# Patient Record
Sex: FEMALE | Race: WHITE | NOT HISPANIC OR LATINO | Employment: OTHER | URBAN - METROPOLITAN AREA
[De-identification: names, ages, dates, MRNs, and addresses within clinical notes are randomized per-mention and may not be internally consistent; named-entity substitution may affect disease eponyms.]

---

## 2023-03-11 PROBLEM — E03.9 HYPOTHYROIDISM: Status: ACTIVE | Noted: 2023-03-11

## 2023-03-11 PROBLEM — I10 HTN (HYPERTENSION), BENIGN: Status: ACTIVE | Noted: 2023-03-11

## 2023-03-11 PROBLEM — E87.1 HYPONATREMIA: Status: ACTIVE | Noted: 2023-03-11

## 2023-03-11 PROBLEM — F41.9 ANXIETY: Status: ACTIVE | Noted: 2023-03-11

## 2023-03-11 PROBLEM — M19.90 OSTEOARTHRITIS: Status: ACTIVE | Noted: 2023-03-11

## 2023-03-11 PROBLEM — M79.7 FIBROMYALGIA: Status: ACTIVE | Noted: 2023-03-11

## 2023-03-11 PROBLEM — H91.90 HOH (HARD OF HEARING): Status: ACTIVE | Noted: 2023-03-11

## 2023-03-11 PROBLEM — F32.A DEPRESSION: Status: ACTIVE | Noted: 2023-03-11

## 2023-04-13 ENCOUNTER — NURSING HOME VISIT (OUTPATIENT)
Dept: POST ACUTE CARE | Facility: EXTERNAL LOCATION | Age: 88
End: 2023-04-13
Payer: MEDICARE

## 2023-04-13 VITALS
TEMPERATURE: 97 F | WEIGHT: 138.2 LBS | SYSTOLIC BLOOD PRESSURE: 154 MMHG | BODY MASS INDEX: 24.49 KG/M2 | DIASTOLIC BLOOD PRESSURE: 64 MMHG | HEIGHT: 63 IN | HEART RATE: 50 BPM | RESPIRATION RATE: 16 BRPM

## 2023-04-13 DIAGNOSIS — F41.9 ANXIETY: ICD-10-CM

## 2023-04-13 DIAGNOSIS — M79.7 FIBROMYALGIA: ICD-10-CM

## 2023-04-13 DIAGNOSIS — F32.A DEPRESSION, UNSPECIFIED DEPRESSION TYPE: ICD-10-CM

## 2023-04-13 DIAGNOSIS — I10 HTN (HYPERTENSION), BENIGN: Primary | ICD-10-CM

## 2023-04-13 DIAGNOSIS — E87.1 HYPONATREMIA: ICD-10-CM

## 2023-04-13 DIAGNOSIS — H91.93 BILATERAL HEARING LOSS, UNSPECIFIED HEARING LOSS TYPE: ICD-10-CM

## 2023-04-13 DIAGNOSIS — M19.90 OSTEOARTHRITIS, UNSPECIFIED OSTEOARTHRITIS TYPE, UNSPECIFIED SITE: ICD-10-CM

## 2023-04-13 DIAGNOSIS — E03.9 HYPOTHYROIDISM, UNSPECIFIED TYPE: ICD-10-CM

## 2023-04-13 PROBLEM — R42 VERTIGO: Status: ACTIVE | Noted: 2023-04-13

## 2023-04-13 PROCEDURE — 99309 SBSQ NF CARE MODERATE MDM 30: CPT | Performed by: NURSE PRACTITIONER

## 2023-04-13 NOTE — LETTER
Patient: Jacqueline Sandoval  : 1935    Encounter Date: 2023    Name: Jacqueline Sandoval  YOB: 1935    Monthly visit: Long term care resident    Subjective:  The patient is an 87-year-old female who is a long-term care resident at [facility].  She transitioned after a hospitalization back in 2022 for a UTI.  And Prior, the patient had a fractured left humerus and repair in 2022.  The patient has a past medical history of hypertension, per patient and son CHF, hard of hearing, fibromyalgia, hyponatremia, anxiety/depression, and hypothyroidism patient is not a smoker.  She is allergic to penicillin and sulfa drugs.  Family history is not pertinent.  Patient was seen by Ortho in January of this year for follow-up of humerus fracture from 2022 giving full activities with no restrictions and to follow up as needed.  Patient is sitting up in her wheelchair and appears comfortable.  She states that she is doing okay.  She is pleasant. She denies shortness of breath or chest pain.  She does report feeling vertigo at times. Patient denies any difficulty with urination or constipation/diarrhea.  Patient does follow with urology for history of urinary retention.  The patient has had history of hyponatremia while here at the facility and sodium tablets were substituted with fluid restriction however this has resolved.  Sodium is 140 and we will continue to monitor closely.    Labs reviewed at facility:  TSH REPORTED 2023 10:58 TSH H 10.670 mIU/L 0.270-4.200  Urine Culture REPORTED 2023 08:37 Culture Urine See Below PLDEF CULTURE, URINE No growth (<100 CFU/ml) SOURCE: Urine, Straight catheterization   Urinalysis With Micro REPORTED 2023 10:11 Color Light Yellow Yellow Clarity Clear Clear Glucose, Urine Negative Trace, Negative Bilirubin, Urine Negative Negative Ketones, Urine Negative Negative, Trace Specific Gravity, Ur 1.012 1.005-1.030  "Hemoglobin/Blood,Ur Negative Negative, Trace pH, Urine 6.0 5.0-8.0 Protein, Urine Negative Trace, Negative Urobilinogen Negative Negative Nitrites Negative Negative Leuk Esterase Negative Negative, 25 Edward/uL WBC, Urine 0-5 /HPF 0-5 /HPF RBC, Urine 0-3 /HPF 0-3 /HPF Bacteria A Rare /HPF None Seen  CBC REPORTED 03/15/2023 09:08 White Blood Cell Count 5.72 k/uL 3.70-11.00 RBC L 3.43 m/uL 3.90-5.20 Hemoglobin L 10.6 g/dL 11.5-15.5 Hematocrit L 33.0 % 36.0-46.0 MCV 96.2 fL 80.0-100.0 MCH 30.9 pg 26.0-34.0 MCHC 32.1 g/dL 30.5-36.0 RDW-CV H 16.1 % 11.5-15.0 Platelet Count 196 k/uL 150-400 MPV 10.5 fL 9.0-12.7 Absolute nRBC <0.01 k/uL <0.01   Basic Metabolic Panl REPORTED 03/15/2023 09:13 Glucose 95 mg/dL 74-99 BUN H 24 mg/dL 7-21 Creatinine H 1.27 mg/dL 0.58-0.96 Sodium 140 mmol/L 136-144 Potassium 4.0 mmol/L 3.7-5.1 Chloride 105 mmol/L  CO2 25 mmol/L 22-30 Anion Gap 10 mmol/L 9-18 Calcium, Total 9.3 mg/dL 8.5-10.2 Estimated Glomerular Filtration Rate L 41 mL/min/1.73 meters squared >=60     Medications reviewed at facility:  Meclizine 12.5 mg Q12hr PRN added today  Aripiprazole 5 mg daily  Aspirin 81 mg daily  Buspirone 15 mg 3 times a day  Colace 100 mg twice daily  Diazepam 5 mg daily  Gabapentin 100 mg at bedtime  Levothyroxine 100 mics daily  Melatonin 3 mg 2 tabs at bedtime  Metoprolol tartrate 12.5 mg twice daily  Multivitamin daily  Oxybutynin 5 mg daily  MiraLAX 17 g daily  Systane 2 times a day  Tylenol arthritis 2 tabs twice daily  Zoloft 50 mg 1 daily  Bowel protocol-Milk of mag, Dulcolax suppository, fleets enema as needed for constipation    Objective:  /64   Pulse 50   Temp 36.1 °C (97 °F)   Resp 16   Ht 1.6 m (5' 3\")   Wt 62.7 kg (138 lb 3.2 oz)   BMI 24.48 kg/m²   Physical Exam  HENT:      Head: Normocephalic.      Mouth/Throat:      Mouth: Mucous membranes are moist.   Cardiovascular:      Rate and Rhythm: Normal rate and regular rhythm.   Pulmonary:      Effort: Pulmonary effort is " normal.      Breath sounds: Normal breath sounds.   Abdominal:      General: Abdomen is flat. Bowel sounds are normal.      Palpations: Abdomen is soft.   Genitourinary:     Comments: Genitalia not examined  Musculoskeletal:      Cervical back: Neck supple.      Comments: Generalized weakness   Skin:     General: Skin is warm and dry.      Capillary Refill: Capillary refill takes less than 2 seconds.   Neurological:      General: No focal deficit present.      Mental Status: She is alert and oriented to person, place, and time.   Psychiatric:         Mood and Affect: Mood normal.         Assessment/Plan  Problem List Items Addressed This Visit          Circulatory    HTN (hypertension), benign - Primary       Musculoskeletal    Fibromyalgia    Osteoarthritis       Endocrine/Metabolic    Hypothyroidism       Other    Anxiety    Depression    [place] (hard of hearing)    Hyponatremia     Skin integrity:  Nursing to monitor skin integrity as patient is at risk for pressure injuries.    Plan:  The patient was seen for a regular monthly visit.  The patient is a long-term care resident confined here at [facility].  Recent nursing evaluation and notes were reviewed.  Living will related issues reviewed-patient is a DNR CCA.  Blood pressure readings were reviewed readings are variable and today 150s over 60s.  Patient has a f/up appt w/Urology - Dr. Padilla on June 28.  She was recently seen by Ortho and now has full weightbearing status to the left upper extremity.  Patient's mood is stable.  She is followed by psych 360.  TSH is due May 18.  Pt c/o Vertigo and will give Meclizine 12.5 mg q 12 hr PRN. No changes with medications at this time.  Overall the patient is stable and is getting along here at the facility well.  Any decline or change in condition needs to be communicated with the physician or myself.  Discussion with nursing staff.  If needed would communicate with family who are not present at this  time.  There are no major concerns at this time.  Medications will continue as noted above.    Discharge planning: no plan for discharge    *Please note that nursing facility, outside laboratory agency, and  [facility] do not interface.     Completion of the note was done through Dragon voice recognition technology and may include   unintended or grammatical errors which may not have been recognized when finalizing the note.     Time:    GLORIA Valverde      Electronically Signed By: GLORIA Valverde   4/13/23  8:29 PM

## 2023-04-13 NOTE — PROGRESS NOTES
Name: Jacqueline Sandoval  YOB: 1935    Monthly visit: Long term care resident    Subjective:  The patient is an 87-year-old female who is a long-term care resident at [facility].  She transitioned after a hospitalization back in October 2022 for a UTI.  And Prior, the patient had a fractured left humerus and repair in September 2022.  The patient has a past medical history of hypertension, per patient and son CHF, hard of hearing, fibromyalgia, hyponatremia, anxiety/depression, and hypothyroidism patient is not a smoker.  She is allergic to penicillin and sulfa drugs.  Family history is not pertinent.  Patient was seen by Ortho in January of this year for follow-up of humerus fracture from September 2022 giving full activities with no restrictions and to follow up as needed.  Patient is sitting up in her wheelchair and appears comfortable.  She states that she is doing okay.  She is pleasant. She denies shortness of breath or chest pain.  She does report feeling vertigo at times. Patient denies any difficulty with urination or constipation/diarrhea.  Patient does follow with urology for history of urinary retention.  The patient has had history of hyponatremia while here at the facility and sodium tablets were substituted with fluid restriction however this has resolved.  Sodium is 140 and we will continue to monitor closely.    Labs reviewed at facility:  TSH REPORTED 04/05/2023 10:58 TSH H 10.670 mIU/L 0.270-4.200  Urine Culture REPORTED 03/23/2023 08:37 Culture Urine See Below PLDEF CULTURE, URINE No growth (<100 CFU/ml) SOURCE: Urine, Straight catheterization   Urinalysis With Micro REPORTED 03/21/2023 10:11 Color Light Yellow Yellow Clarity Clear Clear Glucose, Urine Negative Trace, Negative Bilirubin, Urine Negative Negative Ketones, Urine Negative Negative, Trace Specific Gravity, Ur 1.012 1.005-1.030 Hemoglobin/Blood,Ur Negative Negative, Trace pH, Urine 6.0 5.0-8.0 Protein, Urine  "Negative Trace, Negative Urobilinogen Negative Negative Nitrites Negative Negative Leuk Esterase Negative Negative, 25 Edward/uL WBC, Urine 0-5 /HPF 0-5 /HPF RBC, Urine 0-3 /HPF 0-3 /HPF Bacteria A Rare /HPF None Seen  CBC REPORTED 03/15/2023 09:08 White Blood Cell Count 5.72 k/uL 3.70-11.00 RBC L 3.43 m/uL 3.90-5.20 Hemoglobin L 10.6 g/dL 11.5-15.5 Hematocrit L 33.0 % 36.0-46.0 MCV 96.2 fL 80.0-100.0 MCH 30.9 pg 26.0-34.0 MCHC 32.1 g/dL 30.5-36.0 RDW-CV H 16.1 % 11.5-15.0 Platelet Count 196 k/uL 150-400 MPV 10.5 fL 9.0-12.7 Absolute nRBC <0.01 k/uL <0.01   Basic Metabolic Panl REPORTED 03/15/2023 09:13 Glucose 95 mg/dL 74-99 BUN H 24 mg/dL 7-21 Creatinine H 1.27 mg/dL 0.58-0.96 Sodium 140 mmol/L 136-144 Potassium 4.0 mmol/L 3.7-5.1 Chloride 105 mmol/L  CO2 25 mmol/L 22-30 Anion Gap 10 mmol/L 9-18 Calcium, Total 9.3 mg/dL 8.5-10.2 Estimated Glomerular Filtration Rate L 41 mL/min/1.73 meters squared >=60     Medications reviewed at facility:  Meclizine 12.5 mg Q12hr PRN added today  Aripiprazole 5 mg daily  Aspirin 81 mg daily  Buspirone 15 mg 3 times a day  Colace 100 mg twice daily  Diazepam 5 mg daily  Gabapentin 100 mg at bedtime  Levothyroxine 100 mics daily  Melatonin 3 mg 2 tabs at bedtime  Metoprolol tartrate 12.5 mg twice daily  Multivitamin daily  Oxybutynin 5 mg daily  MiraLAX 17 g daily  Systane 2 times a day  Tylenol arthritis 2 tabs twice daily  Zoloft 50 mg 1 daily  Bowel protocol-Milk of mag, Dulcolax suppository, fleets enema as needed for constipation    Objective:  /64   Pulse 50   Temp 36.1 °C (97 °F)   Resp 16   Ht 1.6 m (5' 3\")   Wt 62.7 kg (138 lb 3.2 oz)   BMI 24.48 kg/m²   Physical Exam  HENT:      Head: Normocephalic.      Mouth/Throat:      Mouth: Mucous membranes are moist.   Cardiovascular:      Rate and Rhythm: Normal rate and regular rhythm.   Pulmonary:      Effort: Pulmonary effort is normal.      Breath sounds: Normal breath sounds.   Abdominal:      General: Abdomen " is flat. Bowel sounds are normal.      Palpations: Abdomen is soft.   Genitourinary:     Comments: Genitalia not examined  Musculoskeletal:      Cervical back: Neck supple.      Comments: Generalized weakness   Skin:     General: Skin is warm and dry.      Capillary Refill: Capillary refill takes less than 2 seconds.   Neurological:      General: No focal deficit present.      Mental Status: She is alert and oriented to person, place, and time.   Psychiatric:         Mood and Affect: Mood normal.         Assessment/Plan   Problem List Items Addressed This Visit          Circulatory    HTN (hypertension), benign - Primary       Musculoskeletal    Fibromyalgia    Osteoarthritis       Endocrine/Metabolic    Hypothyroidism       Other    Anxiety    Depression    [place] (hard of hearing)    Hyponatremia     Skin integrity:  Nursing to monitor skin integrity as patient is at risk for pressure injuries.    Plan:  The patient was seen for a regular monthly visit.  The patient is a long-term care resident confined here at [facility].  Recent nursing evaluation and notes were reviewed.  Living will related issues reviewed-patient is a DNR CCA.  Blood pressure readings were reviewed readings are variable and today 150s over 60s.  Patient has a f/up appt w/Urology - Dr. Padilla on June 28.  She was recently seen by Ortho and now has full weightbearing status to the left upper extremity.  Patient's mood is stable.  She is followed by psych 360.  TSH is due May 18.  Pt c/o Vertigo and will give Meclizine 12.5 mg q 12 hr PRN. No changes with medications at this time.  Overall the patient is stable and is getting along here at the facility well.  Any decline or change in condition needs to be communicated with the physician or myself.  Discussion with nursing staff.  If needed would communicate with family who are not present at this time.  There are no major concerns at this time.  Medications will continue as noted  above.    Discharge planning: no plan for discharge    *Please note that nursing facility, outside laboratory agency, and  [facility] do not interface.     Completion of the note was done through Dragon voice recognition technology and may include   unintended or grammatical errors which may not have been recognized when finalizing the note.     Time:    Rosetta Christy, APRN-CNP

## 2023-05-25 ENCOUNTER — NURSING HOME VISIT (OUTPATIENT)
Dept: POST ACUTE CARE | Facility: EXTERNAL LOCATION | Age: 88
End: 2023-05-25
Payer: COMMERCIAL

## 2023-05-25 DIAGNOSIS — E03.9 HYPOTHYROIDISM, UNSPECIFIED TYPE: Primary | ICD-10-CM

## 2023-05-25 DIAGNOSIS — N30.00 ACUTE CYSTITIS WITHOUT HEMATURIA: ICD-10-CM

## 2023-05-25 PROCEDURE — 99309 SBSQ NF CARE MODERATE MDM 30: CPT | Performed by: NURSE PRACTITIONER

## 2023-05-25 NOTE — LETTER
"Patient: Jacqueline Sandoval  : 1935    Encounter Date: 2023    Name: Jacqueline Sandoval  YOB: 1935    ACUTE VISIT: Urinary symptoms, hypothyroid - labreview    SUBJECTIVE:  The patient is c/o urinary symptoms.   REVIEW OF SYSTEMS:   All review of systems are negative unless otherwise stated above under subjective.    LABS REVIEWED AT FACILITY:  TSH  REPORTED 2023 14:55  TSH H 18.880 mIU/L 0.270-4.200           T4  REPORTED 2023 00:14  T4 L 4.6 ug/dL 5.5-10.2 PLDEF    Urinalysis With Micro  REPORTED 2023 10:18  Color   Yellow     Yellow    Clarity A Turbid     Clear    Glucose, Urine   Negative     Trace, Negative    Bilirubin, Urine   Negative     Negative    Ketones, Urine   Negative     Negative, Trace    Specific Gravity, Ur   1.012     1.005-1.030    Hemoglobin/Blood,Ur   Negative     Negative, Trace    pH, Urine   6.5     5.0-8.0    Protein, Urine A 2+     Trace, Negative    Urobilinogen   Negative     Negative    Nitrites   Negative     Negative    Leuk Esterase A 500 Edward/uL     Negative, 25 Edward/uL    WBC, Urine A >25 /HPF     0-5 /HPF    RBC, Urine A 3-5 /HPF     0-3 /HPF    Bacteria A Rare /HPF None Seen    Squamous Epithelial Cells   Few /HPF    Calcium Oxalate Crystals A Few /HPF None Seen    MEDICATIONS REVIEWED AT FACILITY:  Add Macrobid and Pyridium  Increase Levothyroxine to 112 mcg     OBJECTIVE:  /64   Pulse 58   Temp 36.8 °C (98.3 °F)   Resp 18   Ht 1.6 m (5' 3\")   Wt 64 kg (141 lb 3.2 oz)   BMI 25.01 kg/m²   Physical Exam  Physical Exam  HENT:      Head: Normocephalic.      Mouth/Throat:      Mouth: Mucous membranes are moist.   Cardiovascular:      Rate and Rhythm: Normal rate and regular rhythm.   Pulmonary:      Effort: Pulmonary effort is normal.      Breath sounds: Normal breath sounds.   Abdominal:      General: Abdomen is flat. Bowel sounds are normal.      Palpations: Abdomen is soft.   Genitourinary:     Comments: Genitalia not " examined  Musculoskeletal:      Cervical back: Neck supple.      Comments: Generalized weakness   Skin:     General: Skin is warm and dry.      Capillary Refill: Capillary refill takes less than 2 seconds.   Neurological:      General: No focal deficit present.      Mental Status: She is alert and oriented to person, place, and time.   Psychiatric:         Mood and Affect: Mood normal.     Assessment/Plan  Problem List Items Addressed This Visit          Genitourinary    Acute cystitis without hematuria       Endocrine/Metabolic    Hypothyroidism - Primary       Skin integrity:  Nursing to monitor skin integrity as patient is at risk for pressure injuries.    PLAN:  Pt has been seen for an Acute visit.  Recent nursing evaluation and notes were reviewed.   Living will related issues reviewed-Code status: DNRCCA  Overall, the patient is experiencing -   #UTI: Macrobid and Pyridium  #Hypothyroidism: Increase levothyroxine  Any decline or change in condition needs to be communicated with the physician or myself.    Discussion with nursing staff regarding ongoing care and management.  If needed, would communicate with family who are not present at this time.   There are no concerns at this time.  We will continue with the medications noted above.    We will continue to follow the patient here at the facility.    Discharge planning: no plan for discharge, LTC resident, rehab potential    *Please note that nursing facility, outside laboratory agency, and [facility] do not interface.     Completion of the note was done through Dragon voice recognition technology and may include   unintended or grammatical errors which may not have been recognized when finalizing the note.     Time:    GLORIA Valverde      Electronically Signed By: GLORIA Valverde   5/31/23  2:21 PM

## 2023-05-30 VITALS
TEMPERATURE: 98.3 F | BODY MASS INDEX: 25.02 KG/M2 | DIASTOLIC BLOOD PRESSURE: 64 MMHG | WEIGHT: 141.2 LBS | SYSTOLIC BLOOD PRESSURE: 162 MMHG | RESPIRATION RATE: 18 BRPM | HEART RATE: 58 BPM | HEIGHT: 63 IN

## 2023-05-30 NOTE — PROGRESS NOTES
"Name: Jacqueline Sandoval  YOB: 1935    ACUTE VISIT: Urinary symptoms, hypothyroid - labreview    SUBJECTIVE:  The patient is c/o urinary symptoms.   REVIEW OF SYSTEMS:   All review of systems are negative unless otherwise stated above under subjective.    LABS REVIEWED AT FACILITY:  TSH  REPORTED 05/22/2023 14:55  TSH H 18.880 mIU/L 0.270-4.200           T4  REPORTED 05/23/2023 00:14  T4 L 4.6 ug/dL 5.5-10.2 PLDEF    Urinalysis With Micro  REPORTED 05/25/2023 10:18  Color   Yellow     Yellow    Clarity A Turbid     Clear    Glucose, Urine   Negative     Trace, Negative    Bilirubin, Urine   Negative     Negative    Ketones, Urine   Negative     Negative, Trace    Specific Gravity, Ur   1.012     1.005-1.030    Hemoglobin/Blood,Ur   Negative     Negative, Trace    pH, Urine   6.5     5.0-8.0    Protein, Urine A 2+     Trace, Negative    Urobilinogen   Negative     Negative    Nitrites   Negative     Negative    Leuk Esterase A 500 Edward/uL     Negative, 25 Edward/uL    WBC, Urine A >25 /HPF     0-5 /HPF    RBC, Urine A 3-5 /HPF     0-3 /HPF    Bacteria A Rare /HPF None Seen    Squamous Epithelial Cells   Few /HPF    Calcium Oxalate Crystals A Few /HPF None Seen    MEDICATIONS REVIEWED AT FACILITY:  Add Macrobid and Pyridium  Increase Levothyroxine to 112 mcg     OBJECTIVE:  /64   Pulse 58   Temp 36.8 °C (98.3 °F)   Resp 18   Ht 1.6 m (5' 3\")   Wt 64 kg (141 lb 3.2 oz)   BMI 25.01 kg/m²   Physical Exam  Physical Exam  HENT:      Head: Normocephalic.      Mouth/Throat:      Mouth: Mucous membranes are moist.   Cardiovascular:      Rate and Rhythm: Normal rate and regular rhythm.   Pulmonary:      Effort: Pulmonary effort is normal.      Breath sounds: Normal breath sounds.   Abdominal:      General: Abdomen is flat. Bowel sounds are normal.      Palpations: Abdomen is soft.   Genitourinary:     Comments: Genitalia not examined  Musculoskeletal:      Cervical back: Neck supple.      Comments: " Generalized weakness   Skin:     General: Skin is warm and dry.      Capillary Refill: Capillary refill takes less than 2 seconds.   Neurological:      General: No focal deficit present.      Mental Status: She is alert and oriented to person, place, and time.   Psychiatric:         Mood and Affect: Mood normal.     Assessment/Plan   Problem List Items Addressed This Visit          Genitourinary    Acute cystitis without hematuria       Endocrine/Metabolic    Hypothyroidism - Primary       Skin integrity:  Nursing to monitor skin integrity as patient is at risk for pressure injuries.    PLAN:  Pt has been seen for an Acute visit.  Recent nursing evaluation and notes were reviewed.   Living will related issues reviewed-Code status: DNRCCA  Overall, the patient is experiencing -   #UTI: Macrobid and Pyridium  #Hypothyroidism: Increase levothyroxine  Any decline or change in condition needs to be communicated with the physician or myself.    Discussion with nursing staff regarding ongoing care and management.  If needed, would communicate with family who are not present at this time.   There are no concerns at this time.  We will continue with the medications noted above.    We will continue to follow the patient here at the facility.    Discharge planning: no plan for discharge, LTC resident, rehab potential    *Please note that nursing facility, outside laboratory agency, and  [facility] do not interface.     Completion of the note was done through Dragon voice recognition technology and may include   unintended or grammatical errors which may not have been recognized when finalizing the note.     Time:    JOCELIN Valverde-CNP

## 2023-05-31 PROBLEM — N30.00 ACUTE CYSTITIS WITHOUT HEMATURIA: Status: ACTIVE | Noted: 2023-05-31

## 2023-06-15 ENCOUNTER — NURSING HOME VISIT (OUTPATIENT)
Dept: POST ACUTE CARE | Facility: EXTERNAL LOCATION | Age: 88
End: 2023-06-15
Payer: COMMERCIAL

## 2023-06-15 DIAGNOSIS — R39.9 URINARY SYMPTOM OR SIGN: Primary | ICD-10-CM

## 2023-06-15 DIAGNOSIS — I10 HTN (HYPERTENSION), BENIGN: ICD-10-CM

## 2023-06-15 PROCEDURE — 99309 SBSQ NF CARE MODERATE MDM 30: CPT | Performed by: NURSE PRACTITIONER

## 2023-06-15 NOTE — LETTER
"Patient: Jacqueline Sandoval  : 1935    Encounter Date: 06/15/2023    Name: Jacqueline Sandoval  YOB: 1935    ACUTE VISIT: Urinary symptoms    SUBJECTIVE:  The patient is c/o urinary symptoms. Jacqueline is up in her room and appears comfortable. She has no c/o fevers or chills.    REVIEW OF SYSTEMS:   All review of systems are negative unless otherwise stated above under subjective.    LABS REVIEWED AT FACILITY:  CBC  REPORTED 2023 10:38  White Blood Cell Count   4.39 k/uL 3.70-11.00    RBC L 3.79 m/uL 3.90-5.20    Hemoglobin   12.3 g/dL 11.5-15.5    Hematocrit   36.8 % 36.0-46.0    MCV   97.1 fL 80.0-100.0    MCH   32.5 pg 26.0-34.0    MCHC   33.4 g/dL 30.5-36.0    RDW-CV   13.2 % 11.5-15.0    Platelet Count   167 k/uL 150-400    MPV   10.7 fL 9.0-12.7    Absolute nRBC   <0.01 k/uL <0.01           Comp Metabolic Panel  REPORTED 2023 10:53  Protein, Total   6.5 g/dL 6.3-8.0    Albumin   4.0 g/dL 3.9-4.9    Calcium, Total   9.5 mg/dL 8.5-10.2    Bilirubin, Total   0.2 mg/dL 0.2-1.3    Alkaline Phosphatase   66 U/L     AST   15 U/L 13-35    ALT   11 U/L 7-38    Glucose   92 mg/dL 74-99    BUN   20 mg/dL 7-21    Creatinine H 1.02 mg/dL 0.58-0.96    Sodium   139 mmol/L 136-144    Potassium   4.1 mmol/L 3.7-5.1    Chloride   104 mmol/L     CO2   25 mmol/L 22-30    Anion Gap   10 mmol/L 9-18    Estimated Glomerular Filtration Rate L 53 mL/min/1.73 meters squared >=60      MEDICATIONS REVIEWED AT FACILITY:  Pyridium added pending UA C/S    OBJECTIVE:  /67   Pulse 67   Temp 36.3 °C (97.4 °F)   Resp 16   Ht 1.6 m (5' 3\")   Wt 62.5 kg (137 lb 11.2 oz)   BMI 24.39 kg/m²     Physical Exam  HENT:      Head: Normocephalic.      Mouth/Throat:      Mouth: Mucous membranes are moist.   Cardiovascular:      Rate and Rhythm: Normal rate and regular rhythm.   Pulmonary:      Effort: Pulmonary effort is normal.      Breath sounds: Normal breath sounds.   Abdominal:      General: Abdomen is " flat. Bowel sounds are normal.      Palpations: Abdomen is soft.   Genitourinary:     Comments: Genitalia not examined  Musculoskeletal:      Cervical back: Neck supple.      Comments: Generalized weakness   Skin:     General: Skin is warm and dry.      Capillary Refill: Capillary refill takes less than 2 seconds.   Neurological:      General: No focal deficit present.      Mental Status: She is alert and oriented to person, place, and time.   Psychiatric:         Mood and Affect: Mood normal.     Assessment/Plan  Problem List Items Addressed This Visit          Circulatory    HTN (hypertension), benign       Genitourinary    Urinary symptom or sign - Primary     Skin integrity:  Nursing to monitor skin integrity as patient is at risk for pressure injuries.    PLAN:  Pt has been seen for an Acute visit.  Recent nursing evaluation and notes were reviewed.   Living will related issues reviewed-Code status: DNRCCA  Overall, the patient is experiencing -   #UTI symptom: Pyridium added, UA C/S per st cath  #HTN: readings reviewed, systolic higher 150's, cont Metoprolol tartrate  Any decline or change in condition needs to be communicated with the physician or myself.    Discussion with nursing staff regarding ongoing care and management.  If needed, would communicate with family who are not present at this time.   There are no concerns at this time.  We will continue with the medications noted above.    We will continue to follow the patient here at the facility.    Discharge planning: no plan for discharge, LTC resident, rehab potential    *Please note that nursing facility, outside laboratory agency, and [facility] do not interface.     Completion of the note was done through Dragon voice recognition technology and may include   unintended or grammatical errors which may not have been recognized when finalizing the note.     Time:    GLORIA Valverde      Electronically Signed By: GLOIRA Valverde    6/20/23 11:08 AM

## 2023-06-17 VITALS
SYSTOLIC BLOOD PRESSURE: 155 MMHG | HEIGHT: 63 IN | RESPIRATION RATE: 16 BRPM | WEIGHT: 137.7 LBS | BODY MASS INDEX: 24.4 KG/M2 | TEMPERATURE: 97.4 F | HEART RATE: 67 BPM | DIASTOLIC BLOOD PRESSURE: 67 MMHG

## 2023-06-17 NOTE — PROGRESS NOTES
"Name: Jacqueline Sandoval  YOB: 1935    ACUTE VISIT: Urinary symptoms    SUBJECTIVE:  The patient is c/o urinary symptoms. Jacqueline is up in her room and appears comfortable. She has no c/o fevers or chills.    REVIEW OF SYSTEMS:   All review of systems are negative unless otherwise stated above under subjective.    LABS REVIEWED AT FACILITY:  CBC  REPORTED 06/01/2023 10:38  White Blood Cell Count   4.39 k/uL 3.70-11.00    RBC L 3.79 m/uL 3.90-5.20    Hemoglobin   12.3 g/dL 11.5-15.5    Hematocrit   36.8 % 36.0-46.0    MCV   97.1 fL 80.0-100.0    MCH   32.5 pg 26.0-34.0    MCHC   33.4 g/dL 30.5-36.0    RDW-CV   13.2 % 11.5-15.0    Platelet Count   167 k/uL 150-400    MPV   10.7 fL 9.0-12.7    Absolute nRBC   <0.01 k/uL <0.01           Comp Metabolic Panel  REPORTED 06/01/2023 10:53  Protein, Total   6.5 g/dL 6.3-8.0    Albumin   4.0 g/dL 3.9-4.9    Calcium, Total   9.5 mg/dL 8.5-10.2    Bilirubin, Total   0.2 mg/dL 0.2-1.3    Alkaline Phosphatase   66 U/L     AST   15 U/L 13-35    ALT   11 U/L 7-38    Glucose   92 mg/dL 74-99    BUN   20 mg/dL 7-21    Creatinine H 1.02 mg/dL 0.58-0.96    Sodium   139 mmol/L 136-144    Potassium   4.1 mmol/L 3.7-5.1    Chloride   104 mmol/L     CO2   25 mmol/L 22-30    Anion Gap   10 mmol/L 9-18    Estimated Glomerular Filtration Rate L 53 mL/min/1.73 meters squared >=60      MEDICATIONS REVIEWED AT FACILITY:  Pyridium added pending UA C/S    OBJECTIVE:  /67   Pulse 67   Temp 36.3 °C (97.4 °F)   Resp 16   Ht 1.6 m (5' 3\")   Wt 62.5 kg (137 lb 11.2 oz)   BMI 24.39 kg/m²     Physical Exam  HENT:      Head: Normocephalic.      Mouth/Throat:      Mouth: Mucous membranes are moist.   Cardiovascular:      Rate and Rhythm: Normal rate and regular rhythm.   Pulmonary:      Effort: Pulmonary effort is normal.      Breath sounds: Normal breath sounds.   Abdominal:      General: Abdomen is flat. Bowel sounds are normal.      Palpations: Abdomen is soft. "   Genitourinary:     Comments: Genitalia not examined  Musculoskeletal:      Cervical back: Neck supple.      Comments: Generalized weakness   Skin:     General: Skin is warm and dry.      Capillary Refill: Capillary refill takes less than 2 seconds.   Neurological:      General: No focal deficit present.      Mental Status: She is alert and oriented to person, place, and time.   Psychiatric:         Mood and Affect: Mood normal.     Assessment/Plan   Problem List Items Addressed This Visit          Circulatory    HTN (hypertension), benign       Genitourinary    Urinary symptom or sign - Primary     Skin integrity:  Nursing to monitor skin integrity as patient is at risk for pressure injuries.    PLAN:  Pt has been seen for an Acute visit.  Recent nursing evaluation and notes were reviewed.   Living will related issues reviewed-Code status: DNRCCA  Overall, the patient is experiencing -   #UTI symptom: Pyridium added, UA C/S per st cath  #HTN: readings reviewed, systolic higher 150's, cont Metoprolol tartrate  Any decline or change in condition needs to be communicated with the physician or myself.    Discussion with nursing staff regarding ongoing care and management.  If needed, would communicate with family who are not present at this time.   There are no concerns at this time.  We will continue with the medications noted above.    We will continue to follow the patient here at the facility.    Discharge planning: no plan for discharge, LTC resident, rehab potential    *Please note that nursing facility, outside laboratory agency, and  [facility] do not interface.     Completion of the note was done through Dragon voice recognition technology and may include   unintended or grammatical errors which may not have been recognized when finalizing the note.     Time:    JOCELIN Valverde-CNP

## 2023-06-20 PROBLEM — R39.9 URINARY SYMPTOM OR SIGN: Status: ACTIVE | Noted: 2023-06-20

## 2023-07-06 ENCOUNTER — NURSING HOME VISIT (OUTPATIENT)
Dept: POST ACUTE CARE | Facility: EXTERNAL LOCATION | Age: 88
End: 2023-07-06
Payer: COMMERCIAL

## 2023-07-06 VITALS
WEIGHT: 138 LBS | HEIGHT: 63 IN | RESPIRATION RATE: 16 BRPM | SYSTOLIC BLOOD PRESSURE: 148 MMHG | DIASTOLIC BLOOD PRESSURE: 58 MMHG | HEART RATE: 60 BPM | BODY MASS INDEX: 24.45 KG/M2 | TEMPERATURE: 97.4 F

## 2023-07-06 DIAGNOSIS — E03.9 HYPOTHYROIDISM, UNSPECIFIED TYPE: ICD-10-CM

## 2023-07-06 DIAGNOSIS — N30.00 ACUTE CYSTITIS WITHOUT HEMATURIA: ICD-10-CM

## 2023-07-06 DIAGNOSIS — I10 HTN (HYPERTENSION), BENIGN: Primary | ICD-10-CM

## 2023-07-06 PROCEDURE — 99309 SBSQ NF CARE MODERATE MDM 30: CPT | Performed by: NURSE PRACTITIONER

## 2023-07-06 NOTE — LETTER
Patient: Jacqueline Sandoval  : 1935    Encounter Date: 2023    Name: Jacqueline Sandoval  YOB: 1935    MONTHLY VISIT: LTC resident    SUBJECTIVE:  The patient is an 88-year-old female who is a long-term care resident at [facility].  She transitioned after a hospitalization back in 2022 for a UTI.  And Prior, the patient had a fractured left humerus and repair in 2022.  The patient has a past medical history of hypertension, per patient and son CHF, hard of hearing, fibromyalgia, hyponatremia, anxiety/depression, and hypothyroidism patient is not a smoker.  She is allergic to penicillin and sulfa drugs.  Family history is not pertinent.  Patient is sitting up in her wheelchair and appears comfortable.  She states that she is doing okay.  She is pleasant. She denies shortness of breath or chest pain.  Patient denies any difficulty with urination or constipation/diarrhea.  Patient does follow with urology for history of urinary retention.      REVIEW OF SYSTEMS:  All review of systems are negative unless otherwise stated above under subjective.    LABS REVIEWED AT FACILITY:  Urinalysis With Micro  REPORTED 2023 11:59  Color   Light Yellow     Yellow    Clarity   Clear     Clear    Glucose, Urine   Negative     Trace, Negative    Bilirubin, Urine   Negative     Negative    Ketones, Urine   Negative     Negative, Trace    Specific Gravity, Ur   1.009     1.005-1.030    Hemoglobin/Blood,Ur   Negative     Negative, Trace    pH, Urine   6.5     5.0-8.0    Protein, Urine A 1+     Trace, Negative    Urobilinogen   Negative     Negative    Nitrites   Negative     Negative    Leuk Esterase A 75 Edward/uL     Negative, 25 Edward/uL    WBC, Urine A 6-10 /HPF     0-5 /HPF    RBC, Urine   0-3 /HPF     0-3 /HPF    Squamous Epithelial Cells   Few /HPF    TSH  REPORTED 2023 12:52  TSH H 19.680 mIU/L 0.270-4.200    MEDICATIONS REVIEWED AT FACILITY:  Meclizine 12.5 mg Q12hr  "PRN  Aripiprazole 5 mg daily  Aspirin 81 mg daily  Buspirone 15 mg 3 times a day  Colace 100 mg twice daily  Diazepam 5 mg daily  Gabapentin 100 mg at bedtime  Levothyroxine 125 mics daily  Melatonin 3 mg 2 tabs at bedtime  Metoprolol tartrate 12.5 mg twice daily  Multivitamin daily  Oxybutynin 5 mg daily  MiraLAX 17 g daily  Systane 2 times a day  Tylenol arthritis 2 tabs twice daily  Zoloft 50 mg 1 daily  Bowel protocol-Milk of mag, Dulcolax suppository, fleets enema as needed for constipation    Living will related issues reviewed-Code status: DNRCCA    OBJECTIVE:  /58   Pulse 60   Temp 36.3 °C (97.4 °F)   Resp 16   Ht 1.6 m (5' 3\")   Wt 62.6 kg (138 lb)   BMI 24.45 kg/m²     Physical Exam  HENT:      Head: Normocephalic.      Mouth/Throat:      Mouth: Mucous membranes are moist.   Cardiovascular:      Rate and Rhythm: Normal rate and regular rhythm.   Pulmonary:      Effort: Pulmonary effort is normal.      Breath sounds: Normal breath sounds.   Abdominal:      General: Abdomen is flat. Bowel sounds are normal.      Palpations: Abdomen is soft.   Genitourinary:     Comments: Genitalia not examined  Musculoskeletal:      Cervical back: Neck supple.      Comments: Generalized weakness +2 pitting edema  Skin:     General: Skin is warm and dry.      Capillary Refill: Capillary refill takes less than 2 seconds.   Neurological:      General: No focal deficit present.      Mental Status: She is alert and oriented to person, place, and time.   Psychiatric:         Mood and Affect: Mood normal.         Assessment/Plan  Problem List Items Addressed This Visit          Cardiac and Vasculature    HTN (hypertension), benign - Primary       Endocrine/Metabolic    Hypothyroidism       Genitourinary and Reproductive    Acute cystitis without hematuria       Skin integrity:  Nursing to monitor skin integrity as patient is at risk for pressure injuries.    PLAN:  Pt has been seen for regular monthly visit.    The " patient is a long-term care resident and confined here at [facility]  Recent nursing evaluation and notes were reviewed.   Overall, patient is stable despite his/her chronic conditions.    #HTN, edema: BP readings reviewed, fluctuating, will add lasix 20 mg for 5 days, cont metoprolol tartrate 12.5 mg bid  #Freq UTI's, h/o urinary retention: follows w/urology and urine sent, macrobid 100 mg bid x 7 days   #Hypothyroid: TSH elevated and Levothyroxine adjusted to 125 mcg daily, recheck TSH in a few weeks  Any decline or change in condition needs to be communicated with the physician or myself.    Discussion with nursing staff regarding ongoing care and management.  If needed, would communicate with family who are not present at this time.   There are no concerns at this time.  We will continue with the medications noted above.    We will continue to follow the patient here at the facility.      Discharge planning: no plan for discharge, LTC resident, rehab potential    *Please note that nursing facility, outside laboratory agency, and [facility] do not interface.     Completion of the note was done through Dragon voice recognition technology and may include   unintended or grammatical errors which may not have been recognized when finalizing the note.     Time:    GLORIA Valverde      Electronically Signed By: GLORIA Valverde   7/9/23  1:11 PM

## 2023-07-06 NOTE — PROGRESS NOTES
Name: Jacqueline Sandoval  YOB: 1935    MONTHLY VISIT: LTC resident    SUBJECTIVE:  The patient is an 88-year-old female who is a long-term care resident at [facility].  She transitioned after a hospitalization back in October 2022 for a UTI.  And Prior, the patient had a fractured left humerus and repair in September 2022.  The patient has a past medical history of hypertension, per patient and son CHF, hard of hearing, fibromyalgia, hyponatremia, anxiety/depression, and hypothyroidism patient is not a smoker.  She is allergic to penicillin and sulfa drugs.  Family history is not pertinent.  Patient is sitting up in her wheelchair and appears comfortable.  She states that she is doing okay.  She is pleasant. She denies shortness of breath or chest pain.  Patient denies any difficulty with urination or constipation/diarrhea.  Patient does follow with urology for history of urinary retention.      REVIEW OF SYSTEMS:  All review of systems are negative unless otherwise stated above under subjective.    LABS REVIEWED AT FACILITY:  Urinalysis With Micro  REPORTED 06/29/2023 11:59  Color   Light Yellow     Yellow    Clarity   Clear     Clear    Glucose, Urine   Negative     Trace, Negative    Bilirubin, Urine   Negative     Negative    Ketones, Urine   Negative     Negative, Trace    Specific Gravity, Ur   1.009     1.005-1.030    Hemoglobin/Blood,Ur   Negative     Negative, Trace    pH, Urine   6.5     5.0-8.0    Protein, Urine A 1+     Trace, Negative    Urobilinogen   Negative     Negative    Nitrites   Negative     Negative    Leuk Esterase A 75 Edward/uL     Negative, 25 Edward/uL    WBC, Urine A 6-10 /HPF     0-5 /HPF    RBC, Urine   0-3 /HPF     0-3 /HPF    Squamous Epithelial Cells   Few /HPF    TSH  REPORTED 06/26/2023 12:52  TSH H 19.680 mIU/L 0.270-4.200    MEDICATIONS REVIEWED AT FACILITY:  Meclizine 12.5 mg Q12hr PRN  Aripiprazole 5 mg daily  Aspirin 81 mg daily  Buspirone 15 mg 3 times a  "day  Colace 100 mg twice daily  Diazepam 5 mg daily  Gabapentin 100 mg at bedtime  Levothyroxine 125 mics daily  Melatonin 3 mg 2 tabs at bedtime  Metoprolol tartrate 12.5 mg twice daily  Multivitamin daily  Oxybutynin 5 mg daily  MiraLAX 17 g daily  Systane 2 times a day  Tylenol arthritis 2 tabs twice daily  Zoloft 50 mg 1 daily  Bowel protocol-Milk of mag, Dulcolax suppository, fleets enema as needed for constipation    Living will related issues reviewed-Code status: DNRCCA    OBJECTIVE:  /58   Pulse 60   Temp 36.3 °C (97.4 °F)   Resp 16   Ht 1.6 m (5' 3\")   Wt 62.6 kg (138 lb)   BMI 24.45 kg/m²     Physical Exam  HENT:      Head: Normocephalic.      Mouth/Throat:      Mouth: Mucous membranes are moist.   Cardiovascular:      Rate and Rhythm: Normal rate and regular rhythm.   Pulmonary:      Effort: Pulmonary effort is normal.      Breath sounds: Normal breath sounds.   Abdominal:      General: Abdomen is flat. Bowel sounds are normal.      Palpations: Abdomen is soft.   Genitourinary:     Comments: Genitalia not examined  Musculoskeletal:      Cervical back: Neck supple.      Comments: Generalized weakness +2 pitting edema  Skin:     General: Skin is warm and dry.      Capillary Refill: Capillary refill takes less than 2 seconds.   Neurological:      General: No focal deficit present.      Mental Status: She is alert and oriented to person, place, and time.   Psychiatric:         Mood and Affect: Mood normal.         Assessment/Plan   Problem List Items Addressed This Visit          Cardiac and Vasculature    HTN (hypertension), benign - Primary       Endocrine/Metabolic    Hypothyroidism       Genitourinary and Reproductive    Acute cystitis without hematuria       Skin integrity:  Nursing to monitor skin integrity as patient is at risk for pressure injuries.    PLAN:  Pt has been seen for regular monthly visit.    The patient is a long-term care resident and confined here at [facility]  Recent nursing evaluation and notes were reviewed.   Overall, patient is stable despite his/her chronic conditions.    #HTN, edema: BP readings reviewed, fluctuating, will add lasix 20 mg for 5 days, cont metoprolol tartrate 12.5 mg bid  #Freq UTI's, h/o urinary retention: follows w/urology and urine sent, macrobid 100 mg bid x 7 days   #Hypothyroid: TSH elevated and Levothyroxine adjusted to 125 mcg daily, recheck TSH in a few weeks  Any decline or change in condition needs to be communicated with the physician or myself.    Discussion with nursing staff regarding ongoing care and management.  If needed, would communicate with family who are not present at this time.   There are no concerns at this time.  We will continue with the medications noted above.    We will continue to follow the patient here at the facility.      Discharge planning: no plan for discharge, LTC resident, rehab potential    *Please note that nursing facility, outside laboratory agency, and [facility] do not interface.     Completion of the note was done through Dragon voice recognition technology and may include   unintended or grammatical errors which may not have been recognized when finalizing the note.     Time:    Rosetta Christy, JOCELIN-CNP

## 2023-07-10 ENCOUNTER — NURSING HOME VISIT (OUTPATIENT)
Dept: POST ACUTE CARE | Facility: EXTERNAL LOCATION | Age: 88
End: 2023-07-10
Payer: COMMERCIAL

## 2023-07-10 VITALS
TEMPERATURE: 97.4 F | HEIGHT: 63 IN | RESPIRATION RATE: 16 BRPM | HEART RATE: 64 BPM | SYSTOLIC BLOOD PRESSURE: 178 MMHG | DIASTOLIC BLOOD PRESSURE: 84 MMHG | BODY MASS INDEX: 24.45 KG/M2 | WEIGHT: 138 LBS

## 2023-07-10 DIAGNOSIS — N30.00 ACUTE CYSTITIS WITHOUT HEMATURIA: ICD-10-CM

## 2023-07-10 DIAGNOSIS — I10 HTN (HYPERTENSION), BENIGN: Primary | ICD-10-CM

## 2023-07-10 PROCEDURE — 99309 SBSQ NF CARE MODERATE MDM 30: CPT | Performed by: NURSE PRACTITIONER

## 2023-07-10 NOTE — LETTER
"Patient: Jacqueline Sandoval  : 1935    Encounter Date: 07/10/2023    Name: Jacqueline Sandoval  YOB: 1935    Follow up VISIT: LTC, elevated BP    SUBJECTIVE:  Jacqueline is a 88 yr old female who was seen last week for monthly visit and BP noted to be running high on most days of the week. The patient also endorsed headaches w/elevated readings. Lasix was given as she was also having increased pedal edema. Today 178/84.   Currently, the patient is resting in bed and appears comfortable. She has her son at the bedside. Discussion done regarding the BP and meds. Jacqueline denies CP or SOB. She does have a light headache.     REVIEW OF SYSTEMS:   All review of systems are negative unless otherwise stated above under subjective.    LABS REVIEWED AT FACILITY:  No new labs  Pending 23    MEDICATIONS REVIEWED AT FACILITY:    Living will related issues reviewed-Code status: DNRCCA    OBJECTIVE:  /84   Pulse 64   Temp 36.3 °C (97.4 °F)   Resp 16   Ht 1.6 m (5' 3\")   Wt 62.6 kg (138 lb)   BMI 24.45 kg/m²     Physical Exam  HENT:      Head: Normocephalic.      Mouth/Throat:      Mouth: Mucous membranes are moist.   Cardiovascular:      Rate and Rhythm: Normal rate and regular rhythm.   Pulmonary:      Effort: Pulmonary effort is normal.      Breath sounds: Normal breath sounds.   Abdominal:      General: Abdomen is flat. Bowel sounds are normal.      Palpations: Abdomen is soft.   Genitourinary:     Comments: Genitalia not examined  Musculoskeletal:      Cervical back: Neck supple.      Comments: Generalized weakness +1 pitting edema  Skin:     General: Skin is warm and dry.      Capillary Refill: Capillary refill takes less than 2 seconds.   Neurological:      General: No focal deficit present.      Mental Status: She is alert and oriented to person, place, and time.   Psychiatric:         Mood and Affect: Mood normal.     Assessment/Plan  Problem List Items Addressed This Visit       HTN " (hypertension), benign - Primary    Acute cystitis without hematuria       Skin integrity:  Nursing to monitor skin integrity as patient is at risk for pressure injuries.    PLAN:  Pt has been seen for an Acute visit.  Recent nursing evaluation and notes were reviewed.   Overall, the patient is experiencing elevated blood pressure readings. Lasix did not help much. Will start hydrochlorothiazide 25 mg daily and check BMP on 7/13 along w/nursing to check BP daily x 7 days. Patient is having no urinary symptoms and Macrobid bid - last dose on 7/13/23. Upcoming cysto planned for end of month w/Dr Padilla.  Any decline or change in condition needs to be communicated with the physician or myself.    Discussion with nursing staff regarding ongoing care and management.  If needed, would communicate with family who are not present at this time.   There are no concerns at this time.  We will continue with the medications noted above.    We will continue to follow the patient here at the facility.    Discharge planning: no plan for discharge, LTC resident, rehab potential    *Please note that nursing facility, outside laboratory agency, and [facility] do not interface.     Completion of the note was done through Dragon voice recognition technology and may include   unintended or grammatical errors which may not have been recognized when finalizing the note.     Time:    GLORIA Valverde      Electronically Signed By: GLORIA Valverde   7/12/23  7:07 PM

## 2023-07-10 NOTE — PROGRESS NOTES
"Name: Jacqueline Sandoval  YOB: 1935    Follow up VISIT: LTC, elevated BP    SUBJECTIVE:  Jacqueline is a 88 yr old female who was seen last week for monthly visit and BP noted to be running high on most days of the week. The patient also endorsed headaches w/elevated readings. Lasix was given as she was also having increased pedal edema. Today 178/84.   Currently, the patient is resting in bed and appears comfortable. She has her son at the bedside. Discussion done regarding the BP and meds. Jacqueline denies CP or SOB. She does have a light headache.     REVIEW OF SYSTEMS:   All review of systems are negative unless otherwise stated above under subjective.    LABS REVIEWED AT FACILITY:  No new labs  Pending 7/13/23    MEDICATIONS REVIEWED AT FACILITY:    Living will related issues reviewed-Code status: DNCA    OBJECTIVE:  /84   Pulse 64   Temp 36.3 °C (97.4 °F)   Resp 16   Ht 1.6 m (5' 3\")   Wt 62.6 kg (138 lb)   BMI 24.45 kg/m²     Physical Exam  HENT:      Head: Normocephalic.      Mouth/Throat:      Mouth: Mucous membranes are moist.   Cardiovascular:      Rate and Rhythm: Normal rate and regular rhythm.   Pulmonary:      Effort: Pulmonary effort is normal.      Breath sounds: Normal breath sounds.   Abdominal:      General: Abdomen is flat. Bowel sounds are normal.      Palpations: Abdomen is soft.   Genitourinary:     Comments: Genitalia not examined  Musculoskeletal:      Cervical back: Neck supple.      Comments: Generalized weakness +1 pitting edema  Skin:     General: Skin is warm and dry.      Capillary Refill: Capillary refill takes less than 2 seconds.   Neurological:      General: No focal deficit present.      Mental Status: She is alert and oriented to person, place, and time.   Psychiatric:         Mood and Affect: Mood normal.     Assessment/Plan   Problem List Items Addressed This Visit       HTN (hypertension), benign - Primary    Acute cystitis without hematuria       Skin " integrity:  Nursing to monitor skin integrity as patient is at risk for pressure injuries.    PLAN:  Pt has been seen for an Acute visit.  Recent nursing evaluation and notes were reviewed.   Overall, the patient is experiencing elevated blood pressure readings. Lasix did not help much. Will start hydrochlorothiazide 25 mg daily and check BMP on 7/13 along w/nursing to check BP daily x 7 days. Patient is having no urinary symptoms and Macrobid bid - last dose on 7/13/23. Upcoming cysto planned for end of month w/Dr Padilla.  Any decline or change in condition needs to be communicated with the physician or myself.    Discussion with nursing staff regarding ongoing care and management.  If needed, would communicate with family who are not present at this time.   There are no concerns at this time.  We will continue with the medications noted above.    We will continue to follow the patient here at the facility.    Discharge planning: no plan for discharge, LTC resident, rehab potential    *Please note that nursing facility, outside laboratory agency, and  [facility] do not interface.     Completion of the note was done through Dragon voice recognition technology and may include   unintended or grammatical errors which may not have been recognized when finalizing the note.     Time:    Rosetta Christy, APRN-CNP

## 2023-08-17 ENCOUNTER — NURSING HOME VISIT (OUTPATIENT)
Dept: POST ACUTE CARE | Facility: EXTERNAL LOCATION | Age: 88
End: 2023-08-17
Payer: COMMERCIAL

## 2023-08-17 DIAGNOSIS — E03.9 HYPOTHYROIDISM, UNSPECIFIED TYPE: ICD-10-CM

## 2023-08-17 DIAGNOSIS — E87.1 HYPONATREMIA: ICD-10-CM

## 2023-08-17 DIAGNOSIS — N39.0 FREQUENT UTI: ICD-10-CM

## 2023-08-17 DIAGNOSIS — I10 HTN (HYPERTENSION), BENIGN: Primary | ICD-10-CM

## 2023-08-17 PROCEDURE — 99309 SBSQ NF CARE MODERATE MDM 30: CPT | Performed by: NURSE PRACTITIONER

## 2023-08-17 NOTE — LETTER
"Patient: Jacqueline Sandoval  : 1935    Encounter Date: 2023    Name: Jacqueline Sandoval  YOB: 1935    MONTHLY VISIT: LTC resident    SUBJECTIVE:  Is a 88-year-old female who is here at [facility] as a long-term care resident.  The patient is resting quietly at this time in her room she appears to be comfortable and in no distress.  Labs were done today and reviewed.  Patient's son is at the bedside and updated on lab results and changes in medications.  Patient is hard of hearing and this makes conversing difficult.  Patient does deny chest pain or shortness of breath.    REVIEW OF SYSTEMS:  All review of systems are negative unless otherwise stated above under subjective.    LABS REVIEWED AT FACILITY:  Basic Metabolic Panl  REPORTED 2023 13:22  Glucose   76 mg/dL 74-99    BUN H 24 mg/dL 7-21    Creatinine H 1.02 mg/dL 0.58-0.96    Sodium L 128 mmol/L 136-144    Potassium L 3.5 mmol/L 3.7-5.1    Chloride L 92 mmol/L     CO2   25 mmol/L 22-30    Anion Gap   11 mmol/L 9-18    Calcium, Total   9.4 mg/dL 8.5-10.2    Estimated Glomerular Filtration Rate L 53 mL/min/1.73 meters squared >=60      MEDICATIONS REVIEWED AT FACILITY:  Will DC lisinopril and hydrochlorothiazide and start lisinopril 20 mg daily, sodium chloride 1 g twice daily  Bactrim was started by urology 50 mg daily for UTI prophylaxis    Living will related issues reviewed-Code status: DNR CCA    OBJECTIVE:  /58   Pulse 58   Temp 36.3 °C (97.4 °F)   Resp 16   Ht 1.6 m (5' 3\")   Wt 64.3 kg (141 lb 12.8 oz)   BMI 25.12 kg/m²     Physical Exam  HENT:      Head: Normocephalic.      Mouth/Throat:      Mouth: Mucous membranes are moist.   Cardiovascular:      Rate and Rhythm: Normal rate and regular rhythm.   Pulmonary:      Effort: Pulmonary effort is normal.      Breath sounds: Normal breath sounds.   Abdominal:      General: Abdomen is flat. Bowel sounds are normal.      Palpations: Abdomen is soft. "   Genitourinary:     Comments: Genitalia not examined  Musculoskeletal:      Cervical back: Neck supple.      Comments: Generalized weakness +1 pitting edema, JAN hose on  Skin:     General: Skin is warm and dry.      Capillary Refill: Capillary refill takes less than 2 seconds.   Neurological:      General: No focal deficit present.      Mental Status: She is alert and oriented to person, place, and time.   Psychiatric:         Mood and Affect: Mood normal.     Assessment/Plan  Problem List Items Addressed This Visit       HTN (hypertension), benign - Primary    Hyponatremia    Hypothyroidism    Frequent UTI       Skin integrity:  Nursing to monitor skin integrity as patient is at risk for pressure injuries.    PLAN:  Pt has been seen for regular monthly visit.    The patient is a long-term care resident and confined here at LT facility:   Recent nursing evaluation and notes were reviewed.   Overall, patient is stable despite his/her chronic conditions.    #HTN/CKD: DC'd lisinopril/hydrochlorothiazide and will increase lisinopril to 20 mg daily, BUN is 24 and creatinine 1.02  #Frequent UTIs: Follows with urology and Bactrim started 50 mg daily as UTI prophylaxis, follow-up appointments September 26  #Hyponatremia: will start sodium chloride twice daily  #Hypothyroid: Levothyroxine, lab to be repeated  Any decline or change in condition needs to be communicated with the physician or myself.    Discussion with nursing staff regarding ongoing care and management.  If needed, would communicate with family who are not present at this time.   There are no concerns at this time.  We will continue with the medications noted above.    We will continue to follow the patient here at the facility.      Discharge planning: no plan for discharge, LTC resident, rehab potential    *Please note that nursing facility, outside laboratory agency, and [facility] do not interface.     Completion of the note was done through Dragon voice  recognition technology and may include   unintended or grammatical errors which may not have been recognized when finalizing the note.     Time:    GLORIA Valverde      Electronically Signed By: GLORIA Valverde   8/22/23 11:17 AM

## 2023-08-21 VITALS
WEIGHT: 141.8 LBS | RESPIRATION RATE: 16 BRPM | DIASTOLIC BLOOD PRESSURE: 58 MMHG | TEMPERATURE: 97.4 F | SYSTOLIC BLOOD PRESSURE: 165 MMHG | BODY MASS INDEX: 25.12 KG/M2 | HEART RATE: 58 BPM | HEIGHT: 63 IN

## 2023-08-21 NOTE — PROGRESS NOTES
"Name: Jacqueline Sandoval  YOB: 1935    MONTHLY VISIT: LTC resident    SUBJECTIVE:  Is a 88-year-old female who is here at [facility] as a long-term care resident.  The patient is resting quietly at this time in her room she appears to be comfortable and in no distress.  Labs were done today and reviewed.  Patient's son is at the bedside and updated on lab results and changes in medications.  Patient is hard of hearing and this makes conversing difficult.  Patient does deny chest pain or shortness of breath.    REVIEW OF SYSTEMS:  All review of systems are negative unless otherwise stated above under subjective.    LABS REVIEWED AT FACILITY:  Basic Metabolic Panl  REPORTED 08/17/2023 13:22  Glucose   76 mg/dL 74-99    BUN H 24 mg/dL 7-21    Creatinine H 1.02 mg/dL 0.58-0.96    Sodium L 128 mmol/L 136-144    Potassium L 3.5 mmol/L 3.7-5.1    Chloride L 92 mmol/L     CO2   25 mmol/L 22-30    Anion Gap   11 mmol/L 9-18    Calcium, Total   9.4 mg/dL 8.5-10.2    Estimated Glomerular Filtration Rate L 53 mL/min/1.73 meters squared >=60      MEDICATIONS REVIEWED AT FACILITY:  Will DC lisinopril and hydrochlorothiazide and start lisinopril 20 mg daily, sodium chloride 1 g twice daily  Bactrim was started by urology 50 mg daily for UTI prophylaxis    Living will related issues reviewed-Code status: DNR CCA    OBJECTIVE:  /58   Pulse 58   Temp 36.3 °C (97.4 °F)   Resp 16   Ht 1.6 m (5' 3\")   Wt 64.3 kg (141 lb 12.8 oz)   BMI 25.12 kg/m²     Physical Exam  HENT:      Head: Normocephalic.      Mouth/Throat:      Mouth: Mucous membranes are moist.   Cardiovascular:      Rate and Rhythm: Normal rate and regular rhythm.   Pulmonary:      Effort: Pulmonary effort is normal.      Breath sounds: Normal breath sounds.   Abdominal:      General: Abdomen is flat. Bowel sounds are normal.      Palpations: Abdomen is soft.   Genitourinary:     Comments: Genitalia not examined  Musculoskeletal:      " Cervical back: Neck supple.      Comments: Generalized weakness +1 pitting edema, JAN hose on  Skin:     General: Skin is warm and dry.      Capillary Refill: Capillary refill takes less than 2 seconds.   Neurological:      General: No focal deficit present.      Mental Status: She is alert and oriented to person, place, and time.   Psychiatric:         Mood and Affect: Mood normal.     Assessment/Plan   Problem List Items Addressed This Visit       HTN (hypertension), benign - Primary    Hyponatremia    Hypothyroidism    Frequent UTI       Skin integrity:  Nursing to monitor skin integrity as patient is at risk for pressure injuries.    PLAN:  Pt has been seen for regular monthly visit.    The patient is a long-term care resident and confined here at LT facility:   Recent nursing evaluation and notes were reviewed.   Overall, patient is stable despite his/her chronic conditions.    #HTN/CKD: DC'd lisinopril/hydrochlorothiazide and will increase lisinopril to 20 mg daily, BUN is 24 and creatinine 1.02  #Frequent UTIs: Follows with urology and Bactrim started 50 mg daily as UTI prophylaxis, follow-up appointments September 26  #Hyponatremia: will start sodium chloride twice daily  #Hypothyroid: Levothyroxine, lab to be repeated  Any decline or change in condition needs to be communicated with the physician or myself.    Discussion with nursing staff regarding ongoing care and management.  If needed, would communicate with family who are not present at this time.   There are no concerns at this time.  We will continue with the medications noted above.    We will continue to follow the patient here at the facility.      Discharge planning: no plan for discharge, LTC resident, rehab potential    *Please note that nursing facility, outside laboratory agency, and [facility] do not interface.     Completion of the note was done through Dragon voice recognition technology and may include   unintended or grammatical errors  which may not have been recognized when finalizing the note.     Time:    Rosetta Christy, APRN-CNP

## 2023-08-22 PROBLEM — N39.0 FREQUENT UTI: Status: ACTIVE | Noted: 2023-08-22

## 2023-10-12 ENCOUNTER — NURSING HOME VISIT (OUTPATIENT)
Dept: POST ACUTE CARE | Facility: EXTERNAL LOCATION | Age: 88
End: 2023-10-12
Payer: COMMERCIAL

## 2023-10-12 VITALS
TEMPERATURE: 97.7 F | SYSTOLIC BLOOD PRESSURE: 172 MMHG | HEART RATE: 50 BPM | DIASTOLIC BLOOD PRESSURE: 84 MMHG | OXYGEN SATURATION: 96 % | WEIGHT: 140.3 LBS | RESPIRATION RATE: 16 BRPM | BODY MASS INDEX: 24.86 KG/M2 | HEIGHT: 63 IN

## 2023-10-12 DIAGNOSIS — H91.93 BILATERAL HEARING LOSS, UNSPECIFIED HEARING LOSS TYPE: ICD-10-CM

## 2023-10-12 DIAGNOSIS — N18.31 STAGE 3A CHRONIC KIDNEY DISEASE (MULTI): ICD-10-CM

## 2023-10-12 DIAGNOSIS — F32.A DEPRESSION, UNSPECIFIED DEPRESSION TYPE: ICD-10-CM

## 2023-10-12 DIAGNOSIS — N39.0 FREQUENT UTI: ICD-10-CM

## 2023-10-12 DIAGNOSIS — F41.9 ANXIETY: ICD-10-CM

## 2023-10-12 DIAGNOSIS — E03.9 HYPOTHYROIDISM, UNSPECIFIED TYPE: ICD-10-CM

## 2023-10-12 DIAGNOSIS — I10 HTN (HYPERTENSION), BENIGN: Primary | ICD-10-CM

## 2023-10-12 PROCEDURE — 99309 SBSQ NF CARE MODERATE MDM 30: CPT | Performed by: NURSE PRACTITIONER

## 2023-10-12 NOTE — PROGRESS NOTES
Name: Jacqueline Sandoval  YOB: 1935    MONTHLY VISIT: LTC, HTN    SUBJECTIVE:  Jacqueline is a 88-year-old female who is here at [facility] as a long-term care resident.  The patient has a past medical history of hypertension, per patient and son CHF, hard of hearing, fibromyalgia, hyponatremia, anxiety/depression, and hypothyroidism patient is not a smoker.  She is allergic to penicillin and sulfa drugs. Family history is not pertinent.   The patient is sitting up in her WC in her room. She appears to be comfortable and in no distress.  Patient is hard of hearing and this makes conversing difficult.  Patient c/o intermittent headache and concern regarding elevated BP readings. She denies chest pain or shortness of breath.     REVIEW OF SYSTEMS:  All review of systems are negative unless otherwise stated above under subjective.    LABS REVIEWED AT FACILITY:  CBC and Differential  REPORTED 10/05/2023 11:31  White Blood Cell Count   5.62 k/uL 3.70-11.00    RBC L 3.24 m/uL 3.90-5.20    Hemoglobin L 10.6 g/dL 11.5-15.5    Hematocrit L 32.2 % 36.0-46.0    MCV   99.4 fL 80.0-100.0    MCH   32.7 pg 26.0-34.0    MCHC   32.9 g/dL 30.5-36.0    RDW-CV   13.8 % 11.5-15.0    Platelet Count   175 k/uL 150-400    MPV   10.7 fL 9.0-12.7    Neut%   43.9 %    Abs Neut   2.47 k/uL 1.45-7.50    Lymph%   35.6 %    Abs Lymph   2.00 k/uL 1.00-4.00    Mono%   14.4 %    Abs Mono   0.81 k/uL <0.87    Eosin%   5.2 %    Abs Eosin   0.29 k/uL <0.46    Baso%   0.5 %    Abs Baso   0.03 k/uL <0.11    Immature Gran %%   0.4 %    Abs Immature Gran   <0.03 k/uL <0.10    Absolute nRBC   0.0 /100 WBC    Absolute nRBC   <0.01 k/uL <0.01    Diff Type   Auto               Comp Metabolic Panel  REPORTED 10/05/2023 11:41  Protein, Total L 6.2 g/dL 6.3-8.0    Albumin   3.9 g/dL 3.9-4.9    Calcium, Total   9.4 mg/dL 8.5-10.2   Bilirubin, Total   0.3 mg/dL 0.2-1.3   Alkaline Phosphatase   62 U/L   AST   16 U/L 13-35    ALT   13 U/L 7-38   "  Glucose   81 mg/dL 74-99  BUN H 26 mg/dL 7-21    Creatinine H 1.14 mg/dL 0.58-0.96    Sodium   140 mmol/L 136-144    Potassium   4.2 mmol/L 3.7-5.1    Chloride   104 mmol/L     CO2   25 mmol/L 22-30    Anion Gap   11 mmol/L 9-18    Estimated Glomerular Filtration Rate L 46 mL/min/1.73 meters squared >=60      Basic Metabolic Panl  REPORTED 09/28/2023 11:51  Glucose   77 mg/dL 74-99  BUN H 22 mg/dL 7-21    Creatinine H 1.32 mg/dL 0.58-0.96    Sodium   139 mmol/L 136-144    Potassium   4.1 mmol/L 3.7-5.1    Chloride   103 mmol/L     CO2   22 mmol/L 22-30    Anion Gap   14 mmol/L 9-18    Calcium, Total   9.2 mg/dL 8.5-10.2  Estimated Glomerular Filtration Rate L 39 mL/min/1.73 meters squared >=60      TSH  REPORTED 09/28/2023 11:58  TSH H 29.760 mIU/L 0.270-4.200    MEDICATIONS REVIEWED AT FACILITY:  Levothyroxine increased to 175 mcg M-F and 200 mcg on Sat and Sun  Lisinopril increased to 30 mg daily  Ocean Nasal spray Q8hr PRN    Living will related issues reviewed-Code status: DNRCCA    OBJECTIVE:  /84   Pulse 50   Temp 36.5 °C (97.7 °F)   Resp 16   Ht 1.6 m (5' 3\")   Wt 63.6 kg (140 lb 4.8 oz)   SpO2 96%   BMI 24.85 kg/m²     Physical Exam  HENT:      Head: Normocephalic.      Mouth/Throat:      Mouth: Mucous membranes are moist.   Cardiovascular:      Rate and Rhythm: Normal rate and regular rhythm.   Pulmonary:      Effort: Pulmonary effort is normal.      Breath sounds: Normal breath sounds.   Abdominal:      General: Abdomen is flat. Bowel sounds are normal.      Palpations: Abdomen is soft.   Genitourinary:     Comments: Genitalia not examined  Musculoskeletal:      Cervical back: Neck supple.      Comments: Generalized weakness +1 pitting edema, JAN hose on  Skin:     General: Skin is warm and dry.      Capillary Refill: Capillary refill takes less than 2 seconds.   Neurological:      General: No focal deficit present.      Mental Status: She is alert and oriented to person, place, " and time.   Psychiatric:         Mood and Affect: Mood normal.     Assessment/Plan   Problem List Items Addressed This Visit       Anxiety    Depression    HTN (hypertension), benign - Primary    [place] (hard of hearing)    Hypothyroidism    Frequent UTI    Stage 3a chronic kidney disease (CMS/HCC)       Skin integrity:  Nursing to monitor skin integrity as patient is at risk for pressure injuries.    PLAN:  Pt has been seen for regular monthly visit.    The patient is confined to a long term care facility.   Recent nursing evaluation and notes were reviewed.   Overall, patient is stable despite his/her chronic conditions.    #HTN/CKD: Increased lisinopril to 30 mg daily, BUN 26 and creat 1.14  #Hyponatremia: improved - Na 140 - Cont Na chloride BID  #Hypothyroid: Meds adjusted and will recheck in few weeks  Any decline or change in condition needs to be communicated with the physician or myself.    Discussion with nursing staff regarding ongoing care and management.  If needed, would communicate with family who are not present at this time.   There are no concerns at this time.  We will continue with the medications noted above.    We will continue to follow the patient here at the facility.    Discharge planning: no plan for discharge, LTC resident, rehab potential    *Please note that nursing facility, outside laboratory agency, and  [facility] do not interface.     Completion of the note was done through Dragon voice recognition technology and may include   unintended or grammatical errors which may not have been recognized when finalizing the note.     Time:    JOCELIN Valverde-CNP

## 2023-10-12 NOTE — LETTER
Patient: Jacqueline Sandoval  : 1935    Encounter Date: 10/12/2023    Name: Jacqueline Sandoval  YOB: 1935    MONTHLY VISIT: LTC, HTN    SUBJECTIVE:  Jacqueline is a 88-year-old female who is here at [facility] as a long-term care resident.  The patient has a past medical history of hypertension, per patient and son CHF, hard of hearing, fibromyalgia, hyponatremia, anxiety/depression, and hypothyroidism patient is not a smoker.  She is allergic to penicillin and sulfa drugs. Family history is not pertinent.   The patient is sitting up in her WC in her room. She appears to be comfortable and in no distress.  Patient is hard of hearing and this makes conversing difficult.  Patient c/o intermittent headache and concern regarding elevated BP readings. She denies chest pain or shortness of breath.     REVIEW OF SYSTEMS:  All review of systems are negative unless otherwise stated above under subjective.    LABS REVIEWED AT FACILITY:  CBC and Differential  REPORTED 10/05/2023 11:31  White Blood Cell Count   5.62 k/uL 3.70-11.00    RBC L 3.24 m/uL 3.90-5.20    Hemoglobin L 10.6 g/dL 11.5-15.5    Hematocrit L 32.2 % 36.0-46.0    MCV   99.4 fL 80.0-100.0    MCH   32.7 pg 26.0-34.0    MCHC   32.9 g/dL 30.5-36.0    RDW-CV   13.8 % 11.5-15.0    Platelet Count   175 k/uL 150-400    MPV   10.7 fL 9.0-12.7    Neut%   43.9 %    Abs Neut   2.47 k/uL 1.45-7.50    Lymph%   35.6 %    Abs Lymph   2.00 k/uL 1.00-4.00    Mono%   14.4 %    Abs Mono   0.81 k/uL <0.87    Eosin%   5.2 %    Abs Eosin   0.29 k/uL <0.46    Baso%   0.5 %    Abs Baso   0.03 k/uL <0.11    Immature Gran %%   0.4 %    Abs Immature Gran   <0.03 k/uL <0.10    Absolute nRBC   0.0 /100 WBC    Absolute nRBC   <0.01 k/uL <0.01    Diff Type   Auto               Comp Metabolic Panel  REPORTED 10/05/2023 11:41  Protein, Total L 6.2 g/dL 6.3-8.0    Albumin   3.9 g/dL 3.9-4.9    Calcium, Total   9.4 mg/dL 8.5-10.2   Bilirubin, Total   0.3 mg/dL 0.2-1.3   Alkaline  "Phosphatase   62 U/L   AST   16 U/L 13-35    ALT   13 U/L 7-38    Glucose   81 mg/dL 74-99  BUN H 26 mg/dL 7-21    Creatinine H 1.14 mg/dL 0.58-0.96    Sodium   140 mmol/L 136-144    Potassium   4.2 mmol/L 3.7-5.1    Chloride   104 mmol/L     CO2   25 mmol/L 22-30    Anion Gap   11 mmol/L 9-18    Estimated Glomerular Filtration Rate L 46 mL/min/1.73 meters squared >=60      Basic Metabolic Panl  REPORTED 09/28/2023 11:51  Glucose   77 mg/dL 74-99  BUN H 22 mg/dL 7-21    Creatinine H 1.32 mg/dL 0.58-0.96    Sodium   139 mmol/L 136-144    Potassium   4.1 mmol/L 3.7-5.1    Chloride   103 mmol/L     CO2   22 mmol/L 22-30    Anion Gap   14 mmol/L 9-18    Calcium, Total   9.2 mg/dL 8.5-10.2  Estimated Glomerular Filtration Rate L 39 mL/min/1.73 meters squared >=60      TSH  REPORTED 09/28/2023 11:58  TSH H 29.760 mIU/L 0.270-4.200    MEDICATIONS REVIEWED AT FACILITY:  Levothyroxine increased to 175 mcg M-F and 200 mcg on Sat and Sun  Lisinopril increased to 30 mg daily  Ocean Nasal spray Q8hr PRN    Living will related issues reviewed-Code status: DNRCCA    OBJECTIVE:  /84   Pulse 50   Temp 36.5 °C (97.7 °F)   Resp 16   Ht 1.6 m (5' 3\")   Wt 63.6 kg (140 lb 4.8 oz)   SpO2 96%   BMI 24.85 kg/m²     Physical Exam  HENT:      Head: Normocephalic.      Mouth/Throat:      Mouth: Mucous membranes are moist.   Cardiovascular:      Rate and Rhythm: Normal rate and regular rhythm.   Pulmonary:      Effort: Pulmonary effort is normal.      Breath sounds: Normal breath sounds.   Abdominal:      General: Abdomen is flat. Bowel sounds are normal.      Palpations: Abdomen is soft.   Genitourinary:     Comments: Genitalia not examined  Musculoskeletal:      Cervical back: Neck supple.      Comments: Generalized weakness +1 pitting edema, JAN hose on  Skin:     General: Skin is warm and dry.      Capillary Refill: Capillary refill takes less than 2 seconds.   Neurological:      General: No focal deficit " present.      Mental Status: She is alert and oriented to person, place, and time.   Psychiatric:         Mood and Affect: Mood normal.     Assessment/Plan  Problem List Items Addressed This Visit       Anxiety    Depression    HTN (hypertension), benign - Primary    [place] (hard of hearing)    Hypothyroidism    Frequent UTI    Stage 3a chronic kidney disease (CMS/HCC)       Skin integrity:  Nursing to monitor skin integrity as patient is at risk for pressure injuries.    PLAN:  Pt has been seen for regular monthly visit.    The patient is confined to a long term care facility.   Recent nursing evaluation and notes were reviewed.   Overall, patient is stable despite his/her chronic conditions.    #HTN/CKD: Increased lisinopril to 30 mg daily, BUN 26 and creat 1.14  #Hyponatremia: improved - Na 140 - Cont Na chloride BID  #Hypothyroid: Meds adjusted and will recheck in few weeks  Any decline or change in condition needs to be communicated with the physician or myself.    Discussion with nursing staff regarding ongoing care and management.  If needed, would communicate with family who are not present at this time.   There are no concerns at this time.  We will continue with the medications noted above.    We will continue to follow the patient here at the facility.    Discharge planning: no plan for discharge, LTC resident, rehab potential    *Please note that nursing facility, outside laboratory agency, and  [facility] do not interface.     Completion of the note was done through Dragon voice recognition technology and may include   unintended or grammatical errors which may not have been recognized when finalizing the note.     Time:    GLORIA Valverde      Electronically Signed By: GLORIA Valverde   10/16/23  4:28 PM

## 2023-10-16 PROBLEM — N18.31 STAGE 3A CHRONIC KIDNEY DISEASE (MULTI): Status: ACTIVE | Noted: 2023-10-16

## 2023-11-09 ENCOUNTER — NURSING HOME VISIT (OUTPATIENT)
Dept: POST ACUTE CARE | Facility: EXTERNAL LOCATION | Age: 88
End: 2023-11-09
Payer: COMMERCIAL

## 2023-11-09 VITALS
HEART RATE: 62 BPM | WEIGHT: 143 LBS | TEMPERATURE: 97.8 F | DIASTOLIC BLOOD PRESSURE: 69 MMHG | RESPIRATION RATE: 16 BRPM | HEIGHT: 63 IN | SYSTOLIC BLOOD PRESSURE: 170 MMHG | BODY MASS INDEX: 25.34 KG/M2

## 2023-11-09 DIAGNOSIS — I10 HTN (HYPERTENSION), BENIGN: Primary | ICD-10-CM

## 2023-11-09 DIAGNOSIS — E03.9 HYPOTHYROIDISM, UNSPECIFIED TYPE: ICD-10-CM

## 2023-11-09 DIAGNOSIS — E87.1 HYPONATREMIA: ICD-10-CM

## 2023-11-09 DIAGNOSIS — N18.31 STAGE 3A CHRONIC KIDNEY DISEASE (MULTI): ICD-10-CM

## 2023-11-09 PROCEDURE — 99309 SBSQ NF CARE MODERATE MDM 30: CPT | Performed by: NURSE PRACTITIONER

## 2023-11-09 NOTE — LETTER
Patient: Jacqueline Sandoval  : 1935    Encounter Date: 2023    Name: Jacqueline Sandoval  YOB: 1935    MONTHLY VISIT: LTC    SUBJECTIVE:  Jacqueline is a 88-year-old female who resides here at [facility] as a long-term care resident.  The patient has a past medical history of hypertension, per patient and son CHF, hard of hearing, fibromyalgia, hyponatremia, anxiety/depression, and hypothyroidism.  Patient is not a smoker.  She is allergic to penicillin and sulfa drugs. Family history is not pertinent.   Nursing reported elevated BP readings. The patient is sitting up in her WC in her room. She appears to be comfortable and in no distress.  Patient is hard of hearing and this makes conversing difficult. She denies chest pain or shortness of breath.    REVIEW OF SYSTEMS:  All review of systems are negative unless otherwise stated above under subjective.    LABS REVIEWED AT FACILITY TODAY:  TSH  REPORTED 10/27/2023 09:40  TSH H 5.580 mIU/L 0.270-4.200    CBC and Differential  REPORTED 10/05/2023 11:31  White Blood Cell Count   5.62 k/uL 3.70-11.00    RBC L 3.24 m/uL 3.90-5.20    Hemoglobin L 10.6 g/dL 11.5-15.5    Hematocrit L 32.2 % 36.0-46.0    MCV   99.4 fL 80.0-100.0    MCH   32.7 pg 26.0-34.0    MCHC   32.9 g/dL 30.5-36.0    RDW-CV   13.8 % 11.5-15.0    Platelet Count   175 k/uL 150-400    MPV   10.7 fL 9.0-12.7    Neut%   43.9 %    Abs Neut   2.47 k/uL 1.45-7.50    Lymph%   35.6 %    Abs Lymph   2.00 k/uL 1.00-4.00    Mono%   14.4 %    Abs Mono   0.81 k/uL <0.87    Eosin%   5.2 %    Abs Eosin   0.29 k/uL <0.46    Baso%   0.5 %    Abs Baso   0.03 k/uL <0.11    Immature Gran %%   0.4 %    Abs Immature Gran   <0.03 k/uL <0.10    Absolute nRBC   0.0 /100 WBC    Absolute nRBC   <0.01 k/uL <0.01    Diff Type   Auto               Comp Metabolic Panel  REPORTED 10/05/2023 11:41  Protein, Total L 6.2 g/dL 6.3-8.0    Albumin   3.9 g/dL 3.9-4.9    Calcium, Total   9.4 mg/dL 8.5-10.2    Bilirubin,  "Total   0.3 mg/dL 0.2-1.3    Alkaline Phosphatase   62 U/L     AST   16 U/L 13-35    ALT   13 U/L 7-38    Glucose   81 mg/dL 74-99  BUN H 26 mg/dL 7-21    Creatinine H 1.14 mg/dL 0.58-0.96    Sodium   140 mmol/L 136-144    Potassium   4.2 mmol/L 3.7-5.1    Chloride   104 mmol/L     CO2   25 mmol/L 22-30    Anion Gap   11 mmol/L 9-18    Estimated Glomerular Filtration Rate L 46 mL/min/1.73 meters squared >=60    MEDICATIONS REVIEWED AT FACILITY:  Meclizine 12.5 mg BID PRN  Aripiprazole 5 mg daily  ASA 81 mg daily  Buspirone 10 mg TID  Colace 100 mg BID  Diazepam 5 mg at bedtime  Gabapentin 100 mg at bedtime  Melatonin 3 mg at bedtime  MTV daily  Oxybutynin 5 mg daily  Miralax 17 grams daily  Systane BID  Tylenol Arthritis two tabs BID  Zoloft 75 mg daily  Bowel protocol - MOM PRN, Docusate RS PRN, and Fleets Enema PRN  Bactrim 50 mg   Amlodipine 2.5 mg daily added today  Levothyroxine increased to 175 mcg M-F and 200 mcg on Sat and Sun  Lisinopril 40 mg daily (increased 10/31/23)  Ocean Nasal spray Q8hr PRN  Na Cl tabs BID  Bactrim 50 mg daily Prophylaxis UTI per Urology  Voltaren gel BID PRN    Living will related issues reviewed-Code status: DNRCCA    OBJECTIVE:  /69   Pulse 62   Temp 36.6 °C (97.8 °F)   Resp 16   Ht 1.6 m (5' 3\")   Wt 64.9 kg (143 lb)   BMI 25.33 kg/m²     Physical Exam  HENT:      Head: Normocephalic.      Mouth/Throat:      Mouth: Mucous membranes are moist.   Cardiovascular:      Rate and Rhythm: Normal rate and regular rhythm.   Pulmonary:      Effort: Pulmonary effort is normal.      Breath sounds: Normal breath sounds.   Abdominal:      General: Abdomen is flat. Bowel sounds are normal.      Palpations: Abdomen is soft.   Genitourinary:     Comments: Genitalia not examined  Musculoskeletal:      Cervical back: Neck supple.      Comments: Generalized weakness +1 pitting edema, JAN hose on  Skin:     General: Skin is warm and dry.      Capillary Refill: Capillary refill " takes less than 2 seconds.   Neurological:      General: No focal deficit present.      Mental Status: She is alert and oriented to person, place, and time.   Psychiatric:         Mood and Affect: Mood normal.     Assessment/Plan  Problem List Items Addressed This Visit       HTN (hypertension), benign - Primary    Hyponatremia    Hypothyroidism    Stage 3a chronic kidney disease (CMS/HCC)       Skin integrity:  Nursing to monitor skin integrity as patient is at risk for pressure injuries.    PLAN:  Pt has been seen for regular monthly visit.    The patient is confined to a long term care facility.   Recent nursing evaluation and notes were reviewed.   Overall, patient is stable despite his/her chronic conditions.    #HTN/CKD: Increased lisinopril to 40 mg daily, added Amlodipine 2.5  mg daily, BUN 22 and creat 1.32 on 10/5/23 (monthly labs - these labs are from 10/5, need updated labs as ordered monthly)  #Hyponatremia: improved - Na 140 - Cont Na chloride daily  #Hypothyroid: TSH 5.58, will cont same dose and check TSH level in a few weeks  Any decline or change in condition needs to be communicated with the physician or myself.    Discussion with nursing staff regarding ongoing care and management.  If needed, would communicate with family who are not present at this time.   There are no concerns at this time.  We will continue with the medications noted above.    We will continue to follow the patient here at the facility.    Discharge planning: no plan for discharge, LTC resident, rehab potential    *Please note that nursing facility, outside laboratory agency, and [facility] do not interface.     Completion of the note was done through Dragon voice recognition technology and may include   unintended or grammatical errors which may not have been recognized when finalizing the note.     Time:    GLORIA Valverde      Electronically Signed By: GLORIA Valverde   11/10/23  5:20 PM

## 2023-11-09 NOTE — PROGRESS NOTES
Name: Jacqueline Sandoval  YOB: 1935    MONTHLY VISIT: LTC    SUBJECTIVE:  Jacqueline is a 88-year-old female who resides here at [facility] as a long-term care resident.  The patient has a past medical history of hypertension, per patient and son CHF, hard of hearing, fibromyalgia, hyponatremia, anxiety/depression, and hypothyroidism.  Patient is not a smoker.  She is allergic to penicillin and sulfa drugs. Family history is not pertinent.   Nursing reported elevated BP readings. The patient is sitting up in her WC in her room. She appears to be comfortable and in no distress.  Patient is hard of hearing and this makes conversing difficult. She denies chest pain or shortness of breath.    REVIEW OF SYSTEMS:  All review of systems are negative unless otherwise stated above under subjective.    LABS REVIEWED AT FACILITY TODAY:  TSH  REPORTED 10/27/2023 09:40  TSH H 5.580 mIU/L 0.270-4.200    CBC and Differential  REPORTED 10/05/2023 11:31  White Blood Cell Count   5.62 k/uL 3.70-11.00    RBC L 3.24 m/uL 3.90-5.20    Hemoglobin L 10.6 g/dL 11.5-15.5    Hematocrit L 32.2 % 36.0-46.0    MCV   99.4 fL 80.0-100.0    MCH   32.7 pg 26.0-34.0    MCHC   32.9 g/dL 30.5-36.0    RDW-CV   13.8 % 11.5-15.0    Platelet Count   175 k/uL 150-400    MPV   10.7 fL 9.0-12.7    Neut%   43.9 %    Abs Neut   2.47 k/uL 1.45-7.50    Lymph%   35.6 %    Abs Lymph   2.00 k/uL 1.00-4.00    Mono%   14.4 %    Abs Mono   0.81 k/uL <0.87    Eosin%   5.2 %    Abs Eosin   0.29 k/uL <0.46    Baso%   0.5 %    Abs Baso   0.03 k/uL <0.11    Immature Gran %%   0.4 %    Abs Immature Gran   <0.03 k/uL <0.10    Absolute nRBC   0.0 /100 WBC    Absolute nRBC   <0.01 k/uL <0.01    Diff Type   Auto               Comp Metabolic Panel  REPORTED 10/05/2023 11:41  Protein, Total L 6.2 g/dL 6.3-8.0    Albumin   3.9 g/dL 3.9-4.9    Calcium, Total   9.4 mg/dL 8.5-10.2    Bilirubin, Total   0.3 mg/dL 0.2-1.3    Alkaline Phosphatase   62 U/L     AST   " 16 U/L 13-35    ALT   13 U/L 7-38    Glucose   81 mg/dL 74-99  BUN H 26 mg/dL 7-21    Creatinine H 1.14 mg/dL 0.58-0.96    Sodium   140 mmol/L 136-144    Potassium   4.2 mmol/L 3.7-5.1    Chloride   104 mmol/L     CO2   25 mmol/L 22-30    Anion Gap   11 mmol/L 9-18    Estimated Glomerular Filtration Rate L 46 mL/min/1.73 meters squared >=60    MEDICATIONS REVIEWED AT FACILITY:  Meclizine 12.5 mg BID PRN  Aripiprazole 5 mg daily  ASA 81 mg daily  Buspirone 10 mg TID  Colace 100 mg BID  Diazepam 5 mg at bedtime  Gabapentin 100 mg at bedtime  Melatonin 3 mg at bedtime  MTV daily  Oxybutynin 5 mg daily  Miralax 17 grams daily  Systane BID  Tylenol Arthritis two tabs BID  Zoloft 75 mg daily  Bowel protocol - MOM PRN, Docusate RS PRN, and Fleets Enema PRN  Bactrim 50 mg   Amlodipine 2.5 mg daily added today  Levothyroxine increased to 175 mcg M-F and 200 mcg on Sat and Sun  Lisinopril 40 mg daily (increased 10/31/23)  Ocean Nasal spray Q8hr PRN  Na Cl tabs BID  Bactrim 50 mg daily Prophylaxis UTI per Urology  Voltaren gel BID PRN    Living will related issues reviewed-Code status: DNRCCA    OBJECTIVE:  /69   Pulse 62   Temp 36.6 °C (97.8 °F)   Resp 16   Ht 1.6 m (5' 3\")   Wt 64.9 kg (143 lb)   BMI 25.33 kg/m²     Physical Exam  HENT:      Head: Normocephalic.      Mouth/Throat:      Mouth: Mucous membranes are moist.   Cardiovascular:      Rate and Rhythm: Normal rate and regular rhythm.   Pulmonary:      Effort: Pulmonary effort is normal.      Breath sounds: Normal breath sounds.   Abdominal:      General: Abdomen is flat. Bowel sounds are normal.      Palpations: Abdomen is soft.   Genitourinary:     Comments: Genitalia not examined  Musculoskeletal:      Cervical back: Neck supple.      Comments: Generalized weakness +1 pitting edema, JAN hose on  Skin:     General: Skin is warm and dry.      Capillary Refill: Capillary refill takes less than 2 seconds.   Neurological:      General: No focal deficit " present.      Mental Status: She is alert and oriented to person, place, and time.   Psychiatric:         Mood and Affect: Mood normal.     Assessment/Plan   Problem List Items Addressed This Visit       HTN (hypertension), benign - Primary    Hyponatremia    Hypothyroidism    Stage 3a chronic kidney disease (CMS/HCC)       Skin integrity:  Nursing to monitor skin integrity as patient is at risk for pressure injuries.    PLAN:  Pt has been seen for regular monthly visit.    The patient is confined to a long term care facility.   Recent nursing evaluation and notes were reviewed.   Overall, patient is stable despite his/her chronic conditions.    #HTN/CKD: Increased lisinopril to 40 mg daily, added Amlodipine 2.5  mg daily, BUN 22 and creat 1.32 on 10/5/23 (monthly labs - these labs are from 10/5, need updated labs as ordered monthly)  #Hyponatremia: improved - Na 140 - Cont Na chloride daily  #Hypothyroid: TSH 5.58, will cont same dose and check TSH level in a few weeks  Any decline or change in condition needs to be communicated with the physician or myself.    Discussion with nursing staff regarding ongoing care and management.  If needed, would communicate with family who are not present at this time.   There are no concerns at this time.  We will continue with the medications noted above.    We will continue to follow the patient here at the facility.    Discharge planning: no plan for discharge, LTC resident, rehab potential    *Please note that nursing facility, outside laboratory agency, and [facility] do not interface.     Completion of the note was done through Dragon voice recognition technology and may include   unintended or grammatical errors which may not have been recognized when finalizing the note.     Time:    JOCELIN Valverde-CNP

## 2023-11-23 ENCOUNTER — PREP FOR PROCEDURE (OUTPATIENT)
Dept: OPERATING ROOM | Facility: HOSPITAL | Age: 88
End: 2023-11-23

## 2023-11-23 ENCOUNTER — APPOINTMENT (OUTPATIENT)
Dept: RADIOLOGY | Facility: HOSPITAL | Age: 88
DRG: 521 | End: 2023-11-23
Payer: COMMERCIAL

## 2023-11-23 ENCOUNTER — HOSPITAL ENCOUNTER (INPATIENT)
Facility: HOSPITAL | Age: 88
LOS: 4 days | Discharge: NURSING, REHAB OR LONG-TERM CARE | DRG: 521 | End: 2023-11-27
Attending: EMERGENCY MEDICINE | Admitting: STUDENT IN AN ORGANIZED HEALTH CARE EDUCATION/TRAINING PROGRAM
Payer: COMMERCIAL

## 2023-11-23 DIAGNOSIS — J18.9 PNEUMONIA OF LEFT UPPER LOBE DUE TO INFECTIOUS ORGANISM: ICD-10-CM

## 2023-11-23 DIAGNOSIS — R39.9 URINARY SYMPTOM OR SIGN: ICD-10-CM

## 2023-11-23 DIAGNOSIS — S72.002A LEFT DISPLACED FEMORAL NECK FRACTURE (MULTI): Primary | ICD-10-CM
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
## 2023-11-23 LAB
ABO GROUP (TYPE) IN BLOOD: NORMAL
ALBUMIN SERPL BCP-MCNC: 3.8 G/DL (ref 3.4–5)
ALP SERPL-CCNC: 84 U/L (ref 33–136)
ALT SERPL W P-5'-P-CCNC: 27 U/L (ref 7–45)
ANION GAP SERPL CALC-SCNC: 14 MMOL/L (ref 10–20)
ANTIBODY SCREEN: NORMAL
APPEARANCE UR: CLEAR
AST SERPL W P-5'-P-CCNC: 24 U/L (ref 9–39)
BASOPHILS # BLD AUTO: 0.02 X10*3/UL (ref 0–0.1)
BASOPHILS NFR BLD AUTO: 0.1 %
BILIRUB SERPL-MCNC: 0.4 MG/DL (ref 0–1.2)
BILIRUB UR STRIP.AUTO-MCNC: NEGATIVE MG/DL
BUN SERPL-MCNC: 24 MG/DL (ref 6–23)
CALCIUM SERPL-MCNC: 9.5 MG/DL (ref 8.6–10.3)
CARDIAC TROPONIN I PNL SERPL HS: 9 NG/L (ref 0–13)
CHLORIDE SERPL-SCNC: 107 MMOL/L (ref 98–107)
CO2 SERPL-SCNC: 22 MMOL/L (ref 21–32)
COLOR UR: YELLOW
CREAT SERPL-MCNC: 1.12 MG/DL (ref 0.5–1.05)
EOSINOPHIL # BLD AUTO: 0.11 X10*3/UL (ref 0–0.4)
EOSINOPHIL NFR BLD AUTO: 0.7 %
ERYTHROCYTE [DISTWIDTH] IN BLOOD BY AUTOMATED COUNT: 13.1 % (ref 11.5–14.5)
GFR SERPL CREATININE-BSD FRML MDRD: 47 ML/MIN/1.73M*2
GLUCOSE SERPL-MCNC: 117 MG/DL (ref 74–99)
GLUCOSE UR STRIP.AUTO-MCNC: NEGATIVE MG/DL
HCT VFR BLD AUTO: 34.7 % (ref 36–46)
HGB BLD-MCNC: 11.5 G/DL (ref 12–16)
HOLD SPECIMEN: NORMAL
IMM GRANULOCYTES # BLD AUTO: 0.13 X10*3/UL (ref 0–0.5)
IMM GRANULOCYTES NFR BLD AUTO: 0.9 % (ref 0–0.9)
KETONES UR STRIP.AUTO-MCNC: NEGATIVE MG/DL
LEUKOCYTE ESTERASE UR QL STRIP.AUTO: NEGATIVE
LYMPHOCYTES # BLD AUTO: 1.01 X10*3/UL (ref 0.8–3)
LYMPHOCYTES NFR BLD AUTO: 6.6 %
MAGNESIUM SERPL-MCNC: 1.9 MG/DL (ref 1.6–2.4)
MCH RBC QN AUTO: 33.2 PG (ref 26–34)
MCHC RBC AUTO-ENTMCNC: 33.1 G/DL (ref 32–36)
MCV RBC AUTO: 100 FL (ref 80–100)
MONOCYTES # BLD AUTO: 0.64 X10*3/UL (ref 0.05–0.8)
MONOCYTES NFR BLD AUTO: 4.2 %
NEUTROPHILS # BLD AUTO: 13.31 X10*3/UL (ref 1.6–5.5)
NEUTROPHILS NFR BLD AUTO: 87.5 %
NITRITE UR QL STRIP.AUTO: NEGATIVE
NRBC BLD-RTO: 0 /100 WBCS (ref 0–0)
PH UR STRIP.AUTO: 6 [PH]
PLATELET # BLD AUTO: 236 X10*3/UL (ref 150–450)
POTASSIUM SERPL-SCNC: 4 MMOL/L (ref 3.5–5.3)
PROT SERPL-MCNC: 7.1 G/DL (ref 6.4–8.2)
PROT UR STRIP.AUTO-MCNC: ABNORMAL MG/DL
RBC # BLD AUTO: 3.46 X10*6/UL (ref 4–5.2)
RBC # UR STRIP.AUTO: NEGATIVE /UL
RBC #/AREA URNS AUTO: NORMAL /HPF
RH FACTOR (ANTIGEN D): NORMAL
SODIUM SERPL-SCNC: 139 MMOL/L (ref 136–145)
SP GR UR STRIP.AUTO: 1.01
UROBILINOGEN UR STRIP.AUTO-MCNC: <2 MG/DL
WBC # BLD AUTO: 15.2 X10*3/UL (ref 4.4–11.3)
WBC #/AREA URNS AUTO: NORMAL /HPF

## 2023-11-23 PROCEDURE — 85025 COMPLETE CBC W/AUTO DIFF WBC: CPT | Performed by: REGISTERED NURSE

## 2023-11-23 PROCEDURE — 2500000004 HC RX 250 GENERAL PHARMACY W/ HCPCS (ALT 636 FOR OP/ED): Performed by: STUDENT IN AN ORGANIZED HEALTH CARE EDUCATION/TRAINING PROGRAM

## 2023-11-23 PROCEDURE — 99223 1ST HOSP IP/OBS HIGH 75: CPT | Performed by: STUDENT IN AN ORGANIZED HEALTH CARE EDUCATION/TRAINING PROGRAM

## 2023-11-23 PROCEDURE — 71045 X-RAY EXAM CHEST 1 VIEW: CPT | Performed by: RADIOLOGY

## 2023-11-23 PROCEDURE — 81001 URINALYSIS AUTO W/SCOPE: CPT | Performed by: REGISTERED NURSE

## 2023-11-23 PROCEDURE — 83735 ASSAY OF MAGNESIUM: CPT | Performed by: REGISTERED NURSE

## 2023-11-23 PROCEDURE — 2500000001 HC RX 250 WO HCPCS SELF ADMINISTERED DRUGS (ALT 637 FOR MEDICARE OP): Performed by: STUDENT IN AN ORGANIZED HEALTH CARE EDUCATION/TRAINING PROGRAM

## 2023-11-23 PROCEDURE — 70450 CT HEAD/BRAIN W/O DYE: CPT

## 2023-11-23 PROCEDURE — 84484 ASSAY OF TROPONIN QUANT: CPT | Performed by: REGISTERED NURSE

## 2023-11-23 PROCEDURE — 99285 EMERGENCY DEPT VISIT HI MDM: CPT | Mod: 25 | Performed by: EMERGENCY MEDICINE

## 2023-11-23 PROCEDURE — 70450 CT HEAD/BRAIN W/O DYE: CPT | Performed by: RADIOLOGY

## 2023-11-23 PROCEDURE — 96374 THER/PROPH/DIAG INJ IV PUSH: CPT | Mod: 59

## 2023-11-23 PROCEDURE — 72170 X-RAY EXAM OF PELVIS: CPT

## 2023-11-23 PROCEDURE — 72170 X-RAY EXAM OF PELVIS: CPT | Mod: LEFT SIDE | Performed by: RADIOLOGY

## 2023-11-23 PROCEDURE — 86900 BLOOD TYPING SEROLOGIC ABO: CPT | Performed by: REGISTERED NURSE

## 2023-11-23 PROCEDURE — 36415 COLL VENOUS BLD VENIPUNCTURE: CPT | Performed by: REGISTERED NURSE

## 2023-11-23 PROCEDURE — 73552 X-RAY EXAM OF FEMUR 2/>: CPT | Mod: LEFT SIDE | Performed by: RADIOLOGY

## 2023-11-23 PROCEDURE — 2500000004 HC RX 250 GENERAL PHARMACY W/ HCPCS (ALT 636 FOR OP/ED): Performed by: REGISTERED NURSE

## 2023-11-23 PROCEDURE — 80053 COMPREHEN METABOLIC PANEL: CPT | Performed by: REGISTERED NURSE

## 2023-11-23 PROCEDURE — P9040 RBC LEUKOREDUCED IRRADIATED: HCPCS

## 2023-11-23 PROCEDURE — 71045 X-RAY EXAM CHEST 1 VIEW: CPT

## 2023-11-23 PROCEDURE — 96375 TX/PRO/DX INJ NEW DRUG ADDON: CPT

## 2023-11-23 PROCEDURE — 96372 THER/PROPH/DIAG INJ SC/IM: CPT | Performed by: STUDENT IN AN ORGANIZED HEALTH CARE EDUCATION/TRAINING PROGRAM

## 2023-11-23 PROCEDURE — 84075 ASSAY ALKALINE PHOSPHATASE: CPT | Performed by: REGISTERED NURSE

## 2023-11-23 PROCEDURE — 86920 COMPATIBILITY TEST SPIN: CPT

## 2023-11-23 PROCEDURE — 1200000002 HC GENERAL ROOM WITH TELEMETRY DAILY

## 2023-11-23 PROCEDURE — 73552 X-RAY EXAM OF FEMUR 2/>: CPT | Mod: LT,FY

## 2023-11-23 RX ORDER — DEXTROMETHORPHAN HYDROBROMIDE, GUAIFENESIN 5; 100 MG/5ML; MG/5ML
1300 LIQUID ORAL 2 TIMES DAILY
COMMUNITY

## 2023-11-23 RX ORDER — LEVOFLOXACIN 5 MG/ML
750 INJECTION, SOLUTION INTRAVENOUS EVERY 24 HOURS
Status: DISCONTINUED | OUTPATIENT
Start: 2023-11-24 | End: 2023-11-23 | Stop reason: DRUGHIGH

## 2023-11-23 RX ORDER — ENOXAPARIN SODIUM 100 MG/ML
40 INJECTION SUBCUTANEOUS EVERY 24 HOURS
Status: DISCONTINUED | OUTPATIENT
Start: 2023-11-23 | End: 2023-11-24

## 2023-11-23 RX ORDER — DOCUSATE SODIUM 100 MG/1
100 CAPSULE, LIQUID FILLED ORAL 2 TIMES DAILY
COMMUNITY
Start: 2022-09-23

## 2023-11-23 RX ORDER — METOPROLOL SUCCINATE 25 MG/1
12.5 TABLET, EXTENDED RELEASE ORAL 2 TIMES DAILY
COMMUNITY

## 2023-11-23 RX ORDER — ACETAMINOPHEN 160 MG/5ML
650 SOLUTION ORAL EVERY 4 HOURS PRN
Status: DISCONTINUED | OUTPATIENT
Start: 2023-11-23 | End: 2023-11-27 | Stop reason: HOSPADM

## 2023-11-23 RX ORDER — OXYCODONE HYDROCHLORIDE 5 MG/1
5 TABLET ORAL EVERY 6 HOURS PRN
Status: DISCONTINUED | OUTPATIENT
Start: 2023-11-23 | End: 2023-11-24

## 2023-11-23 RX ORDER — SERTRALINE HYDROCHLORIDE 50 MG/1
75 TABLET, FILM COATED ORAL DAILY
COMMUNITY

## 2023-11-23 RX ORDER — LISINOPRIL 40 MG/1
40 TABLET ORAL DAILY
COMMUNITY

## 2023-11-23 RX ORDER — FAMOTIDINE 20 MG/1
20 TABLET, FILM COATED ORAL DAILY
COMMUNITY

## 2023-11-23 RX ORDER — ACETAMINOPHEN 325 MG/1
650 TABLET ORAL EVERY 4 HOURS PRN
Status: DISCONTINUED | OUTPATIENT
Start: 2023-11-23 | End: 2023-11-27 | Stop reason: HOSPADM

## 2023-11-23 RX ORDER — ACETAMINOPHEN 500 MG
2000 TABLET ORAL DAILY
COMMUNITY
End: 2023-11-27 | Stop reason: HOSPADM

## 2023-11-23 RX ORDER — ONDANSETRON 4 MG/1
4 TABLET, FILM COATED ORAL EVERY 8 HOURS PRN
Status: DISCONTINUED | OUTPATIENT
Start: 2023-11-23 | End: 2023-11-27 | Stop reason: HOSPADM

## 2023-11-23 RX ORDER — LEVOFLOXACIN 5 MG/ML
750 INJECTION, SOLUTION INTRAVENOUS
Status: DISCONTINUED | OUTPATIENT
Start: 2023-11-25 | End: 2023-11-27 | Stop reason: HOSPADM

## 2023-11-23 RX ORDER — POLYETHYLENE GLYCOL 3350 17 G/17G
17 POWDER, FOR SOLUTION ORAL DAILY PRN
Status: DISCONTINUED | OUTPATIENT
Start: 2023-11-23 | End: 2023-11-27 | Stop reason: HOSPADM

## 2023-11-23 RX ORDER — GABAPENTIN 100 MG/1
100 CAPSULE ORAL NIGHTLY
COMMUNITY

## 2023-11-23 RX ORDER — DICLOFENAC SODIUM 10 MG/G
4 GEL TOPICAL 2 TIMES DAILY PRN
COMMUNITY

## 2023-11-23 RX ORDER — SODIUM CHLORIDE 1000 MG
1 TABLET, SOLUBLE MISCELLANEOUS DAILY
COMMUNITY

## 2023-11-23 RX ORDER — BUSPIRONE HYDROCHLORIDE 15 MG/1
10 TABLET ORAL 3 TIMES DAILY
COMMUNITY
Start: 2021-08-30

## 2023-11-23 RX ORDER — ACETAMINOPHEN 650 MG/1
650 SUPPOSITORY RECTAL EVERY 4 HOURS PRN
Status: DISCONTINUED | OUTPATIENT
Start: 2023-11-23 | End: 2023-11-27 | Stop reason: HOSPADM

## 2023-11-23 RX ORDER — ONDANSETRON HYDROCHLORIDE 2 MG/ML
4 INJECTION, SOLUTION INTRAVENOUS EVERY 8 HOURS PRN
Status: DISCONTINUED | OUTPATIENT
Start: 2023-11-23 | End: 2023-11-27 | Stop reason: HOSPADM

## 2023-11-23 RX ORDER — AMLODIPINE BESYLATE AND BENAZEPRIL HYDROCHLORIDE 2.5; 1 MG/1; MG/1
1 CAPSULE ORAL
COMMUNITY
Start: 2021-07-14

## 2023-11-23 RX ORDER — NITROFURANTOIN MACROCRYSTALS 50 MG/1
50 CAPSULE ORAL DAILY
COMMUNITY

## 2023-11-23 RX ORDER — LEVOFLOXACIN 5 MG/ML
750 INJECTION, SOLUTION INTRAVENOUS ONCE
Status: COMPLETED | OUTPATIENT
Start: 2023-11-23 | End: 2023-11-23

## 2023-11-23 RX ORDER — DIAZEPAM 5 MG/1
5 TABLET ORAL NIGHTLY
COMMUNITY
Start: 2021-07-14

## 2023-11-23 RX ORDER — LEVOTHYROXINE SODIUM 175 UG/1
175 TABLET ORAL
COMMUNITY

## 2023-11-23 RX ORDER — ASPIRIN 81 MG/1
1 TABLET ORAL DAILY
COMMUNITY

## 2023-11-23 RX ORDER — OXYBUTYNIN CHLORIDE 5 MG/1
5 TABLET ORAL DAILY
COMMUNITY

## 2023-11-23 RX ORDER — MORPHINE SULFATE 2 MG/ML
2 INJECTION, SOLUTION INTRAMUSCULAR; INTRAVENOUS ONCE
Status: COMPLETED | OUTPATIENT
Start: 2023-11-23 | End: 2023-11-23

## 2023-11-23 RX ORDER — ACETAMINOPHEN 325 MG/1
975 TABLET ORAL ONCE
Status: COMPLETED | OUTPATIENT
Start: 2023-11-23 | End: 2023-11-23

## 2023-11-23 RX ORDER — ONDANSETRON HYDROCHLORIDE 2 MG/ML
4 INJECTION, SOLUTION INTRAVENOUS ONCE
Status: COMPLETED | OUTPATIENT
Start: 2023-11-23 | End: 2023-11-23

## 2023-11-23 RX ORDER — MECLIZINE HYDROCHLORIDE 25 MG/1
12.5 TABLET ORAL 3 TIMES DAILY PRN
COMMUNITY

## 2023-11-23 RX ORDER — ARIPIPRAZOLE 2 MG/1
2 TABLET ORAL DAILY
COMMUNITY

## 2023-11-23 RX ORDER — LEVOTHYROXINE SODIUM 200 UG/1
200 TABLET ORAL
COMMUNITY

## 2023-11-23 RX ADMIN — ONDANSETRON 4 MG: 2 INJECTION INTRAMUSCULAR; INTRAVENOUS at 12:17

## 2023-11-23 RX ADMIN — LEVOFLOXACIN 750 MG: 5 INJECTION, SOLUTION INTRAVENOUS at 12:18

## 2023-11-23 RX ADMIN — OXYCODONE HYDROCHLORIDE 5 MG: 5 TABLET ORAL at 21:28

## 2023-11-23 RX ADMIN — MORPHINE SULFATE 2 MG: 2 INJECTION, SOLUTION INTRAMUSCULAR; INTRAVENOUS at 12:18

## 2023-11-23 RX ADMIN — ENOXAPARIN SODIUM 40 MG: 40 INJECTION SUBCUTANEOUS at 22:34

## 2023-11-23 RX ADMIN — ACETAMINOPHEN 975 MG: 325 TABLET ORAL at 09:55

## 2023-11-23 SDOH — SOCIAL STABILITY: SOCIAL INSECURITY: ARE THERE ANY APPARENT SIGNS OF INJURIES/BEHAVIORS THAT COULD BE RELATED TO ABUSE/NEGLECT?: UNABLE TO ASSESS

## 2023-11-23 SDOH — SOCIAL STABILITY: SOCIAL INSECURITY: DO YOU FEEL UNSAFE GOING BACK TO THE PLACE WHERE YOU ARE LIVING?: UNABLE TO ASSESS

## 2023-11-23 SDOH — SOCIAL STABILITY: SOCIAL INSECURITY: HAS ANYONE EVER THREATENED TO HURT YOUR FAMILY OR YOUR PETS?: UNABLE TO ASSESS

## 2023-11-23 SDOH — SOCIAL STABILITY: SOCIAL INSECURITY: DOES ANYONE TRY TO KEEP YOU FROM HAVING/CONTACTING OTHER FRIENDS OR DOING THINGS OUTSIDE YOUR HOME?: UNABLE TO ASSESS

## 2023-11-23 SDOH — SOCIAL STABILITY: SOCIAL INSECURITY: WERE YOU ABLE TO COMPLETE ALL THE BEHAVIORAL HEALTH SCREENINGS?: NO

## 2023-11-23 SDOH — SOCIAL STABILITY: SOCIAL INSECURITY: ABUSE: ADULT

## 2023-11-23 SDOH — SOCIAL STABILITY: SOCIAL INSECURITY: HAVE YOU HAD THOUGHTS OF HARMING ANYONE ELSE?: UNABLE TO ASSESS

## 2023-11-23 SDOH — SOCIAL STABILITY: SOCIAL INSECURITY: ARE YOU OR HAVE YOU BEEN THREATENED OR ABUSED PHYSICALLY, EMOTIONALLY, OR SEXUALLY BY ANYONE?: UNABLE TO ASSESS

## 2023-11-23 SDOH — SOCIAL STABILITY: SOCIAL INSECURITY: DO YOU FEEL ANYONE HAS EXPLOITED OR TAKEN ADVANTAGE OF YOU FINANCIALLY OR OF YOUR PERSONAL PROPERTY?: UNABLE TO ASSESS

## 2023-11-23 ASSESSMENT — PATIENT HEALTH QUESTIONNAIRE - PHQ9
2. FEELING DOWN, DEPRESSED OR HOPELESS: NOT AT ALL
1. LITTLE INTEREST OR PLEASURE IN DOING THINGS: NOT AT ALL
SUM OF ALL RESPONSES TO PHQ9 QUESTIONS 1 & 2: 0

## 2023-11-23 ASSESSMENT — ACTIVITIES OF DAILY LIVING (ADL)
FEEDING YOURSELF: INDEPENDENT
TOILETING: NEEDS ASSISTANCE
PATIENT'S MEMORY ADEQUATE TO SAFELY COMPLETE DAILY ACTIVITIES?: NO
ASSISTIVE_DEVICE: WHEELCHAIR
HEARING - RIGHT EAR: DIFFICULTY WITH NOISE
JUDGMENT_ADEQUATE_SAFELY_COMPLETE_DAILY_ACTIVITIES: NO
GROOMING: NEEDS ASSISTANCE
ADEQUATE_TO_COMPLETE_ADL: YES
BATHING: NEEDS ASSISTANCE
WALKS IN HOME: DEPENDENT
LACK_OF_TRANSPORTATION: PATIENT DECLINED
DRESSING YOURSELF: NEEDS ASSISTANCE
HEARING - LEFT EAR: DIFFICULTY WITH NOISE

## 2023-11-23 ASSESSMENT — LIFESTYLE VARIABLES
HOW OFTEN DO YOU HAVE A DRINK CONTAINING ALCOHOL: PATIENT DECLINED
HAVE PEOPLE ANNOYED YOU BY CRITICIZING YOUR DRINKING: NO
HOW MANY STANDARD DRINKS CONTAINING ALCOHOL DO YOU HAVE ON A TYPICAL DAY: PATIENT DECLINED
HAVE YOU EVER FELT YOU SHOULD CUT DOWN ON YOUR DRINKING: NO
AUDIT-C TOTAL SCORE: -1
EVER FELT BAD OR GUILTY ABOUT YOUR DRINKING: NO
EVER HAD A DRINK FIRST THING IN THE MORNING TO STEADY YOUR NERVES TO GET RID OF A HANGOVER: NO
HOW OFTEN DO YOU HAVE 6 OR MORE DRINKS ON ONE OCCASION: PATIENT DECLINED
SKIP TO QUESTIONS 9-10: 0
AUDIT-C TOTAL SCORE: -1
REASON UNABLE TO ASSESS: NO

## 2023-11-23 ASSESSMENT — COGNITIVE AND FUNCTIONAL STATUS - GENERAL
WALKING IN HOSPITAL ROOM: TOTAL
TURNING FROM BACK TO SIDE WHILE IN FLAT BAD: A LOT
DAILY ACTIVITIY SCORE: 14
MOVING TO AND FROM BED TO CHAIR: TOTAL
STANDING UP FROM CHAIR USING ARMS: TOTAL
HELP NEEDED FOR BATHING: A LOT
TOILETING: TOTAL
MOVING FROM LYING ON BACK TO SITTING ON SIDE OF FLAT BED WITH BEDRAILS: A LOT
DRESSING REGULAR LOWER BODY CLOTHING: A LOT
CLIMB 3 TO 5 STEPS WITH RAILING: TOTAL
PATIENT BASELINE BEDBOUND: NO
DRESSING REGULAR UPPER BODY CLOTHING: A LOT
MOBILITY SCORE: 8
PERSONAL GROOMING: A LITTLE

## 2023-11-23 ASSESSMENT — PAIN DESCRIPTION - LOCATION: LOCATION: LEG

## 2023-11-23 ASSESSMENT — COLUMBIA-SUICIDE SEVERITY RATING SCALE - C-SSRS
1. IN THE PAST MONTH, HAVE YOU WISHED YOU WERE DEAD OR WISHED YOU COULD GO TO SLEEP AND NOT WAKE UP?: NO
6. HAVE YOU EVER DONE ANYTHING, STARTED TO DO ANYTHING, OR PREPARED TO DO ANYTHING TO END YOUR LIFE?: NO
2. HAVE YOU ACTUALLY HAD ANY THOUGHTS OF KILLING YOURSELF?: NO

## 2023-11-23 ASSESSMENT — PAIN - FUNCTIONAL ASSESSMENT
PAIN_FUNCTIONAL_ASSESSMENT: 0-10

## 2023-11-23 ASSESSMENT — PAIN SCALES - GENERAL
PAINLEVEL_OUTOF10: 10 - WORST POSSIBLE PAIN
PAINLEVEL_OUTOF10: 3
PAINLEVEL_OUTOF10: 0 - NO PAIN
PAINLEVEL_OUTOF10: 8
PAINLEVEL_OUTOF10: 8

## 2023-11-23 ASSESSMENT — PAIN DESCRIPTION - ORIENTATION: ORIENTATION: LEFT

## 2023-11-23 NOTE — H&P
Medical Group History and Physical    ASSESSMENT & PLAN:     Mechanical fall  Left femoral neck fracture  - Presented from SNF after fall, unknown mechanism, patient unable to provide history as she was found on the ground per EMS  - X-ray pelvis and left femur consistent with left femoral neck superiorly displaced fracture  - Orthopedic surgery consulted  - Presurgical optimization and clearance as per below  - PT/OT postoperatively  - VT prophylaxis with Enoxparin subcutaneous  - Pain medications as per above  - NPO at midnight    Community-acquired bacterial pneumonia, gram negative  - Left upper lobe opacity on chest x-ray on admission with slight the elevated WBC count.  - Continue levofloxacin at this time    Leukocytosis, reactive to above    Dementia without behavioral disturbances  - Seems to be at baseline, patient unable to provide any history    Essential hypertension  Hypertensive urgency  - Blood pressure elevated likely in setting of pain and fall along with dementia being in the hospital  - Blood pressure improved with pain medications  - Resume home meds once resulted    Generalized anxiety  GERD  - Resume home medications once reconciled    Unknown history of CHF  - Per previous documentation, patient with history of CHF but no echo found to further clarify details    Surgical Risk Assessment:  Updated Revised Cardiac Risk Index (RCRI):  - High risk surgery: 0  - Ischemic heart disease history: 0  - CHF history: 1  - TIA/CVA history: 0  - Pre-operative treatement with insulin: 0  - Pre-op creatinine>2.0: 0    1 point = 6.0% 30 day risk of both intra-/post-operative major adverse cardiovascular events including cardiac arrest, MI, arrhythmias, and/or even death.  Patient appears medically optimized.    Re-assess patient post-operatively for hypotension, aspiration, altered mental status, or any other change in status from baseline for possible escalation of monitoring in telemetry or ICU  management.  Post-operative VTE prophylaxis and pain management per surgical service.    VTE prophylaxis: Enoxaparin subcutaneous pre-operatively    Per paperwork brought in from [facility], patient is a DNR CCA.    ---Of note, this documentation is completed using the Dragon Dictation system (voice recognition software). There may be spelling and/or grammatical errors that were not corrected prior to final submission.---    Jeff Scott MD    HISTORY OF PRESENT ILLNESS:   Chief Complaint: Fall with left hip pain    History Of Present Illness:    Jacqueline Sandoval is a 88 y.o. female with a significant past medical history of hypertension, CHF, anxiety, depression, hypothyroidism, dementia that presented from the skilled nursing facility after a fall.  Patient was found on the floor next to the bed in the room per EMS and ED staff.    On my exam patient was unable to tell me provide much of a history.  She continued having continued complaints of diffuse pain in her back and legs.  She was able to tell me her name otherwise alert and oriented x1 only.  Patient was unable to tell me or provide any other history.    ED course:  - Vitals: Temperature 97.3, HR 69, /73, RR 18 saturating 90% on room air  - Labs: WBC count of 15.2 otherwise unremarkable CBC and CMP.  - Imaging: Pelvis x-ray with superior displaced left femoral neck fracture that was confirmed on left femur x-ray.  Chest x-ray reviewed with a left upper lobe opacity.  CT head without contrast with no acute findings.    ED spoke with on-call orthopedic surgery who recommended admission to medicine with plans for surgical intervention.     Review of systems: Unable to obtain 10 point review of systems with patient's dementia.      PAST HISTORIES:   Below history is obtained via chart review, patient was unable to provide.    Past Medical History: Dementia, hypothyroidism, hypertension, CHF, anxiety, recurrent falls    Past Surgical History: Left  "humerus ORIF      Social History: Unclear social history, patient unable to tell me if she has appears tobacco, current alcohol and illicit drug use history.  Long-term resident of [facility].    Family History: Patient unable to provide.     Allergies: Penicillins and Sulfa (sulfonamide antibiotics)    OBJECTIVE:     Last Recorded Vitals:  Vitals:    11/23/23 0938 11/23/23 1100 11/23/23 1200   BP: (!) 186/73 (!) 190/78 164/73   BP Location: Right arm     Patient Position: Sitting     Pulse: 69 75 78   Resp: 18 18 18   Temp: 36.3 °C (97.3 °F)     TempSrc: Tympanic     SpO2: 90% 91% 92%   Weight: 68 kg (149 lb 14.6 oz)     Height: 1.575 m (5' 2\")       Last I/O:  No intake/output data recorded.    Physical Exam  Vitals reviewed.   Constitutional:       Appearance: Normal appearance.   HENT:      Head: Normocephalic and atraumatic.      Mouth/Throat:      Mouth: Mucous membranes are moist.      Comments: Unable to examined oral mucosa as patient would not open mouth  Eyes:      Extraocular Movements: Extraocular movements intact.      Conjunctiva/sclera: Conjunctivae normal.      Pupils: Pupils are equal, round, and reactive to light.   Cardiovascular:      Rate and Rhythm: Normal rate and regular rhythm.      Pulses: Normal pulses.      Heart sounds: Normal heart sounds.      Comments: Bilateral dorsalis pedis pulses 2+ palpable, bilateral radial artery pulses 2+ palpable.  Pulmonary:      Effort: Pulmonary effort is normal. No respiratory distress.      Breath sounds: Normal breath sounds. No wheezing.   Abdominal:      General: Bowel sounds are normal.      Palpations: Abdomen is soft.   Musculoskeletal:      Cervical back: Normal range of motion and neck supple.      Comments: Range of motion intact in the bilateral upper extremities and right lower extremity.  Left lower extremity range of motion not examined with hip fracture.   Neurological:      Mental Status: She is alert. Mental status is at baseline. "      Comments: Alert and oriented x1 (to self only).  Otherwise unable to check neuro exam with patient's current medical condition       Scheduled Medications  levoFLOXacin, 750 mg, intravenous, Once    PRN Medications    Continuous Medications     Outpatient Medications:  Prior to Admission medications    Not on File     LABS AND IMAGING:     Labs:  Results from last 7 days   Lab Units 11/23/23  0944   WBC AUTO x10*3/uL 15.2*   RBC AUTO x10*6/uL 3.46*   HEMOGLOBIN g/dL 11.5*   HEMATOCRIT % 34.7*   MCV fL 100   MCH pg 33.2   MCHC g/dL 33.1   RDW % 13.1   PLATELETS AUTO x10*3/uL 236     Results from last 7 days   Lab Units 11/23/23  0944   SODIUM mmol/L 139   POTASSIUM mmol/L 4.0   CHLORIDE mmol/L 107   CO2 mmol/L 22   BUN mg/dL 24*   CREATININE mg/dL 1.12*   GLUCOSE mg/dL 117*   PROTEIN TOTAL g/dL 7.1   CALCIUM mg/dL 9.5   BILIRUBIN TOTAL mg/dL 0.4   ALK PHOS U/L 84   AST U/L 24   ALT U/L 27     Results from last 7 days   Lab Units 11/23/23  0944   MAGNESIUM mg/dL 1.90     Results from last 7 days   Lab Units 11/23/23  0944   TROPHS ng/L 9       Imaging:  XR femur left 2+ views, XR pelvis 1-2 views  Narrative: Interpreted By:  Arlene Kelley,   STUDY:  XR FEMUR LEFT 2+ VIEWS; XR PELVIS 1-2 VIEWS;  11/23/2023 11:06 am      INDICATION:  Signs/Symptoms:fall/ short and rotated.      COMPARISON:  None.      ACCESSION NUMBER(S):  [id number]; [id number]      ORDERING CLINICIAN:  JEANNETTE MEYER      FINDINGS:  AP view of the pelvis and multiple views of the left femur are  obtained. Superiorly displaced left femoral neck fracture. No  dislocation. The distal left femur appears grossly unremarkable.      Degenerative changes of the visualized left knee. There is a      Impression: Superiorly displaced left femoral neck fracture. No dislocation.          Signed by: Arlene Kelley 11/23/2023 11:26 AM  Dictation workstation:   [id number]  CT head wo IV contrast  Narrative: Interpreted By:  Arlene Kelley,   STUDY:  CT HEAD  WO IV CONTRAST;  11/23/2023 11:10 am      INDICATION:  unwitnessed fall.      COMPARISON:  None.      ACCESSION NUMBER(S):  [id number]      ORDERING CLINICIAN:  JEANNETTE MEYER      TECHNIQUE:  Noncontrast axial CT scan of head was performed. Angled reformats in  brain and bone windows were generated. The images were reviewed in  bone, brain, blood and soft tissue windows.      FINDINGS:  The ventricles, cisterns and sulci are prominent, consistent with  mild-to-moderate diffuse volume loss. There are areas of moderate to  severe nonspecific white matter hypodensity, which are probably  age-related or microvascular in nature.      Gray-white differentiation is intact and there is no evidence of  acute cortical infarct. No mass, mass effect or midline shift is  seen. There is no evidence of hemorrhage.      The visualized paranasal sinuses are clear.          Impression: No evidence of acute cortical infarct or intracranial hemorrhage.  Chronic changes as described.      MACRO:  None      Signed by: Arlene Kelley 11/23/2023 11:24 AM  Dictation workstation:   [id number]  XR chest 1 view  Narrative: Interpreted By:  Arlene Kelley,   STUDY:  XR CHEST 1 VIEW;  11/23/2023 11:06 am      INDICATION:  Signs/Symptoms:fall.      COMPARISON:  None.      ACCESSION NUMBER(S):  [id number]      ORDERING CLINICIAN:  JEANNETTE MEYER      FINDINGS:  AP portable view of the chest is obtained.  Limited exam due to  portable nature. Magnified cardiac silhouette. Left upper lobe  asymmetrical density may represent infiltrate or summation of  shadows. Mild interstitial prominence. No effusion or pneumothorax.      Impression: 1. Asymmetrical left upper lobe opacity may represent infiltrate.  Consider short-term follow-up to re-evaluate.              MACRO:  None      Signed by: Arlene Kelley 11/23/2023 11:21 AM  Dictation workstation:   [id number]

## 2023-11-23 NOTE — ED PROVIDER NOTES
"HPI   Chief Complaint   Patient presents with    Fall     Patient found on floor next to bed in the room.  Patient is not on thinners.  Patient does not show signs of hitting head.  Patient complaining of body aches.  Patient has dementia.\"         88-year-old female past medical history significant for anxiety, anemia, dementia, falls, fibromyalgia, heart failure, hypertension presents emergency department for a fall at the nursing home.  Per EMS personnel patient was found on the floor.  Patient is on baby aspirin.  Patient is unable to articulate how she fell.  Patient is complaining that her left leg hurts.  Patient has no other complaints.       History provided by:  EMS personnel  History limited by:  Dementia   used: No                        No data recorded                Patient History   Past Medical History:   Diagnosis Date    Anemia     Anxiety     Dementia (CMS/[facility])     Falls     Fibromyalgia     Heart failure (CMS/[facility])     Hypertension      Past Surgical History:   Procedure Laterality Date    OTHER SURGICAL HISTORY  10/05/2022    Humerus fracture repair     No family history on file.  Social History     Tobacco Use    Smoking status: Never    Smokeless tobacco: Never   Vaping Use    Vaping Use: Never used   Substance Use Topics    Alcohol use: Never    Drug use: Never       Physical Exam   ED Triage Vitals [11/23/23 0938]   Temp Heart Rate Resp BP   36.3 °C (97.3 °F) 69 18 --      SpO2 Temp Source Heart Rate Source Patient Position   90 % Tympanic Monitor Sitting      BP Location FiO2 (%)     Right arm --       Physical Exam  Vitals and nursing note reviewed.   HENT:      Head: Normocephalic and atraumatic.   Cardiovascular:      Rate and Rhythm: Normal rate and regular rhythm.      Pulses: Normal pulses.   Pulmonary:      Effort: Pulmonary effort is normal.   Abdominal:      Palpations: Abdomen is soft.   Musculoskeletal:         General: Deformity present.      Comments: Has a " shortening and rotation of the left lower extremity   Skin:     Capillary Refill: Capillary refill takes less than 2 seconds.   Neurological:      Mental Status: She is alert. Mental status is at baseline.         ED Course & MDM   Diagnoses as of 11/23/23 1157   Left displaced femoral neck fracture (CMS/HCC)   Fall, initial encounter   Pneumonia of left upper lobe due to infectious organism       Medical Decision Making  88-year-old female past medical history significant for anxiety, anemia, dementia, falls, fibromyalgia, heart failure, hypertension presents emergency department for a fall at the nursing home.  Per EMS personnel patient was found on the floor.  Patient is on baby aspirin.  Patient is unable to articulate how she fell.  Patient is complaining that her left leg hurts.  Patient has no other complaints.     Patient seen examined emergency department; patient is chronically ill appearance but not appear acute distress.  Clinical exam significant for shortening and slight rotation of the left lower extremity.  Patient does experience pain with palpation of the left hip.  The clear bilaterally without any adventitious noises.  Heart regular rate and rhythm no murmur noted.  Will obtain CT of the head, as well as x-ray of the left hip and femur.  Will also obtain CBC, CMP, chest x-ray, lactic and type and screen.  Will also order EKG.  Patient's pain treated with acetaminophen.    EKG at 09:53 with ventricular rate of 73, as interpreted by me, shows a normal rate and rhythm, normal axis, normal intervals. And normal ST and T wave pattern with no evidence of acute ischemia or other acute findings    CBC with mild leukocytosis, CMP significant for glucose of 117 BUN/creatinine 24/1.12.  Magnesium 1.90 troponin is 9.  Chest x-ray with asymmetrical left lobe opacity which might be an infiltrate.  Imaging of right hip and femur does to go superiorly displaced left femoral neck fracture without  dislocation.    1145 -patient discussed with her son, Mj, who is her power of .  I did explain to him the patient is having femoral neck fracture at this time will need to be brought to the hospital.  He is agreeable with admission at this time.    Given patient's leukocytosis we will start patient on Levaquin for left lung infiltrate.  I did discuss patient with orthopedics on-call, Dr. Koch at 1205.  And then talk to the hospitalist team, Dr. Scott who accept patient for admission.  Given 2 mg of IV morphine for pain.      Labs Reviewed   CBC WITH AUTO DIFFERENTIAL - Abnormal       Result Value    WBC 15.2 (*)     nRBC 0.0      RBC 3.46 (*)     Hemoglobin 11.5 (*)     Hematocrit 34.7 (*)           MCH 33.2      MCHC 33.1      RDW 13.1      Platelets 236      Neutrophils % 87.5      Immature Granulocytes %, Automated 0.9      Lymphocytes % 6.6      Monocytes % 4.2      Eosinophils % 0.7      Basophils % 0.1      Neutrophils Absolute 13.31 (*)     Immature Granulocytes Absolute, Automated 0.13      Lymphocytes Absolute 1.01      Monocytes Absolute 0.64      Eosinophils Absolute 0.11      Basophils Absolute 0.02     COMPREHENSIVE METABOLIC PANEL - Abnormal    Glucose 117 (*)     Sodium 139      Potassium 4.0      Chloride 107      Bicarbonate 22      Anion Gap 14      Urea Nitrogen 24 (*)     Creatinine 1.12 (*)     eGFR 47 (*)     Calcium 9.5      Albumin 3.8      Alkaline Phosphatase 84      Total Protein 7.1      AST 24      Bilirubin, Total 0.4      ALT 27     MAGNESIUM - Normal    Magnesium 1.90     TROPONIN I, HIGH SENSITIVITY - Normal    Troponin I, High Sensitivity 9      Narrative:     Less than 99th percentile of normal range cutoff-  Female and children under 18 years old <14 ng/L; Male <21 ng/L: Negative  Repeat testing should be performed if clinically indicated.     Female and children under 18 years old 14-50 ng/L; Male 21-50 ng/L:  Consistent with possible cardiac damage and possible  increased clinical   risk. Serial measurements may help to assess extent of myocardial damage.     >50 ng/L: Consistent with cardiac damage, increased clinical risk and  myocardial infarction. Serial measurements may help assess extent of   myocardial damage.      NOTE: Children less than 1 year old may have higher baseline troponin   levels and results should be interpreted in conjunction with the overall   clinical context.     NOTE: Troponin I testing is performed using a different   testing methodology at [facility] than at other   [facility]. Direct result comparisons should only   be made within the same method.   TYPE AND SCREEN    ABO TYPE A      Rh TYPE POS      ANTIBODY SCREEN NEG     URINALYSIS WITH REFLEX MICROSCOPIC AND CULTURE    Narrative:     The following orders were created for panel order Urinalysis with Reflex Microscopic and Culture.  Procedure                               Abnormality         Status                     ---------                               -----------         ------                     Urinalysis with Reflex M...[[id number]]                      In process                 Extra Urine Gray Tube[[id number]]                            In process                   Please view results for these tests on the individual orders.   URINALYSIS WITH REFLEX MICROSCOPIC AND CULTURE   EXTRA URINE GRAY TUBE   URINALYSIS MICROSCOPIC WITH REFLEX CULTURE       CT head wo IV contrast   Final Result   No evidence of acute cortical infarct or intracranial hemorrhage.   Chronic changes as described.        MACRO:   None        Signed by: Arlene Kelley 11/23/2023 11:24 AM   Dictation workstation:   [id number]      XR femur left 2+ views   Final Result   Superiorly displaced left femoral neck fracture. No dislocation.             Signed by: Arlene Kelley 11/23/2023 11:26 AM   Dictation workstation:   [id number]      XR chest 1 view   Final Result   1. Asymmetrical left upper lobe  opacity may represent infiltrate.   Consider short-term follow-up to re-evaluate.                  MACRO:   None        Signed by: Arlene Kelley 11/23/2023 11:21 AM   Dictation workstation:   [id number]      XR pelvis 1-2 views   Final Result   Superiorly displaced left femoral neck fracture. No dislocation.             Signed by: Arlene Kelley 11/23/2023 11:26 AM   Dictation workstation:   [id number]            Procedure  Procedures     Parris Watts, JOCELIN-CNP  11/23/23 1213

## 2023-11-24 ENCOUNTER — ANESTHESIA (OUTPATIENT)
Dept: OPERATING ROOM | Facility: HOSPITAL | Age: 88
DRG: 521 | End: 2023-11-24
Payer: COMMERCIAL

## 2023-11-24 ENCOUNTER — ANESTHESIA EVENT (OUTPATIENT)
Dept: OPERATING ROOM | Facility: HOSPITAL | Age: 88
DRG: 521 | End: 2023-11-24
Payer: COMMERCIAL

## 2023-11-24 ENCOUNTER — APPOINTMENT (OUTPATIENT)
Dept: RADIOLOGY | Facility: HOSPITAL | Age: 88
DRG: 521 | End: 2023-11-24
Payer: COMMERCIAL

## 2023-11-24 LAB
ANION GAP SERPL CALC-SCNC: 13 MMOL/L (ref 10–20)
BASOPHILS # BLD AUTO: 0.04 X10*3/UL (ref 0–0.1)
BASOPHILS NFR BLD AUTO: 0.3 %
BUN SERPL-MCNC: 31 MG/DL (ref 6–23)
CALCIUM SERPL-MCNC: 9.2 MG/DL (ref 8.6–10.3)
CHLORIDE SERPL-SCNC: 103 MMOL/L (ref 98–107)
CO2 SERPL-SCNC: 24 MMOL/L (ref 21–32)
CREAT SERPL-MCNC: 1.44 MG/DL (ref 0.5–1.05)
EOSINOPHIL # BLD AUTO: 0.05 X10*3/UL (ref 0–0.4)
EOSINOPHIL NFR BLD AUTO: 0.4 %
ERYTHROCYTE [DISTWIDTH] IN BLOOD BY AUTOMATED COUNT: 13.5 % (ref 11.5–14.5)
GFR SERPL CREATININE-BSD FRML MDRD: 35 ML/MIN/1.73M*2
GLUCOSE SERPL-MCNC: 105 MG/DL (ref 74–99)
HCT VFR BLD AUTO: 34.6 % (ref 36–46)
HGB BLD-MCNC: 11.5 G/DL (ref 12–16)
IMM GRANULOCYTES # BLD AUTO: 0.07 X10*3/UL (ref 0–0.5)
IMM GRANULOCYTES NFR BLD AUTO: 0.6 % (ref 0–0.9)
LYMPHOCYTES # BLD AUTO: 1.34 X10*3/UL (ref 0.8–3)
LYMPHOCYTES NFR BLD AUTO: 10.7 %
MCH RBC QN AUTO: 33.2 PG (ref 26–34)
MCHC RBC AUTO-ENTMCNC: 33.2 G/DL (ref 32–36)
MCV RBC AUTO: 100 FL (ref 80–100)
MONOCYTES # BLD AUTO: 0.89 X10*3/UL (ref 0.05–0.8)
MONOCYTES NFR BLD AUTO: 7.1 %
NEUTROPHILS # BLD AUTO: 10.16 X10*3/UL (ref 1.6–5.5)
NEUTROPHILS NFR BLD AUTO: 80.9 %
NRBC BLD-RTO: 0 /100 WBCS (ref 0–0)
PLATELET # BLD AUTO: 212 X10*3/UL (ref 150–450)
POTASSIUM SERPL-SCNC: 4.2 MMOL/L (ref 3.5–5.3)
RBC # BLD AUTO: 3.46 X10*6/UL (ref 4–5.2)
SODIUM SERPL-SCNC: 136 MMOL/L (ref 136–145)
WBC # BLD AUTO: 12.6 X10*3/UL (ref 4.4–11.3)

## 2023-11-24 PROCEDURE — 2500000004 HC RX 250 GENERAL PHARMACY W/ HCPCS (ALT 636 FOR OP/ED): Performed by: STUDENT IN AN ORGANIZED HEALTH CARE EDUCATION/TRAINING PROGRAM

## 2023-11-24 PROCEDURE — 3700000002 HC GENERAL ANESTHESIA TIME - EACH INCREMENTAL 1 MINUTE: Performed by: ORTHOPAEDIC SURGERY

## 2023-11-24 PROCEDURE — 2500000004 HC RX 250 GENERAL PHARMACY W/ HCPCS (ALT 636 FOR OP/ED)

## 2023-11-24 PROCEDURE — 36415 COLL VENOUS BLD VENIPUNCTURE: CPT | Performed by: STUDENT IN AN ORGANIZED HEALTH CARE EDUCATION/TRAINING PROGRAM

## 2023-11-24 PROCEDURE — 72170 X-RAY EXAM OF PELVIS: CPT | Performed by: RADIOLOGY

## 2023-11-24 PROCEDURE — 99232 SBSQ HOSP IP/OBS MODERATE 35: CPT | Performed by: STUDENT IN AN ORGANIZED HEALTH CARE EDUCATION/TRAINING PROGRAM

## 2023-11-24 PROCEDURE — 80048 BASIC METABOLIC PNL TOTAL CA: CPT | Performed by: STUDENT IN AN ORGANIZED HEALTH CARE EDUCATION/TRAINING PROGRAM

## 2023-11-24 PROCEDURE — 27236 TREAT THIGH FRACTURE: CPT | Performed by: PHYSICIAN ASSISTANT

## 2023-11-24 PROCEDURE — 3700000001 HC GENERAL ANESTHESIA TIME - INITIAL BASE CHARGE: Performed by: ORTHOPAEDIC SURGERY

## 2023-11-24 PROCEDURE — 85025 COMPLETE CBC W/AUTO DIFF WBC: CPT | Performed by: STUDENT IN AN ORGANIZED HEALTH CARE EDUCATION/TRAINING PROGRAM

## 2023-11-24 PROCEDURE — 2500000001 HC RX 250 WO HCPCS SELF ADMINISTERED DRUGS (ALT 637 FOR MEDICARE OP): Performed by: STUDENT IN AN ORGANIZED HEALTH CARE EDUCATION/TRAINING PROGRAM

## 2023-11-24 PROCEDURE — 2500000005 HC RX 250 GENERAL PHARMACY W/O HCPCS

## 2023-11-24 PROCEDURE — 2500000004 HC RX 250 GENERAL PHARMACY W/ HCPCS (ALT 636 FOR OP/ED): Performed by: ORTHOPAEDIC SURGERY

## 2023-11-24 PROCEDURE — 2500000004 HC RX 250 GENERAL PHARMACY W/ HCPCS (ALT 636 FOR OP/ED): Performed by: NURSE ANESTHETIST, CERTIFIED REGISTERED

## 2023-11-24 PROCEDURE — 1200000002 HC GENERAL ROOM WITH TELEMETRY DAILY

## 2023-11-24 PROCEDURE — A4217 STERILE WATER/SALINE, 500 ML: HCPCS | Performed by: ORTHOPAEDIC SURGERY

## 2023-11-24 PROCEDURE — C1776 JOINT DEVICE (IMPLANTABLE): HCPCS | Performed by: ORTHOPAEDIC SURGERY

## 2023-11-24 PROCEDURE — 99222 1ST HOSP IP/OBS MODERATE 55: CPT | Performed by: ORTHOPAEDIC SURGERY

## 2023-11-24 PROCEDURE — 72170 X-RAY EXAM OF PELVIS: CPT

## 2023-11-24 PROCEDURE — 3600000005 HC OR TIME - INITIAL BASE CHARGE - PROCEDURE LEVEL FIVE: Performed by: ORTHOPAEDIC SURGERY

## 2023-11-24 PROCEDURE — 2500000004 HC RX 250 GENERAL PHARMACY W/ HCPCS (ALT 636 FOR OP/ED): Performed by: ANESTHESIOLOGY

## 2023-11-24 PROCEDURE — 7100000002 HC RECOVERY ROOM TIME - EACH INCREMENTAL 1 MINUTE: Performed by: ORTHOPAEDIC SURGERY

## 2023-11-24 PROCEDURE — 2780000003 HC OR 278 NO HCPCS: Performed by: ORTHOPAEDIC SURGERY

## 2023-11-24 PROCEDURE — 3600000010 HC OR TIME - EACH INCREMENTAL 1 MINUTE - PROCEDURE LEVEL FIVE: Performed by: ORTHOPAEDIC SURGERY

## 2023-11-24 PROCEDURE — 0SRS0J9 REPLACEMENT OF LEFT HIP JOINT, FEMORAL SURFACE WITH SYNTHETIC SUBSTITUTE, CEMENTED, OPEN APPROACH: ICD-10-PCS | Performed by: ORTHOPAEDIC SURGERY

## 2023-11-24 PROCEDURE — 7100000001 HC RECOVERY ROOM TIME - INITIAL BASE CHARGE: Performed by: ORTHOPAEDIC SURGERY

## 2023-11-24 PROCEDURE — 2500000005 HC RX 250 GENERAL PHARMACY W/O HCPCS: Performed by: ORTHOPAEDIC SURGERY

## 2023-11-24 PROCEDURE — C1713 ANCHOR/SCREW BN/BN,TIS/BN: HCPCS | Performed by: ORTHOPAEDIC SURGERY

## 2023-11-24 PROCEDURE — 2500000005 HC RX 250 GENERAL PHARMACY W/O HCPCS: Performed by: ANESTHESIOLOGY

## 2023-11-24 PROCEDURE — 2500000005 HC RX 250 GENERAL PHARMACY W/O HCPCS: Performed by: NURSE ANESTHETIST, CERTIFIED REGISTERED

## 2023-11-24 PROCEDURE — 27236 TREAT THIGH FRACTURE: CPT | Performed by: ORTHOPAEDIC SURGERY

## 2023-11-24 PROCEDURE — 2720000007 HC OR 272 NO HCPCS: Performed by: ORTHOPAEDIC SURGERY

## 2023-11-24 DEVICE — IMPLANTABLE DEVICE: Type: IMPLANTABLE DEVICE | Site: HIP | Status: FUNCTIONAL

## 2023-11-24 DEVICE — SIMPLEX® HV IS A FAST-SETTING ACRYLIC RESIN FOR USE IN BONE SURGERY. MIXING THE TWO SEPARATE STERILE COMPONENTS PRODUCES A DUCTILE BONE CEMENT WHICH, AFTER HARDENING, FIXES THE IMPLANT AND TRANSFERS STRESSES PRODUCED DURING MOVEMENT EVENLY TO THE BONE. SIMPLEX® HV CEMENT POWDER ALSO CONTAINS INSOLUBLE ZIRCONIUM DIOXIDE AS AN X-RAY CONTRAST MEDIUM. SIMPLEX® HV DOES NOT EMIT A SIGNAL AND DOES NOT POSE A SAFETY RISK IN A MAGNETIC RESONANCE ENVIRONMENT.
Type: IMPLANTABLE DEVICE | Site: HIP | Status: FUNCTIONAL
Brand: SIMPLEX HV

## 2023-11-24 DEVICE — HEAD, FEMUR V40 26/0 COCR: Type: IMPLANTABLE DEVICE | Site: HIP | Status: FUNCTIONAL

## 2023-11-24 DEVICE — IMPLANTABLE DEVICE
Type: IMPLANTABLE DEVICE | Site: HIP | Status: FUNCTIONAL
Brand: QUIK-USE®

## 2023-11-24 DEVICE — IMPLANTABLE DEVICE
Type: IMPLANTABLE DEVICE | Site: HIP | Status: FUNCTIONAL
Brand: JUGGERKNOT SOFT ANCHORS

## 2023-11-24 RX ORDER — PROCHLORPERAZINE MALEATE 5 MG
10 TABLET ORAL EVERY 6 HOURS PRN
Status: DISCONTINUED | OUTPATIENT
Start: 2023-11-24 | End: 2023-11-27 | Stop reason: HOSPADM

## 2023-11-24 RX ORDER — CEFAZOLIN SODIUM 2 G/100ML
2 INJECTION, SOLUTION INTRAVENOUS EVERY 8 HOURS
Status: COMPLETED | OUTPATIENT
Start: 2023-11-24 | End: 2023-11-25

## 2023-11-24 RX ORDER — LISINOPRIL 10 MG/1
10 TABLET ORAL DAILY
Status: DISCONTINUED | OUTPATIENT
Start: 2023-11-24 | End: 2023-11-27 | Stop reason: HOSPADM

## 2023-11-24 RX ORDER — PHENYLEPHRINE HCL IN 0.9% NACL 1 MG/10 ML
SYRINGE (ML) INTRAVENOUS AS NEEDED
Status: DISCONTINUED | OUTPATIENT
Start: 2023-11-24 | End: 2023-11-24

## 2023-11-24 RX ORDER — TRANEXAMIC ACID 650 MG/1
1950 TABLET ORAL ONCE
Status: DISCONTINUED | OUTPATIENT
Start: 2023-11-24 | End: 2023-11-24

## 2023-11-24 RX ORDER — HYDRALAZINE HYDROCHLORIDE 20 MG/ML
5 INJECTION INTRAMUSCULAR; INTRAVENOUS EVERY 6 HOURS PRN
Status: DISCONTINUED | OUTPATIENT
Start: 2023-11-24 | End: 2023-11-27 | Stop reason: HOSPADM

## 2023-11-24 RX ORDER — LABETALOL HYDROCHLORIDE 5 MG/ML
10 INJECTION, SOLUTION INTRAVENOUS EVERY 6 HOURS PRN
Status: DISCONTINUED | OUTPATIENT
Start: 2023-11-24 | End: 2023-11-27 | Stop reason: HOSPADM

## 2023-11-24 RX ORDER — AMLODIPINE BESYLATE 5 MG/1
2.5 TABLET ORAL DAILY
Status: DISCONTINUED | OUTPATIENT
Start: 2023-11-24 | End: 2023-11-27 | Stop reason: HOSPADM

## 2023-11-24 RX ORDER — PROCHLORPERAZINE 25 MG/1
25 SUPPOSITORY RECTAL EVERY 12 HOURS PRN
Status: DISCONTINUED | OUTPATIENT
Start: 2023-11-24 | End: 2023-11-27 | Stop reason: HOSPADM

## 2023-11-24 RX ORDER — MEPERIDINE HYDROCHLORIDE 25 MG/ML
12.5 INJECTION INTRAMUSCULAR; INTRAVENOUS; SUBCUTANEOUS EVERY 10 MIN PRN
Status: DISCONTINUED | OUTPATIENT
Start: 2023-11-24 | End: 2023-11-24 | Stop reason: HOSPADM

## 2023-11-24 RX ORDER — PROCHLORPERAZINE EDISYLATE 5 MG/ML
10 INJECTION INTRAMUSCULAR; INTRAVENOUS EVERY 6 HOURS PRN
Status: DISCONTINUED | OUTPATIENT
Start: 2023-11-24 | End: 2023-11-27 | Stop reason: HOSPADM

## 2023-11-24 RX ORDER — DEXAMETHASONE SODIUM PHOSPHATE 4 MG/ML
INJECTION, SOLUTION INTRA-ARTICULAR; INTRALESIONAL; INTRAMUSCULAR; INTRAVENOUS; SOFT TISSUE AS NEEDED
Status: DISCONTINUED | OUTPATIENT
Start: 2023-11-24 | End: 2023-11-24

## 2023-11-24 RX ORDER — CEFAZOLIN SODIUM 2 G/50ML
2 SOLUTION INTRAVENOUS ONCE
Status: DISCONTINUED | OUTPATIENT
Start: 2023-11-24 | End: 2023-11-24

## 2023-11-24 RX ORDER — FENTANYL CITRATE 50 UG/ML
50 INJECTION, SOLUTION INTRAMUSCULAR; INTRAVENOUS EVERY 5 MIN PRN
Status: DISCONTINUED | OUTPATIENT
Start: 2023-11-24 | End: 2023-11-24 | Stop reason: HOSPADM

## 2023-11-24 RX ORDER — BISACODYL 5 MG
10 TABLET, DELAYED RELEASE (ENTERIC COATED) ORAL DAILY PRN
Status: DISCONTINUED | OUTPATIENT
Start: 2023-11-24 | End: 2023-11-27 | Stop reason: HOSPADM

## 2023-11-24 RX ORDER — AMLODIPINE BESYLATE 5 MG/1
2.5 TABLET ORAL DAILY
Status: DISCONTINUED | OUTPATIENT
Start: 2023-11-24 | End: 2023-11-24

## 2023-11-24 RX ORDER — SODIUM CHLORIDE 0.9 G/100ML
IRRIGANT IRRIGATION AS NEEDED
Status: DISCONTINUED | OUTPATIENT
Start: 2023-11-24 | End: 2023-11-24 | Stop reason: HOSPADM

## 2023-11-24 RX ORDER — METHOCARBAMOL 500 MG/1
500 TABLET, FILM COATED ORAL EVERY 8 HOURS PRN
Status: DISCONTINUED | OUTPATIENT
Start: 2023-11-24 | End: 2023-11-27 | Stop reason: HOSPADM

## 2023-11-24 RX ORDER — TRANEXAMIC ACID 650 MG/1
1950 TABLET ORAL ONCE
Status: DISCONTINUED | OUTPATIENT
Start: 2023-11-24 | End: 2023-11-27 | Stop reason: HOSPADM

## 2023-11-24 RX ORDER — ACETAMINOPHEN 325 MG/1
650 TABLET ORAL ONCE
Status: COMPLETED | OUTPATIENT
Start: 2023-11-24 | End: 2023-11-24

## 2023-11-24 RX ORDER — ENOXAPARIN SODIUM 100 MG/ML
30 INJECTION SUBCUTANEOUS EVERY 24 HOURS
Status: DISCONTINUED | OUTPATIENT
Start: 2023-11-24 | End: 2023-11-24

## 2023-11-24 RX ORDER — FENTANYL CITRATE 50 UG/ML
INJECTION, SOLUTION INTRAMUSCULAR; INTRAVENOUS AS NEEDED
Status: DISCONTINUED | OUTPATIENT
Start: 2023-11-24 | End: 2023-11-24

## 2023-11-24 RX ORDER — CEFAZOLIN SODIUM 2 G/50ML
2 SOLUTION INTRAVENOUS ONCE
Status: COMPLETED | OUTPATIENT
Start: 2023-11-24 | End: 2023-11-24

## 2023-11-24 RX ORDER — ENOXAPARIN SODIUM 100 MG/ML
30 INJECTION SUBCUTANEOUS EVERY 24 HOURS
Status: DISCONTINUED | OUTPATIENT
Start: 2023-11-25 | End: 2023-11-27 | Stop reason: HOSPADM

## 2023-11-24 RX ORDER — ROPIVACAINE/EPI/CLONIDINE/KET 2.46-0.005
SYRINGE (ML) INJECTION AS NEEDED
Status: DISCONTINUED | OUTPATIENT
Start: 2023-11-24 | End: 2023-11-24 | Stop reason: HOSPADM

## 2023-11-24 RX ORDER — SODIUM CHLORIDE, SODIUM LACTATE, POTASSIUM CHLORIDE, CALCIUM CHLORIDE 600; 310; 30; 20 MG/100ML; MG/100ML; MG/100ML; MG/100ML
75 INJECTION, SOLUTION INTRAVENOUS CONTINUOUS
Status: DISCONTINUED | OUTPATIENT
Start: 2023-11-24 | End: 2023-11-26

## 2023-11-24 RX ORDER — OXYCODONE HYDROCHLORIDE 5 MG/1
5 TABLET ORAL EVERY 4 HOURS PRN
Status: DISCONTINUED | OUTPATIENT
Start: 2023-11-24 | End: 2023-11-27 | Stop reason: HOSPADM

## 2023-11-24 RX ORDER — ROCURONIUM BROMIDE 10 MG/ML
INJECTION, SOLUTION INTRAVENOUS AS NEEDED
Status: DISCONTINUED | OUTPATIENT
Start: 2023-11-24 | End: 2023-11-24

## 2023-11-24 RX ORDER — AMLODIPINE BESYLATE AND BENAZEPRIL HYDROCHLORIDE 2.5; 1 MG/1; MG/1
1 CAPSULE ORAL
Status: DISCONTINUED | OUTPATIENT
Start: 2023-11-24 | End: 2023-11-24 | Stop reason: RX

## 2023-11-24 RX ORDER — PROPOFOL 10 MG/ML
INJECTION, EMULSION INTRAVENOUS AS NEEDED
Status: DISCONTINUED | OUTPATIENT
Start: 2023-11-24 | End: 2023-11-24

## 2023-11-24 RX ORDER — NALOXONE HYDROCHLORIDE 1 MG/ML
0.2 INJECTION INTRAMUSCULAR; INTRAVENOUS; SUBCUTANEOUS EVERY 5 MIN PRN
Status: DISCONTINUED | OUTPATIENT
Start: 2023-11-24 | End: 2023-11-27 | Stop reason: HOSPADM

## 2023-11-24 RX ORDER — CELECOXIB 100 MG/1
200 CAPSULE ORAL ONCE
Status: DISCONTINUED | OUTPATIENT
Start: 2023-11-24 | End: 2023-11-24

## 2023-11-24 RX ORDER — HYDROCODONE BITARTRATE AND ACETAMINOPHEN 5; 325 MG/1; MG/1
1 TABLET ORAL EVERY 4 HOURS PRN
Status: DISCONTINUED | OUTPATIENT
Start: 2023-11-24 | End: 2023-11-27 | Stop reason: HOSPADM

## 2023-11-24 RX ORDER — SODIUM CHLORIDE, SODIUM LACTATE, POTASSIUM CHLORIDE, CALCIUM CHLORIDE 600; 310; 30; 20 MG/100ML; MG/100ML; MG/100ML; MG/100ML
50 INJECTION, SOLUTION INTRAVENOUS CONTINUOUS
Status: DISCONTINUED | OUTPATIENT
Start: 2023-11-24 | End: 2023-11-25

## 2023-11-24 RX ORDER — TRANEXAMIC ACID 650 MG/1
1950 TABLET ORAL ONCE
Status: DISCONTINUED | OUTPATIENT
Start: 2023-11-25 | End: 2023-11-27 | Stop reason: HOSPADM

## 2023-11-24 RX ORDER — LISINOPRIL 20 MG/1
40 TABLET ORAL DAILY
Status: DISCONTINUED | OUTPATIENT
Start: 2023-11-24 | End: 2023-11-24

## 2023-11-24 RX ORDER — LISINOPRIL 10 MG/1
10 TABLET ORAL DAILY
Status: DISCONTINUED | OUTPATIENT
Start: 2023-11-24 | End: 2023-11-24

## 2023-11-24 RX ORDER — MORPHINE SULFATE 2 MG/ML
2 INJECTION, SOLUTION INTRAMUSCULAR; INTRAVENOUS EVERY 2 HOUR PRN
Status: DISCONTINUED | OUTPATIENT
Start: 2023-11-24 | End: 2023-11-27 | Stop reason: HOSPADM

## 2023-11-24 RX ORDER — HYDROCODONE BITARTRATE AND ACETAMINOPHEN 7.5; 325 MG/1; MG/1
1 TABLET ORAL EVERY 4 HOURS PRN
Status: DISCONTINUED | OUTPATIENT
Start: 2023-11-24 | End: 2023-11-27 | Stop reason: HOSPADM

## 2023-11-24 RX ORDER — ACETAMINOPHEN 325 MG/1
650 TABLET ORAL EVERY 6 HOURS SCHEDULED
Status: DISCONTINUED | OUTPATIENT
Start: 2023-11-25 | End: 2023-11-27 | Stop reason: HOSPADM

## 2023-11-24 RX ORDER — DOCUSATE SODIUM 100 MG/1
100 CAPSULE, LIQUID FILLED ORAL 2 TIMES DAILY
Status: DISCONTINUED | OUTPATIENT
Start: 2023-11-24 | End: 2023-11-27 | Stop reason: HOSPADM

## 2023-11-24 RX ADMIN — PROPOFOL 100 MG: 10 INJECTION, EMULSION INTRAVENOUS at 14:47

## 2023-11-24 RX ADMIN — CEFAZOLIN SODIUM 2 G: 2 SOLUTION INTRAVENOUS at 14:35

## 2023-11-24 RX ADMIN — DEXAMETHASONE SODIUM PHOSPHATE 4 MG: 4 INJECTION, SOLUTION INTRAMUSCULAR; INTRAVENOUS at 14:40

## 2023-11-24 RX ADMIN — FENTANYL CITRATE 50 MCG: 50 INJECTION, SOLUTION INTRAMUSCULAR; INTRAVENOUS at 14:45

## 2023-11-24 RX ADMIN — Medication 100 MCG: at 15:23

## 2023-11-24 RX ADMIN — ROCURONIUM BROMIDE 50 MG: 10 SOLUTION INTRAVENOUS at 14:29

## 2023-11-24 RX ADMIN — SODIUM CHLORIDE, SODIUM LACTATE, POTASSIUM CHLORIDE, AND CALCIUM CHLORIDE 50 ML/HR: 600; 310; 30; 20 INJECTION, SOLUTION INTRAVENOUS at 17:00

## 2023-11-24 RX ADMIN — SUGAMMADEX 200 MG: 100 INJECTION, SOLUTION INTRAVENOUS at 15:52

## 2023-11-24 RX ADMIN — LISINOPRIL 10 MG: 10 TABLET ORAL at 03:34

## 2023-11-24 RX ADMIN — Medication: at 16:15

## 2023-11-24 RX ADMIN — AMLODIPINE BESYLATE 2.5 MG: 5 TABLET ORAL at 03:35

## 2023-11-24 RX ADMIN — ONDANSETRON 4 MG: 2 INJECTION INTRAMUSCULAR; INTRAVENOUS at 14:40

## 2023-11-24 RX ADMIN — PROPOFOL 100 MG: 10 INJECTION, EMULSION INTRAVENOUS at 14:29

## 2023-11-24 RX ADMIN — FENTANYL CITRATE 50 MCG: 50 INJECTION, SOLUTION INTRAMUSCULAR; INTRAVENOUS at 15:36

## 2023-11-24 RX ADMIN — DEXAMETHASONE SODIUM PHOSPHATE: 10 INJECTION, SOLUTION INTRAMUSCULAR; INTRAVENOUS at 13:45

## 2023-11-24 RX ADMIN — FENTANYL CITRATE 50 MCG: 50 INJECTION, SOLUTION INTRAMUSCULAR; INTRAVENOUS at 14:29

## 2023-11-24 RX ADMIN — CEFAZOLIN SODIUM 2 G: 2 INJECTION, SOLUTION INTRAVENOUS at 20:52

## 2023-11-24 RX ADMIN — Medication 2 L/MIN: at 17:00

## 2023-11-24 RX ADMIN — SODIUM CHLORIDE, POTASSIUM CHLORIDE, SODIUM LACTATE AND CALCIUM CHLORIDE 75 ML/HR: 600; 310; 30; 20 INJECTION, SOLUTION INTRAVENOUS at 16:56

## 2023-11-24 RX ADMIN — OXYCODONE HYDROCHLORIDE 5 MG: 5 TABLET ORAL at 03:35

## 2023-11-24 RX ADMIN — Medication 100 MCG: at 15:10

## 2023-11-24 RX ADMIN — ACETAMINOPHEN 650 MG: 325 TABLET ORAL at 17:29

## 2023-11-24 RX ADMIN — FENTANYL CITRATE 50 MCG: 50 INJECTION, SOLUTION INTRAMUSCULAR; INTRAVENOUS at 15:02

## 2023-11-24 RX ADMIN — SODIUM CHLORIDE, POTASSIUM CHLORIDE, SODIUM LACTATE AND CALCIUM CHLORIDE 75 ML/HR: 600; 310; 30; 20 INJECTION, SOLUTION INTRAVENOUS at 12:26

## 2023-11-24 SDOH — ECONOMIC STABILITY: HOUSING INSECURITY: IN THE LAST 12 MONTHS, HOW MANY PLACES HAVE YOU LIVED?: 1

## 2023-11-24 SDOH — ECONOMIC STABILITY: FOOD INSECURITY

## 2023-11-24 SDOH — ECONOMIC STABILITY: TRANSPORTATION INSECURITY
IN THE PAST 12 MONTHS, HAS THE LACK OF TRANSPORTATION KEPT YOU FROM MEDICAL APPOINTMENTS OR FROM GETTING MEDICATIONS?: NO

## 2023-11-24 SDOH — ECONOMIC STABILITY: HOUSING INSECURITY
IN THE LAST 12 MONTHS, WAS THERE A TIME WHEN YOU DID NOT HAVE A STEADY PLACE TO SLEEP OR SLEPT IN A SHELTER (INCLUDING NOW)?: NO

## 2023-11-24 SDOH — ECONOMIC STABILITY: FOOD INSECURITY: WITHIN THE PAST 12 MONTHS, YOU WORRIED THAT YOUR FOOD WOULD RUN OUT BEFORE YOU GOT MONEY TO BUY MORE.: NEVER TRUE

## 2023-11-24 SDOH — ECONOMIC STABILITY: GENERAL

## 2023-11-24 SDOH — ECONOMIC STABILITY: INCOME INSECURITY: IN THE LAST 12 MONTHS, WAS THERE A TIME WHEN YOU WERE NOT ABLE TO PAY THE MORTGAGE OR RENT ON TIME?: NO

## 2023-11-24 SDOH — ECONOMIC STABILITY: HOUSING INSECURITY

## 2023-11-24 SDOH — ECONOMIC STABILITY: FOOD INSECURITY: WITHIN THE PAST 12 MONTHS, THE FOOD YOU BOUGHT JUST DIDN'T LAST AND YOU DIDN'T HAVE MONEY TO GET MORE.: NEVER TRUE

## 2023-11-24 SDOH — ECONOMIC STABILITY: TRANSPORTATION INSECURITY: IN THE PAST 12 MONTHS, HAS LACK OF TRANSPORTATION KEPT YOU FROM MEDICAL APPOINTMENTS OR FROM GETTING MEDICATIONS?: NO

## 2023-11-24 SDOH — ECONOMIC STABILITY: FOOD INSECURITY: WITHIN THE PAST 12 MONTHS, THE FOOD YOU BOUGHT JUST DIDN’T LAST AND YOU DIDN’T HAVE MONEY TO GET MORE.: NEVER TRUE

## 2023-11-24 SDOH — ECONOMIC STABILITY: TRANSPORTATION INSECURITY
IN THE PAST 12 MONTHS, HAS LACK OF TRANSPORTATION KEPT YOU FROM MEETINGS, WORK, OR FROM GETTING THINGS NEEDED FOR DAILY LIVING?: NO

## 2023-11-24 SDOH — ECONOMIC STABILITY: FOOD INSECURITY: WITHIN THE PAST 12 MONTHS, YOU WORRIED THAT YOUR FOOD WOULD RUN OUT BEFORE YOU GOT THE MONEY TO BUY MORE.: NEVER TRUE

## 2023-11-24 SDOH — ECONOMIC STABILITY: TRANSPORTATION INSECURITY

## 2023-11-24 SDOH — HEALTH STABILITY: MENTAL HEALTH: CURRENT SMOKER: 0

## 2023-11-24 SDOH — ECONOMIC STABILITY: HOUSING INSECURITY: IN THE PAST 12 MONTHS HAS THE ELECTRIC, GAS, OIL, OR WATER COMPANY THREATENED TO SHUT OFF SERVICES IN YOUR HOME?: NO

## 2023-11-24 SDOH — ECONOMIC STABILITY: HOUSING INSECURITY: IN THE LAST 12 MONTHS, WAS THERE A TIME WHEN YOU WERE NOT ABLE TO PAY THE MORTGAGE OR RENT ON TIME?: NO

## 2023-11-24 ASSESSMENT — PAIN SCALES - GENERAL
PAINLEVEL_OUTOF10: 0 - NO PAIN
PAINLEVEL_OUTOF10: 8
PAINLEVEL_OUTOF10: 0 - NO PAIN
PAINLEVEL_OUTOF10: 5 - MODERATE PAIN
PAINLEVEL_OUTOF10: 0 - NO PAIN
PAINLEVEL_OUTOF10: 5 - MODERATE PAIN

## 2023-11-24 ASSESSMENT — COGNITIVE AND FUNCTIONAL STATUS - GENERAL
DRESSING REGULAR UPPER BODY CLOTHING: A LOT
MOVING FROM LYING ON BACK TO SITTING ON SIDE OF FLAT BED WITH BEDRAILS: A LOT
PERSONAL GROOMING: A LOT
TURNING FROM BACK TO SIDE WHILE IN FLAT BAD: A LOT
TOILETING: A LOT
DAILY ACTIVITIY SCORE: 13
WALKING IN HOSPITAL ROOM: TOTAL
MOBILITY SCORE: 10
DRESSING REGULAR LOWER BODY CLOTHING: A LOT
MOVING TO AND FROM BED TO CHAIR: A LOT
HELP NEEDED FOR BATHING: TOTAL
CLIMB 3 TO 5 STEPS WITH RAILING: TOTAL
STANDING UP FROM CHAIR USING ARMS: A LOT

## 2023-11-24 ASSESSMENT — PAIN - FUNCTIONAL ASSESSMENT
PAIN_FUNCTIONAL_ASSESSMENT: 0-10

## 2023-11-24 ASSESSMENT — ENCOUNTER SYMPTOMS
JOINT SWELLING: 1
CONFUSION: 1
ARTHRALGIAS: 1

## 2023-11-24 ASSESSMENT — ACTIVITIES OF DAILY LIVING (ADL): LACK_OF_TRANSPORTATION: NO

## 2023-11-24 ASSESSMENT — SOCIAL DETERMINANTS OF HEALTH (SDOH): IN THE PAST 12 MONTHS, HAS THE ELECTRIC, GAS, OIL, OR WATER COMPANY THREATENED TO SHUT OFF SERVICE IN YOUR HOME?: NO

## 2023-11-24 NOTE — CONSULTS
"Nutrition Note  Reason for Assessment: Admission nursing screening    Pt unable to provide any nutrition hx due to dementia. Pt scheduled for OR today for hemiarthroplasty.     Nutrition Assessment        Energy Intake: Poor < 50 %  Food and Nutrient History: Currently NPO. Pt unable to provide any hx on nutrition intake. No noted GI issues or trouble with chewing/swallowing noted in EMR.  Vitamin/Herbal Supplement Use: vitamin D3 per home med list    PMH, meds, and labs reviewed.  Dietary Orders (From admission, onward)       Start     Ordered    11/24/23 0001  NPO Diet Except: Ice chips, Sips with meds; Effective midnight  Diet effective midnight        Question Answer Comment   Except: Ice chips    Except: Sips with meds        11/23/23 2050 11/23/23 2214  May Participate in Room Service With Assistance  Once        Question:  .  Answer:  Yes    11/23/23 2214                       GI per flowsheet:  Gastrointestinal  Gastrointestinal (WDL): Within Defined Limits  Last bowel movement documented:    Allergies: Penicillins and Sulfa (sulfonamide antibiotics)     Anthropometrics:  Height: 157.5 cm (5' 2.01\")  Weight: 68 kg (149 lb 14.6 oz)  BMI (Calculated): 27.41  IBW: 50 kg  ABW:       Weight History / % Weight Change: Wt Readings from Last 10 Encounters:  11/23/23 68 kg (149 lb 14.6 oz)  11/09/23 64.9 kg (143 lb)  10/12/23 63.6 kg (140 lb 4.8 oz)  08/17/23 64.3 kg (141 lb 12.8 oz)  07/10/23 62.6 kg (138 lb)  07/06/23 62.6 kg (138 lb)  06/15/23 62.5 kg (137 lb 11.2 oz)  05/25/23 64 kg (141 lb 3.2 oz)  04/13/23 62.7 kg (138 lb 3.2 oz)  01/05/23 59.5 kg (131 lb 1.6 oz). No noted wt loss, possible wt gain.  Significant Weight Loss: No    Estimated Nutritional Needs:  Calculated Energy Needs Using Equations  Height: 157.5 cm (5' 2.01\")  Temp: 37.3 °C (99.1 °F)    Total Energy Estimated Needs (kCal): 1700 kCal  Total Estimated Energy Need per Day (kCal/kg): 25 kCal/kg  Method for Estimating Needs: ABW    Total " Protein Estimated Needs (g): 68 g  Total Protein Estimated Needs (g/kg): 1 g/kg  Method for Estimating Needs: ABW    Total Fluid Estimated Needs (mL): 1700 mL  Total Fluid Estimated Needs (mL/kg): 25 mL/kg  Method for Estimating Needs: ABW    Nutrition Focused Physical Findings:   Orbital Fat Pads: Well nourshed (slightly bulging fat pads)  Buccal Fat Pads: Well nourished (full, rounded cheeks)  Triceps: Well nourished (ample fat tissue)    Temporalis: Well nourished (well-defined muscle)  Pectoralis (Clavicular Region): Mild-Moderate (some protrusion of clavicle)  Deltoid/Trapezius: Well nourished (rounded appearance at arm, shoulder, neck)    Edema  Edema Location: non-pitting BLE edema per nursing assessment 11/23    Nails: Negative  Skin: Negative  Pain Score: 5 - Moderate pain     Nutrition Diagnosis   Malnutrition Diagnosis  Patient has Malnutrition Diagnosis: No    Patient has Nutrition Diagnosis: Yes  Diagnosis Status (1): New  Nutrition Diagnosis 1: Inadequate oral intake  Related to (1): pending surgery  As Evidenced by (1): NPO status                                         Nutrition Interventions/Recommendations    Individualized Nutrition Prescription Provided for : Mighty Shake BID (220 kcal and 6 g protein per serving) once diet is advanced  Interventions: Meals and snacks, Medical food supplement  Medical Food Supplement: Commercial beverage       Nutrition Monitoring and Evaluation    Food/Nutrient Related History Monitoring  Monitoring and Evaluation Plan: Energy intake, Amount of food  Criteria: Pt meets >75% of estimated energy needs  Criteria: Pt consumes >75% of meals and supplements  Body Composition/Growth/Weight History  Monitoring and Evaluation Plan: Weight  Criteria: Maintains stable weight  Biochemical Data, Medical Tests and Procedures  Monitoring and Evaluation Plan: Glucose/endocrine profile  Glucose/Endocrine Profile: Glucose, casual  Criteria: BG within desirable  range      Education Documentation  No documentation found.    Not appropriate for education.              Time Spent (min): 45 minutes  Last Date of Nutrition Visit: 11/24/23  Nutrition Follow-Up Needed?: 3-5 days

## 2023-11-24 NOTE — CARE PLAN
The patient's goals for the shift include      The clinical goals for the shift include pain rating will be <4/10      Problem: Nutrition  Goal: Less than 5 days NPO/clear liquids  Outcome: Progressing  Goal: Oral intake greater than 50%  Outcome: Progressing  Goal: Oral intake greater 75%  Outcome: Progressing  Goal: Consume prescribed supplement  Outcome: Progressing  Goal: Adequate PO fluid intake  Outcome: Progressing  Goal: Nutrition support goals are met within 48 hrs  Outcome: Progressing  Goal: Nutrition support is meeting 75% of nutrient needs  Outcome: Progressing  Goal: Tube feed tolerance  Outcome: Progressing  Goal: BG  mg/dL  Outcome: Progressing  Goal: Lab values WNL  Outcome: Progressing  Goal: Electrolytes WNL  Outcome: Progressing  Goal: Promote healing  Outcome: Progressing  Goal: Maintain stable weight  Outcome: Progressing  Goal: Reduce weight from edema/fluid  Outcome: Progressing  Goal: Gradual weight gain  Outcome: Progressing  Goal: Improve ostomy output  Outcome: Progressing     Problem: Pain - Adult  Goal: Verbalizes/displays adequate comfort level or baseline comfort level  Outcome: Progressing     Problem: Safety - Adult  Goal: Free from fall injury  Outcome: Progressing     Problem: Discharge Planning  Goal: Discharge to home or other facility with appropriate resources  Outcome: Progressing     Problem: Chronic Conditions and Co-morbidities  Goal: Patient's chronic conditions and co-morbidity symptoms are monitored and maintained or improved  Outcome: Progressing     Problem: Skin  Goal: Decreased wound size/increased tissue granulation at next dressing change  Outcome: Progressing  Flowsheets (Taken 11/24/2023 0322)  Decreased wound size/increased tissue granulation at next dressing change: Promote sleep for wound healing  Goal: Participates in plan/prevention/treatment measures  Outcome: Progressing  Flowsheets (Taken 11/24/2023 0322)  Participates in plan/prevention/treatment  measures: Elevate heels  Goal: Prevent/manage excess moisture  Outcome: Progressing  Flowsheets (Taken 11/24/2023 0322)  Prevent/manage excess moisture: Moisturize dry skin  Goal: Prevent/minimize sheer/friction injuries  Outcome: Progressing  Flowsheets (Taken 11/24/2023 0322)  Prevent/minimize sheer/friction injuries: Increase activity/out of bed for meals  Goal: Promote/optimize nutrition  Outcome: Progressing  Flowsheets (Taken 11/24/2023 0322)  Promote/optimize nutrition: Consume > 50% meals/supplements  Goal: Promote skin healing  Outcome: Progressing  Flowsheets (Taken 11/24/2023 0322)  Promote skin healing: Turn/reposition every 2 hours/use positioning/transfer devices     Problem: Fall/Injury  Goal: Not fall by end of shift  Outcome: Progressing  Goal: Be free from injury by end of the shift  Outcome: Progressing  Goal: Verbalize understanding of personal risk factors for fall in the hospital  Outcome: Progressing  Goal: Verbalize understanding of risk factor reduction measures to prevent injury from fall in the home  Outcome: Progressing  Goal: Use assistive devices by end of the shift  Outcome: Progressing  Goal: Pace activities to prevent fatigue by end of the shift  Outcome: Progressing     Problem: Pain  Goal: Takes deep breaths with improved pain control throughout the shift  Outcome: Progressing  Goal: Turns in bed with improved pain control throughout the shift  Outcome: Progressing  Goal: Walks with improved pain control throughout the shift  Outcome: Progressing  Goal: Performs ADL's with improved pain control throughout shift  Outcome: Progressing  Goal: Participates in PT with improved pain control throughout the shift  Outcome: Progressing  Goal: Free from opioid side effects throughout the shift  Outcome: Progressing  Goal: Free from acute confusion related to pain meds throughout the shift  Outcome: Progressing

## 2023-11-24 NOTE — NURSING NOTE
Pt back from OR. Placed on monitor for post op vitals. Pt is sleepy but opens eyes to voice. Dressing to left operative site dry and intact.  Palpable pedal pulse.

## 2023-11-24 NOTE — ANESTHESIA PROCEDURE NOTES
Peripheral Block    Patient location during procedure: holding area  Start time: 11/24/2023 1:39 PM  End time: 11/24/2023 1:45 PM  Reason for block: procedure for pain, at surgeon's request and post-op pain management  Staffing  Performed: attending   Authorized by: Adalberto Briceño MD    Performed by: Adalberto Briceño MD  Preanesthetic Checklist  Completed: patient identified, IV checked, site marked, risks and benefits discussed, surgical consent, monitors and equipment checked, pre-op evaluation and timeout performed   Timeout performed at: 11/24/2023 1:39 PM  Peripheral Block  Patient position: laying flat  Prep: ChloraPrep  Patient monitoring: heart rate, cardiac monitor and continuous pulse ox  Block type: other  Laterality: left  Injection technique: single-shot  Guidance: ultrasound guided  Local infiltration: lidocaine  Infiltration strength: 1 %  Dose: 1 mL  Needle  Needle type: short-bevel   Needle gauge: 21 G  Needle length: 10 cm  Needle localization: anatomical landmarks and ultrasound guidance  Assessment  Injection assessment: negative aspiration for heme, no paresthesia on injection, incremental injection and local visualized surrounding nerve on ultrasound  Paresthesia pain: none  Heart rate change: no  Slow fractionated injection: yes  Additional Notes  PENG block.  Chloroprep, 1% lido 1cc subcutaneous. US guided, lateral IP approach.  Needle tip visualized contacting the anterior pubic ramus lateral to psoas tendon.  Total of 20cc 0.5% ropivacaine with decadron 5mg injected in fractionated aliquots after negative aspirations.  No complications noted.

## 2023-11-24 NOTE — ANESTHESIA PROCEDURE NOTES
Airway  Date/Time: 11/24/2023 2:31 PM  Urgency: elective    Airway not difficult    Staffing  Performed: CRNA   Authorized by: Adalberto Briceño MD    Performed by: JOCELIN Mahoney-DEREK  Patient location during procedure: OR    Indications and Patient Condition  Indications for airway management: anesthesia  Spontaneous ventilation: present  Sedation level: deep  Preoxygenated: yes  Patient position: sniffing  MILS maintained throughout  Mask difficulty assessment: 1 - vent by mask    Final Airway Details  Final airway type: endotracheal airway      Successful airway: ETT  Cuffed: yes   Successful intubation technique: video laryngoscopy  Facilitating devices/methods: intubating stylet  Endotracheal tube insertion site: oral  Blade: Salud  Blade size: #3  ETT size (mm): 7.0  Cormack-Lehane Classification: grade I - full view of glottis  Placement verified by: capnometry   Measured from: lips  ETT to lips (cm): 21  Number of attempts at approach: 1

## 2023-11-24 NOTE — PROGRESS NOTES
11/24/23 1347   Current Planned Discharge Disposition   Current Planned Discharge Disposition Inter  (from [facility])     Attempted to meet with pt off floor for surgery. Fall @ SNF with left femoral neck fx. Verified pt from [facility] @ [facility]. Care transitions team to follow pt post op for dc needs.

## 2023-11-24 NOTE — ANESTHESIA POSTPROCEDURE EVALUATION
Patient: Jacqueline Sandoval    Procedure Summary       Date: 11/24/23 Room / Location: [facility] / [facility]    Anesthesia Start: 1426 Anesthesia Stop: 1603    Procedure: Hip Hemiarthroplasty FAHAD (Left: Hip) Diagnosis:       Left displaced femoral neck fracture (CMS/HCC)      (Left displaced femoral neck fracture (CMS/HCC) [S72.002A])    Surgeons: Margarito Koch MD Responsible Provider: Adalberto Briceño MD    Anesthesia Type: general ASA Status: 3            Anesthesia Type: general    Anesthesia Post Evaluation    Patient location during evaluation: PACU  Patient participation: waiting for patient participation  Level of consciousness: responsive to light touch  Pain management: adequate  Airway patency: patent  Cardiovascular status: acceptable  Respiratory status: acceptable and face mask  Hydration status: acceptable  Postoperative Nausea and Vomiting: none        No notable events documented.

## 2023-11-24 NOTE — CONSULTS
"Inpatient consult to Orthopaedic Surgery  Consult performed by: JOCELIN Pineda-CNP  Consult ordered by: Jeff Scott MD          Orthopedics Consult Note  Patient: Jacqueline Sandoval  Unit/Bed: 1111/1111-A  YOB: 1935  MRN: [id number]  Acct: [id number]   Admitting Diagnosis: Left displaced femoral neck fracture (CMS/HCC) [S72.002A]  Fall, initial encounter [W19.XXXA]  Pneumonia of left upper lobe due to infectious organism [J18.9]  Date:  11/23/2023  Hospital Day: 1  Attending: Jeff Scott MD         Complaint:  Chief Complaint   Patient presents with    Fall     Patient found on floor next to bed in the room.  Patient is not on thinners.  Patient does not show signs of hitting head.  Patient complaining of body aches.  Patient has dementia.\"          History of Present Illness:  88-year-old female presents to the ER from the skilled nursing facility after a fall.  She was found on the floor next to the bed per EMS and ED staff.  Due to history of dementia she is unable to provide much of a history.  She complains of diffuse pain in her back and legs.  She is able to tell me her name and is oriented x1 only.  This appears to be her baseline.  Imaging in the emergency department shows a superior displaced left femoral neck fracture.  Orthopedic surgery was consulted.  The patient is a minimal ambulator and is essentially wheelchair-bound.    Allergies:  Allergies   Allergen Reactions    Penicillins Unknown    Sulfa (Sulfonamide Antibiotics) Unknown        PMHx:  Past Medical History:   Diagnosis Date    Anemia     Anxiety     Dementia (CMS/HCC)     Falls     Fibromyalgia     Heart failure (CMS/HCC)     Hypertension        PSHx:  Past Surgical History:   Procedure Laterality Date    OTHER SURGICAL HISTORY  10/05/2022    Humerus fracture repair       Social Hx:  Social History     Socioeconomic History    Marital status:      Spouse name: None    Number of children: None    Years of " education: None    Highest education level: None   Occupational History    None   Tobacco Use    Smoking status: Never    Smokeless tobacco: Never   Vaping Use    Vaping Use: Never used   Substance and Sexual Activity    Alcohol use: Never    Drug use: Never    Sexual activity: None   Other Topics Concern    None   Social History Narrative    DNR-CCA     Social Determinants of Health     Financial Resource Strain: Unknown (11/23/2023)    Overall Financial Resource Strain (CARDIA)     Difficulty of Paying Living Expenses: Patient refused   Food Insecurity: Not on file   Transportation Needs: Unknown (11/23/2023)    PRAPARE - Transportation     Lack of Transportation (Medical): Patient refused     Lack of Transportation (Non-Medical): Patient refused   Physical Activity: Not on file   Stress: Not on file   Social Connections: Not on file   Intimate Partner Violence: Not on file   Housing Stability: Unknown (11/23/2023)    Housing Stability Vital Sign     Unable to Pay for Housing in the Last Year: Patient refused     Number of Places Lived in the Last Year: 1     Unstable Housing in the Last Year: Patient refused       Family Hx:  No family history on file.    Review of Systems:   Review of Systems   Unable to perform ROS: Dementia   Musculoskeletal:  Positive for arthralgias and joint swelling.   Psychiatric/Behavioral:  Positive for confusion.      Physical Examination:    Visit Vitals  BP (!) 181/80 Comment: Dr Heredia notifed of BP   Pulse 85   Temp 36.6 °C (97.9 °F)   Resp 18      Physical Exam  Constitutional:       Appearance: She is ill-appearing.   HENT:      Head: Normocephalic and atraumatic.      Mouth/Throat:      Mouth: Mucous membranes are dry.      Pharynx: Oropharynx is clear.   Eyes:      Extraocular Movements: Extraocular movements intact.      Pupils: Pupils are equal, round, and reactive to light.   Cardiovascular:      Rate and Rhythm: Normal rate and regular rhythm.      Pulses: Normal pulses.       Heart sounds: S1 normal and S2 normal.   Pulmonary:      Effort: Pulmonary effort is normal.   Musculoskeletal:         General: No swelling. Normal range of motion.      Cervical back: Normal range of motion.      Left hip: Tenderness present.      Comments: Neurovascularly intact to the left leg.   Skin:     General: Skin is warm and dry.      Capillary Refill: Capillary refill takes less than 2 seconds.   Neurological:      General: No focal deficit present.      Mental Status: She is alert. Mental status is at baseline.   Psychiatric:         Attention and Perception: Attention normal.         Mood and Affect: Mood normal.         Speech: Speech normal.         Behavior: Behavior normal. Behavior is cooperative.     The skin is intact about the left hip.  Any motion of the left hip elicits pain.    LABS:  CBC:   Lab Results   Component Value Date    WBC 15.2 (H) 11/23/2023    RBC 3.46 (L) 11/23/2023    HGB 11.5 (L) 11/23/2023    HCT 34.7 (L) 11/23/2023     11/23/2023    MCH 33.2 11/23/2023    MCHC 33.1 11/23/2023    RDW 13.1 11/23/2023     11/23/2023     CBC with Differential:    Lab Results   Component Value Date    WBC 15.2 (H) 11/23/2023    RBC 3.46 (L) 11/23/2023    HGB 11.5 (L) 11/23/2023    HCT 34.7 (L) 11/23/2023     11/23/2023     11/23/2023    MCH 33.2 11/23/2023    MCHC 33.1 11/23/2023    RDW 13.1 11/23/2023    NRBC 0.0 11/23/2023    LYMPHOPCT 6.6 11/23/2023    MONOPCT 4.2 11/23/2023    EOSPCT 0.7 11/23/2023    BASOPCT 0.1 11/23/2023    MONOSABS 0.64 11/23/2023    LYMPHSABS 1.01 11/23/2023    EOSABS 0.11 11/23/2023    BASOSABS 0.02 11/23/2023     CMP:    Lab Results   Component Value Date     11/23/2023    K 4.0 11/23/2023     11/23/2023    CO2 22 11/23/2023    BUN 24 (H) 11/23/2023    CREATININE 1.12 (H) 11/23/2023    GLUCOSE 117 (H) 11/23/2023    PROT 7.1 11/23/2023    CALCIUM 9.5 11/23/2023    BILITOT 0.4 11/23/2023    ALKPHOS 84 11/23/2023    AST 24  11/23/2023    ALT 27 11/23/2023     BMP:    Lab Results   Component Value Date     11/23/2023    K 4.0 11/23/2023     11/23/2023    CO2 22 11/23/2023    BUN 24 (H) 11/23/2023    CREATININE 1.12 (H) 11/23/2023    CALCIUM 9.5 11/23/2023    GLUCOSE 117 (H) 11/23/2023     Magnesium:  Lab Results   Component Value Date    MG 1.90 11/23/2023     Troponin:    Lab Results   Component Value Date    TROPHS 9 11/23/2023     Current Medications:    Current Facility-Administered Medications:     acetaminophen (Tylenol) tablet 650 mg, 650 mg, oral, q4h PRN **OR** acetaminophen (Tylenol) oral liquid 650 mg, 650 mg, nasogastric tube, q4h PRN **OR** acetaminophen (Tylenol) suppository 650 mg, 650 mg, rectal, q4h PRN, Jeff Scott MD    amLODIPine (Norvasc) tablet 2.5 mg, 2.5 mg, oral, Daily, 2.5 mg at 11/24/23 0335 **AND** lisinopril tablet 10 mg, 10 mg, oral, Daily, Charles Heredia MD, 10 mg at 11/24/23 0334    enoxaparin (Lovenox) syringe 40 mg, 40 mg, subcutaneous, q24h, Jeff Scott MD, 40 mg at 11/23/23 2234    [START ON 11/25/2023] levoFLOXacin (Levaquin)  mg, 750 mg, intravenous, q48h, Jeff Scott MD    ondansetron (Zofran) tablet 4 mg, 4 mg, oral, q8h PRN **OR** ondansetron (Zofran) injection 4 mg, 4 mg, intravenous, q8h PRN, Jeff Scott MD    oxyCODONE (Roxicodone) immediate release tablet 5 mg, 5 mg, oral, q6h PRN, Jeff Scott MD, 5 mg at 11/24/23 0335    polyethylene glycol (Glycolax, Miralax) packet 17 g, 17 g, oral, Daily PRN, Jeff Scott MD    XR femur left 2+ views    Result Date: 11/23/2023  Interpreted By:  Arlene Kelley, STUDY: XR FEMUR LEFT 2+ VIEWS; XR PELVIS 1-2 VIEWS;  11/23/2023 11:06 am   INDICATION: Signs/Symptoms:fall/ short and rotated.   COMPARISON: None.   ACCESSION NUMBER(S): [id number]; [id number]   ORDERING CLINICIAN: JEANNETTE MEYER   FINDINGS: AP view of the pelvis and multiple views of the left femur are obtained. Superiorly displaced left femoral neck  fracture. No dislocation. The distal left femur appears grossly unremarkable.   Degenerative changes of the visualized left knee. There is a       Superiorly displaced left femoral neck fracture. No dislocation.     Signed by: Arlene Kelley 11/23/2023 11:26 AM Dictation workstation:   [id number]    XR pelvis 1-2 views    Result Date: 11/23/2023  Interpreted By:  Arlene Kelley, STUDY: XR FEMUR LEFT 2+ VIEWS; XR PELVIS 1-2 VIEWS;  11/23/2023 11:06 am   INDICATION: Signs/Symptoms:fall/ short and rotated.   COMPARISON: None.   ACCESSION NUMBER(S): [id number]; [id number]   ORDERING CLINICIAN: JEANNETTE MEYER   FINDINGS: AP view of the pelvis and multiple views of the left femur are obtained. Superiorly displaced left femoral neck fracture. No dislocation. The distal left femur appears grossly unremarkable.   Degenerative changes of the visualized left knee. There is a       Superiorly displaced left femoral neck fracture. No dislocation.     Signed by: Arlene Kelley 11/23/2023 11:26 AM Dictation workstation:   [id number]    CT head wo IV contrast    Result Date: 11/23/2023  Interpreted By:  Arlene Kelley, STUDY: CT HEAD WO IV CONTRAST;  11/23/2023 11:10 am   INDICATION: unwitnessed fall.   COMPARISON: None.   ACCESSION NUMBER(S): [id number]   ORDERING CLINICIAN: JEANNETTE MEYER   TECHNIQUE: Noncontrast axial CT scan of head was performed. Angled reformats in brain and bone windows were generated. The images were reviewed in bone, brain, blood and soft tissue windows.   FINDINGS: The ventricles, cisterns and sulci are prominent, consistent with mild-to-moderate diffuse volume loss. There are areas of moderate to severe nonspecific white matter hypodensity, which are probably age-related or microvascular in nature.   Gray-white differentiation is intact and there is no evidence of acute cortical infarct. No mass, mass effect or midline shift is seen. There is no evidence of hemorrhage.   The visualized paranasal  sinuses are clear.         No evidence of acute cortical infarct or intracranial hemorrhage. Chronic changes as described.   MACRO: None   Signed by: Arlene Kelley 11/23/2023 11:24 AM Dictation workstation:   [id number]    XR chest 1 view    Result Date: 11/23/2023  Interpreted By:  Arlene Kelley, STUDY: XR CHEST 1 VIEW;  11/23/2023 11:06 am   INDICATION: Signs/Symptoms:fall.   COMPARISON: None.   ACCESSION NUMBER(S): [id number]   ORDERING CLINICIAN: JEANNETTE MEYER   FINDINGS: AP portable view of the chest is obtained.  Limited exam due to portable nature. Magnified cardiac silhouette. Left upper lobe asymmetrical density may represent infiltrate or summation of shadows. Mild interstitial prominence. No effusion or pneumothorax.       1. Asymmetrical left upper lobe opacity may represent infiltrate. Consider short-term follow-up to re-evaluate.       MACRO: None   Signed by: Arlene Kelley 11/23/2023 11:21 AM Dictation workstation:   [id number]     Assessment:    Patient Active Problem List   Diagnosis    Anxiety    Depression    Fibromyalgia    HTN (hypertension), benign    [place] (hard of hearing)    Hyponatremia    Hypothyroidism    Osteoarthritis    Vertigo    Acute cystitis without hematuria    Urinary symptom or sign    Frequent UTI    Stage 3a chronic kidney disease (CMS/HCC)    Left displaced femoral neck fracture (CMS/HCC)      Subjective  Patient resting comfortably in bed. She endorses left hip pain. On examination, her pedal pulse is palpable and sensations are intact. Dr. Koch spoke with her POA, about risks and benefits of left hemiarthroplasty. She was medically cleared for surgery by the medical team. Plan is for her to go to surgery today for hemiarthroplasty of the left hip.    Plan:  Pain control  OR today for hemiarthroplasty, I explained the procedure to the patient's son who has power of .  The risks, benefits and alternatives were reviewed.  Questions were answered.  N.p.o.  will work  with therapy postoperatively            Electronically signed by GLORIA Yoder on 11/24/2023 at 6:55 AM

## 2023-11-24 NOTE — ANESTHESIA PREPROCEDURE EVALUATION
Patient: Jacqueline Sandoval    Procedure Information       Date/Time: 11/24/23 1400    Procedure: Hip Hemiarthroplasty FAHAD (Left: Hip) - [place]    [facility]: [facility]    Surgeons: Margarito Koch MD            Relevant Problems   Cardiovascular   (+) HTN (hypertension), benign      Endocrine   (+) Hyponatremia   (+) Hypothyroidism      /Renal   (+) Acute cystitis without hematuria   (+) Frequent UTI   (+) Stage 3a chronic kidney disease (CMS/HCC)      Neuro/Psych   (+) Anxiety   (+) Depression      Musculoskeletal   (+) Fibromyalgia   (+) Osteoarthritis      Eyes, Ears, Nose, and Throat   (+) [place] (hard of hearing)      Infectious Disease   (+) Frequent UTI       Clinical information reviewed:   Tobacco  Allergies  Meds   Med Hx  Surg Hx   Fam Hx  Soc Hx        NPO Detail:  No data recorded     Physical Exam    Airway  Mallampati: II  TM distance: >3 FB  Neck ROM: full     Cardiovascular    Dental    Pulmonary    Abdominal          Anesthesia Plan    ASA 3     general   (PENG block)  The patient is not a current smoker.    intravenous induction   Anesthetic plan and risks discussed with patient.    Plan discussed with CRNA.

## 2023-11-24 NOTE — PROGRESS NOTES
ASSESSMENT & PLAN:     Mechanical fall  Left femoral neck fracture  - X-ray pelvis and left femur consistent with left femoral neck superiorly displaced fracture  - Orthopedic surgery following - plan for OR today  - Presurgical optimization and clearance as per H&P on 11/23, medically optimized and moderate risk  - PT/OT postoperatively  - VTE prophylaxis with Enoxparin subcutaneous  - Pain medications as per above     Community-acquired bacterial pneumonia, gram negative  - Continue levofloxacin at this time    ÓSCAR on CKD stage III  - Creatine 1.44 today (baseline ~1.0)  - IV fluids initiated 11/24     Leukocytosis, reactive to above     Dementia without behavioral disturbances  - Seems to be at baseline, patient unable to provide any history     Essential hypertension  Hypertensive urgency  - Blood pressure elevated likely in setting of pain and fall along with dementia being in the hospital  - Continuing home medications as ordered     Generalized anxiety  GERD  - Resume home medications once reconciled     Unknown history of CHF  - Per previous documentation, patient with history of CHF but no echo found to further clarify details    VTE prophylaxis: Enoxaparin subcutaneous    Disposition/Daily update: Patient seen today, plan is for left hemiarthroplasty for left femur fracture today.  Postop PT/OT and VTE prophylaxis.  Slight increase in creatinine with an ÓSCAR creatinine at 1.44 today, IV fluids initiated.  Plan is for discharge back to her long-term care facility once cleared.      ---Of note, this documentation is completed using the Dragon Dictation system (voice recognition software). There may be spelling and/or grammatical errors that were not corrected prior to final submission.---      Jeff Scott MD    SUBJECTIVE     Patient was seen and examined at bedside this morning.  She had no complaints of pain.  Was only alert and oriented herself as her baseline.  Plan is for surgery today with  "orthopedics.    OBJECTIVE:     Last Recorded Vitals:  Vitals:    11/23/23 1800 11/23/23 1843 11/23/23 2051 11/24/23 0239   BP: 171/62 154/84 (!) 202/88 (!) 181/80   BP Location:   Right arm    Patient Position:       Pulse: 80 83 88 85   Resp: 18 18 18 18   Temp:   37.5 °C (99.5 °F) 36.6 °C (97.9 °F)   TempSrc:       SpO2: 97% 98% 94% 94%   Weight:   68 kg (149 lb 14.6 oz)    Height:   1.575 m (5' 2\")      Last I/O:  I/O last 3 completed shifts:  In: 150 (2.2 mL/kg) [IV Piggyback:150]  Out: 400 (5.9 mL/kg) [Urine:400 (0.2 mL/kg/hr)]  Weight: 68 kg     Physical Exam  Constitutional:       General: She is not in acute distress.     Appearance: Normal appearance.   HENT:      Head: Normocephalic and atraumatic.   Eyes:      Extraocular Movements: Extraocular movements intact.      Conjunctiva/sclera: Conjunctivae normal.   Cardiovascular:      Rate and Rhythm: Normal rate and regular rhythm.      Pulses: Normal pulses.      Heart sounds: Normal heart sounds.   Pulmonary:      Effort: Pulmonary effort is normal. No respiratory distress.      Breath sounds: Normal breath sounds. No wheezing.   Abdominal:      General: Bowel sounds are normal.      Palpations: Abdomen is soft.      Tenderness: There is no abdominal tenderness.   Musculoskeletal:      Cervical back: Normal range of motion and neck supple.      Comments: Range of motion intact in the bilateral upper extremities.  Lower extremity exam deferred today with plans for surgery for the left femur fracture.   Neurological:      Mental Status: She is alert. Mental status is at baseline. She is disoriented.      Comments: Alert and oriented to herself only as per her baseline.     Inpatient Medications:  amLODIPine, 2.5 mg, oral, Daily   And  lisinopril, 10 mg, oral, Daily  [Held by provider] enoxaparin, 30 mg, subcutaneous, q24h  [START ON 11/25/2023] levoFLOXacin, 750 mg, intravenous, q48h    PRN Medications  PRN medications: acetaminophen **OR** acetaminophen " **OR** acetaminophen, hydrALAZINE, labetaloL, ondansetron **OR** ondansetron, oxyCODONE, polyethylene glycol  Continuous Medications:     LABS AND IMAGING:     Labs:  Results from last 7 days   Lab Units 11/24/23  0820 11/23/23  0944   WBC AUTO x10*3/uL 12.6* 15.2*   RBC AUTO x10*6/uL 3.46* 3.46*   HEMOGLOBIN g/dL 11.5* 11.5*   HEMATOCRIT % 34.6* 34.7*   MCV fL 100 100   MCH pg 33.2 33.2   MCHC g/dL 33.2 33.1   RDW % 13.5 13.1   PLATELETS AUTO x10*3/uL 212 236     Results from last 7 days   Lab Units 11/24/23  0820 11/23/23  0944   SODIUM mmol/L 136 139   POTASSIUM mmol/L 4.2 4.0   CHLORIDE mmol/L 103 107   CO2 mmol/L 24 22   BUN mg/dL 31* 24*   CREATININE mg/dL 1.44* 1.12*   GLUCOSE mg/dL 105* 117*   PROTEIN TOTAL g/dL  --  7.1   CALCIUM mg/dL 9.2 9.5   BILIRUBIN TOTAL mg/dL  --  0.4   ALK PHOS U/L  --  84   AST U/L  --  24   ALT U/L  --  27     Results from last 7 days   Lab Units 11/23/23  0944   MAGNESIUM mg/dL 1.90     Results from last 7 days   Lab Units 11/23/23  0944   TROPHS ng/L 9     Imaging:  XR femur left 2+ views, XR pelvis 1-2 views  Narrative: Interpreted By:  Arlene Kelley,   STUDY:  XR FEMUR LEFT 2+ VIEWS; XR PELVIS 1-2 VIEWS;  11/23/2023 11:06 am      INDICATION:  Signs/Symptoms:fall/ short and rotated.      COMPARISON:  None.      ACCESSION NUMBER(S):  [id number]; [id number]      ORDERING CLINICIAN:  JEANNETTE MEYER      FINDINGS:  AP view of the pelvis and multiple views of the left femur are  obtained. Superiorly displaced left femoral neck fracture. No  dislocation. The distal left femur appears grossly unremarkable.      Degenerative changes of the visualized left knee. There is a      Impression: Superiorly displaced left femoral neck fracture. No dislocation.          Signed by: Arlene Kelley 11/23/2023 11:26 AM  Dictation workstation:   [id number]  CT head wo IV contrast  Narrative: Interpreted By:  Arlene Kelley,   STUDY:  CT HEAD WO IV CONTRAST;  11/23/2023 11:10 am       INDICATION:  unwitnessed fall.      COMPARISON:  None.      ACCESSION NUMBER(S):  [id number]      ORDERING CLINICIAN:  JEANNETTE MEYER      TECHNIQUE:  Noncontrast axial CT scan of head was performed. Angled reformats in  brain and bone windows were generated. The images were reviewed in  bone, brain, blood and soft tissue windows.      FINDINGS:  The ventricles, cisterns and sulci are prominent, consistent with  mild-to-moderate diffuse volume loss. There are areas of moderate to  severe nonspecific white matter hypodensity, which are probably  age-related or microvascular in nature.      Gray-white differentiation is intact and there is no evidence of  acute cortical infarct. No mass, mass effect or midline shift is  seen. There is no evidence of hemorrhage.      The visualized paranasal sinuses are clear.          Impression: No evidence of acute cortical infarct or intracranial hemorrhage.  Chronic changes as described.      MACRO:  None      Signed by: Arlene Kelley 11/23/2023 11:24 AM  Dictation workstation:   [id number]  XR chest 1 view  Narrative: Interpreted By:  Arlene Kelley,   STUDY:  XR CHEST 1 VIEW;  11/23/2023 11:06 am      INDICATION:  Signs/Symptoms:fall.      COMPARISON:  None.      ACCESSION NUMBER(S):  [id number]      ORDERING CLINICIAN:  JEANNETTE MEYER      FINDINGS:  AP portable view of the chest is obtained.  Limited exam due to  portable nature. Magnified cardiac silhouette. Left upper lobe  asymmetrical density may represent infiltrate or summation of  shadows. Mild interstitial prominence. No effusion or pneumothorax.      Impression: 1. Asymmetrical left upper lobe opacity may represent infiltrate.  Consider short-term follow-up to re-evaluate.              MACRO:  None      Signed by: Arlene Kelley 11/23/2023 11:21 AM  Dictation workstation:   [id number]

## 2023-11-24 NOTE — OP NOTE
Hip Hemiarthroplasty FAHAD (L) Operative Note     Date: 2023 - 2023  OR Location: [facility]    Name: Jacqueline Sandoval, : 1935, Age: 88 y.o., MRN: [id number], Sex: female    Diagnosis  Pre-op Diagnosis     * Left displaced femoral neck fracture (CMS/HCC) [S72.002A] Post-op Diagnosis     * Left displaced femoral neck fracture (CMS/HCC) [S72.002A]     Procedures  Hip Hemiarthroplasty FAHAD  80791 - IA HEMIARTHROPLASTY HIP PARTIAL      Surgeons      * Margarito Koch - Primary    Resident/Fellow/Other Assistant:  Surgeon(s) and Role:     * Quang Turner PA-C - Assisting    Procedure Summary  Anesthesia: General  ASA: III  Anesthesia Staff: Anesthesiologist: Adalberto Briceño MD  CRNA: JOCELIN Mahoney-CRNA  Estimated Blood Loss: 100 mL  Intra-op Medications:   Medication Name Total Dose   sodium chloride 0.9 % irrigation solution 1,000 mL   ropivacaine-epinephrine-clonidine-ketorolac 2.46-0.005- 0.0008-0.3mg/mL periarticular syringe 100 mL   lactated Ringer's infusion Cannot be calculated   ceFAZolin (Ancef) 2 g IV in dextrose 5% 50 mL 2 g              Anesthesia Record               Intraprocedure I/O Totals          Intake    Propofol Drip 0.00 mL    The total shown is the total volume documented since Anesthesia Start was filed.    Total Intake 0 mL          Specimen: No specimens collected     Staff:   Circulator: Renuka Rene RN  Relief Scrub: Clara Wakefield  Scrub Person: Karina Valentine         Drains and/or Catheters: * None in log *    Tourniquet Times:         Implants:  Implants       Type Name Action Serial No.      Joint Hip BONE CEMENT, PREP KIT, FEMORAL - [id number] Implanted      Implant [id number], 2.9MM, DBL LOADED, P2 MAX BRAID, W/ TAPERED NEEDLE - [id number] Implanted      Implant CEMENT, BONE, SIMPLEX HV FULL DOSE  40 GM - [id number] Implanted               Findings: Displaced left femoral neck fracture    Indications: Jacqueline Sandoval is an 88 y.o. female who is having surgery  for Left displaced femoral neck fracture (CMS/[facility]) [S72.002A].     The patient was seen in the preoperative area. The risks, benefits, complications, treatment options, non-operative alternatives, expected recovery and outcomes were discussed with the patient. The possibilities of reaction to medication, pulmonary aspiration, injury to surrounding structures, bleeding, recurrent infection, the need for additional procedures, failure to diagnose a condition, and creating a complication requiring transfusion or operation were discussed with the patient. The patient concurred with the proposed plan, giving informed consent.  The site of surgery was properly noted/marked if necessary per policy. The patient has been actively warmed in preoperative area. Preoperative antibiotics have been ordered and given within 1 hours of incision. Venous thrombosis prophylaxis have been ordered including bilateral sequential compression devices and chemical prophylaxis    Procedure Details:     Indications: The patient has sustained a femoral neck fracture of the left hip. It was recommended to the patient and family that the patient have a hip hemifemoral neck fracture arthroplasty. The procedure was explained along with the risks, benefits alternatives being reviewed. Potential risks including but not limited to infection, neurovascular complication, instability, loosening, wear, limb length inequality, DVT along with the potential need for reoperation and/or revision surgery were all discussed with the patient and they consented to the procedure.      Components: Mark Accolade C    Femoral stem size: 2  Neck length: +0  Bipolar cup size:45  Head: 26    Procedure:    The patient was brought to the operative suite. A general anesthetic was administered per anesthesia. The patient was then placed in the lateral decubitus position, left side up. An axillary roll was placed. Pegs were placed in the pegboard and padded. The down leg  was well-padded at the knee and ankle. A U drape was used to exclude the operative hip and lower extremity from the rest of the body. The hip and lower extremity was then sterilely prepped with ChloraPrep then sterilely draped in the usual manner. Timeout was performed, the patient was then identified, the site, laterality and procedure confirmed along with the administration of the antibiotics.    I began by identifying landmarks. I made a lateral incision centered over the greater trochanter for a direct lateral approach to the hip. Dissection was carried down carefully through skin and subcutaneous tissues. Some of the vessels in the fatty tissue were cauterized. Identified the tensor fascia and IT band. I incised this in the line of the incision. A Charnley retractor was then placed. I then identified the gluteus medius. About an inch from the posterior border of the gluteus medius I split the medius musculature and identified the gluteus minimus tendon. I incised through the minimus tendon. I split the minimus musculature and identified the capsule. I then incised through the capsule. Next I incised through the vastus lateralis tendon. I split the vastus musculature down to the lateral femur. The crossing vessels were cauterized. I then raised as a cuff, the soft tissues anteriorly off of the trochanter and neck for the direct lateral approach to the hip. I readily identified the femoral neck fracture. I then measured approximately 1 cm from the lesser trochanter based on templating and marked the length of my neck cut. I used a broach to romy the angle of the neck cut. I then made the neck cut with a saw and completed it with an osteotome and mallet. Next I removed the fractured femoral head with a corkscrew. This was measured to a size of 45 mm for the bipolar cup. I then trialed this size and had an excellent fit.    I then prepared the femur. First using a box osteotome then a T-handle canal finding reamer  I began then broaching. I broached sequentially to the appropriate size. A calcar planar was used to smooth off the calcar.    Leaving the trial stem in place, I began trialing the construct with offset and neck lengths. I decided on the a forementioned construct.    The cement was prepared. While the cement was being prepared I thoroughly cookie irrigated the canal with a pulsatile lavage. I placed a cement restrictor to be approximately 1 cm distal to the tip of the prosthesis. I packed the canal with sponges. Once the cement was ready I placed the cement with a cement gun. I then placed the stem matching the anteversion that I had broached to. The PA and I debrided any excess cement with a Stryker elevator. Once the cement had hardened I placed the head and fracture ball assembly on the thacker taper. This was checked to be sure it was seated. The hip was reduced and again taken through range of motion. I was satisfied with my stability. I seem to recreate my leg lengths based upon an EKG lead that I had placed in the inferior pole of the patella the opposite leg preoperatively.    At that point I thoroughly copiously irrigated with normal saline. Attention was turned to closure. The capsule was repaired with #1 Ethibond. The gluteus minimus was repaired with #1 Vicryl. I then drilled and placed the Biomet juggernaut suture anchor with #2 max braid suture into the trochanter. I passed the sutures through the cuff of anterior tissues and tied this bringing this down to the greater trochanter. I repaired the split in the medius musculature with #1 Vicryl. I repaired the vastus lateralis with #1 Vicryl. I then repaired the IT band with Ethibond. The deep fatty tissue was repaired with 0 Vicryl. Subcutaneous tissue closure was with 2-0 Vicryl. The skin was closed with staples. A sterile waterproof dressing was applied.    The patient procedure well. There were no apparent complications. The patient was transferred from  the operating room to the recovery room and was in stable condition.    The physician assistant was present for the entire case.  Given the nature of the procedure and disease process a skilled surgical assistant was necessary for the case.  The assistant was necessary for retraction and helped directly facilitate completion of the surgery.  A certified scrub tech was at the back table managing instruments and supplies for the surgical procedure.    Complications:  None; patient tolerated the procedure well.    Disposition: PACU - hemodynamically stable.  Condition: stable         Additional Details: Not applicable    Attending Attestation: I performed the procedure.    Margarito Koch  Phone Number: [phone]

## 2023-11-25 LAB
ANION GAP SERPL CALC-SCNC: 12 MMOL/L (ref 10–20)
BASOPHILS # BLD AUTO: 0.02 X10*3/UL (ref 0–0.1)
BASOPHILS NFR BLD AUTO: 0.1 %
BUN SERPL-MCNC: 39 MG/DL (ref 6–23)
CALCIUM SERPL-MCNC: 8.6 MG/DL (ref 8.6–10.3)
CHLORIDE SERPL-SCNC: 103 MMOL/L (ref 98–107)
CO2 SERPL-SCNC: 23 MMOL/L (ref 21–32)
CREAT SERPL-MCNC: 1.54 MG/DL (ref 0.5–1.05)
EOSINOPHIL # BLD AUTO: 0 X10*3/UL (ref 0–0.4)
EOSINOPHIL NFR BLD AUTO: 0 %
ERYTHROCYTE [DISTWIDTH] IN BLOOD BY AUTOMATED COUNT: 13.4 % (ref 11.5–14.5)
GFR SERPL CREATININE-BSD FRML MDRD: 32 ML/MIN/1.73M*2
GLUCOSE SERPL-MCNC: 131 MG/DL (ref 74–99)
HCT VFR BLD AUTO: 26.3 % (ref 36–46)
HGB BLD-MCNC: 8.6 G/DL (ref 12–16)
HOLD SPECIMEN: NORMAL
IMM GRANULOCYTES # BLD AUTO: 0.09 X10*3/UL (ref 0–0.5)
IMM GRANULOCYTES NFR BLD AUTO: 0.6 % (ref 0–0.9)
LYMPHOCYTES # BLD AUTO: 1.04 X10*3/UL (ref 0.8–3)
LYMPHOCYTES NFR BLD AUTO: 7.1 %
MCH RBC QN AUTO: 33.2 PG (ref 26–34)
MCHC RBC AUTO-ENTMCNC: 32.7 G/DL (ref 32–36)
MCV RBC AUTO: 102 FL (ref 80–100)
MONOCYTES # BLD AUTO: 1.15 X10*3/UL (ref 0.05–0.8)
MONOCYTES NFR BLD AUTO: 7.8 %
NEUTROPHILS # BLD AUTO: 12.44 X10*3/UL (ref 1.6–5.5)
NEUTROPHILS NFR BLD AUTO: 84.4 %
NRBC BLD-RTO: 0 /100 WBCS (ref 0–0)
PLATELET # BLD AUTO: 163 X10*3/UL (ref 150–450)
POTASSIUM SERPL-SCNC: 4.3 MMOL/L (ref 3.5–5.3)
RBC # BLD AUTO: 2.59 X10*6/UL (ref 4–5.2)
SODIUM SERPL-SCNC: 134 MMOL/L (ref 136–145)
WBC # BLD AUTO: 14.7 X10*3/UL (ref 4.4–11.3)

## 2023-11-25 PROCEDURE — 97162 PT EVAL MOD COMPLEX 30 MIN: CPT | Mod: GP | Performed by: PHYSICAL THERAPIST

## 2023-11-25 PROCEDURE — 2500000001 HC RX 250 WO HCPCS SELF ADMINISTERED DRUGS (ALT 637 FOR MEDICARE OP)

## 2023-11-25 PROCEDURE — 1200000002 HC GENERAL ROOM WITH TELEMETRY DAILY

## 2023-11-25 PROCEDURE — 2500000004 HC RX 250 GENERAL PHARMACY W/ HCPCS (ALT 636 FOR OP/ED): Performed by: ORTHOPAEDIC SURGERY

## 2023-11-25 PROCEDURE — 2500000004 HC RX 250 GENERAL PHARMACY W/ HCPCS (ALT 636 FOR OP/ED)

## 2023-11-25 PROCEDURE — 36415 COLL VENOUS BLD VENIPUNCTURE: CPT

## 2023-11-25 PROCEDURE — 97110 THERAPEUTIC EXERCISES: CPT | Mod: GP | Performed by: PHYSICAL THERAPIST

## 2023-11-25 PROCEDURE — 96372 THER/PROPH/DIAG INJ SC/IM: CPT

## 2023-11-25 PROCEDURE — 97530 THERAPEUTIC ACTIVITIES: CPT | Mod: GP | Performed by: PHYSICAL THERAPIST

## 2023-11-25 PROCEDURE — 85025 COMPLETE CBC W/AUTO DIFF WBC: CPT

## 2023-11-25 PROCEDURE — 80048 BASIC METABOLIC PNL TOTAL CA: CPT

## 2023-11-25 PROCEDURE — 2500000004 HC RX 250 GENERAL PHARMACY W/ HCPCS (ALT 636 FOR OP/ED): Performed by: STUDENT IN AN ORGANIZED HEALTH CARE EDUCATION/TRAINING PROGRAM

## 2023-11-25 PROCEDURE — 99232 SBSQ HOSP IP/OBS MODERATE 35: CPT | Performed by: STUDENT IN AN ORGANIZED HEALTH CARE EDUCATION/TRAINING PROGRAM

## 2023-11-25 RX ADMIN — ENOXAPARIN SODIUM 30 MG: 30 INJECTION SUBCUTANEOUS at 08:26

## 2023-11-25 RX ADMIN — LEVOFLOXACIN 750 MG: 5 INJECTION, SOLUTION INTRAVENOUS at 12:03

## 2023-11-25 RX ADMIN — ACETAMINOPHEN 650 MG: 325 TABLET ORAL at 23:50

## 2023-11-25 RX ADMIN — ACETAMINOPHEN 650 MG: 325 SOLUTION ORAL at 08:25

## 2023-11-25 RX ADMIN — AMLODIPINE BESYLATE 2.5 MG: 5 TABLET ORAL at 08:25

## 2023-11-25 RX ADMIN — HYDROCODONE BITARTRATE AND ACETAMINOPHEN 1 TABLET: 5; 325 TABLET ORAL at 13:44

## 2023-11-25 RX ADMIN — SODIUM CHLORIDE, SODIUM LACTATE, POTASSIUM CHLORIDE, AND CALCIUM CHLORIDE 50 ML/HR: 600; 310; 30; 20 INJECTION, SOLUTION INTRAVENOUS at 06:18

## 2023-11-25 RX ADMIN — ACETAMINOPHEN 650 MG: 325 TABLET ORAL at 00:14

## 2023-11-25 RX ADMIN — ACETAMINOPHEN 650 MG: 325 TABLET ORAL at 18:38

## 2023-11-25 RX ADMIN — DOCUSATE SODIUM 100 MG: 100 CAPSULE, LIQUID FILLED ORAL at 08:26

## 2023-11-25 RX ADMIN — DOCUSATE SODIUM 100 MG: 100 CAPSULE, LIQUID FILLED ORAL at 20:35

## 2023-11-25 RX ADMIN — CEFAZOLIN SODIUM 2 G: 2 INJECTION, SOLUTION INTRAVENOUS at 06:16

## 2023-11-25 RX ADMIN — ACETAMINOPHEN 650 MG: 325 TABLET ORAL at 12:00

## 2023-11-25 ASSESSMENT — COGNITIVE AND FUNCTIONAL STATUS - GENERAL
TOILETING: A LOT
CLIMB 3 TO 5 STEPS WITH RAILING: TOTAL
WALKING IN HOSPITAL ROOM: TOTAL
STANDING UP FROM CHAIR USING ARMS: A LOT
MOVING FROM LYING ON BACK TO SITTING ON SIDE OF FLAT BED WITH BEDRAILS: TOTAL
DAILY ACTIVITIY SCORE: 11
MOBILITY SCORE: 6
TURNING FROM BACK TO SIDE WHILE IN FLAT BAD: TOTAL
MOVING TO AND FROM BED TO CHAIR: TOTAL
STANDING UP FROM CHAIR USING ARMS: TOTAL
DRESSING REGULAR UPPER BODY CLOTHING: A LOT
HELP NEEDED FOR BATHING: TOTAL
MOVING FROM LYING ON BACK TO SITTING ON SIDE OF FLAT BED WITH BEDRAILS: A LOT
EATING MEALS: A LITTLE
MOBILITY SCORE: 6
DRESSING REGULAR UPPER BODY CLOTHING: A LOT
STANDING UP FROM CHAIR USING ARMS: TOTAL
CLIMB 3 TO 5 STEPS WITH RAILING: TOTAL
MOBILITY SCORE: 9
CLIMB 3 TO 5 STEPS WITH RAILING: TOTAL
TURNING FROM BACK TO SIDE WHILE IN FLAT BAD: A LOT
WALKING IN HOSPITAL ROOM: TOTAL
MOVING TO AND FROM BED TO CHAIR: TOTAL
TOILETING: A LOT
EATING MEALS: A LITTLE
PERSONAL GROOMING: A LOT
WALKING IN HOSPITAL ROOM: TOTAL
PERSONAL GROOMING: A LOT
MOVING TO AND FROM BED TO CHAIR: TOTAL
DRESSING REGULAR LOWER BODY CLOTHING: TOTAL
TURNING FROM BACK TO SIDE WHILE IN FLAT BAD: TOTAL
DRESSING REGULAR LOWER BODY CLOTHING: TOTAL
MOVING FROM LYING ON BACK TO SITTING ON SIDE OF FLAT BED WITH BEDRAILS: TOTAL
HELP NEEDED FOR BATHING: TOTAL
DAILY ACTIVITIY SCORE: 11

## 2023-11-25 ASSESSMENT — PAIN - FUNCTIONAL ASSESSMENT: PAIN_FUNCTIONAL_ASSESSMENT: 0-10

## 2023-11-25 ASSESSMENT — PAIN SCALES - GENERAL
PAINLEVEL_OUTOF10: 6
PAINLEVEL_OUTOF10: 0 - NO PAIN
PAINLEVEL_OUTOF10: 1
PAINLEVEL_OUTOF10: 0 - NO PAIN

## 2023-11-25 ASSESSMENT — PAIN DESCRIPTION - LOCATION: LOCATION: HIP

## 2023-11-25 ASSESSMENT — PAIN DESCRIPTION - ORIENTATION: ORIENTATION: LEFT

## 2023-11-25 NOTE — PROGRESS NOTES
ASSESSMENT & PLAN:     Mechanical fall  Left femoral neck fracture s/p hemiarthroplasty (11/24)  - Orthopedic surgery following  - Status post repair/hemiarthroplasty on 11/24  - PT/OT postoperatively  - VTE prophylaxis with Enoxparin subcutaneous  - Pain medications as per above    Acute blood loss anemia  - Hemoglobin 8.6 today (>11.0 on admission)  - Will transfuse if hemoglobin <7.0     Community-acquired bacterial pneumonia, gram negative  - Continue levofloxacin at this time    ÓSCAR on CKD stage III  - Creatine 1.54 today (baseline ~1.0)  - IV fluids initiated 11/24  - Holding home lisinopril     Leukocytosis, reactive to above     Dementia without behavioral disturbances  - Seems to be at baseline, patient unable to provide any history     Essential hypertension  Hypertensive urgency  - Blood pressure elevated likely in setting of pain and fall along with dementia being in the hospital  - Continuing home medications as ordered     Generalized anxiety  GERD  - Resume home medications once reconciled     Unknown history of CHF  - Per previous documentation, patient with history of CHF but no echo found to further clarify details    VTE prophylaxis: Enoxaparin subcutaneous    Disposition/Daily update: Patient with acute drop in hemoglobin postoperatively to 8.6 today, spoke with orthopedic surgery and they are aware, plan to transfuse if hemoglobin <7.0.  Renal function continues to decline creatinine up to 1.54 this morning.  Holding lisinopril, continue IV fluids for another 24 hours.  PT/OT today with plan for discharge back to long-term care once hemoglobin is stable.      ---Of note, this documentation is completed using the Dragon Dictation system (voice recognition software). There may be spelling and/or grammatical errors that were not corrected prior to final submission.---      Jeff Scott MD    SUBJECTIVE     Patient was seen and examined at bedside this morning.  She was more alert this morning.       OBJECTIVE:     Last Recorded Vitals:  Vitals:    11/24/23 2205 11/24/23 2250 11/25/23 0157 11/25/23 0603   BP: 138/63 157/72 136/63 155/67   BP Location: Right arm   Right arm   Patient Position: Lying   Lying   Pulse: 81 84 74 76   Resp: 14   16   Temp: 36.4 °C (97.5 °F)  36.3 °C (97.3 °F) 36.4 °C (97.5 °F)   TempSrc: Temporal   Temporal   SpO2: 95% 96% 99% 96%   Weight:       Height:         Last I/O:  I/O last 3 completed shifts:  In: 1704 (25.1 mL/kg) [I.V.:1604 (23.6 mL/kg); IV Piggyback:100]  Out: 650 (9.6 mL/kg) [Urine:650 (0.3 mL/kg/hr)]  Weight: 68 kg     Physical Exam  Constitutional:       General: She is not in acute distress.     Appearance: Normal appearance.   HENT:      Head: Normocephalic and atraumatic.   Eyes:      Extraocular Movements: Extraocular movements intact.      Conjunctiva/sclera: Conjunctivae normal.   Cardiovascular:      Rate and Rhythm: Normal rate and regular rhythm.      Pulses: Normal pulses.      Heart sounds: Normal heart sounds.   Pulmonary:      Effort: Pulmonary effort is normal. No respiratory distress.      Breath sounds: Normal breath sounds. No wheezing.   Abdominal:      General: Bowel sounds are normal.      Palpations: Abdomen is soft.      Tenderness: There is no abdominal tenderness.   Musculoskeletal:      Cervical back: Normal range of motion and neck supple.      Comments: Range of motion intact in the bilateral upper extremities.  Lower extremity exam deferred today with plans for surgery for the left femur fracture.   Neurological:      Mental Status: She is alert. Mental status is at baseline. She is disoriented.      Comments: Alert and oriented to herself only as per her baseline.     Inpatient Medications:  acetaminophen, 650 mg, oral, q6h MEGHAN  amLODIPine, 2.5 mg, oral, Daily   And  [Held by provider] lisinopril, 10 mg, oral, Daily  docusate sodium, 100 mg, oral, BID  enoxaparin, 30 mg, subcutaneous, q24h  levoFLOXacin, 750 mg, intravenous,  q48h  tranexamic acid, 1,950 mg, oral, Once  tranexamic acid, 1,950 mg, oral, Once    PRN Medications  PRN medications: acetaminophen **OR** acetaminophen **OR** acetaminophen, benzocaine-menthol, bisacodyl, hydrALAZINE, HYDROcodone-acetaminophen, HYDROcodone-acetaminophen, labetaloL, methocarbamol, morphine, naloxone, ondansetron **OR** ondansetron, oxyCODONE, polyethylene glycol, prochlorperazine **OR** prochlorperazine **OR** prochlorperazine, promethazine (Phenergan) 6.25 mg in sodium chloride 0.9% 50 mL IV  Continuous Medications:  lactated Ringer's, 75 mL/hr, Last Rate: 75 mL/hr (11/24/23 1656)  oxygen, 2 L/min    LABS AND IMAGING:     Labs:  Results from last 7 days   Lab Units 11/25/23  0558 11/24/23  0820 11/23/23  0944   WBC AUTO x10*3/uL 14.7* 12.6* 15.2*   RBC AUTO x10*6/uL 2.59* 3.46* 3.46*   HEMOGLOBIN g/dL 8.6* 11.5* 11.5*   HEMATOCRIT % 26.3* 34.6* 34.7*   MCV fL 102* 100 100   MCH pg 33.2 33.2 33.2   MCHC g/dL 32.7 33.2 33.1   RDW % 13.4 13.5 13.1   PLATELETS AUTO x10*3/uL 163 212 236       Results from last 7 days   Lab Units 11/25/23  0558 11/24/23  0820 11/23/23  0944   SODIUM mmol/L 134* 136 139   POTASSIUM mmol/L 4.3 4.2 4.0   CHLORIDE mmol/L 103 103 107   CO2 mmol/L 23 24 22   BUN mg/dL 39* 31* 24*   CREATININE mg/dL 1.54* 1.44* 1.12*   GLUCOSE mg/dL 131* 105* 117*   PROTEIN TOTAL g/dL  --   --  7.1   CALCIUM mg/dL 8.6 9.2 9.5   BILIRUBIN TOTAL mg/dL  --   --  0.4   ALK PHOS U/L  --   --  84   AST U/L  --   --  24   ALT U/L  --   --  27       Results from last 7 days   Lab Units 11/23/23  0944   MAGNESIUM mg/dL 1.90       Results from last 7 days   Lab Units 11/23/23  0944   TROPHS ng/L 9       Imaging:  XR pelvis 1-2 views  Narrative: Interpreted By:  Carlos Landeros,   STUDY:  Pelvis dated  11/24/2023.      INDICATION:  Signs/Symptoms:Post op hip      COMPARISON:  Radiographs dated 11/23/2020      ACCESSION NUMBER(S):  [id number].      ORDERING CLINICIAN:  TRICE NORMAN.       TECHNIQUE:  One view(s) of the pelvis.      FINDINGS:  Bones are demineralized. There is placement of a bipolar  hemiarthroplasty of the left hip. Hardware is intact as visualized.  There appears to be buckling of the right pubic body. It is not well  profiled. The posterior pelvis is not well assessed due to  obscuration by the soft tissues. Subcutaneous emphysema and edema and  skin staples are seen from recent surgery.      Impression: 1. Appearance of fracturing at the right pubic body which is not well  profiled. CT of the pelvis is recommended when the patient's clinical  condition allows to assess the entirety of the pelvic ring.  2. Bipolar hemiarthroplasty of the left hip hardware appearing intact  as seen on this single AP radiograph.      MACRO:  None      Signed by: Carlos Landeros 11/24/2023 4:42 PM  Dictation workstation:   [id number]

## 2023-11-25 NOTE — PROGRESS NOTES
Physical Therapy    Physical Therapy Evaluation & Treatment    Patient Name: Jacqueline Sandoval  MRN: [id number]  Today's Date: 11/25/2023   Time Calculation  Start Time: 0900  Stop Time: 0934  Time Calculation (min): 34 min    Assessment/Plan   PT Assessment  PT Assessment Results: Decreased strength, Decreased range of motion, Decreased endurance, Impaired balance, Decreased mobility, Decreased cognition, Impaired judgement, Decreased safety awareness, Impaired hearing, Pain  Rehab Prognosis: Fair  Evaluation/Treatment Tolerance:  (Limited by pain, fatigue and impaired cognition)  Medical Staff Made Aware: Yes  End of Session Communication: Bedside nurse  End of Session Patient Position: Bed, 3 rail up, Alarm on  IP OR SWING BED PT PLAN  Inpatient or Swing Bed: Inpatient  PT Plan  Treatment/Interventions: Bed mobility, Balance training, Strengthening, Range of motion, Endurance training, Therapeutic exercise, Therapeutic activity, Home exercise program, Postural re-education  PT Plan: Skilled PT (Will trial skilled PT to see if pt. able to participate and make progress towards PT goals)  PT Frequency: Daily  PT Discharge Recommendations:  (low to mod intensity; 24/7 assist)  PT - OK to Discharge: Yes (back to NH, when medically stable)    Current Problem:  Patient Active Problem List   Diagnosis    Anxiety    Depression    Fibromyalgia    HTN (hypertension), benign    [place] (hard of hearing)    Hyponatremia    Hypothyroidism    Osteoarthritis    Vertigo    Acute cystitis without hematuria    Urinary symptom or sign    Frequent UTI    Stage 3a chronic kidney disease (CMS/HCC)    Left displaced femoral neck fracture (CMS/HCC)       Subjective     General Visit Information:  General  Reason for Referral: L hip hemiarthroplasty  Referred By: Leandra GREEN/Zee  Past Medical History Relevant to Rehab: Dementia, HTN, CHF, fibromyalgia, falls, anxiety, anemia, humerus fx s/p ORIF 10/2022  Family/Caregiver Present: No  Patient  "Position Received: Bed, 3 rail up (purewick intact)  General Comment: admit 11/23 s/p fall at [facility], pt. found on floor, c/o LLE pain. Pt. sustained L displaced femoral neck fx and was found to have NAJMA PNA; s/p L hip hemiarthroplasty 11/24/23 with Dr. Koch. CT head (-) acute. Per Ortho note, the post op films show an abnormality in the right ischium and pubis which may represent a fracture, however, there is no change in ortho precautions, per Dr. Koch    Home Living:  Home Living  Home Living Comments: Per EMR, pt. is LTC resident at [facility].    Prior Level of Function:  Prior Function Per Pt/Caregiver Report  Prior Function Comments: Pt. unable to provide PLOF information. However, pt. has B/L plantar flexor contractures and significant weakness and rigidity throughout BLEs. Suspect that pt. is nonambulatory at baseline.    Precautions:  Precautions  LE Weight Bearing Status: Weight Bearing as Tolerated  Medical Precautions: Fall precautions  Post-Surgical Precautions:  (no hip precautions, per Dr. Koch)  Precautions Comment: very [place]    Vital Signs:  Vital Signs  Heart Rate: 81  SpO2:  (O2 sat 88-92% on room air; RN notified and 2LO2 applied; O2 sat 94% on 2L)  Objective     Pain:  Pain Assessment  Pain Assessment:  (Pt. unable to rate pain due to significantly impaired cognitive status. Pt. states that she has pain \"all over.\" Pt. moaning during mobility due to pain. However, at rest, pt. appears comfortable and not in any distress.)    Cognition:  Cognition  Overall Cognitive Status: Impaired, Impaired at baseline  Orientation Level: Disoriented to place, Disoriented to time, Disoriented to situation (oriented to self only; pt. speaking out of context at times. Several attempts to reorient patient.)  Following Commands:  (follows 1 step commands ~50-74% of the time with increased time and repetition of cues. Pt. is VERY [place])    General Assessments:        Strength  Strength Comments: General weaknes " "throughout BUE; RLE 2-3-/5 throughout, except R ankle DF 1-2-/5; LLE 1-2/5 throughout           Static Sitting Balance  Static Sitting-Balance Support: Bilateral upper extremity supported  Static Sitting-Level of Assistance: Close supervision  Static Sitting-Comment/Number of Minutes: Sat unsupported EOB ~ 12 minutes  Dynamic Sitting Balance  Dynamic Sitting-Comments: up to modAx1 required for dynamic sitting balance       Functional Assessments:     Bed Mobility  Bed Mobility: Yes (sup to/from sit with Dependent x 2; increased rigidity throughout BLEs)  Transfers  Transfer: No (Not appropriate to attempt due to B/L plantarflexor contractures, increased rigidity and weakness throughout BLE)             Extremity/Trunk Assessments:  RUE   RUE :  (R shoulder flexion AROM limited, but RUE grossly WFL)  LUE   LUE: Exceptions to WFL (L shoulder flexion AROM limited, but otherwise LUE WFL)  RLE   RLE :  (Difficult to assess due to increased rigidity throughout. R ankle plantar flexion contracture ~ 20 degrees. R hip flex in seated position WFL, R knee flexion in seated ~70 degrees.)  LLE   LLE :  (Difficult to assess due to increased rigidity throughout. L ankle plantar flexion contracture ~ 30 degrees. L hip flex in seated position WFL, L knee flexion in seated ~70 degrees.)    Treatments:     Pt. sat EOB for 12 min. with mostly SBA; cues for upright posture and deep breathing. Cues to \"relax\" BLEs due to increased rigidity. Pt. able to flex B/L knees after increased time. Performed BLE LAQ and hip abd/add with AROM </= 50% of full ROM ~8-10 reps x 2 sets while sitting unsupported with CGA/SBAx1.        Outcome Measures:  [facility] Basic Mobility  Turning from your back to your side while in a flat bed without using bedrails: Total  Moving from lying on your back to sitting on the side of a flat bed without using bedrails: Total  Moving to and from bed to chair (including a wheelchair): Total  Standing up from a chair using " your arms (e.g. wheelchair or bedside chair): Total  To walk in hospital room: Total  Climbing 3-5 steps with railing: Total  Basic Mobility - Total Score: 6                            Goals:  Encounter Problems       Encounter Problems (Active)       Impaired mobility        Perform all bed mobility with modAx1        Start:  11/25/23    Expected End:  12/09/23            Patient will sit unsupported with supervision for static balance and CGAx1 for multidirectional reaching         Start:  11/25/23    Expected End:  12/09/23            Patient will participate in BLE AROM/AAROM x10-20 reps x 1-2 sets        Start:  11/25/23    Expected End:  12/09/23               Pain - Adult            Education Documentation  No documentation found.  Education Comments  No comments found.

## 2023-11-25 NOTE — CARE PLAN
Problem: Nutrition  Goal: Oral intake greater 75%  Outcome: Progressing  Goal: Consume prescribed supplement  Outcome: Progressing  Goal: Adequate PO fluid intake  Outcome: Progressing  Goal: BG  mg/dL  Outcome: Progressing  Goal: Lab values WNL  Outcome: Progressing  Goal: Electrolytes WNL  Outcome: Progressing  Goal: Promote healing  Outcome: Progressing  Goal: Maintain stable weight  Outcome: Progressing     Problem: Pain - Adult  Goal: Verbalizes/displays adequate comfort level or baseline comfort level  Outcome: Progressing  Flowsheets (Taken 11/25/2023 1702)  Verbalizes/displays adequate comfort level or baseline comfort level:   Encourage patient to monitor pain and request assistance   Assess pain using appropriate pain scale   Administer analgesics based on type and severity of pain and evaluate response   Implement non-pharmacological measures as appropriate and evaluate response   Consider cultural and social influences on pain and pain management   Notify Licensed Independent Practitioner if interventions unsuccessful or patient reports new pain     Problem: Safety - Adult  Goal: Free from fall injury  Outcome: Progressing  Flowsheets (Taken 11/25/2023 1702)  Free from fall injury: Instruct family/caregiver on patient safety     Problem: Discharge Planning  Goal: Discharge to home or other facility with appropriate resources  Outcome: Progressing  Flowsheets (Taken 11/25/2023 1702)  Discharge to home or other facility with appropriate resources:   Identify barriers to discharge with patient and caregiver   Arrange for needed discharge resources and transportation as appropriate   Identify discharge learning needs (meds, wound care, etc)   Refer to discharge planning if patient needs post-hospital services based on physician order or complex needs related to functional status, cognitive ability or social support system     Problem: Chronic Conditions and Co-morbidities  Goal: Patient's chronic  conditions and co-morbidity symptoms are monitored and maintained or improved  Outcome: Progressing  Flowsheets (Taken 11/25/2023 1702)  Care Plan - Patient's Chronic Conditions and Co-Morbidity Symptoms are Monitored and Maintained or Improved:   Monitor and assess patient's chronic conditions and comorbid symptoms for stability, deterioration, or improvement   Collaborate with multidisciplinary team to address chronic and comorbid conditions and prevent exacerbation or deterioration   Update acute care plan with appropriate goals if chronic or comorbid symptoms are exacerbated and prevent overall improvement and discharge     Problem: Skin  Goal: Decreased wound size/increased tissue granulation at next dressing change  Outcome: Progressing  Flowsheets (Taken 11/25/2023 1702)  Decreased wound size/increased tissue granulation at next dressing change: Promote sleep for wound healing  Goal: Participates in plan/prevention/treatment measures  Outcome: Progressing  Flowsheets (Taken 11/25/2023 1702)  Participates in plan/prevention/treatment measures:   Discuss with provider PT/OT consult   Elevate heels  Goal: Prevent/manage excess moisture  Outcome: Progressing  Flowsheets (Taken 11/25/2023 1702)  Prevent/manage excess moisture:   Cleanse incontinence/protect with barrier cream   Moisturize dry skin   Follow provider orders for dressing changes   Monitor for/manage infection if present  Goal: Prevent/minimize sheer/friction injuries  Outcome: Progressing  Flowsheets (Taken 11/25/2023 1702)  Prevent/minimize sheer/friction injuries:   Complete micro-shifts as needed if patient unable. Adjust patient position to relieve pressure points, not a full turn   Use pull sheet   HOB 30 degrees or less   Turn/reposition every 2 hours/use positioning/transfer devices  Goal: Promote/optimize nutrition  Outcome: Progressing  Flowsheets (Taken 11/25/2023 1702)  Promote/optimize nutrition:   Offer water/supplements/favorite foods    Consume > 50% meals/supplements   Reassess MST if dietician not consulted  Goal: Promote skin healing  Outcome: Progressing  Flowsheets (Taken 11/25/2023 1702)  Promote skin healing: Turn/reposition every 2 hours/use positioning/transfer devices     Problem: Fall/Injury  Goal: Not fall by end of shift  Outcome: Progressing  Goal: Be free from injury by end of the shift  Outcome: Progressing  Goal: Verbalize understanding of personal risk factors for fall in the hospital  Outcome: Progressing  Goal: Verbalize understanding of risk factor reduction measures to prevent injury from fall in the home  Outcome: Progressing  Goal: Use assistive devices by end of the shift  Outcome: Progressing  Goal: Pace activities to prevent fatigue by end of the shift  Outcome: Progressing     Problem: Pain  Goal: Takes deep breaths with improved pain control throughout the shift  Outcome: Progressing  Goal: Turns in bed with improved pain control throughout the shift  Outcome: Progressing  Goal: Performs ADL's with improved pain control throughout shift  Outcome: Progressing  Goal: Participates in PT with improved pain control throughout the shift  Outcome: Progressing  Goal: Free from opioid side effects throughout the shift  Outcome: Progressing  Goal: Free from acute confusion related to pain meds throughout the shift  Outcome: Progressing   The patient's goals for the shift include      The clinical goals for the shift include Patient's pain will be managed to a tolerable level throughout this shift.    Over the shift, the patient did make progress toward care plan goals.

## 2023-11-25 NOTE — PROGRESS NOTES
Hip surgery    Progress Note    Subjective:     Post-Operative Day: 1 Status Post right Hip Bhavik arthroplasty  Systemic or Specific Complaints:No Complaints and patient is confused    Objective:     Visit Vitals  /67 (BP Location: Right arm, Patient Position: Lying)   Pulse 76   Temp 36.4 °C (97.5 °F) (Temporal)   Resp 16       General: alert, appears stated age, and cooperative   Wound: wound clean and dry no evidence of infection   Motion: Painless Range of Motion   DVT Exam: No evidence of DVT seen on physical exam.       NVI in lower extremity. Thigh swollen but soft. Moving foot and ankle.    Logrolling of the right hip elicits no pain.  The patient though gets combative and angry.  She states that her knee hurts where I touch to move her hip.      Data Review  ontains abnormal data CBC and Auto Differential  Order: [id number]  Status: Final result       Visible to patient: Yes (not seen)    0 Result Notes            Component  Ref Range & Units 05:58  (11/25/23) 1 d ago  (11/24/23) 2 d ago  (11/23/23) 1 yr ago  (11/3/22) 1 yr ago  (11/2/22) 1 yr ago  (10/31/22) 1 yr ago  (10/29/22)   WBC  4.4 - 11.3 x10*3/uL 14.7 High  12.6 High  15.2 High  4.4 R 6.1 R 4.9 R 8.8 R   nRBC  0.0 - 0.0 /100 WBCs 0.0 0.0 0.0       RBC  4.00 - 5.20 x10*6/uL 2.59 Low  3.46 Low  3.46 Low  2.97 Low  R 3.56 Low  R 3.34 Low  R 3.64 Low  R   Hemoglobin  12.0 - 16.0 g/dL 8.6 Low  11.5 Low  11.5 Low  9.4 Low  11.4 Low  10.8 Low  11.7 Low    Hematocrit  36.0 - 46.0 % 26.3 Low  34.6 Low  34.7 Low  28.8 Low  34.1 Low  32.2 Low  34.4 Low    MCV  80 - 100 fL 102 High  100 100 97 96 96 95   MCH  26.0 - 34.0 pg 33.2 33.2 33.2       MCHC  32.0 - 36.0 g/dL 32.7 33.2 33.1 32.6 33.4 33.5 34.0   RDW  11.5 - 14.5 % 13.4 13.5 13.1 12.8 12.7 12.8 12.6   Platelets  150 - 450 x10*3/uL 163 212 236 216 R 268 R 224 R 238 R   Neutrophils %  40.0 - 80.0 % 84.4 80.9 87.5 48.4 51.1  72.0   Immature Granulocytes %, Automated  0.0 - 0.9 % 0.6 0.6 CM 0.9 CM  0.7 CM 0.5 CM  0.3 CM   Comment: Immature Granulocyte Count (IG) includes promyelocytes, myelocytes and metamyelocytes but does not include bands. Percent differential counts (%) should be interpreted in the context of the absolute cell counts (cells/UL).   Lymphocytes %  13.0 - 44.0 % 7.1 10.7 6.6 29.9 29.6  15.2   Monocytes %  2.0 - 10.0 % 7.8 7.1 4.2 12.2 11.9  9.5   Eosinophils %  0.0 - 6.0 % 0.0 0.4 0.7 8.1 6.1  2.7   Basophils %  0.0 - 2.0 % 0.1 0.3 0.1 0.7 0.8  0.3   Neutrophils Absolute  1.60 - 5.50 x10*3/uL 12.44 High  10.16 High  CM 13.31 High  CM 2.14 R 3.09 R  6.32 High  R   Comment: Percent differential counts (%) should be interpreted in the context of the absolute cell counts (cells/uL).   Immature Granulocytes Absolute, Automated  0.00 - 0.50 x10*3/uL 0.09 0.07 0.13       Lymphocytes Absolute  0.80 - 3.00 x10*3/uL 1.04 1.34 1.01 1.32 R 1.79 R  1.34 R   Monocytes Absolute  0.05 - 0.80 x10*3/uL 1.15 High  0.89 High  0.64 0.54 R 0.72 R  0.84 High  R   Eosinophils Absolute  0.00 - 0.40 x10*3/uL 0.00 0.05 0.11 0.36 R 0.37 R  0.24 R   Basophils Absolute  0.00 - 0.10 x10*3/uL 0.02 0.04 0.02 0.03 R 0.05 R  0.03 R   Resulting Agency [facility]        Radiographs:  Postop films show the hip hemiarthroplasty in satisfactory position.  The hip is reduced.  No fracture around the stem is identified.  There is an abnormality in the right ischium and pubis which may represent a fracture.    Assessment:     Status Post left Hip Bhavik arthroplasty. Doing well postoperatively.   Acute postsurgical blood loss anemia    Plan:      1: Continues current post-op course :  2:  Continue Deep venous thrombosis prophylaxis  3:  Continue physical therapy  4:  Continue Pain Control  5.  Watch hemoglobin

## 2023-11-26 LAB
ABO GROUP (TYPE) IN BLOOD: NORMAL
ANION GAP SERPL CALC-SCNC: 11 MMOL/L (ref 10–20)
BASOPHILS # BLD AUTO: 0.02 X10*3/UL (ref 0–0.1)
BASOPHILS NFR BLD AUTO: 0.2 %
BLOOD EXPIRATION DATE: NORMAL
BUN SERPL-MCNC: 50 MG/DL (ref 6–23)
CALCIUM SERPL-MCNC: 8.4 MG/DL (ref 8.6–10.3)
CHLORIDE SERPL-SCNC: 101 MMOL/L (ref 98–107)
CO2 SERPL-SCNC: 25 MMOL/L (ref 21–32)
CREAT SERPL-MCNC: 1.76 MG/DL (ref 0.5–1.05)
DISPENSE STATUS: NORMAL
EOSINOPHIL # BLD AUTO: 0.13 X10*3/UL (ref 0–0.4)
EOSINOPHIL NFR BLD AUTO: 1.4 %
ERYTHROCYTE [DISTWIDTH] IN BLOOD BY AUTOMATED COUNT: 13.2 % (ref 11.5–14.5)
GFR SERPL CREATININE-BSD FRML MDRD: 28 ML/MIN/1.73M*2
GLUCOSE SERPL-MCNC: 106 MG/DL (ref 74–99)
HCT VFR BLD AUTO: 20.5 % (ref 36–46)
HCT VFR BLD AUTO: 23.9 % (ref 36–46)
HGB BLD-MCNC: 6.8 G/DL (ref 12–16)
HGB BLD-MCNC: 8.2 G/DL (ref 12–16)
HOLD SPECIMEN: NORMAL
IMM GRANULOCYTES # BLD AUTO: 0.06 X10*3/UL (ref 0–0.5)
IMM GRANULOCYTES NFR BLD AUTO: 0.7 % (ref 0–0.9)
LYMPHOCYTES # BLD AUTO: 1.92 X10*3/UL (ref 0.8–3)
LYMPHOCYTES NFR BLD AUTO: 21.3 %
MCH RBC QN AUTO: 33 PG (ref 26–34)
MCHC RBC AUTO-ENTMCNC: 33.2 G/DL (ref 32–36)
MCV RBC AUTO: 100 FL (ref 80–100)
MONOCYTES # BLD AUTO: 1.34 X10*3/UL (ref 0.05–0.8)
MONOCYTES NFR BLD AUTO: 14.8 %
NEUTROPHILS # BLD AUTO: 5.56 X10*3/UL (ref 1.6–5.5)
NEUTROPHILS NFR BLD AUTO: 61.6 %
NRBC BLD-RTO: 0 /100 WBCS (ref 0–0)
PLATELET # BLD AUTO: 153 X10*3/UL (ref 150–450)
POTASSIUM SERPL-SCNC: 4 MMOL/L (ref 3.5–5.3)
PRODUCT BLOOD TYPE: 6200
PRODUCT CODE: NORMAL
RBC # BLD AUTO: 2.06 X10*6/UL (ref 4–5.2)
RBC MORPH BLD: NORMAL
RH FACTOR (ANTIGEN D): NORMAL
SODIUM SERPL-SCNC: 133 MMOL/L (ref 136–145)
UNIT ABO: NORMAL
UNIT NUMBER: NORMAL
UNIT RH: NORMAL
UNIT VOLUME: 350
WBC # BLD AUTO: 9 X10*3/UL (ref 4.4–11.3)
XM INTEP: NORMAL

## 2023-11-26 PROCEDURE — 36415 COLL VENOUS BLD VENIPUNCTURE: CPT

## 2023-11-26 PROCEDURE — 36415 COLL VENOUS BLD VENIPUNCTURE: CPT | Performed by: STUDENT IN AN ORGANIZED HEALTH CARE EDUCATION/TRAINING PROGRAM

## 2023-11-26 PROCEDURE — 2500000004 HC RX 250 GENERAL PHARMACY W/ HCPCS (ALT 636 FOR OP/ED)

## 2023-11-26 PROCEDURE — 80051 ELECTROLYTE PANEL: CPT

## 2023-11-26 PROCEDURE — 96372 THER/PROPH/DIAG INJ SC/IM: CPT

## 2023-11-26 PROCEDURE — P9016 RBC LEUKOCYTES REDUCED: HCPCS

## 2023-11-26 PROCEDURE — 99232 SBSQ HOSP IP/OBS MODERATE 35: CPT | Performed by: STUDENT IN AN ORGANIZED HEALTH CARE EDUCATION/TRAINING PROGRAM

## 2023-11-26 PROCEDURE — 97165 OT EVAL LOW COMPLEX 30 MIN: CPT | Mod: GO

## 2023-11-26 PROCEDURE — 80048 BASIC METABOLIC PNL TOTAL CA: CPT

## 2023-11-26 PROCEDURE — 2500000001 HC RX 250 WO HCPCS SELF ADMINISTERED DRUGS (ALT 637 FOR MEDICARE OP)

## 2023-11-26 PROCEDURE — 2500000005 HC RX 250 GENERAL PHARMACY W/O HCPCS

## 2023-11-26 PROCEDURE — 1210000001 HC SEMI-PRIVATE ROOM DAILY

## 2023-11-26 PROCEDURE — 97530 THERAPEUTIC ACTIVITIES: CPT | Mod: GP,CQ

## 2023-11-26 PROCEDURE — 36430 TRANSFUSION BLD/BLD COMPNT: CPT

## 2023-11-26 PROCEDURE — 85025 COMPLETE CBC W/AUTO DIFF WBC: CPT

## 2023-11-26 PROCEDURE — 85014 HEMATOCRIT: CPT | Performed by: STUDENT IN AN ORGANIZED HEALTH CARE EDUCATION/TRAINING PROGRAM

## 2023-11-26 PROCEDURE — 94760 N-INVAS EAR/PLS OXIMETRY 1: CPT

## 2023-11-26 RX ADMIN — AMLODIPINE BESYLATE 2.5 MG: 5 TABLET ORAL at 09:34

## 2023-11-26 RX ADMIN — Medication 2 L/MIN: at 11:57

## 2023-11-26 RX ADMIN — ACETAMINOPHEN 650 MG: 325 TABLET ORAL at 06:06

## 2023-11-26 RX ADMIN — ACETAMINOPHEN 650 MG: 325 TABLET ORAL at 11:26

## 2023-11-26 RX ADMIN — ENOXAPARIN SODIUM 30 MG: 30 INJECTION SUBCUTANEOUS at 09:34

## 2023-11-26 ASSESSMENT — COGNITIVE AND FUNCTIONAL STATUS - GENERAL
STANDING UP FROM CHAIR USING ARMS: TOTAL
WALKING IN HOSPITAL ROOM: TOTAL
TURNING FROM BACK TO SIDE WHILE IN FLAT BAD: TOTAL
STANDING UP FROM CHAIR USING ARMS: TOTAL
MOVING FROM LYING ON BACK TO SITTING ON SIDE OF FLAT BED WITH BEDRAILS: TOTAL
PERSONAL GROOMING: A LOT
TURNING FROM BACK TO SIDE WHILE IN FLAT BAD: TOTAL
PERSONAL GROOMING: A LOT
DRESSING REGULAR LOWER BODY CLOTHING: TOTAL
HELP NEEDED FOR BATHING: TOTAL
EATING MEALS: A LITTLE
CLIMB 3 TO 5 STEPS WITH RAILING: TOTAL
EATING MEALS: A LITTLE
MOVING FROM LYING ON BACK TO SITTING ON SIDE OF FLAT BED WITH BEDRAILS: TOTAL
MOBILITY SCORE: 6
DRESSING REGULAR LOWER BODY CLOTHING: TOTAL
TOILETING: A LOT
DRESSING REGULAR LOWER BODY CLOTHING: TOTAL
DAILY ACTIVITIY SCORE: 11
DRESSING REGULAR UPPER BODY CLOTHING: A LOT
DRESSING REGULAR UPPER BODY CLOTHING: A LOT
EATING MEALS: A LITTLE
TURNING FROM BACK TO SIDE WHILE IN FLAT BAD: TOTAL
TOILETING: A LOT
MOVING FROM LYING ON BACK TO SITTING ON SIDE OF FLAT BED WITH BEDRAILS: TOTAL
MOVING TO AND FROM BED TO CHAIR: TOTAL
MOBILITY SCORE: 6
DAILY ACTIVITIY SCORE: 11
DRESSING REGULAR UPPER BODY CLOTHING: A LOT
HELP NEEDED FOR BATHING: TOTAL
TOILETING: TOTAL
PERSONAL GROOMING: A LOT
HELP NEEDED FOR BATHING: A LOT
CLIMB 3 TO 5 STEPS WITH RAILING: TOTAL
STANDING UP FROM CHAIR USING ARMS: TOTAL
DAILY ACTIVITIY SCORE: 11
MOVING TO AND FROM BED TO CHAIR: TOTAL
MOBILITY SCORE: 6
WALKING IN HOSPITAL ROOM: TOTAL
MOVING TO AND FROM BED TO CHAIR: TOTAL
CLIMB 3 TO 5 STEPS WITH RAILING: TOTAL
WALKING IN HOSPITAL ROOM: TOTAL

## 2023-11-26 ASSESSMENT — PAIN - FUNCTIONAL ASSESSMENT
PAIN_FUNCTIONAL_ASSESSMENT: 0-10
PAIN_FUNCTIONAL_ASSESSMENT: 0-10

## 2023-11-26 ASSESSMENT — PAIN SCALES - GENERAL: PAINLEVEL_OUTOF10: 0 - NO PAIN

## 2023-11-26 ASSESSMENT — ACTIVITIES OF DAILY LIVING (ADL): BATHING_ASSISTANCE: MAXIMAL

## 2023-11-26 NOTE — PROGRESS NOTES
"Physical Therapy    Physical Therapy Treatment    Patient Name: Jacqueline Sandoval  MRN: [id number]  Today's Date: 11/26/2023  Time Calculation  Start Time: 0857  Stop Time: 0914  Time Calculation (min): 17 min       Assessment/Plan   PT Assessment  PT Assessment Results: Decreased strength, Decreased range of motion, Decreased endurance, Impaired balance, Decreased mobility, Decreased cognition, Impaired judgement, Decreased safety awareness, Impaired hearing, Pain  Rehab Prognosis: Fair  End of Session Communication: Bedside nurse  Assessment Comment: Limited participation with PT due to baseline status and advanced dementia. Pt would benefit from further skilled PT to progress strength and transfers if cooperative.  End of Session Patient Position: Bed, 3 rail up, Alarm on  PT Plan  Inpatient/Swing Bed or Outpatient: Inpatient  PT Plan  Treatment/Interventions: Bed mobility, Therapeutic exercise, Neuromuscular re-education  PT Plan: Skilled PT  PT Frequency: Daily  PT Discharge Recommendations: Low intensity level of continued care, Moderate intensity level of continued care (24/7 care)  PT - OK to Discharge: Yes (back to NH, when medically stable)      General Visit Information:   PT  Visit  PT Received On: 11/26/23  General  Reason for Referral: L hip hemiarthroplasty  Referred By: Leandra GREEN/Zee  Past Medical History Relevant to Rehab: Dementia, HTN, CHF, fibromyalgia, falls, anxiety, anemia, humerus fx s/p ORIF 10/2022  Co-Treatment: OT (To maximize patient and staff safety and for pt activity tolerance.)  Prior to Session Communication: Bedside nurse  Patient Position Received: Bed, 3 rail up  General Comment: Pt supine and sleeping upon entering and arouses to verbal stimuli. Very [place]. Pt initially resistant to therapy with flat affect telling staff \"you can't move me they tried\" and \"leave me alone.\" Pt has advanced dementia. (Juan R, oxygen. Hgb 6.8 to receive blood after 10am.)    Subjective " "  Precautions:  Precautions  Medical Precautions: Fall precautions  Vital Signs:       Objective   Pain:  Pain Assessment  Pain Assessment: 0-10  Pain Score:  (Pt states pain \"all over\" but does not quantify with a number.)  Cognition:  Cognition  Orientation Level: Disoriented to place, Disoriented to time, Disoriented to situation (Oriented to self)  Postural Control:     Extremity/Trunk Assessments:        Activity Tolerance:  Activity Tolerance  Endurance: Decreased tolerance for upright activites  Treatments:  Therapeutic Exercise  Therapeutic Exercise Performed: Yes (Seated LAQ with limited ROM attempted to assist with AAROM for LAQ and dorsiflexion but patient rigid and resistant.)    Bed Mobility  Bed Mobility: Yes (Pt dependent for all bed mobility. Rigid and not giving any assist to staff.)    Outcome Measures:  [facility] Basic Mobility  Turning from your back to your side while in a flat bed without using bedrails: Total  Moving from lying on your back to sitting on the side of a flat bed without using bedrails: Total  Moving to and from bed to chair (including a wheelchair): Total  Standing up from a chair using your arms (e.g. wheelchair or bedside chair): Total  To walk in hospital room: Total  Climbing 3-5 steps with railing: Total  Basic Mobility - Total Score: 6    Education Documentation  No documentation found.  Education Comments  No comments found.        OP EDUCATION:  Outpatient Education  Individual(s) Educated: Patient  Education Comment: Attempted education but does not appear to retain or follow due to dementia. Pt speaks non-sensibly at times.    Encounter Problems       Encounter Problems (Active)       Impaired mobility        Perform all bed mobility with modAx1  (Progressing)       Start:  11/25/23    Expected End:  12/09/23            Patient will sit unsupported with supervision for static balance and CGAx1 for multidirectional reaching   (Progressing)       Start:  11/25/23    Expected " End:  12/09/23            Patient will participate in BLE AROM/AAROM x10-20 reps x 1-2 sets  (Progressing)       Start:  11/25/23    Expected End:  12/09/23               Pain - Adult

## 2023-11-26 NOTE — CARE PLAN
Problem: Nutrition  Goal: Oral intake greater 75%  Outcome: Progressing  Goal: Consume prescribed supplement  Outcome: Progressing  Goal: Adequate PO fluid intake  Outcome: Progressing  Goal: BG  mg/dL  Outcome: Progressing  Goal: Lab values WNL  Outcome: Progressing  Goal: Electrolytes WNL  Outcome: Progressing  Goal: Promote healing  Outcome: Progressing  Goal: Maintain stable weight  Outcome: Progressing     Problem: Pain - Adult  Goal: Verbalizes/displays adequate comfort level or baseline comfort level  Outcome: Progressing  Flowsheets (Taken 11/25/2023 1702)  Verbalizes/displays adequate comfort level or baseline comfort level:   Encourage patient to monitor pain and request assistance   Assess pain using appropriate pain scale   Administer analgesics based on type and severity of pain and evaluate response   Implement non-pharmacological measures as appropriate and evaluate response   Consider cultural and social influences on pain and pain management   Notify Licensed Independent Practitioner if interventions unsuccessful or patient reports new pain     Problem: Safety - Adult  Goal: Free from fall injury  Outcome: Progressing  Flowsheets (Taken 11/25/2023 1702)  Free from fall injury: Instruct family/caregiver on patient safety     Problem: Discharge Planning  Goal: Discharge to home or other facility with appropriate resources  Outcome: Progressing  Flowsheets (Taken 11/25/2023 1702)  Discharge to home or other facility with appropriate resources:   Identify barriers to discharge with patient and caregiver   Arrange for needed discharge resources and transportation as appropriate   Identify discharge learning needs (meds, wound care, etc)   Refer to discharge planning if patient needs post-hospital services based on physician order or complex needs related to functional status, cognitive ability or social support system     Problem: Chronic Conditions and Co-morbidities  Goal: Patient's chronic  conditions and co-morbidity symptoms are monitored and maintained or improved  Outcome: Progressing  Flowsheets (Taken 11/25/2023 1702)  Care Plan - Patient's Chronic Conditions and Co-Morbidity Symptoms are Monitored and Maintained or Improved:   Monitor and assess patient's chronic conditions and comorbid symptoms for stability, deterioration, or improvement   Collaborate with multidisciplinary team to address chronic and comorbid conditions and prevent exacerbation or deterioration   Update acute care plan with appropriate goals if chronic or comorbid symptoms are exacerbated and prevent overall improvement and discharge     Problem: Skin  Goal: Decreased wound size/increased tissue granulation at next dressing change  Outcome: Progressing  Flowsheets (Taken 11/26/2023 1723)  Decreased wound size/increased tissue granulation at next dressing change: Promote sleep for wound healing  Goal: Participates in plan/prevention/treatment measures  Outcome: Progressing  Flowsheets (Taken 11/26/2023 1723)  Participates in plan/prevention/treatment measures:   Discuss with provider PT/OT consult   Elevate heels  Goal: Prevent/manage excess moisture  Outcome: Progressing  Flowsheets (Taken 11/26/2023 1723)  Prevent/manage excess moisture:   Cleanse incontinence/protect with barrier cream   Moisturize dry skin   Follow provider orders for dressing changes   Monitor for/manage infection if present  Goal: Prevent/minimize sheer/friction injuries  Outcome: Progressing  Flowsheets (Taken 11/26/2023 1723)  Prevent/minimize sheer/friction injuries:   Complete micro-shifts as needed if patient unable. Adjust patient position to relieve pressure points, not a full turn   Use pull sheet   HOB 30 degrees or less   Turn/reposition every 2 hours/use positioning/transfer devices  Goal: Promote/optimize nutrition  Outcome: Progressing  Flowsheets (Taken 11/26/2023 1723)  Promote/optimize nutrition:   Offer water/supplements/favorite foods    Reassess MST if dietician not consulted  Goal: Promote skin healing  Outcome: Progressing  Flowsheets (Taken 11/26/2023 1723)  Promote skin healing:   Turn/reposition every 2 hours/use positioning/transfer devices   Assess skin/pad under line(s)/device(s)     Problem: Fall/Injury  Goal: Not fall by end of shift  Outcome: Progressing  Goal: Be free from injury by end of the shift  Outcome: Progressing  Goal: Verbalize understanding of personal risk factors for fall in the hospital  Outcome: Progressing  Goal: Verbalize understanding of risk factor reduction measures to prevent injury from fall in the home  Outcome: Progressing  Goal: Use assistive devices by end of the shift  Outcome: Progressing  Goal: Pace activities to prevent fatigue by end of the shift  Outcome: Progressing     Problem: Pain  Goal: Takes deep breaths with improved pain control throughout the shift  Outcome: Progressing  Goal: Turns in bed with improved pain control throughout the shift  Outcome: Progressing  Goal: Performs ADL's with improved pain control throughout shift  Outcome: Progressing  Goal: Participates in PT with improved pain control throughout the shift  Outcome: Progressing  Goal: Free from opioid side effects throughout the shift  Outcome: Progressing  Goal: Free from acute confusion related to pain meds throughout the shift  Outcome: Progressing   The patient's goals for the shift include      The clinical goals for the shift include Patient's pain will be managed to a tolerable level throughout this shift.    Over the shift, the patient did make progress toward care plan goals.

## 2023-11-26 NOTE — PROGRESS NOTES
ASSESSMENT & PLAN:     Mechanical fall  Left femoral neck fracture s/p hemiarthroplasty (11/24)  - Orthopedic surgery following  - Status post repair/hemiarthroplasty on 11/24  - PT/OT postoperatively  - VTE prophylaxis with Enoxparin subcutaneous  - Pain medications as per below    Acute blood loss anemia  - Hemoglobin 6.8 today (>11.0 on admission)  - 1u PRBC transfusion 11/26     Community-acquired bacterial pneumonia, gram negative  - Continue levofloxacin at this time    ÓSCAR on CKD stage III  - Creatine 1.76 today (baseline ~1.0)  - No urinary retention on bedside bladder scan per nursing staff  - Holding home lisinopril  - Follow up renal ultrasound  - If continues to worsen, will consult nephrology     Leukocytosis, reactive to above - resolved     Dementia without behavioral disturbances  - Patient at baseline - waxes between alert and oriented x 1-2 (person and sometimes hospital/city)     Essential hypertension  Hypertensive urgency - resolved  - Continuing home medications as ordered  - Holding lisinopril for ÓSCAR     Generalized anxiety  GERD  - Resume home medications once reconciled     Unknown history of CHF  - Per previous documentation, patient with history of CHF but no echo found to further clarify details    VTE prophylaxis: Enoxaparin subcutaneous    Disposition/Daily update: Patient with acute drop in hemoglobin postoperatively, down to 6.8 today, transfusing 1 unit PRBC on 11/26.  Renal function also steadily slowly declining, creatinine up to 1.76 today.  Received 1 unit of blood, PT/OT drip.  Plan for discharge back to long-term care possibly in 24 to 48 hours pending improved hemoglobin and no bleeding.      ---Of note, this documentation is completed using the Dragon Dictation system (voice recognition software). There may be spelling and/or grammatical errors that were not corrected prior to final submission.---      Jeff Scott MD    SUBJECTIVE     Patient was seen and examined bedside  this morning.  She was more alert and oriented today to person, and hospital/city but did not know month or year.  Denies having pain in her left hip surgical site.    OBJECTIVE:     Last Recorded Vitals:  Vitals:    11/25/23 0900 11/25/23 1338 11/26/23 0249 11/26/23 0257   BP:  120/56 88/53 130/53   BP Location:  Left arm Left arm Left arm   Patient Position:  Lying Lying Lying   Pulse: 81 81 68 77   Resp:  14 16 16   Temp:  36.6 °C (97.9 °F) 36.3 °C (97.3 °F) 36.6 °C (97.9 °F)   TempSrc:  Temporal Temporal Temporal   SpO2:  93% 95% 96%   Weight:       Height:         Last I/O:  I/O last 3 completed shifts:  In: 4128.4 (60.7 mL/kg) [P.O.:840; I.V.:2938.4 (43.2 mL/kg); IV Piggyback:350]  Out: 450 (6.6 mL/kg) [Urine:450 (0.2 mL/kg/hr)]  Weight: 68 kg     Physical Exam  Constitutional:       General: She is not in acute distress.     Appearance: Normal appearance.   HENT:      Head: Normocephalic and atraumatic.   Eyes:      Extraocular Movements: Extraocular movements intact.      Conjunctiva/sclera: Conjunctivae normal.   Cardiovascular:      Rate and Rhythm: Normal rate and regular rhythm.      Pulses: Normal pulses.      Heart sounds: Normal heart sounds.   Pulmonary:      Effort: Pulmonary effort is normal. No respiratory distress.      Breath sounds: Normal breath sounds. No wheezing.   Abdominal:      General: Bowel sounds are normal.      Palpations: Abdomen is soft.      Tenderness: There is no abdominal tenderness.   Musculoskeletal:      Cervical back: Normal range of motion and neck supple.      Comments: Range of motion intact in the bilateral upper extremities.  Lower extremity exam limited as patient does not follow commands.   Neurological:      Mental Status: She is alert. Mental status is at baseline. She is disoriented.      Comments: Alert and oriented to person and hospital/city today     Inpatient Medications:  acetaminophen, 650 mg, oral, q6h MEGHAN  amLODIPine, 2.5 mg, oral, Daily   And  [Held by  provider] lisinopril, 10 mg, oral, Daily  docusate sodium, 100 mg, oral, BID  enoxaparin, 30 mg, subcutaneous, q24h  levoFLOXacin, 750 mg, intravenous, q48h  tranexamic acid, 1,950 mg, oral, Once  tranexamic acid, 1,950 mg, oral, Once    PRN Medications  PRN medications: acetaminophen **OR** acetaminophen **OR** acetaminophen, benzocaine-menthol, bisacodyl, hydrALAZINE, HYDROcodone-acetaminophen, HYDROcodone-acetaminophen, labetaloL, methocarbamol, morphine, naloxone, ondansetron **OR** ondansetron, oxyCODONE, polyethylene glycol, prochlorperazine **OR** prochlorperazine **OR** prochlorperazine, promethazine (Phenergan) 6.25 mg in sodium chloride 0.9% 50 mL IV  Continuous Medications:  oxygen, 2 L/min    LABS AND IMAGING:     Labs:  Results from last 7 days   Lab Units 11/26/23  0604 11/25/23  0558 11/24/23  0820   WBC AUTO x10*3/uL 9.0 14.7* 12.6*   RBC AUTO x10*6/uL 2.06* 2.59* 3.46*   HEMOGLOBIN g/dL 6.8* 8.6* 11.5*   HEMATOCRIT % 20.5* 26.3* 34.6*   MCV fL 100 102* 100   MCH pg 33.0 33.2 33.2   MCHC g/dL 33.2 32.7 33.2   RDW % 13.2 13.4 13.5   PLATELETS AUTO x10*3/uL 153 163 212       Results from last 7 days   Lab Units 11/26/23  0604 11/25/23  0558 11/24/23  0820 11/23/23  0944   SODIUM mmol/L 133* 134* 136 139   POTASSIUM mmol/L 4.0 4.3 4.2 4.0   CHLORIDE mmol/L 101 103 103 107   CO2 mmol/L 25 23 24 22   BUN mg/dL 50* 39* 31* 24*   CREATININE mg/dL 1.76* 1.54* 1.44* 1.12*   GLUCOSE mg/dL 106* 131* 105* 117*   PROTEIN TOTAL g/dL  --   --   --  7.1   CALCIUM mg/dL 8.4* 8.6 9.2 9.5   BILIRUBIN TOTAL mg/dL  --   --   --  0.4   ALK PHOS U/L  --   --   --  84   AST U/L  --   --   --  24   ALT U/L  --   --   --  27       Results from last 7 days   Lab Units 11/23/23  0944   MAGNESIUM mg/dL 1.90       Results from last 7 days   Lab Units 11/23/23  0944   TROPHS ng/L 9       Imaging:  XR pelvis 1-2 views  Narrative: Interpreted By:  Carlos Landeros,   STUDY:  Pelvis dated  11/24/2023.       INDICATION:  Signs/Symptoms:Post op hip      COMPARISON:  Radiographs dated 11/23/2020      ACCESSION NUMBER(S):  [id number].      ORDERING CLINICIAN:  TRICE NORMAN.      TECHNIQUE:  One view(s) of the pelvis.      FINDINGS:  Bones are demineralized. There is placement of a bipolar  hemiarthroplasty of the left hip. Hardware is intact as visualized.  There appears to be buckling of the right pubic body. It is not well  profiled. The posterior pelvis is not well assessed due to  obscuration by the soft tissues. Subcutaneous emphysema and edema and  skin staples are seen from recent surgery.      Impression: 1. Appearance of fracturing at the right pubic body which is not well  profiled. CT of the pelvis is recommended when the patient's clinical  condition allows to assess the entirety of the pelvic ring.  2. Bipolar hemiarthroplasty of the left hip hardware appearing intact  as seen on this single AP radiograph.      MACRO:  None      Signed by: Carlos Landeros 11/24/2023 4:42 PM  Dictation workstation:   [id number]

## 2023-11-26 NOTE — NURSING NOTE
Communicated with physician at 1238 regarding patient is very drowsy and hard to awaken. Patient does awaken to verbal and tactile stimuli, but then goes back to sleep quickly. Patient did not eat lunch today, but did take a chocolate milkshake. Milkshake consistency was moderately thick. Then attempted to give patient thin liquid and patient coughed with the attempt. Physician did come to the unit to assess patient and orders were received for SLP assessment and change consistency of liquids to moderately thick.

## 2023-11-26 NOTE — PROGRESS NOTES
Per PCP, patient should be ready for discharge back to LTC tomorrow 11/27. Will continue to follow for discharge orders.

## 2023-11-26 NOTE — PROGRESS NOTES
Occupational Therapy    Evaluation    Patient Name: Jacqueline Sandoval  MRN: [id number]  Today's Date: 11/26/2023  Time Calculation  Start Time: 0858  Stop Time: 0915  Time Calculation (min): 17 min        Assessment:  Prognosis: Fair  End of Session Communication: Bedside nurse  End of Session Patient Position: Bed, 3 rail up, Alarm on  Prognosis: Fair  Plan:  Treatment Interventions: ADL retraining, UE strengthening/ROM, Endurance training  OT Frequency: 2 times per week (Trial OT POC  to assess for functional progress)  OT Discharge Recommendations: 24 hr supervision due to cognition, Moderate intensity level of continued care (return to LTC)  OT - OK to Discharge: Yes (when pt. is medically stable. Recommending moderate to LTC needs and 24/7 supervision and assist)  Treatment Interventions: ADL retraining, UE strengthening/ROM, Endurance training    Subjective   Current Problem:  1. Left displaced femoral neck fracture (CMS/HCC)  Case Request Operating Room: Hip Hemiarthroplasty    Case Request Operating Room: Hip Hemiarthroplasty      2. Fall, initial encounter        3. Pneumonia of left upper lobe due to infectious organism          General:  General  Reason for Referral: ADL impairment  Referred By: Leandra GREEN/Zee  Past Medical History Relevant to Rehab: Dementia, HTN, CHF, fibromyalgia, falls, anxiety, anemia, humerus fx s/p ORIF 10/2022  Co-Treatment: PT  Co-Treatment Reason: to maximize pt. participation and safety  Prior to Session Communication: Bedside nurse  Patient Position Received: Bed, 3 rail up, Alarm on  General Comment: Pt. is  89 y/o female to ED s/p fall at [facility], found on the ground with c/o of LLE pain. ortho consulted: (+) L femur superiorly displaced L femoral neck fx. Dr. Koch performed L hip hemiarthroplasty on 9/24.  CXR: NAJMA opacity, CT head (-), hgb 6.8 - orders to recieve 1 unit PRBC.  Precautions:  LE Weight Bearing Status: Weight Bearing as Tolerated  Medical Precautions: Fall  precautions  Precautions Comment: no hip precautions       Pain:  Pain Assessment  Pain Assessment: 0-10  Pain Score:  (did not numerically rate, but states mild pain all oveer)    Objective   Cognition:  Overall Cognitive Status: Impaired at baseline  Orientation Level: Disoriented to place, Disoriented to time, Disoriented to situation           Home Living:  Home Living Comments: Pt. is LTC resident at NH. Pt. unable to provide information regarding PLOF or home set up due to cognition.  Prior Function:  Prior Function Comments: Per assessment of pt. and BLEs plantar flexor contractures and baseline cognition, suspect that pt. recieves assist with all ADLs/IADLs       ADL:  Eating Assistance: Minimal  Grooming Assistance: Moderate (sitting)  Bathing Assistance: Maximal  UE Dressing Assistance: Moderate  LE Dressing Assistance: Total  Toileting Assistance with Device: Total  Activity Tolerance:  Endurance: Decreased tolerance for upright activites  Bed Mobility/Transfers: Bed Mobility  Bed Mobility: Yes  Bed Mobility 1  Bed Mobility 1: Supine to sitting, Sitting to supine  Level of Assistance 1: Dependent  Bed Mobility Comments 1: Pt. with little to no initiation of assist and/or participaiton in bed mobility; dependent to bring LEs and trunk to upright sitting, scooting forward, as well as bring LEs and trunk back into bed. dependent to boost at end of session    Transfers  Transfer: No (did not attempt due to fair - sitting balance, BLE plantar contractures and pt. baseline status)      Ambulation/Gait Training:  Ambulation/Gait Training  Ambulation/Gait Training Performed: No  Sitting Balance:  Static Sitting Balance  Static Sitting-Balance Support: Bilateral upper extremity supported  Static Sitting-Level of Assistance:  (Mod A progressing to Min A; pt. leans to R side with forward flexed posture and flexed cervical posturing.)  Static Sitting-Comment/Number of Minutes: 6  Dynamic Sitting Balance  Dynamic  Sitting-Balance: Reaching for objects  Dynamic Sitting-Comments: Min A to balance with single UE forward reaching        Strength:  Strength Comments: Grossly 3-/5  Perception:  Initiation: Cues to initiate tasks  Coordination:  Movements are Fluid and Coordinated: No  Coordination Comment: rigid   Hand Function:     Extremities: RUE   RUE :  (Did not formally assess, however demonstrates impaired AROM during functional reaching. impaired shoulder flexion.) and   LUE:  (did not formally assess, however during functional reaching, pt. unable to flex shoulder  ~30 deg.)    Outcome Measures:[facility] Daily Activity  Putting on and taking off regular lower body clothing: Total  Bathing (including washing, rinsing, drying): A lot  Putting on and taking off regular upper body clothing: A lot  Toileting, which includes using toilet, bedpan or urinal: Total  Taking care of personal grooming such as brushing teeth: A lot  Eating Meals: A little  Daily Activity - Total Score: 11        Education Documentation  ADL Training, taught by Lola Kwok, OT at 11/26/2023  1:24 PM.  Learner: Patient  Readiness: Acceptance  Method: Explanation  Response: No Evidence of Learning, Needs Reinforcement    Education Comments  No comments found.           Goals:  Encounter Problems       Encounter Problems (Active)       OT Goals       Mod A for bed mobility to EOB for ADLs and functional tasks  (Progressing)       Start:  11/26/23    Expected End:  12/10/23            Fair dyn sitting balance EOB x10 minutes for ADL participation.  (Progressing)       Start:  11/26/23    Expected End:  12/10/23            Pt. will participate in BUE therapeutic exercise and ROM activities for ADLs  (Progressing)       Start:  11/26/23    Expected End:  12/10/23

## 2023-11-26 NOTE — PROGRESS NOTES
Hip surgery    Progress Note    Subjective:     Post-Operative Day: 2 Status Post left Hip HemiArthroplasty  Systemic or Specific Complaints: Patient is confused    Objective:     Visit Vitals  /53 (BP Location: Left arm, Patient Position: Lying)   Pulse 77   Temp 36.6 °C (97.9 °F) (Temporal)   Resp 16       General: Confused and appears stated age   Wound: Dressings clean and dry   Motion: Painless Range of Motion   DVT Exam: No evidence of DVT seen on physical exam.       NVI in lower extremity. Thigh swollen but soft. Moving foot and ankle.      Data Review  Results for orders placed or performed during the hospital encounter of 11/23/23 (from the past 24 hour(s))   CBC and Auto Differential   Result Value Ref Range    WBC 9.0 4.4 - 11.3 x10*3/uL    nRBC 0.0 0.0 - 0.0 /100 WBCs    RBC 2.06 (L) 4.00 - 5.20 x10*6/uL    Hemoglobin 6.8 (L) 12.0 - 16.0 g/dL    Hematocrit 20.5 (L) 36.0 - 46.0 %     80 - 100 fL    MCH 33.0 26.0 - 34.0 pg    MCHC 33.2 32.0 - 36.0 g/dL    RDW 13.2 11.5 - 14.5 %    Platelets 153 150 - 450 x10*3/uL    Neutrophils % 61.6 40.0 - 80.0 %    Immature Granulocytes %, Automated 0.7 0.0 - 0.9 %    Lymphocytes % 21.3 13.0 - 44.0 %    Monocytes % 14.8 2.0 - 10.0 %    Eosinophils % 1.4 0.0 - 6.0 %    Basophils % 0.2 0.0 - 2.0 %    Neutrophils Absolute 5.56 (H) 1.60 - 5.50 x10*3/uL    Immature Granulocytes Absolute, Automated 0.06 0.00 - 0.50 x10*3/uL    Lymphocytes Absolute 1.92 0.80 - 3.00 x10*3/uL    Monocytes Absolute 1.34 (H) 0.05 - 0.80 x10*3/uL    Eosinophils Absolute 0.13 0.00 - 0.40 x10*3/uL    Basophils Absolute 0.02 0.00 - 0.10 x10*3/uL   Basic Metabolic Panel   Result Value Ref Range    Glucose 106 (H) 74 - 99 mg/dL    Sodium 133 (L) 136 - 145 mmol/L    Potassium 4.0 3.5 - 5.3 mmol/L    Chloride 101 98 - 107 mmol/L    Bicarbonate 25 21 - 32 mmol/L    Anion Gap 11 10 - 20 mmol/L    Urea Nitrogen 50 (H) 6 - 23 mg/dL    Creatinine 1.76 (H) 0.50 - 1.05 mg/dL    eGFR 28 (L) >60  mL/min/1.73m*2    Calcium 8.4 (L) 8.6 - 10.3 mg/dL   SST TOP   Result Value Ref Range    Extra Tube Hold for add-ons.    Morphology   Result Value Ref Range    RBC Morphology No significant RBC morphology present    Prepare RBC: 1 Units   Result Value Ref Range    PRODUCT CODE A1002B76     Unit Number [id number]     Unit ABO A     Unit RH POS     XM INTEP COMP     Dispense Status XM     Blood Expiration Date December 19, 2023 23:59 EST     PRODUCT BLOOD TYPE 6200     UNIT VOLUME 350    VERIFY ABO/Rh Group Test   Result Value Ref Range    ABO TYPE A     Rh TYPE POS         Assessment:     Status Post left Hip HemiArthroplasty.  Acute post surgical blood loss anmia      Plan:      1: Continues current post-op course  Transfuse PRBC's for blood loss anemia    2:  Continue Deep venous thrombosis prophylaxis  3:  Continue physical therapy  4:  Continue Pain Control

## 2023-11-27 VITALS
TEMPERATURE: 99.1 F | WEIGHT: 149.91 LBS | BODY MASS INDEX: 27.59 KG/M2 | DIASTOLIC BLOOD PRESSURE: 57 MMHG | RESPIRATION RATE: 18 BRPM | HEIGHT: 62 IN | SYSTOLIC BLOOD PRESSURE: 126 MMHG | OXYGEN SATURATION: 98 % | HEART RATE: 102 BPM

## 2023-11-27 LAB
ANION GAP SERPL CALC-SCNC: 10 MMOL/L (ref 10–20)
BASOPHILS # BLD AUTO: 0.02 X10*3/UL (ref 0–0.1)
BASOPHILS NFR BLD AUTO: 0.2 %
BUN SERPL-MCNC: 48 MG/DL (ref 6–23)
CALCIUM SERPL-MCNC: 8.5 MG/DL (ref 8.6–10.3)
CHLORIDE SERPL-SCNC: 103 MMOL/L (ref 98–107)
CO2 SERPL-SCNC: 25 MMOL/L (ref 21–32)
CREAT SERPL-MCNC: 1.57 MG/DL (ref 0.5–1.05)
EOSINOPHIL # BLD AUTO: 0.19 X10*3/UL (ref 0–0.4)
EOSINOPHIL NFR BLD AUTO: 2.2 %
ERYTHROCYTE [DISTWIDTH] IN BLOOD BY AUTOMATED COUNT: 14.8 % (ref 11.5–14.5)
GFR SERPL CREATININE-BSD FRML MDRD: 32 ML/MIN/1.73M*2
GLUCOSE SERPL-MCNC: 105 MG/DL (ref 74–99)
HCT VFR BLD AUTO: 24.6 % (ref 36–46)
HGB BLD-MCNC: 8.4 G/DL (ref 12–16)
HOLD SPECIMEN: NORMAL
IMM GRANULOCYTES # BLD AUTO: 0.06 X10*3/UL (ref 0–0.5)
IMM GRANULOCYTES NFR BLD AUTO: 0.7 % (ref 0–0.9)
LYMPHOCYTES # BLD AUTO: 1.39 X10*3/UL (ref 0.8–3)
LYMPHOCYTES NFR BLD AUTO: 16.2 %
MCH RBC QN AUTO: 32.6 PG (ref 26–34)
MCHC RBC AUTO-ENTMCNC: 34.1 G/DL (ref 32–36)
MCV RBC AUTO: 95 FL (ref 80–100)
MONOCYTES # BLD AUTO: 1.21 X10*3/UL (ref 0.05–0.8)
MONOCYTES NFR BLD AUTO: 14.1 %
NEUTROPHILS # BLD AUTO: 5.69 X10*3/UL (ref 1.6–5.5)
NEUTROPHILS NFR BLD AUTO: 66.6 %
NRBC BLD-RTO: 0 /100 WBCS (ref 0–0)
PLATELET # BLD AUTO: 144 X10*3/UL (ref 150–450)
POTASSIUM SERPL-SCNC: 4.3 MMOL/L (ref 3.5–5.3)
RBC # BLD AUTO: 2.58 X10*6/UL (ref 4–5.2)
SODIUM SERPL-SCNC: 134 MMOL/L (ref 136–145)
WBC # BLD AUTO: 8.6 X10*3/UL (ref 4.4–11.3)

## 2023-11-27 PROCEDURE — 36415 COLL VENOUS BLD VENIPUNCTURE: CPT

## 2023-11-27 PROCEDURE — 92610 EVALUATE SWALLOWING FUNCTION: CPT | Mod: GN

## 2023-11-27 PROCEDURE — 2500000001 HC RX 250 WO HCPCS SELF ADMINISTERED DRUGS (ALT 637 FOR MEDICARE OP)

## 2023-11-27 PROCEDURE — 2500000004 HC RX 250 GENERAL PHARMACY W/ HCPCS (ALT 636 FOR OP/ED): Performed by: STUDENT IN AN ORGANIZED HEALTH CARE EDUCATION/TRAINING PROGRAM

## 2023-11-27 PROCEDURE — 2500000004 HC RX 250 GENERAL PHARMACY W/ HCPCS (ALT 636 FOR OP/ED): Performed by: NURSE PRACTITIONER

## 2023-11-27 PROCEDURE — 97530 THERAPEUTIC ACTIVITIES: CPT | Mod: GP,CQ

## 2023-11-27 PROCEDURE — 92526 ORAL FUNCTION THERAPY: CPT | Mod: GN

## 2023-11-27 PROCEDURE — 96372 THER/PROPH/DIAG INJ SC/IM: CPT

## 2023-11-27 PROCEDURE — 85025 COMPLETE CBC W/AUTO DIFF WBC: CPT

## 2023-11-27 PROCEDURE — 51701 INSERT BLADDER CATHETER: CPT

## 2023-11-27 PROCEDURE — 80048 BASIC METABOLIC PNL TOTAL CA: CPT

## 2023-11-27 PROCEDURE — 2500000004 HC RX 250 GENERAL PHARMACY W/ HCPCS (ALT 636 FOR OP/ED)

## 2023-11-27 PROCEDURE — 99239 HOSP IP/OBS DSCHRG MGMT >30: CPT | Performed by: STUDENT IN AN ORGANIZED HEALTH CARE EDUCATION/TRAINING PROGRAM

## 2023-11-27 PROCEDURE — 99231 SBSQ HOSP IP/OBS SF/LOW 25: CPT | Performed by: NURSE PRACTITIONER

## 2023-11-27 RX ORDER — HYDROCODONE BITARTRATE AND ACETAMINOPHEN 5; 325 MG/1; MG/1
1 TABLET ORAL EVERY 6 HOURS PRN
Qty: 28 TABLET | Refills: 0 | Status: SHIPPED | OUTPATIENT
Start: 2023-11-27 | End: 2023-12-04

## 2023-11-27 RX ORDER — TAMSULOSIN HYDROCHLORIDE 0.4 MG/1
0.4 CAPSULE ORAL DAILY
Qty: 30 CAPSULE | Refills: 0
Start: 2023-11-28 | End: 2023-12-28

## 2023-11-27 RX ORDER — ENOXAPARIN SODIUM 100 MG/ML
30 INJECTION SUBCUTANEOUS EVERY 24 HOURS
Qty: 30 EACH | Refills: 0 | Status: SHIPPED | OUTPATIENT
Start: 2023-11-28 | End: 2023-12-28

## 2023-11-27 RX ORDER — ONDANSETRON 4 MG/1
4 TABLET, ORALLY DISINTEGRATING ORAL EVERY 8 HOURS PRN
Qty: 12 TABLET | Refills: 0 | Status: SHIPPED | OUTPATIENT
Start: 2023-11-27 | End: 2023-12-04

## 2023-11-27 RX ORDER — METHOCARBAMOL 500 MG/1
500 TABLET, FILM COATED ORAL EVERY 8 HOURS PRN
Qty: 21 TABLET | Refills: 0 | Status: SHIPPED | OUTPATIENT
Start: 2023-11-27 | End: 2023-12-04

## 2023-11-27 RX ORDER — LEVOFLOXACIN 250 MG/1
750 TABLET ORAL EVERY OTHER DAY
Qty: 3 TABLET | Refills: 0
Start: 2023-11-27 | End: 2023-12-02

## 2023-11-27 RX ORDER — TAMSULOSIN HYDROCHLORIDE 0.4 MG/1
0.4 CAPSULE ORAL DAILY
Status: DISCONTINUED | OUTPATIENT
Start: 2023-11-27 | End: 2023-11-27 | Stop reason: HOSPADM

## 2023-11-27 RX ADMIN — LEVOFLOXACIN 750 MG: 5 INJECTION, SOLUTION INTRAVENOUS at 12:15

## 2023-11-27 RX ADMIN — HYDROCODONE BITARTRATE AND ACETAMINOPHEN 1 TABLET: 5; 325 TABLET ORAL at 16:54

## 2023-11-27 RX ADMIN — ENOXAPARIN SODIUM 30 MG: 30 INJECTION SUBCUTANEOUS at 08:35

## 2023-11-27 RX ADMIN — AMLODIPINE BESYLATE 2.5 MG: 5 TABLET ORAL at 08:35

## 2023-11-27 RX ADMIN — TAMSULOSIN HYDROCHLORIDE 0.4 MG: 0.4 CAPSULE ORAL at 08:35

## 2023-11-27 RX ADMIN — ACETAMINOPHEN 650 MG: 325 TABLET ORAL at 12:15

## 2023-11-27 RX ADMIN — ACETAMINOPHEN 650 MG: 325 TABLET ORAL at 00:06

## 2023-11-27 RX ADMIN — DOCUSATE SODIUM 100 MG: 100 CAPSULE, LIQUID FILLED ORAL at 08:35

## 2023-11-27 ASSESSMENT — PAIN - FUNCTIONAL ASSESSMENT
PAIN_FUNCTIONAL_ASSESSMENT: 0-10

## 2023-11-27 ASSESSMENT — PAIN SCALES - GENERAL
PAINLEVEL_OUTOF10: 0 - NO PAIN
PAINLEVEL_OUTOF10: 0 - NO PAIN
PAINLEVEL_OUTOF10: 6
PAINLEVEL_OUTOF10: 0 - NO PAIN

## 2023-11-27 ASSESSMENT — COGNITIVE AND FUNCTIONAL STATUS - GENERAL
PERSONAL GROOMING: A LOT
HELP NEEDED FOR BATHING: A LOT
STANDING UP FROM CHAIR USING ARMS: TOTAL
WALKING IN HOSPITAL ROOM: TOTAL
MOVING TO AND FROM BED TO CHAIR: TOTAL
CLIMB 3 TO 5 STEPS WITH RAILING: TOTAL
STANDING UP FROM CHAIR USING ARMS: TOTAL
DAILY ACTIVITIY SCORE: 11
MOVING FROM LYING ON BACK TO SITTING ON SIDE OF FLAT BED WITH BEDRAILS: TOTAL
TOILETING: TOTAL
CLIMB 3 TO 5 STEPS WITH RAILING: TOTAL
DRESSING REGULAR LOWER BODY CLOTHING: TOTAL
MOVING FROM LYING ON BACK TO SITTING ON SIDE OF FLAT BED WITH BEDRAILS: TOTAL
MOVING TO AND FROM BED TO CHAIR: TOTAL
MOBILITY SCORE: 6
WALKING IN HOSPITAL ROOM: TOTAL
TURNING FROM BACK TO SIDE WHILE IN FLAT BAD: TOTAL
MOBILITY SCORE: 6
TURNING FROM BACK TO SIDE WHILE IN FLAT BAD: TOTAL
DRESSING REGULAR UPPER BODY CLOTHING: A LOT
EATING MEALS: A LITTLE

## 2023-11-27 ASSESSMENT — PAIN DESCRIPTION - LOCATION: LOCATION: HIP

## 2023-11-27 ASSESSMENT — PAIN DESCRIPTION - ORIENTATION: ORIENTATION: LEFT

## 2023-11-27 NOTE — PROGRESS NOTES
Speech-Language Pathology    Inpatient Speech-Language Pathology Clinical Swallow Evaluation       Patient Name: Jacqueline Sandoval  MRN: [id number]  : 1935  Today's Date: 23  Time Calculation  Start Time: 810  Stop Time: 0855  Time Calculation (min): 45 min         Recommendations:  Risk for Aspiration: Yes   Solid Diet Recommendations : Regular (IDDSI Level 7)   Liquid Diet Recommendations: Thin (IDDSI Level 0)   Compensatory Swallowing Strategies: Upright 90 degrees as possible for all oral intake, Alternate solids and liquids, Swallow 2 times per bite/sip, Single sips, Small bites/sips     Medication Administration Recommendations: Whole, With Liquid      Assessment:  Consistencies Trialed: Consistencies Trialed: Thin (IDDSI Level 0) - Cup, Thin (IDDSI Level 0) - Straw, Nectar thick/mildly thick (IDDSI Level 2) - Cup, Nectar thick/mildly thick (IDDSI Level 2) - Spoon, Honey thick/liquidised/moderately thick (IDDSI Level 3) - Spoon, Pureed/extremely thick (IDDSI Level 4), Soft & bite sized/chopped (IDDSI Level 6), Regular (IDDSI Level 7)   Oral Motor: Impaired Function, Within Functional Limits  Lingual Strength: Within Functional Limits   Dentition: Adequate/Natural     Assessment Results:   DYSPHAGIA EVALUATION: (Consistencies attempted: Puree, gram cracker trial, thin liquids, nectar thick liquids, honey thick liquids)    Oral Phase: Patient's oral phase of the swallow characterized by mild-moderate oral delay secondary to prolonged mastication of Malagasy toast that was cut up into bite-size pieces.  Patient was able to tolerate oatmeal with minimal oral delay.  She was able to tolerate honey thick liquids via spoon without anterior leakage.  Trials of nectar thick and thin liquids were attempted with patient with no leakage of liquids out of oral cavity.    Pharyngeal Phase: Patient's pharyngeal phase of the swallow characterized by palpable laryngeal elevation during the swallow.  Patient was able to  tolerate all solid trials without overt signs symptoms aspiration.  She was able to tolerate her honey thick liquids from her tray without overt signs symptoms aspiration via spoon.  Patient was able to tolerate nectar thick liquids via spoon and cup without overt signs symptoms aspiration.  She was able to take single sips and multiple sips via cup and straw of thin liquids without overt signs symptoms aspiration.  Patient's son reported that she has not had difficulty with coughing or choking with food or liquids at the skilled nursing facility prior to admission.    DIAGNOSTIC IMPRESSIONS:  Patient presents with a mild-moderate oral dysphagia secondary to prolonged mastication solid foods.  She was able to clear oral cavity to minimal oral residuals post swallow with food and liquid trials.  She was also able to tolerate her meds with thin liquids via straw without difficulty.  Recommending diet upgrade to regular with thin liquids and use of compensatory swallowing techniques.  Patient's son was in agreement with this plan.  Unable to rule out possible silent aspiration during bedside swallow evaluation however a modified barium swallow study is not ready recommended at this time.  Speech therapist will continue to assess patient's ability to safely tolerate p.o. diet and recommend modified barium swallow study if indicated.    Prognosis: Good         Baseline Assessment:  Respiratory Status: Oxygen via nasal cannula   Patient Positioning: Upright in Bed     Plan:  SLP Plan: Skilled SLP Skilled speech therapy for dysphagia treatment is warranted in order to provide training and instruction regarding the use of compensatory swallow strategies, oropharyngeal strengthening exercises, and pt/caregiver education in order to reduce risk of aspiration, dehydration and malnutrition.  SLP Frequency: 5x per week   Duration: 30 days       Discussed POC: Patient, Nursing, Caregiver/family   Discussed Risks/Benefits: Yes,  Patient, Caregiver/Family, Nursing   Patient/Caregiver Agreeable: Yes   SLP Discharge Recommendations: unable to determine at this time, refer to subsequent notes    Goals:  1.  Patient will be able to tolerate regular diet with thin liquids without overt signs symptoms aspiration or pulmonary compromise.  (To be assessed by increased need for oxygen, chest x-ray/CT and/or signs symptoms aspiration during p.o. intake.  2.  Patient will be able to use compensatory swallowing techniques i.e. small bites/sips, alternating bites/sips to reduce her oral delay to minimal with minimal cueing from family, staff, or SLP.  3.  Patient will be able to improve her ability to feed herself by receiving cues from family and staff with verbal cues, hand overhand assistance and encouragement as taught by SLP to family and staff.  (Measured through demonstration and verbal teach back from family and staff.)    Subjective   Patient was resting comfortably but awakened with moderate cues.  Patient's tray was set up and she required feeding at the beginning of the session.  By the end of the session, patient was able to feed herself with loading of food on the floor.  Patient's son came in jail through the session during the treatment session of evaluation and treatment session.      General Visit Information:  Pt. Admitted admitted status post fall with left femoral neck fracture.  Chest x-ray revealed left upper lobe salivation may represent infiltrate.  Past medical history: Patient has past medical history of dementia, HTN, anxiety, GERD, CHF, and hypothyroidism.    Living Environment: Nursing home (skilled/long-term)           Reason for Referral: dysphagia      Current Diet : Regular with honey thick liquids   Prior to Session Communication: Bedside nurse   Pain:  Pain Assessment  Pain Assessment: 0-10  Pain Score: 0 - No pain       Treatment:  Treatment session completed post bedside swallow evaluation.  Speech therapist spent  an additional 30 minutes with patient and her son after bedside swallow evaluation was completed.  Initially patient required 100% feeding.  Her son stated that the patient was able to feed herself independently prior to hospitalization.  Speech therapist used hand overhand assistance with patient with her liquids first and then with her fork with Malagasy toast.  Eventually patient was able to feed herself Malagasy toast with fork loading only.  She was able to take sips of liquids and hold her cup with minimal steadying of the cup by the therapist.  During the treatment session, patient's son was given compensatory swallowing strategies for patient.  He acknowledged use of compensatory swallowing strategies.  Nurse came in during the treatment session and gave patient her meds whole with sips of thin milk.  She was able to tolerate well.  Encouraged nurse and patient's son to use verbal cueing, hand overhand assistance and encouragement to promote patient feeding herself at meals.  Patient ate approximately two thirds of her Malagasy toast, several bites of her oatmeal, 4 of 6 ounces of milk, several sips of thin coffee, and 3 ounces of her nectar thick apple juice.    Inpatient Education:  Pt. educated on safe swallow strategies, role of SLP, S/S of aspiration to be aware of, risks of aspiration, current swallow function, recommended diet  Patient and her son gave verbal understanding

## 2023-11-27 NOTE — PROGRESS NOTES
"Jacqueline Sandoval is a 88 y.o. female on day 4 of admission presenting with Left displaced femoral neck fracture (CMS/HCC).      Subjective   Patient is resting quietly she responds to painful stimuli moves all extremities I did tell her she is okay to go to rehab from orthopedic standpoint       Objective   Left hip dressing clean, dry, intact good sensation and cap refill  Last Recorded Vitals  Blood pressure 159/63, pulse 87, temperature 37.2 °C (99 °F), temperature source Temporal, resp. rate 17, height 1.575 m (5' 2.01\"), weight 68 kg (149 lb 14.6 oz), SpO2 94 %.    Physical Exam  Pulmonary:      Effort: Pulmonary effort is normal.   Skin:     General: Skin is warm and dry.      Capillary Refill: Capillary refill takes less than 2 seconds.   Psychiatric:         Attention and Perception: Attention normal.         Behavior: Behavior is cooperative.     Left hip dressing clean, dry, intact    Current Medications:  acetaminophen, 650 mg, oral, q6h MEGHAN  amLODIPine, 2.5 mg, oral, Daily   And  [Held by provider] lisinopril, 10 mg, oral, Daily  docusate sodium, 100 mg, oral, BID  enoxaparin, 30 mg, subcutaneous, q24h  levoFLOXacin, 750 mg, intravenous, q48h  tamsulosin, 0.4 mg, oral, Daily  tranexamic acid, 1,950 mg, oral, Once  tranexamic acid, 1,950 mg, oral, Once           CBC:   Results from last 7 days   Lab Units 11/27/23  0648 11/26/23  1658 11/26/23  0604 11/25/23  0558   WBC AUTO x10*3/uL 8.6  --  9.0 14.7*   RBC AUTO x10*6/uL 2.58*  --  2.06* 2.59*   HEMOGLOBIN g/dL 8.4* 8.2* 6.8* 8.6*   HEMATOCRIT % 24.6* 23.9* 20.5* 26.3*   MCV fL 95  --  100 102*   MCH pg 32.6  --  33.0 33.2   MCHC g/dL 34.1  --  33.2 32.7   RDW % 14.8*  --  13.2 13.4   PLATELETS AUTO x10*3/uL 144*  --  153 163     CMP:    Results from last 7 days   Lab Units 11/27/23  0648 11/26/23  0604 11/25/23  0558 11/24/23  0820 11/23/23  0944   SODIUM mmol/L 134* 133* 134*   < > 139   POTASSIUM mmol/L 4.3 4.0 4.3   < > 4.0   CHLORIDE mmol/L 103 101 " 103   < > 107   CO2 mmol/L 25 25 23   < > 22   BUN mg/dL 48* 50* 39*   < > 24*   CREATININE mg/dL 1.57* 1.76* 1.54*   < > 1.12*   GLUCOSE mg/dL 105* 106* 131*   < > 117*   PROTEIN TOTAL g/dL  --   --   --   --  7.1   CALCIUM mg/dL 8.5* 8.4* 8.6   < > 9.5   BILIRUBIN TOTAL mg/dL  --   --   --   --  0.4   ALK PHOS U/L  --   --   --   --  84   AST U/L  --   --   --   --  24   ALT U/L  --   --   --   --  27    < > = values in this interval not displayed.     BMP:    Results from last 7 days   Lab Units 11/27/23  0648 11/26/23  0604 11/25/23  0558   SODIUM mmol/L 134* 133* 134*   POTASSIUM mmol/L 4.3 4.0 4.3   CHLORIDE mmol/L 103 101 103   CO2 mmol/L 25 25 23   BUN mg/dL 48* 50* 39*   CREATININE mg/dL 1.57* 1.76* 1.54*   CALCIUM mg/dL 8.5* 8.4* 8.6   GLUCOSE mg/dL 105* 106* 131*                     Assessment/Plan      Principal Problem:    Left displaced femoral neck fracture (CMS/HCC)  Status post left hip hemiarthroplasty postop day 3    Plan  Weightbearing as tolerated  Pain control  Lovenox 30 mg subcu daily  PT/OT evaluation and treatment  Okay to discharge to rehab from orthopedic standpoint   Orthopedics to sign off case      Rambo More, APRN-CNP

## 2023-11-27 NOTE — DISCHARGE SUMMARY
DISCHARGE DIAGNOSIS     Mechanical fall  Left femoral neck displaced fracture status post hemiarthroplasty  Dementia  Acute blood loss anemia  Acute on chronic urinary retention    HOSPITAL COURSE AND DETAILS     This is an 88-year-old female with a significant past medical history of hypertension, dementia, anxiety, depression, hypothyroidism that presented from her long-term care facility after a fall and being found on the ground.  Patient was found to have an acute left femoral neck superiorly displaced fracture.  She was seen by orthopedic surgery and underwent hemiarthroplasty on 11/24.    At baseline patient has dementia and is only alert and oriented to herself on most days.  She also is predominately bed/wheelchair bound and does not ambulate.  Spoke with patient's son in great length on day of discharge with states that this dementia has progressed over the past few weeks.  Patient with also history of chronic urinary retention for which she is being seen as an outpatient with urology, she did require straight catheterization and hospitalization twice.    Patient had a postoperative drop in hemoglobin requiring 1 unit of PRBCs to be transfused on 11/26.  Posttransfusion patient had 2 blood draws with no further decreases in hemoglobin.    Patient is being discharged back to her long-term care facility for continued rehabilitation.  She will follow-up with orthopedic surgery, her primary neurologist, PCP in the next 1 to 2 weeks to discuss hospitalization.    Total time spent on discharge: >30min      ---Of note, this documentation is completed using the Dragon Dictation system (voice recognition software). There may be spelling and/or grammatical errors that were not corrected prior to final submission.---    Jeff Scott MD    DISCHARGE PHYSICAL EXAM     Last Recorded Vitals:  Vitals:    11/26/23 1425 11/26/23 1525 11/26/23 2023 11/27/23 0552   BP: 115/57 127/60 131/61 159/63   BP Location:        Patient Position:       Pulse: 73 82 86 87   Resp: 18 18  17   Temp: 37 °C (98.6 °F) 37.2 °C (99 °F) 36.5 °C (97.7 °F) 37.2 °C (99 °F)   TempSrc: Temporal Temporal  Temporal   SpO2: 99%  95% 94%   Weight:       Height:         Physical Exam:  - General: Alert and oriented x1 (person only). No acute distress.   - HEENT: Normocephalic atraumatic.  Neck supple/range of motion intact.  EOMI  - Respiratory: No acute respiratory distress. Breath sounds clear to auscultation bilaterally.   - Cardiovascular: Regular rate and rhythm, normal S1/S2.    - Abdomen: Abdomen soft. Bowel sounds present. Nontender to deep palpation.    - Extremities: Bilateral upper extremities with range of motion intact, strength 2-3/5.  Range of motion limited in the bilateral lower extremities.    DISCHARGE MEDICATIONS        Your medication list        START taking these medications        Instructions Last Dose Given Next Dose Due   enoxaparin 30 mg/0.3 mL syringe  Commonly known as: Lovenox  Start taking on: November 28, 2023      Inject 0.3 mL (30 mg) under the skin once every 24 hours. Do not start before November 28, 2023.       HYDROcodone-acetaminophen 5-325 mg tablet  Commonly known as: Norco      Take 1 tablet by mouth every 6 hours if needed for moderate pain (4 - 6) for up to 7 days.       levoFLOXacin 250 mg tablet  Commonly known as: Levaquin      Take 3 tablets (750 mg) by mouth every other day for 5 days.       methocarbamol 500 mg tablet  Commonly known as: Robaxin      Take 1 tablet (500 mg) by mouth every 8 hours if needed for muscle spasms for up to 7 days.       ondansetron ODT 4 mg disintegrating tablet  Commonly known as: Zofran-ODT      Take 1 tablet (4 mg) by mouth every 8 hours if needed for nausea for up to 7 days.       tamsulosin 0.4 mg 24 hr capsule  Commonly known as: Flomax  Start taking on: November 28, 2023      Take 1 capsule (0.4 mg) by mouth once daily. Do not start before November 28, 2023.               CONTINUE taking these medications        Instructions Last Dose Given Next Dose Due   acetaminophen 650 mg ER tablet  Commonly known as: Tylenol 8 HOUR           amLODIPine-benazepriL 2.5-10 mg capsule  Commonly known as: Lotrel           ARIPiprazole 2 mg tablet  Commonly known as: Abilify           aspirin 81 mg EC tablet           busPIRone 15 mg tablet  Commonly known as: Buspar           diazePAM 5 mg tablet  Commonly known as: Valium           docusate sodium 100 mg capsule  Commonly known as: Colace           famotidine 20 mg tablet  Commonly known as: Pepcid           gabapentin 100 mg capsule  Commonly known as: Neurontin           levothyroxine 175 mcg tablet  Commonly known as: Synthroid, Levoxyl           levothyroxine 200 mcg tablet  Commonly known as: Synthroid, Levoxyl           lisinopril 40 mg tablet           meclizine 25 mg tablet  Commonly known as: Antivert           metoprolol succinate XL 25 mg 24 hr tablet  Commonly known as: Toprol-XL           nitrofurantoin 50 mg capsule  Commonly known as: Macrodantin           oxybutynin 5 mg tablet  Commonly known as: Ditropan           sertraline 50 mg tablet  Commonly known as: Zoloft           sodium chloride 1,000 mg tablet           Voltaren 1 % gel gel  Generic drug: diclofenac sodium           white petrolatum-mineral oiL 94-3 % ophthalmic ointment  Commonly known as: Tears Naturale PM                  ASK your doctor about these medications        Instructions Last Dose Given Next Dose Due   cholecalciferol 5,000 Units tablet  Commonly known as: Vitamin D-3  Ask about: Should I take this medication?                     Where to Get Your Medications        These medications were sent to [facility] [place] [place] - [address] [facility] [address]  [address] [facility] [address] [facility] [address]      Phone: [phone]   ondansetron ODT 4 mg disintegrating tablet       You can get these medications from any pharmacy    Bring a paper prescription  for each of these medications  enoxaparin 30 mg/0.3 mL syringe  HYDROcodone-acetaminophen 5-325 mg tablet  methocarbamol 500 mg tablet       Information about where to get these medications is not yet available    Ask your nurse or doctor about these medications  levoFLOXacin 250 mg tablet  tamsulosin 0.4 mg 24 hr capsule           OUTPATIENT FOLLOW-UP     No future appointments.

## 2023-11-27 NOTE — CARE PLAN
Problem: Nutrition  Goal: Oral intake greater 75%  Outcome: Adequate for Discharge  Goal: Consume prescribed supplement  Outcome: Adequate for Discharge  Goal: Adequate PO fluid intake  Outcome: Adequate for Discharge  Goal: BG  mg/dL  Outcome: Adequate for Discharge  Goal: Lab values WNL  Outcome: Adequate for Discharge  Goal: Electrolytes WNL  Outcome: Adequate for Discharge  Goal: Promote healing  Outcome: Adequate for Discharge  Goal: Maintain stable weight  Outcome: Adequate for Discharge     Problem: Pain - Adult  Goal: Verbalizes/displays adequate comfort level or baseline comfort level  Outcome: Adequate for Discharge     Problem: Safety - Adult  Goal: Free from fall injury  Outcome: Adequate for Discharge     Problem: Discharge Planning  Goal: Discharge to home or other facility with appropriate resources  Outcome: Adequate for Discharge     Problem: Chronic Conditions and Co-morbidities  Goal: Patient's chronic conditions and co-morbidity symptoms are monitored and maintained or improved  Outcome: Adequate for Discharge     Problem: Skin  Goal: Decreased wound size/increased tissue granulation at next dressing change  Outcome: Adequate for Discharge  Goal: Participates in plan/prevention/treatment measures  Outcome: Adequate for Discharge  Goal: Prevent/manage excess moisture  Outcome: Adequate for Discharge  Goal: Prevent/minimize sheer/friction injuries  Outcome: Adequate for Discharge  Goal: Promote/optimize nutrition  Outcome: Adequate for Discharge  Goal: Promote skin healing  Outcome: Adequate for Discharge     Problem: Fall/Injury  Goal: Not fall by end of shift  Outcome: Adequate for Discharge  Goal: Be free from injury by end of the shift  Outcome: Adequate for Discharge  Goal: Verbalize understanding of personal risk factors for fall in the hospital  Outcome: Adequate for Discharge  Goal: Verbalize understanding of risk factor reduction measures to prevent injury from fall in the  home  Outcome: Adequate for Discharge  Goal: Use assistive devices by end of the shift  Outcome: Adequate for Discharge  Goal: Pace activities to prevent fatigue by end of the shift  Outcome: Adequate for Discharge     Problem: Pain  Goal: Takes deep breaths with improved pain control throughout the shift  Outcome: Adequate for Discharge  Goal: Turns in bed with improved pain control throughout the shift  Outcome: Adequate for Discharge  Goal: Performs ADL's with improved pain control throughout shift  Outcome: Adequate for Discharge  Goal: Participates in PT with improved pain control throughout the shift  Outcome: Adequate for Discharge  Goal: Free from opioid side effects throughout the shift  Outcome: Adequate for Discharge  Goal: Free from acute confusion related to pain meds throughout the shift  Outcome: Adequate for Discharge

## 2023-11-27 NOTE — CARE PLAN
The patient's goals for the shift include      The clinical goals for the shift include Patient's pain will be managed to a tolerable level throughout this shift.    Over the shift, the patient did not make progress toward the following goals. Barriers to progression include dementia. Recommendations to address these barriers include offering fluids.    Problem: Nutrition  Goal: Oral intake greater 75%  Outcome: Not Progressing  Goal: Consume prescribed supplement  Outcome: Not Progressing  Goal: Adequate PO fluid intake  Outcome: Not Progressing  Goal: BG  mg/dL  Outcome: Not Progressing  Goal: Lab values WNL  Outcome: Not Progressing  Goal: Electrolytes WNL  Outcome: Not Progressing

## 2023-11-27 NOTE — CONSULTS
UROLOGY CONSULT NOTE FOR MONDAY, 11/27/2023    SUBJECTIVE: 88-year-old white female currently status post left femoral head fracture repair in the past day or so patient has been been unable to completely empty her bladder, has had 2 straight caths overnight currently has an external wick catheter and nursing will BladderScan now and straight cath again if necessary  Unable to get good history, patient has some confusion altered mental status  Medical comorbidities as listed and reviewed     OBJECTIVE:   On today's visit the patient is sitting in bed seems comfortable voices no complaints but unable to give us accurate history  Abdomen generally soft possibly some distention of the lower abdominal area and nursing will perform bladder scan now  Mild renal insufficiency with serum creatinine 1.57, hemoglobin 8.4 postop  Currently on antibiotic coverage with Levaquin 750 mg IV  Tamsulosin/Flomax 0.4 mg daily just started    ASSESSMENT/PLAN  Reviewed status with nursing and apparently patient is to be discharged to outpatient follow-up and care and we will see her previous urologist Dr. Padilla probably at [facility] for follow-up  Otherwise continue Flomax as ordered and bladder scan as planned with straight cath if necessary today    Thank you for allowing us to participate in the patient's care and management and patient will follow-up urologically with Dr. Padilla    Additional medical and historical objective data reviewed and listed below      Current Facility-Administered Medications:     acetaminophen (Tylenol) tablet 650 mg, 650 mg, oral, q4h PRN **OR** acetaminophen (Tylenol) oral liquid 650 mg, 650 mg, nasogastric tube, q4h PRN, 650 mg at 11/25/23 0825 **OR** acetaminophen (Tylenol) suppository 650 mg, 650 mg, rectal, q4h PRN, Geraldine Mobley APRN-CNP    acetaminophen (Tylenol) tablet 650 mg, 650 mg, oral, q6h MEGHAN, JOCELIN Pineda-CNP, 650 mg at 11/27/23 1215    amLODIPine (Norvasc) tablet 2.5 mg, 2.5  mg, oral, Daily, 2.5 mg at 11/27/23 0835 **AND** [Held by provider] lisinopril tablet 10 mg, 10 mg, oral, Daily, JOCELIN Pineda-CNP, 10 mg at 11/24/23 0334    benzocaine-menthol (Cepastat Sore Throat) 15-3.6 mg lozenge 1 lozenge, 1 lozenge, Mouth/Throat, q4h PRN, GLORIA Pineda    bisacodyl (Dulcolax) EC tablet 10 mg, 10 mg, oral, Daily PRN, GLORIA Pineda    docusate sodium (Colace) capsule 100 mg, 100 mg, oral, BID, GLORIA Pineda, 100 mg at 11/27/23 0835    enoxaparin (Lovenox) syringe 30 mg, 30 mg, subcutaneous, q24h, GLORIA Pineda, 30 mg at 11/27/23 0835    hydrALAZINE (Apresoline) injection 5 mg, 5 mg, intravenous, q6h PRN, JOCELIN Pineda-CNP    HYDROcodone-acetaminophen (Norco) 5-325 mg per tablet 1 tablet, 1 tablet, oral, q4h PRN, JOCELIN Pineda-CNP, 1 tablet at 11/25/23 1344    HYDROcodone-acetaminophen (Norco) 7.5-325 mg per tablet 1 tablet, 1 tablet, oral, q4h PRN, JOCELIN Pineda-CNP    labetaloL (Normodyne,Trandate) injection 10 mg, 10 mg, intravenous, q6h PRN, JOCELIN Pineda-CNP    levoFLOXacin (Levaquin)  mg, 750 mg, intravenous, q48h, Jeff Scott MD, Last Rate: 100 mL/hr at 11/27/23 1215, 750 mg at 11/27/23 1215    methocarbamol (Robaxin) tablet 500 mg, 500 mg, oral, q8h PRN, JOCELIN Pineda-CNP    morphine injection 2 mg, 2 mg, intravenous, q2h PRN, JOCELIN Pineda-CNP    naloxone (Narcan) injection 0.2 mg, 0.2 mg, intravenous, q5 min PRN, GLORIA Pineda    ondansetron (Zofran) tablet 4 mg, 4 mg, oral, q8h PRN **OR** ondansetron (Zofran) injection 4 mg, 4 mg, intravenous, q8h PRN, GLORIA Pineda, 4 mg at 11/24/23 1440    oxyCODONE (Roxicodone) immediate release tablet 5 mg, 5 mg, oral, q4h PRN, Adalberto Briceño MD    oxygen (O2) therapy, 2 L/min, inhalation, Continuous, GLORIA Pineda, 2 L/min at 11/26/23 1157    polyethylene glycol (Glycolax,  Miralax) packet 17 g, 17 g, oral, Daily PRN, JOCELIN Pineda-CNP    prochlorperazine (Compazine) tablet 10 mg, 10 mg, oral, q6h PRN **OR** prochlorperazine (Compazine) injection 10 mg, 10 mg, intravenous, q6h PRN **OR** prochlorperazine (Compazine) suppository 25 mg, 25 mg, rectal, q12h PRN, Geraldine Mobley APRN-CNP    promethazine (Phenergan) 6.25 mg in sodium chloride 0.9% 50 mL IV, 6.25 mg, intravenous, Once PRN, Adalberto Briceño MD    tamsulosin (Flomax) 24 hr capsule 0.4 mg, 0.4 mg, oral, Daily, JOCELIN Melgar-CNP, 0.4 mg at 11/27/23 0835    tranexamic acid (Lysteda) tablet 1,950 mg, 1,950 mg, oral, Once, Margarito Koch MD    tranexamic acid (Lysteda) tablet 1,950 mg, 1,950 mg, oral, Once, Geraldine Mobley APRN-CNP     Results for orders placed or performed during the hospital encounter of 11/23/23 (from the past 96 hour(s))   CBC and Auto Differential   Result Value Ref Range    WBC 12.6 (H) 4.4 - 11.3 x10*3/uL    nRBC 0.0 0.0 - 0.0 /100 WBCs    RBC 3.46 (L) 4.00 - 5.20 x10*6/uL    Hemoglobin 11.5 (L) 12.0 - 16.0 g/dL    Hematocrit 34.6 (L) 36.0 - 46.0 %     80 - 100 fL    MCH 33.2 26.0 - 34.0 pg    MCHC 33.2 32.0 - 36.0 g/dL    RDW 13.5 11.5 - 14.5 %    Platelets 212 150 - 450 x10*3/uL    Neutrophils % 80.9 40.0 - 80.0 %    Immature Granulocytes %, Automated 0.6 0.0 - 0.9 %    Lymphocytes % 10.7 13.0 - 44.0 %    Monocytes % 7.1 2.0 - 10.0 %    Eosinophils % 0.4 0.0 - 6.0 %    Basophils % 0.3 0.0 - 2.0 %    Neutrophils Absolute 10.16 (H) 1.60 - 5.50 x10*3/uL    Immature Granulocytes Absolute, Automated 0.07 0.00 - 0.50 x10*3/uL    Lymphocytes Absolute 1.34 0.80 - 3.00 x10*3/uL    Monocytes Absolute 0.89 (H) 0.05 - 0.80 x10*3/uL    Eosinophils Absolute 0.05 0.00 - 0.40 x10*3/uL    Basophils Absolute 0.04 0.00 - 0.10 x10*3/uL   Basic Metabolic Panel   Result Value Ref Range    Glucose 105 (H) 74 - 99 mg/dL    Sodium 136 136 - 145 mmol/L    Potassium 4.2 3.5 - 5.3 mmol/L    Chloride 103 98  - 107 mmol/L    Bicarbonate 24 21 - 32 mmol/L    Anion Gap 13 10 - 20 mmol/L    Urea Nitrogen 31 (H) 6 - 23 mg/dL    Creatinine 1.44 (H) 0.50 - 1.05 mg/dL    eGFR 35 (L) >60 mL/min/1.73m*2    Calcium 9.2 8.6 - 10.3 mg/dL   CBC and Auto Differential   Result Value Ref Range    WBC 14.7 (H) 4.4 - 11.3 x10*3/uL    nRBC 0.0 0.0 - 0.0 /100 WBCs    RBC 2.59 (L) 4.00 - 5.20 x10*6/uL    Hemoglobin 8.6 (L) 12.0 - 16.0 g/dL    Hematocrit 26.3 (L) 36.0 - 46.0 %     (H) 80 - 100 fL    MCH 33.2 26.0 - 34.0 pg    MCHC 32.7 32.0 - 36.0 g/dL    RDW 13.4 11.5 - 14.5 %    Platelets 163 150 - 450 x10*3/uL    Neutrophils % 84.4 40.0 - 80.0 %    Immature Granulocytes %, Automated 0.6 0.0 - 0.9 %    Lymphocytes % 7.1 13.0 - 44.0 %    Monocytes % 7.8 2.0 - 10.0 %    Eosinophils % 0.0 0.0 - 6.0 %    Basophils % 0.1 0.0 - 2.0 %    Neutrophils Absolute 12.44 (H) 1.60 - 5.50 x10*3/uL    Immature Granulocytes Absolute, Automated 0.09 0.00 - 0.50 x10*3/uL    Lymphocytes Absolute 1.04 0.80 - 3.00 x10*3/uL    Monocytes Absolute 1.15 (H) 0.05 - 0.80 x10*3/uL    Eosinophils Absolute 0.00 0.00 - 0.40 x10*3/uL    Basophils Absolute 0.02 0.00 - 0.10 x10*3/uL   Basic Metabolic Panel   Result Value Ref Range    Glucose 131 (H) 74 - 99 mg/dL    Sodium 134 (L) 136 - 145 mmol/L    Potassium 4.3 3.5 - 5.3 mmol/L    Chloride 103 98 - 107 mmol/L    Bicarbonate 23 21 - 32 mmol/L    Anion Gap 12 10 - 20 mmol/L    Urea Nitrogen 39 (H) 6 - 23 mg/dL    Creatinine 1.54 (H) 0.50 - 1.05 mg/dL    eGFR 32 (L) >60 mL/min/1.73m*2    Calcium 8.6 8.6 - 10.3 mg/dL   SST TOP   Result Value Ref Range    Extra Tube Hold for add-ons.    CBC and Auto Differential   Result Value Ref Range    WBC 9.0 4.4 - 11.3 x10*3/uL    nRBC 0.0 0.0 - 0.0 /100 WBCs    RBC 2.06 (L) 4.00 - 5.20 x10*6/uL    Hemoglobin 6.8 (L) 12.0 - 16.0 g/dL    Hematocrit 20.5 (L) 36.0 - 46.0 %     80 - 100 fL    MCH 33.0 26.0 - 34.0 pg    MCHC 33.2 32.0 - 36.0 g/dL    RDW 13.2 11.5 - 14.5 %     Platelets 153 150 - 450 x10*3/uL    Neutrophils % 61.6 40.0 - 80.0 %    Immature Granulocytes %, Automated 0.7 0.0 - 0.9 %    Lymphocytes % 21.3 13.0 - 44.0 %    Monocytes % 14.8 2.0 - 10.0 %    Eosinophils % 1.4 0.0 - 6.0 %    Basophils % 0.2 0.0 - 2.0 %    Neutrophils Absolute 5.56 (H) 1.60 - 5.50 x10*3/uL    Immature Granulocytes Absolute, Automated 0.06 0.00 - 0.50 x10*3/uL    Lymphocytes Absolute 1.92 0.80 - 3.00 x10*3/uL    Monocytes Absolute 1.34 (H) 0.05 - 0.80 x10*3/uL    Eosinophils Absolute 0.13 0.00 - 0.40 x10*3/uL    Basophils Absolute 0.02 0.00 - 0.10 x10*3/uL   Basic Metabolic Panel   Result Value Ref Range    Glucose 106 (H) 74 - 99 mg/dL    Sodium 133 (L) 136 - 145 mmol/L    Potassium 4.0 3.5 - 5.3 mmol/L    Chloride 101 98 - 107 mmol/L    Bicarbonate 25 21 - 32 mmol/L    Anion Gap 11 10 - 20 mmol/L    Urea Nitrogen 50 (H) 6 - 23 mg/dL    Creatinine 1.76 (H) 0.50 - 1.05 mg/dL    eGFR 28 (L) >60 mL/min/1.73m*2    Calcium 8.4 (L) 8.6 - 10.3 mg/dL   SST TOP   Result Value Ref Range    Extra Tube Hold for add-ons.    Morphology   Result Value Ref Range    RBC Morphology No significant RBC morphology present    Prepare RBC: 1 Units   Result Value Ref Range    PRODUCT CODE K9759P16     Unit Number [id number]     Unit ABO A     Unit RH POS     XM INTEP COMP     Dispense Status TR     Blood Expiration Date December 19, 2023 23:59 EST     PRODUCT BLOOD TYPE 6200     UNIT VOLUME 350    VERIFY ABO/Rh Group Test   Result Value Ref Range    ABO TYPE A     Rh TYPE POS    Hemoglobin and hematocrit, blood   Result Value Ref Range    Hemoglobin 8.2 (L) 12.0 - 16.0 g/dL    Hematocrit 23.9 (L) 36.0 - 46.0 %   CBC and Auto Differential   Result Value Ref Range    WBC 8.6 4.4 - 11.3 x10*3/uL    nRBC 0.0 0.0 - 0.0 /100 WBCs    RBC 2.58 (L) 4.00 - 5.20 x10*6/uL    Hemoglobin 8.4 (L) 12.0 - 16.0 g/dL    Hematocrit 24.6 (L) 36.0 - 46.0 %    MCV 95 80 - 100 fL    MCH 32.6 26.0 - 34.0 pg    MCHC 34.1 32.0 - 36.0 g/dL     RDW 14.8 (H) 11.5 - 14.5 %    Platelets 144 (L) 150 - 450 x10*3/uL    Neutrophils % 66.6 40.0 - 80.0 %    Immature Granulocytes %, Automated 0.7 0.0 - 0.9 %    Lymphocytes % 16.2 13.0 - 44.0 %    Monocytes % 14.1 2.0 - 10.0 %    Eosinophils % 2.2 0.0 - 6.0 %    Basophils % 0.2 0.0 - 2.0 %    Neutrophils Absolute 5.69 (H) 1.60 - 5.50 x10*3/uL    Immature Granulocytes Absolute, Automated 0.06 0.00 - 0.50 x10*3/uL    Lymphocytes Absolute 1.39 0.80 - 3.00 x10*3/uL    Monocytes Absolute 1.21 (H) 0.05 - 0.80 x10*3/uL    Eosinophils Absolute 0.19 0.00 - 0.40 x10*3/uL    Basophils Absolute 0.02 0.00 - 0.10 x10*3/uL   Basic Metabolic Panel   Result Value Ref Range    Glucose 105 (H) 74 - 99 mg/dL    Sodium 134 (L) 136 - 145 mmol/L    Potassium 4.3 3.5 - 5.3 mmol/L    Chloride 103 98 - 107 mmol/L    Bicarbonate 25 21 - 32 mmol/L    Anion Gap 10 10 - 20 mmol/L    Urea Nitrogen 48 (H) 6 - 23 mg/dL    Creatinine 1.57 (H) 0.50 - 1.05 mg/dL    eGFR 32 (L) >60 mL/min/1.73m*2    Calcium 8.5 (L) 8.6 - 10.3 mg/dL   SST TOP   Result Value Ref Range    Extra Tube Hold for add-ons.         XR pelvis 1-2 views    Result Date: 11/24/2023  Interpreted By:  Carlos Landeros, STUDY: Pelvis dated  11/24/2023.   INDICATION: Signs/Symptoms:Post op hip   COMPARISON: Radiographs dated 11/23/2020   ACCESSION NUMBER(S): [id number].   ORDERING CLINICIAN: TRICE NORMAN.   TECHNIQUE: One view(s) of the pelvis.   FINDINGS: Bones are demineralized. There is placement of a bipolar hemiarthroplasty of the left hip. Hardware is intact as visualized. There appears to be buckling of the right pubic body. It is not well profiled. The posterior pelvis is not well assessed due to obscuration by the soft tissues. Subcutaneous emphysema and edema and skin staples are seen from recent surgery.       1. Appearance of fracturing at the right pubic body which is not well profiled. CT of the pelvis is recommended when the patient's clinical condition allows to  assess the entirety of the pelvic ring. 2. Bipolar hemiarthroplasty of the left hip hardware appearing intact as seen on this single AP radiograph.   MACRO: None   Signed by: Carlos Landeros 11/24/2023 4:42 PM Dictation workstation:   [id number]    XR femur left 2+ views    Result Date: 11/23/2023  Interpreted By:  Arlene Kelley, STUDY: XR FEMUR LEFT 2+ VIEWS; XR PELVIS 1-2 VIEWS;  11/23/2023 11:06 am   INDICATION: Signs/Symptoms:fall/ short and rotated.   COMPARISON: None.   ACCESSION NUMBER(S): [id number]; [id number]   ORDERING CLINICIAN: JEANNETTE MEYER   FINDINGS: AP view of the pelvis and multiple views of the left femur are obtained. Superiorly displaced left femoral neck fracture. No dislocation. The distal left femur appears grossly unremarkable.   Degenerative changes of the visualized left knee. There is a       Superiorly displaced left femoral neck fracture. No dislocation.     Signed by: Arlene Kelley 11/23/2023 11:26 AM Dictation workstation:   [id number]    XR pelvis 1-2 views    Result Date: 11/23/2023  Interpreted By:  Arlene Kelley, STUDY: XR FEMUR LEFT 2+ VIEWS; XR PELVIS 1-2 VIEWS;  11/23/2023 11:06 am   INDICATION: Signs/Symptoms:fall/ short and rotated.   COMPARISON: None.   ACCESSION NUMBER(S): [id number]; [id number]   ORDERING CLINICIAN: JEANNETTE MEYER   FINDINGS: AP view of the pelvis and multiple views of the left femur are obtained. Superiorly displaced left femoral neck fracture. No dislocation. The distal left femur appears grossly unremarkable.   Degenerative changes of the visualized left knee. There is a       Superiorly displaced left femoral neck fracture. No dislocation.     Signed by: Arlene Kelley 11/23/2023 11:26 AM Dictation workstation:   [id number]    CT head wo IV contrast    Result Date: 11/23/2023  Interpreted By:  Arlene Kelley, STUDY: CT HEAD WO IV CONTRAST;  11/23/2023 11:10 am   INDICATION: unwitnessed fall.   COMPARISON: None.   ACCESSION NUMBER(S):  [id number]   ORDERING CLINICIAN: JEANNETTE MEYER   TECHNIQUE: Noncontrast axial CT scan of head was performed. Angled reformats in brain and bone windows were generated. The images were reviewed in bone, brain, blood and soft tissue windows.   FINDINGS: The ventricles, cisterns and sulci are prominent, consistent with mild-to-moderate diffuse volume loss. There are areas of moderate to severe nonspecific white matter hypodensity, which are probably age-related or microvascular in nature.   Gray-white differentiation is intact and there is no evidence of acute cortical infarct. No mass, mass effect or midline shift is seen. There is no evidence of hemorrhage.   The visualized paranasal sinuses are clear.         No evidence of acute cortical infarct or intracranial hemorrhage. Chronic changes as described.   MACRO: None   Signed by: Arlene Kelley 11/23/2023 11:24 AM Dictation workstation:   [id number]    XR chest 1 view    Result Date: 11/23/2023  Interpreted By:  Arlene Kelley, STUDY: XR CHEST 1 VIEW;  11/23/2023 11:06 am   INDICATION: Signs/Symptoms:fall.   COMPARISON: None.   ACCESSION NUMBER(S): [id number]   ORDERING CLINICIAN: JEANNETTE MEYER   FINDINGS: AP portable view of the chest is obtained.  Limited exam due to portable nature. Magnified cardiac silhouette. Left upper lobe asymmetrical density may represent infiltrate or summation of shadows. Mild interstitial prominence. No effusion or pneumothorax.       1. Asymmetrical left upper lobe opacity may represent infiltrate. Consider short-term follow-up to re-evaluate.       MACRO: None   Signed by: Arlene Kelley 11/23/2023 11:21 AM Dictation workstation:   [id number]

## 2023-11-27 NOTE — NURSING NOTE
No output noted since bladder scan at 0100 bladder scanned again for 501cc, attempted straight cath and unsuccessful, another RN attempted without success, YISSEL Dumont CNP notifed and came to assess, She was able to straight cath for 650cc, pt tolerated fair, external catheter reapplied and new canister inserted for accurate output

## 2023-11-27 NOTE — DISCHARGE INSTRUCTIONS
Follow-up with Dr. Gar in 2 weeks  Weightbearing as tolerated left leg  Remove Aquacel like a Band-Aid on 12/1/2023  Ice to left hip 5 times a day for 20 minutes for 2 weeks  Please wear JAN hose for 3 weeks okay to remove for hygiene care  Incentive spirometer 10 times an hour for 2 weeks

## 2023-11-27 NOTE — SIGNIFICANT EVENT
Acute urinary retention s/p Left hip repair    Bladder scan w greater than 500 mL  I/O's are inaccurate given lack of documentation  Straight cath patient for 700 mL; stopped draining, to prevent significant fluid shifts, more residual urine present noted by distention of bladder over the suprapubic region.  Patient tolerated well and reports significant relief.  - Start Flomax  - Consult Urology  - Strict I/Os q6hr with bladder scan if not voiding

## 2023-11-27 NOTE — PROGRESS NOTES
Pt. Will discharge this date returning to [facility] at 2:00pm via ambulance.  Spoke with son, nilda who is aware and in agreement to transfer.

## 2023-11-27 NOTE — PROGRESS NOTES
Physical Therapy    Physical Therapy Treatment    Patient Name: Jacqueline Sandoval  MRN: [id number]  Today's Date: 11/27/2023  Time Calculation  Start Time: 1108  Stop Time: 1120  Time Calculation (min): 12 min       Assessment/Plan   PT Assessment  PT Assessment Results: Decreased strength, Decreased range of motion, Decreased endurance, Impaired balance, Decreased mobility, Decreased cognition, Impaired judgement, Decreased safety awareness, Impaired hearing, Pain  Rehab Prognosis: Fair  End of Session Communication: Bedside nurse  Assessment Comment: Limited participation with PT due to baseline status and advanced dementia. Pt would benefit from further skilled PT to progress strength and transfers if cooperative.  End of Session Patient Position: Bed, 3 rail up, Alarm on  PT Plan  Inpatient/Swing Bed or Outpatient: Inpatient  PT Plan  Treatment/Interventions: Bed mobility, Therapeutic exercise  PT Plan: Skilled PT  PT Frequency: Daily  PT Discharge Recommendations: Low intensity level of continued care, Moderate intensity level of continued care  PT - OK to Discharge: Yes (back to NH, when medically stable)      General Visit Information:   PT  Visit  PT Received On: 11/27/23  General  Referred By: Dr. Scott  Past Medical History Relevant to Rehab: Dementia, hypertension, anxiety, GERD, CHF, and hypothyroidism.  Patient also admitted status post fall with left femoral neck fracture.  Chest x-ray revealed left upper lobe opacity may represent infiltrate.  Prior to Session Communication: Bedside nurse  Patient Position Received: Bed, 3 rail up, Alarm on  General Comment: Pt is sleeping and arouses to verbal stimuli. Initially refusing then requesting to be boosted up in bed. Pt agreeable to limited session. Often repeating be gentle. (IV, purewick, O2)    Subjective   Precautions:  Precautions  Medical Precautions: Fall precautions  Vital Signs:       Objective   Pain:  Pain Assessment  Pain Assessment: 0-10  Pain  "Score:  (Pt states \"all over\" but does not quantify, confused.)  Cognition:  Cognition  Orientation Level: Disoriented to place, Disoriented to time, Disoriented to situation  Postural Control:     Extremity/Trunk Assessments:        Activity Tolerance:  Activity Tolerance  Endurance: Decreased tolerance for upright activites  Treatments:  Therapeutic Exercise  Therapeutic Exercise Performed: Yes (AAROM for supine ther 10 ea  BLE for HS and abd.)    Bed Mobility  Bed Mobility: Yes (Pt dependent x 2 for boosting up in bed but refusing further transfers. Uncooperative. Pt LE rigid allows for minor repositioning only. Pt states \"were doing this my way. \")    Outcome Measures:  [facility] Basic Mobility  Turning from your back to your side while in a flat bed without using bedrails: Total  Moving from lying on your back to sitting on the side of a flat bed without using bedrails: Total  Moving to and from bed to chair (including a wheelchair): Total  Standing up from a chair using your arms (e.g. wheelchair or bedside chair): Total  To walk in hospital room: Total  Climbing 3-5 steps with railing: Total  Basic Mobility - Total Score: 6    Education Documentation  No documentation found.  Education Comments  No comments found.        OP EDUCATION:  Outpatient Education  Individual(s) Educated: Patient  Education Comment: Attempted education but does not appear to retain or follow due to dementia. Pt speaks non-sensibly at times.    Encounter Problems       Encounter Problems (Active)       Impaired mobility        Perform all bed mobility with modAx1  (Progressing)       Start:  11/25/23    Expected End:  12/09/23            Patient will sit unsupported with supervision for static balance and CGAx1 for multidirectional reaching   (Progressing)       Start:  11/25/23    Expected End:  12/09/23            Patient will participate in BLE AROM/AAROM x10-20 reps x 1-2 sets  (Progressing)       Start:  11/25/23    Expected End:  " 12/09/23                  Encounter Problems (Resolved)       Pain - Adult

## 2023-11-30 ENCOUNTER — NURSING HOME VISIT (OUTPATIENT)
Dept: POST ACUTE CARE | Facility: EXTERNAL LOCATION | Age: 88
End: 2023-11-30
Payer: COMMERCIAL

## 2023-11-30 DIAGNOSIS — R33.9 URINARY RETENTION: ICD-10-CM

## 2023-11-30 DIAGNOSIS — J18.9 PNEUMONIA DUE TO INFECTIOUS ORGANISM, UNSPECIFIED LATERALITY, UNSPECIFIED PART OF LUNG: ICD-10-CM

## 2023-11-30 DIAGNOSIS — I10 HTN (HYPERTENSION), BENIGN: ICD-10-CM

## 2023-11-30 DIAGNOSIS — S72.002A LEFT DISPLACED FEMORAL NECK FRACTURE (MULTI): Primary | ICD-10-CM

## 2023-11-30 PROCEDURE — 99309 SBSQ NF CARE MODERATE MDM 30: CPT | Performed by: NURSE PRACTITIONER

## 2023-11-30 NOTE — LETTER
Patient: Jacqueline Sandoval  : 1935    Encounter Date: 2023    Name: Jacqueline Sandoval  YOB: 1935    FOLLOW UP VISIT: SNF (LTC), fall fx left femur, s/p left femur head fx repair    SUBJECTIVE:  Mrs. Sandoval is an 88 yr old female who is here at [facility] as a long term care resident. She was recently hospitalized at [facility] from  - 23 for Left displaced femoral neck fx. She had surgery to repair on 23. The patient received one unit of PRBC's on  for postop drop in hemoglobin. Patient also developed CAP and is now being treated w/Levaquin for 5 more days. She also required a Barker cath post op due to failed attempts to remove due to urinary retention. She was placed on Flomax in addition to Oxybutynin. She will need to f/up with her Urologist Dr. Padilla outpatient in the next couple of weeks.   Past medical history of hypertension, per patient and son CHF, hard of hearing, fibromyalgia, hyponatremia, anxiety/depression, and hypothyroidism patient is not a smoker.  She is allergic to penicillin and sulfa drugs. Family history is not pertinent.   The patient is resting in bed at this time. She appears comfortable and in no acute distress. Patient is hard of hearing and this makes conversing difficult.  Patient c/o some left sided hip pain but states pain medicine does help especially after therapy. She will be skilled for PT/OT for a few weeks here at the facility. She denies CP or SOB.      REVIEW OF SYSTEMS:   All review of systems are negative unless otherwise stated above under subjective.    LABS REVIEWED AT FACILITY:  Pending 23    MEDICATIONS REVIEWED AT FACILITY:  Levaquin 750 mg x 5 days for PNA  Bowel protocol - MOM PRN, Docusate RS PRN, and Fleets Enema PRN  Meclizine 12.5 mg twice daily as needed  Voltaren gel 1% twice daily as needed  Nasal spray 2 drops each nostril every 8 hours as needed  Levothyroxine 175 mcg Monday through Friday and 200 mcg on Saturday and  "Sunday  Buspirone 10 mg 3 times a day  Sodium chloride 1 g daily  Macrobid 50 mg daily for prophylactic antibiotic recurrent UTI  Melatonin 3 mg at bedtime  Abilify 2 mg daily  Sertraline 75 mg daily  Diazepam 5 mg at bedtime  Colace 100 mg twice daily  Aspirin 81 mg daily  Gabapentin 100 mg at bedtime  Systane 1 drop both eyes 2 times a day  Multivitamin daily  MiraLAX 17 g daily  Tylenol 650 every 8 hours as needed  Tylenol 8-hour arthritis 1300 mg twice daily routine  Oxybutynin 5 mg daily  Norco 5/325 mg 1 tab every 6 hours as needed  Flomax 0.4 mg daily  Lovenox 30 mg daily for 30 days  Metoprolol tartrate 12.5 mg twice daily with parameters  Norvasc 2.5 mg daily with parameters  Lisinopril 40 mg daily with parameters    Living will related issues reviewed-Code status: DNRCCA    OBJECTIVE:  BP (!) 103/40   Pulse 60   Temp 36 °C (96.8 °F)   Resp 16   Ht 1.6 m (5' 3\")   Wt 63.5 kg (140 lb)   BMI 24.80 kg/m²     Physical Exam  HENT:      Head: Normocephalic. [place]     Mouth/Throat:      Mouth: Mucous membranes are moist.   Cardiovascular:      Rate and Rhythm: Normal rate and regular rhythm.   Pulmonary:      Effort: Pulmonary effort is normal.      Breath sounds: Normal breath sounds.   Abdominal:      General: Abdomen is flat. Bowel sounds are normal.      Palpations: Abdomen is soft.   Genitourinary:     Comments: Genitalia not examined  Musculoskeletal:      Cervical back: Neck supple.      Comments: Generalized weakness +1 pitting edema, JAN hose on  Skin:     General: Skin is warm and dry.      Capillary Refill: Capillary refill takes less than 2 seconds.   Neurological:      General: No focal deficit present.      Mental Status: She is alert and oriented to person, place, and time. Forgetful and due to [place] difficult to maintain conversation.  Psychiatric:         Mood and Affect: Mood normal.      Assessment/Plan  Problem List Items Addressed This Visit       HTN (hypertension), benign    Left displaced " femoral neck fracture (CMS/HCC) - Primary    Urinary retention     Other Visit Diagnoses       Pneumonia due to infectious organism, unspecified laterality, unspecified part of lung                Skin integrity:  Nursing to monitor skin integrity as patient is at risk for pressure injuries.  Wound care per nursing  Left hip  See Facility notes for measurements/assessments  Turn and reposition Q 2 hours or more  Air mattress and when up in chair cushion reducing device  Dietician to evaluate and recommend:  Nutritional supplement:  Please monitor skin integrity and other pressure areas  Referral to wound clinic if appropriate:    PLAN:  Pt has been seen for follow up visit.  The patient will be participating in PT/OT to maximize strength, function, endurance and safety.  The patient is participating in therapy. Jacqueline Sandoval is making progress to the best of his/her ability.   Please see PT/OT/ST notes in the facility for detailed information regarding progression of patients progress.   Recent nursing evaluation and notes were reviewed.   Overall, patient is stable despite his/her chronic conditions.    #fall, left femur fx, s/p repair, Weakness and physical deconditioning: cont PT/OT to maximize strength, function, and endurance all while maintaining safety.   # Postop anemia: November 27 hemoglobin 8.4  #Urinary retention: f/up Mj Bird to CD, Flomax and Ditropan. Will dc Ditropan for now and see what Dr. Padilla's plan is when pt f/up in couple weeks.  #HTN: BP today is lower than usual, however nurse reports that all meds were given at the same time.  We will split meds up and put parameters in place.  Will follow labs pending next week.  #CAP: Levaquin daily for 5 days for community-acquired pneumonia, respiratory status is stable  #CHF: Per patient and son history of heart failure however no cardiology notes or reports, weight is stable patient is not having any fluid overload  Any decline or change in  condition needs to be communicated with the physician or myself.    Discussion with nursing staff regarding ongoing care and management.  If needed, would communicate with family who are not present at this time.   There are no concerns at this time.  We will continue with the medications noted above.    We will continue to follow the patient here at the facility.    Discharge planning: No plan for discharge, patient is a long-term care resident    *Please note that nursing facility, outside laboratory agency, and [facility] do not interface.     Completion of the note was done through Dragon voice recognition technology and may include   unintended or grammatical errors which may not have been recognized when finalizing the note.     Time:    GLORIA Valverde      Electronically Signed By: GLORIA Valverde   12/8/23  1:24 PM

## 2023-12-05 VITALS
WEIGHT: 140 LBS | HEART RATE: 60 BPM | HEIGHT: 63 IN | RESPIRATION RATE: 16 BRPM | BODY MASS INDEX: 24.8 KG/M2 | DIASTOLIC BLOOD PRESSURE: 40 MMHG | TEMPERATURE: 96.8 F | SYSTOLIC BLOOD PRESSURE: 103 MMHG

## 2023-12-05 NOTE — PROGRESS NOTES
Name: Jacqueline Sandoval  YOB: 1935    FOLLOW UP VISIT: SNF (LTC), fall fx left femur, s/p left femur head fx repair    SUBJECTIVE:  Mrs. Sandoval is an 88 yr old female who is here at [facility] as a long term care resident. She was recently hospitalized at [facility] from 11/23 - 11/27/23 for Left displaced femoral neck fx. She had surgery to repair on 11/24/23. The patient received one unit of PRBC's on 11/26 for postop drop in hemoglobin. Patient also developed CAP and is now being treated w/Levaquin for 5 more days. She also required a Barker cath post op due to failed attempts to remove due to urinary retention. She was placed on Flomax in addition to Oxybutynin. She will need to f/up with her Urologist Dr. Padilla outpatient in the next couple of weeks.   Past medical history of hypertension, per patient and son CHF, hard of hearing, fibromyalgia, hyponatremia, anxiety/depression, and hypothyroidism patient is not a smoker.  She is allergic to penicillin and sulfa drugs. Family history is not pertinent.   The patient is resting in bed at this time. She appears comfortable and in no acute distress. Patient is hard of hearing and this makes conversing difficult.  Patient c/o some left sided hip pain but states pain medicine does help especially after therapy. She will be skilled for PT/OT for a few weeks here at the facility. She denies CP or SOB.      REVIEW OF SYSTEMS:   All review of systems are negative unless otherwise stated above under subjective.    LABS REVIEWED AT FACILITY:  Pending 12/4/23    MEDICATIONS REVIEWED AT FACILITY:  Levaquin 750 mg x 5 days for PNA  Bowel protocol - MOM PRN, Docusate RS PRN, and Fleets Enema PRN  Meclizine 12.5 mg twice daily as needed  Voltaren gel 1% twice daily as needed  Nasal spray 2 drops each nostril every 8 hours as needed  Levothyroxine 175 mcg Monday through Friday and 200 mcg on Saturday and Sunday  Buspirone 10 mg 3 times a day  Sodium chloride 1 g  "daily  Macrobid 50 mg daily for prophylactic antibiotic recurrent UTI  Melatonin 3 mg at bedtime  Abilify 2 mg daily  Sertraline 75 mg daily  Diazepam 5 mg at bedtime  Colace 100 mg twice daily  Aspirin 81 mg daily  Gabapentin 100 mg at bedtime  Systane 1 drop both eyes 2 times a day  Multivitamin daily  MiraLAX 17 g daily  Tylenol 650 every 8 hours as needed  Tylenol 8-hour arthritis 1300 mg twice daily routine  Oxybutynin 5 mg daily  Norco 5/325 mg 1 tab every 6 hours as needed  Flomax 0.4 mg daily  Lovenox 30 mg daily for 30 days  Metoprolol tartrate 12.5 mg twice daily with parameters  Norvasc 2.5 mg daily with parameters  Lisinopril 40 mg daily with parameters    Living will related issues reviewed-Code status: DNRCCA    OBJECTIVE:  BP (!) 103/40   Pulse 60   Temp 36 °C (96.8 °F)   Resp 16   Ht 1.6 m (5' 3\")   Wt 63.5 kg (140 lb)   BMI 24.80 kg/m²     Physical Exam  HENT:      Head: Normocephalic. [place]     Mouth/Throat:      Mouth: Mucous membranes are moist.   Cardiovascular:      Rate and Rhythm: Normal rate and regular rhythm.   Pulmonary:      Effort: Pulmonary effort is normal.      Breath sounds: Normal breath sounds.   Abdominal:      General: Abdomen is flat. Bowel sounds are normal.      Palpations: Abdomen is soft.   Genitourinary:     Comments: Genitalia not examined  Musculoskeletal:      Cervical back: Neck supple.      Comments: Generalized weakness +1 pitting edema, JAN hose on  Skin:     General: Skin is warm and dry.      Capillary Refill: Capillary refill takes less than 2 seconds.   Neurological:      General: No focal deficit present.      Mental Status: She is alert and oriented to person, place, and time. Forgetful and due to [place] difficult to maintain conversation.  Psychiatric:         Mood and Affect: Mood normal.      Assessment/Plan   Problem List Items Addressed This Visit       HTN (hypertension), benign    Left displaced femoral neck fracture (CMS/HCC) - Primary    Urinary " retention     Other Visit Diagnoses       Pneumonia due to infectious organism, unspecified laterality, unspecified part of lung                Skin integrity:  Nursing to monitor skin integrity as patient is at risk for pressure injuries.  Wound care per nursing  Left hip  See Facility notes for measurements/assessments  Turn and reposition Q 2 hours or more  Air mattress and when up in chair cushion reducing device  Dietician to evaluate and recommend:  Nutritional supplement:  Please monitor skin integrity and other pressure areas  Referral to wound clinic if appropriate:    PLAN:  Pt has been seen for follow up visit.  The patient will be participating in PT/OT to maximize strength, function, endurance and safety.  The patient is participating in therapy. Jacqueline Sandoval is making progress to the best of his/her ability.   Please see PT/OT/ST notes in the facility for detailed information regarding progression of patients progress.   Recent nursing evaluation and notes were reviewed.   Overall, patient is stable despite his/her chronic conditions.    #fall, left femur fx, s/p repair, Weakness and physical deconditioning: cont PT/OT to maximize strength, function, and endurance all while maintaining safety.   # Postop anemia: November 27 hemoglobin 8.4  #Urinary retention: f/up Mj Bird to CD, Flomax and Ditropan. Will dc Ditropan for now and see what Dr. Padilla's plan is when pt f/up in couple weeks.  #HTN: BP today is lower than usual, however nurse reports that all meds were given at the same time.  We will split meds up and put parameters in place.  Will follow labs pending next week.  #CAP: Levaquin daily for 5 days for community-acquired pneumonia, respiratory status is stable  #CHF: Per patient and son history of heart failure however no cardiology notes or reports, weight is stable patient is not having any fluid overload  Any decline or change in condition needs to be communicated with the physician  or myself.    Discussion with nursing staff regarding ongoing care and management.  If needed, would communicate with family who are not present at this time.   There are no concerns at this time.  We will continue with the medications noted above.    We will continue to follow the patient here at the facility.    Discharge planning: No plan for discharge, patient is a long-term care resident    *Please note that nursing facility, outside laboratory agency, and [facility] do not interface.     Completion of the note was done through Dragon voice recognition technology and may include   unintended or grammatical errors which may not have been recognized when finalizing the note.     Time:    Rosetta Christy, JOCELIN-CNP

## 2023-12-08 PROBLEM — D64.9 POSTOPERATIVE ANEMIA: Status: ACTIVE | Noted: 2023-12-08

## 2023-12-08 PROBLEM — R33.9 URINARY RETENTION: Status: ACTIVE | Noted: 2023-12-08

## 2023-12-11 DIAGNOSIS — Z96.649 S/P HIP HEMIARTHROPLASTY: Primary | ICD-10-CM

## 2023-12-12 ENCOUNTER — OFFICE VISIT (OUTPATIENT)
Dept: ORTHOPEDIC SURGERY | Facility: CLINIC | Age: 88
End: 2023-12-12
Payer: COMMERCIAL

## 2023-12-12 ENCOUNTER — ANCILLARY PROCEDURE (OUTPATIENT)
Dept: RADIOLOGY | Facility: CLINIC | Age: 88
End: 2023-12-12
Payer: COMMERCIAL

## 2023-12-12 DIAGNOSIS — S72.002A LEFT DISPLACED FEMORAL NECK FRACTURE (MULTI): Primary | ICD-10-CM

## 2023-12-12 DIAGNOSIS — S72.002A LEFT DISPLACED FEMORAL NECK FRACTURE (MULTI): ICD-10-CM

## 2023-12-12 PROCEDURE — 73502 X-RAY EXAM HIP UNI 2-3 VIEWS: CPT | Mod: LT

## 2023-12-12 PROCEDURE — 73502 X-RAY EXAM HIP UNI 2-3 VIEWS: CPT | Mod: LEFT SIDE | Performed by: ORTHOPAEDIC SURGERY

## 2023-12-12 PROCEDURE — 99024 POSTOP FOLLOW-UP VISIT: CPT | Performed by: ORTHOPAEDIC SURGERY

## 2023-12-12 PROCEDURE — 1111F DSCHRG MED/CURRENT MED MERGE: CPT | Performed by: ORTHOPAEDIC SURGERY

## 2023-12-12 PROCEDURE — 1159F MED LIST DOCD IN RCRD: CPT | Performed by: ORTHOPAEDIC SURGERY

## 2023-12-12 PROCEDURE — 1036F TOBACCO NON-USER: CPT | Performed by: ORTHOPAEDIC SURGERY

## 2023-12-12 PROCEDURE — 1125F AMNT PAIN NOTED PAIN PRSNT: CPT | Performed by: ORTHOPAEDIC SURGERY

## 2023-12-12 NOTE — PROGRESS NOTES
History of present illness    88-year-old female from a nursing home somewhat confused had a left hip hemiarthroplasty done by Dr. Koch on November 24 she states did not really have any pain denies any numbness or tingling denies any fevers or chills appears comfortable she is on a gurney she is transported by paramedics      Past medical , Surgical, Family and social history reviewed.      Physical exam  General: No acute distress and breathing comfortably.  Patient is pleasant and cooperative with the examination.    Extremity  Hip incisions well-approximated with outside infection staples are intact there is no redness no drainage brisk cap refill compartments soft And nontender no deformity    Diagnostics      XR hip left with pelvis when performed 2 or 3 views    Result Date: 12/12/2023  Interpreted By:  Cheryl Keith, STUDY: XR HIP LEFT WITH PELVIS WHEN PERFORMED 2 OR 3 VIEWS;  ;  12/12/2023 10:06 am   INDICATION: Signs/Symptoms:PAIN.   ACCESSION NUMBER(S): [id number]   ORDERING CLINICIAN: CHERYL KEITH   FINDINGS: AP and lateral x-ray left hip. Status post cemented hemiarthroplasty components in good position no signs of fracture dislocation or other bony abnormalities staples in the subcutaneous tissue laterally.     Signed by: Cheryl Keith 12/12/2023 11:44 AM Dictation workstation:   [id number]    XR pelvis 1-2 views    Result Date: 11/24/2023  Interpreted By:  Carlos Landeros, STUDY: Pelvis dated  11/24/2023.   INDICATION: Signs/Symptoms:Post op hip   COMPARISON: Radiographs dated 11/23/2020   ACCESSION NUMBER(S): [id number].   ORDERING CLINICIAN: TRICE NORMAN.   TECHNIQUE: One view(s) of the pelvis.   FINDINGS: Bones are demineralized. There is placement of a bipolar hemiarthroplasty of the left hip. Hardware is intact as visualized. There appears to be buckling of the right pubic body. It is not well profiled. The posterior pelvis is not well assessed due to  obscuration by the soft tissues. Subcutaneous emphysema and edema and skin staples are seen from recent surgery.       1. Appearance of fracturing at the right pubic body which is not well profiled. CT of the pelvis is recommended when the patient's clinical condition allows to assess the entirety of the pelvic ring. 2. Bipolar hemiarthroplasty of the left hip hardware appearing intact as seen on this single AP radiograph.   MACRO: None   Signed by: Carlos Landeros 11/24/2023 4:42 PM Dictation workstation:   [id number]    XR femur left 2+ views    Result Date: 11/23/2023  Interpreted By:  Arlene Kelley, STUDY: XR FEMUR LEFT 2+ VIEWS; XR PELVIS 1-2 VIEWS;  11/23/2023 11:06 am   INDICATION: Signs/Symptoms:fall/ short and rotated.   COMPARISON: None.   ACCESSION NUMBER(S): [id number]; [id number]   ORDERING CLINICIAN: JEANNETTE MEYER   FINDINGS: AP view of the pelvis and multiple views of the left femur are obtained. Superiorly displaced left femoral neck fracture. No dislocation. The distal left femur appears grossly unremarkable.   Degenerative changes of the visualized left knee. There is a       Superiorly displaced left femoral neck fracture. No dislocation.     Signed by: Arlene Kelley 11/23/2023 11:26 AM Dictation workstation:   [id number]    XR pelvis 1-2 views    Result Date: 11/23/2023  Interpreted By:  Arlene Kelley, STUDY: XR FEMUR LEFT 2+ VIEWS; XR PELVIS 1-2 VIEWS;  11/23/2023 11:06 am   INDICATION: Signs/Symptoms:fall/ short and rotated.   COMPARISON: None.   ACCESSION NUMBER(S): [id number]; [id number]   ORDERING CLINICIAN: JEANNETTE MEYER   FINDINGS: AP view of the pelvis and multiple views of the left femur are obtained. Superiorly displaced left femoral neck fracture. No dislocation. The distal left femur appears grossly unremarkable.   Degenerative changes of the visualized left knee. There is a       Superiorly displaced left femoral neck fracture. No dislocation.     Signed by: Arlene  Allie 11/23/2023 11:26 AM Dictation workstation:   [id number]    CT head wo IV contrast    Result Date: 11/23/2023  Interpreted By:  Arlene Kelley, STUDY: CT HEAD WO IV CONTRAST;  11/23/2023 11:10 am   INDICATION: unwitnessed fall.   COMPARISON: None.   ACCESSION NUMBER(S): [id number]   ORDERING CLINICIAN: JEANNETTE MEYER   TECHNIQUE: Noncontrast axial CT scan of head was performed. Angled reformats in brain and bone windows were generated. The images were reviewed in bone, brain, blood and soft tissue windows.   FINDINGS: The ventricles, cisterns and sulci are prominent, consistent with mild-to-moderate diffuse volume loss. There are areas of moderate to severe nonspecific white matter hypodensity, which are probably age-related or microvascular in nature.   Gray-white differentiation is intact and there is no evidence of acute cortical infarct. No mass, mass effect or midline shift is seen. There is no evidence of hemorrhage.   The visualized paranasal sinuses are clear.         No evidence of acute cortical infarct or intracranial hemorrhage. Chronic changes as described.   MACRO: None   Signed by: Arlene Kelley 11/23/2023 11:24 AM Dictation workstation:   [id number]    XR chest 1 view    Result Date: 11/23/2023  Interpreted By:  Arlene Kelley, STUDY: XR CHEST 1 VIEW;  11/23/2023 11:06 am   INDICATION: Signs/Symptoms:fall.   COMPARISON: None.   ACCESSION NUMBER(S): [id number]   ORDERING CLINICIAN: JEANNETTE MEYER   FINDINGS: AP portable view of the chest is obtained.  Limited exam due to portable nature. Magnified cardiac silhouette. Left upper lobe asymmetrical density may represent infiltrate or summation of shadows. Mild interstitial prominence. No effusion or pneumothorax.       1. Asymmetrical left upper lobe opacity may represent infiltrate. Consider short-term follow-up to re-evaluate.       MACRO: None   Signed by: Arlene Kelley 11/23/2023 11:21 AM Dictation workstation:   [id number]        Procedure  [ none]    Assessment  Status post left hip hemiarthroplasty    Treatment plan  1.  Reassured that everything seems to be healing just fine.  Wrote an order for them to remove the staples at the nursing home on her return if she is unable to really cooperate here in the office.  She is going to follow-up with Dr. Koch in 3 to 4 weeks for repeat evaluation without x-ray at that time.  2. [   ]  3. [   ]  4.  All of the patient's questions were answered.    This note was prepared using voice recognition software.  The details of this note are correct and have been reviewed, and corrected to the best of my ability.  Some grammatical areas may persist related to the Dragon software    Hardeep Keith MD  Senior Attending Physician  [facility]  [facility] [place]    [phone]

## 2024-01-10 DIAGNOSIS — Z96.649 S/P HIP HEMIARTHROPLASTY: Primary | ICD-10-CM

## 2024-01-10 NOTE — PROGRESS NOTES
History of Present Illness  No chief complaint on file.      Patient returns today for evaluation Status Post left hip hemiarthroplasty  The patient notes improvement in pain.  The patient denies any significant numbness or tingling of calf pain.  She is in a skilled nursing facility/rehab center      Exam  side: left Lower Extremity :  Incision healing nicely, no erythema or drainage  Intact flexion and extension of digits  Neurovascular exam normal distally  2+ dorsalis pedis pulse and good cap refill     Radiographs  XR hip left with pelvis when performed 2 or 3 views  Interpreted By:  Cheryl Keith,   STUDY:  XR HIP LEFT WITH PELVIS WHEN PERFORMED 2 OR 3 VIEWS;  ;  12/12/2023  10:06 am      INDICATION:  Signs/Symptoms:PAIN.      ACCESSION NUMBER(S):  [id number]      ORDERING CLINICIAN:  CHERYL KEITH      FINDINGS:  AP and lateral x-ray left hip. Status post cemented hemiarthroplasty  components in good position no signs of fracture dislocation or other  bony abnormalities staples in the subcutaneous tissue laterally.          Signed by: Cheryl Keith 12/12/2023 11:44 AM  Dictation workstation:   [id number]       Assessment  Patient status post left hip hemiarthroplasty     Plan  Immobilization: none  Weight bearing: WBAT (Weight Bearing as Tolerated) with walker for balance  Reviewed rehab /return to activities  Follow up 3 months with XR  All questions answered

## 2024-01-10 NOTE — PROGRESS NOTES
No chief complaint on file.      The patient is here for follow-up of their {side:03022} hip arthroplasty.  The patient has {severity:29474} hip pain.  The patient has {severity:79115} mechanical symptoms.  The patient is approximately {Time; 1 week to 1 year:39152} postop.    Physical examination:    Examination of the {side:40465} hip  The incision is {Incision status:57383}  No erythema or warmth.  Range of motion: Forward Flexion: {egrees of flexion to 180:42903} Internal Rotation: {degrees of flexion to 90:56325} External Rotation: {degrees of flexion to 90:47881} Abduction {degrees of flexion to 90:25032}  The Patient {can/cannot:92193} single leg stand and actively abduct  Calf is soft, Homans negative  The patient has intact ankle dorsiflexion and plantarflexion.    Radiographs:  XR hip left with pelvis when performed 2 or 3 views  Interpreted By:  Cheryl Keith,   STUDY:  XR HIP LEFT WITH PELVIS WHEN PERFORMED 2 OR 3 VIEWS;  ;  12/12/2023  10:06 am      INDICATION:  Signs/Symptoms:PAIN.      ACCESSION NUMBER(S):  [id number]      ORDERING CLINICIAN:  CHERYL KEITH      FINDINGS:  AP and lateral x-ray left hip. Status post cemented hemiarthroplasty  components in good position no signs of fracture dislocation or other  bony abnormalities staples in the subcutaneous tissue laterally.          Signed by: Cheryl Keith 12/12/2023 11:44 AM  Dictation workstation:   [id number]        Impression:  Status post {side:99420} total hip arthroplasty    Plan:  Discussed the continued importance of prophylactic dental antibiotics  Outpatient physical therapy  Follow up in {Time; 1 week to 1 year:61617}  All questions answered

## 2024-01-11 ENCOUNTER — OFFICE VISIT (OUTPATIENT)
Dept: ORTHOPEDIC SURGERY | Facility: CLINIC | Age: 89
End: 2024-01-11
Payer: COMMERCIAL

## 2024-01-11 DIAGNOSIS — Z96.649 S/P HIP HEMIARTHROPLASTY: Primary | ICD-10-CM

## 2024-01-11 PROCEDURE — 1036F TOBACCO NON-USER: CPT | Performed by: ORTHOPAEDIC SURGERY

## 2024-01-11 PROCEDURE — 99024 POSTOP FOLLOW-UP VISIT: CPT | Performed by: ORTHOPAEDIC SURGERY

## 2024-01-11 PROCEDURE — 1125F AMNT PAIN NOTED PAIN PRSNT: CPT | Performed by: ORTHOPAEDIC SURGERY

## 2024-01-18 ENCOUNTER — NURSING HOME VISIT (OUTPATIENT)
Dept: POST ACUTE CARE | Facility: EXTERNAL LOCATION | Age: 89
End: 2024-01-18
Payer: COMMERCIAL

## 2024-01-18 DIAGNOSIS — Z87.440 HISTORY OF RECURRENT UTIS: ICD-10-CM

## 2024-01-18 DIAGNOSIS — D63.1 ANEMIA DUE TO STAGE 3A CHRONIC KIDNEY DISEASE (MULTI): ICD-10-CM

## 2024-01-18 DIAGNOSIS — F32.A DEPRESSION, UNSPECIFIED DEPRESSION TYPE: ICD-10-CM

## 2024-01-18 DIAGNOSIS — M79.7 FIBROMYALGIA: ICD-10-CM

## 2024-01-18 DIAGNOSIS — E03.9 HYPOTHYROIDISM, UNSPECIFIED TYPE: ICD-10-CM

## 2024-01-18 DIAGNOSIS — H91.93 BILATERAL HEARING LOSS, UNSPECIFIED HEARING LOSS TYPE: ICD-10-CM

## 2024-01-18 DIAGNOSIS — Z97.8 FOLEY CATHETER IN PLACE: ICD-10-CM

## 2024-01-18 DIAGNOSIS — E87.1 HYPONATREMIA: ICD-10-CM

## 2024-01-18 DIAGNOSIS — R33.9 URINARY RETENTION: ICD-10-CM

## 2024-01-18 DIAGNOSIS — I10 HTN (HYPERTENSION), BENIGN: Primary | ICD-10-CM

## 2024-01-18 DIAGNOSIS — M19.90 OSTEOARTHRITIS, UNSPECIFIED OSTEOARTHRITIS TYPE, UNSPECIFIED SITE: ICD-10-CM

## 2024-01-18 DIAGNOSIS — F41.9 ANXIETY: ICD-10-CM

## 2024-01-18 DIAGNOSIS — N18.31 ANEMIA DUE TO STAGE 3A CHRONIC KIDNEY DISEASE (MULTI): ICD-10-CM

## 2024-01-18 PROCEDURE — 99309 SBSQ NF CARE MODERATE MDM 30: CPT | Performed by: NURSE PRACTITIONER

## 2024-01-18 NOTE — LETTER
Patient: Jacqueline Sandoval  : 1935    Encounter Date: 2024    Name: Jacqueline Sandoval  YOB: 1935    MONTHLY VISIT: LTC, multiple problems    SUBJECTIVE:  Mrs. Sandoval is a 88 yr old female who is here at [facility] as a LTC resident. She transitioned here after a hospitalization in Oct of 2022. PMH of fx left femur and repair on 23 (post op anemia due to blood loss and received one unit of pRBC's, post op CAP treated w/Levaquin - recovered, and urinary retention requiring Barker cath placement with failed attempts to pass voiding trial and started on Flomax - followed by urology Dr. Padilla), left humerus and repair 2022, recurrent UTI's, HTN, per patient and son CHF, fibromyalgia, Hyponatremia, anxiety/depression, and hypothyroidism. Patient is [place]. Patient is never a smoker. She is allergic to PCN and sulfa.   Nursing reports some right upper leg pain and PT/TO believe patient is favoring right side due to recent left hip fx causing some discomfort.   Jacqueline is up in her room and appears comfortable and in no acute distress. She c/o aches to the right upper leg and reports Tylenol helps. She denies CP or SOB.     REVIEW OF SYSTEMS:  All review of systems are negative unless otherwise stated above under subjective.    LABS REVIEWED AT FACILITY:  TSH  REPORTED 2023 10:38  TSH H 4.370 mIU/L 0.270-4.200    CBC  REPORTED 2023 14:14  White Blood Cell Count   8.67 k/uL 3.70-11.00    RBC L 2.43 m/uL 3.90-5.20    Hemoglobin L 7.8 g/dL 11.5-15.5    Hematocrit L 24.7 % 36.0-46.0    MCV H 101.6 fL 80.0-100.0    MCH   32.1 pg 26.0-34.0    MCHC   31.6 g/dL 30.5-36.0    RDW-CV   14.2 % 11.5-15.0    Platelet Count   277 k/uL 150-400    MPV   10.0 fL 9.0-12.7    Absolute nRBC   <0.01 k/uL <0.01           Comp Metabolic Panel  REPORTED 2023 14:58  Protein, Total L 5.8 g/dL 6.3-8.0    Albumin L 3.2 g/dL 3.9-4.9    Calcium, Total   8.8 mg/dL 8.5-10.2    Bilirubin, Total   0.3 mg/dL 0.2-1.3     Alkaline Phosphatase H 188 U/L     AST   20 U/L 13-35    ALT   8 U/L 7-38    Glucose   83 mg/dL 74-99  BUN H 30 mg/dL 7-21    Creatinine H 1.11 mg/dL 0.58-0.96    Sodium   137 mmol/L 136-144    Potassium   4.4 mmol/L 3.7-5.1    Chloride   105 mmol/L     CO2 L 21 mmol/L 22-30    Anion Gap   11 mmol/L 9-18    Estimated Glomerular Filtration Rate L 48 mL/min/1.73 meters squared >=60      CBC  REPORTED 12/08/2023 19:03  White Blood Cell Count H 11.81 k/uL 3.70-11.00    RBC L 2.65 m/uL 3.90-5.20    Hemoglobin L 8.5 g/dL 11.5-15.5    Hematocrit L 26.7 % 36.0-46.0    MCV H 100.8 fL 80.0-100.0    MCH   32.1 pg 26.0-34.0    MCHC   31.8 g/dL 30.5-36.0    RDW-CV   13.9 % 11.5-15.0    Platelet Count   331 k/uL 150-400    MPV   9.7 fL 9.0-12.7    Absolute nRBC   <0.01 k/uL <0.01    CBC and Differential  REPORTED 12/04/2023 11:12  White Blood Cell Count   7.96 k/uL 3.70-11.00    RBC L 2.40 m/uL 3.90-5.20    Hemoglobin L 7.6 g/dL 11.5-15.5    Hematocrit L 23.8 % 36.0-46.0    MCV   99.2 fL 80.0-100.0    MCH   31.7 pg 26.0-34.0    MCHC   31.9 g/dL 30.5-36.0    RDW-CV   13.2 % 11.5-15.0    Platelet Count   260 k/uL 150-400    MPV   9.8 fL 9.0-12.7    Neut%   70.4 %    Abs Neut   5.60 k/uL 1.45-7.50    Lymph%   13.9 %    Abs Lymph   1.11 k/uL 1.00-4.00    Mono%   11.3 %    Abs Mono H 0.90 k/uL <0.87    Eosin%   3.0 %    Abs Eosin   0.24 k/uL <0.46    Baso%   0.5 %    Abs Baso   0.04 k/uL <0.11    Immature Gran %%   0.9 %    Abs Immature Gran   0.07 k/uL <0.10    NRBC   0.0 /100 WBC    Absolute nRBC   <0.01 k/uL <0.01    Diff Type   Auto               Comp Metabolic Panel  REPORTED 12/04/2023 11:24  Protein, Total L 6.0 g/dL 6.3-8.0    Albumin L 2.9 g/dL 3.9-4.9    Calcium, Total   8.8 mg/dL 8.5-10.2    Bilirubin, Total   0.6 mg/dL 0.2-1.3    Alkaline Phosphatase   92 U/L     AST   17 U/L 13-35    ALT L <5 U/L 7-38    Glucose   78 mg/dL 74-99  BUN H 45 mg/dL 7-21    Creatinine H 1.18 mg/dL 0.58-0.96    Sodium   " 136 mmol/L 136-144    Potassium   4.6 mmol/L 3.7-5.1    Chloride   101 mmol/L     CO2 L 21 mmol/L 22-30    Anion Gap   14 mmol/L 9-18    Estimated Glomerular Filtration Rate L 45 mL/min/1.73 meters squared >=60      MEDICATIONS REVIEWED AT FACILITY:  Routine periodic labs scheduled    Bowel protocol - MOM PRN, Docusate RS PRN, and Fleets Enema PRN  Meclizine 12.5 mg twice daily as needed  Voltaren gel 1% twice daily as needed  Nasal spray 2 drops each nostril every 8 hours as needed  Levothyroxine 175 mcg Monday through Friday and 200 mcg on Saturday and Sunday  Buspirone 10 mg 3 times a day  Sodium chloride 1 g daily  Macrobid 50 mg daily for prophylactic antibiotic recurrent UTI  Melatonin 3 mg at bedtime  Abilify 2 mg daily  Sertraline 75 mg daily  Diazepam 5 mg at bedtime  Colace 100 mg twice daily  Aspirin 81 mg daily  Gabapentin 100 mg at bedtime  Systane 1 drop both eyes 2 times a day  Multivitamin daily  MiraLAX 17 g daily  Tylenol 650 every 8 hours as needed  Tylenol 8-hour arthritis 1300 mg twice daily routine  Norco 5/325 mg 1 tab every 6 hours as needed  Flomax 0.4 mg daily  Metoprolol tartrate 12.5 mg twice daily with parameters  Norvasc 2.5 mg daily with parameters  Lisinopril 40 mg daily with parameters    Living will related issues reviewed-Code status: DNRCCA    OBJECTIVE:  /59   Pulse 62   Temp 36.3 °C (97.4 °F)   Resp 16   Ht 1.6 m (5' 3\")   Wt 62.8 kg (138 lb 6.4 oz)   SpO2 97%   BMI 24.52 kg/m²     Physical Exam  HENT:      Head: Normocephalic. [place]     Mouth/Throat:      Mouth: Mucous membranes are moist.   Cardiovascular:      Rate and Rhythm: Normal rate and regular rhythm.   Pulmonary:      Effort: Pulmonary effort is normal.      Breath sounds: Normal breath sounds.   Abdominal:      General: Abdomen is flat. Bowel sounds are normal.      Palpations: Abdomen is soft.   Genitourinary:     Comments: Genitalia not examined, Barker cath to CD  Musculoskeletal:      Cervical " back: Neck supple.      Comments: Generalized weakness +1 pitting edema, JAN hose on  Skin:     General: Skin is warm and dry.      Capillary Refill: Capillary refill takes less than 2 seconds. Healed left hip incision  Neurological:      General: No focal deficit present.      Mental Status: She is alert and oriented to person, place, and time. Forgetful and due to [place] difficult to maintain conversation.  Psychiatric:         Mood and Affect: Mood normal.     Assessment/Plan  Problem List Items Addressed This Visit       Anxiety    Depression    Fibromyalgia    HTN (hypertension), benign - Primary    [place] (hard of hearing)    Hyponatremia    Hypothyroidism    Osteoarthritis    Anemia due to stage 3a chronic kidney disease (CMS/HCC)    Urinary retention    History of recurrent UTIs    Barker catheter in place       Skin integrity:  Nursing to monitor skin integrity as patient is at risk for pressure injuries.  Wound care per nursing    See Facility notes for measurements/assessments  Turn and reposition Q 2 hours or more  Air mattress and when up in chair cushion reducing device  Dietician to evaluate and recommend:  Nutritional supplement:  Please monitor skin integrity and other pressure areas  Referral to wound clinic if appropriate:    PLAN:  Pt has been seen for regular monthly visit.    The patient is confined to a long term care facility.   Recent nursing evaluation and notes were reviewed.   Overall, patient is stable despite his/her chronic conditions.    #Fall, left femur fx in 11/2023, s/p repair: PT/OT continues for function, strength, and endurance. F/up Ortho 4/11/24  #Right upper leg pain: cont routine Tylenol and PRN Voltaren. If severe may need to order s-ray to follow up, could have Norco for severe pain as needed.   #Postop anemia: 12/4/23 - Hgb 7.6, will need to have f/up labs  #Urinary retention: f/up Dr Padilla 3/19/24, 16 french Barker to CD, cont Flomax   #Recurrent UTI: prophylaxis Macrobid to  cont  #HTN/CKD: BP readings monitored and today 120/50's. Cont to follow BP readings, cont M. Tartrate, Norvasc, Lisinopril, cont to follow labs, on 12/4/23 - BUN 45, Creat 1.18 and GFR 45, cont to avoid nephrotoxic drugs, adjust meds as needed  #h/o CHF: Per patient and son history of heart failure however no cardiology notes or reports, weight is stable patient is not having any fluid overload. Weight is 138.4# as of 1/17/24 - down from 12/20/23 at 143.5#.  #Hyponatremia: on NaCl tabs, Na level is 136.  #Chronic pain/fibra myalgia: cont gabapentin and routine Tylenol (right leg pain since PT - see notes above)  #Hypothyroidism: cont current dose of Levothyroxine and f/up TSH to be done  #Depression/Anxiety and insomnia: cont Sertraline and Abilify. Diazepam at HS routine. Melatonin at bedtime routine. Followed by [facility]. Mood is stable. Sleeping well. Cont to monitor for side effects from antidepressants.   Any decline or change in condition needs to be communicated with the physician or myself.    Discussion with nursing staff regarding ongoing care and management.  If needed, would communicate with family who are not present at this time.   We will continue with the medications noted above.    We will continue to follow the patient here at the facility.  If there are any questions regarding the patients plan of care do not hesitate to call MD or CNP.  Thank you.     Discharge planning: no plan for discharge, LTC resident, rehab potential    *Please note that nursing facility, outside laboratory agency, and [facility] do not interface.     Completion of the note was done through Dragon voice recognition technology and may include   unintended or grammatical errors which may not have been recognized when finalizing the note.     Time:    GLORIA Valverde      Electronically Signed By: GLORIA Valverde   1/27/24  2:36 PM

## 2024-01-24 VITALS
RESPIRATION RATE: 16 BRPM | HEIGHT: 63 IN | OXYGEN SATURATION: 97 % | HEART RATE: 62 BPM | TEMPERATURE: 97.4 F | WEIGHT: 138.4 LBS | DIASTOLIC BLOOD PRESSURE: 59 MMHG | SYSTOLIC BLOOD PRESSURE: 126 MMHG | BODY MASS INDEX: 24.52 KG/M2

## 2024-01-24 NOTE — PROGRESS NOTES
Name: Jacqueline Sandoval  YOB: 1935    MONTHLY VISIT: LTC, multiple problems    SUBJECTIVE:  Mrs. Sandoval is a 88 yr old female who is here at [facility] as a LTC resident. She transitioned here after a hospitalization in Oct of 2022. PMH of fx left femur and repair on 11/24/23 (post op anemia due to blood loss and received one unit of pRBC's, post op CAP treated w/Levaquin - recovered, and urinary retention requiring Barker cath placement with failed attempts to pass voiding trial and started on Flomax - followed by urology Dr. Padilla), left humerus and repair 9/2022, recurrent UTI's, HTN, per patient and son CHF, fibromyalgia, Hyponatremia, anxiety/depression, and hypothyroidism. Patient is [place]. Patient is never a smoker. She is allergic to PCN and sulfa.   Nursing reports some right upper leg pain and PT/TO believe patient is favoring right side due to recent left hip fx causing some discomfort.   Jacqueline is up in her room and appears comfortable and in no acute distress. She c/o aches to the right upper leg and reports Tylenol helps. She denies CP or SOB.     REVIEW OF SYSTEMS:  All review of systems are negative unless otherwise stated above under subjective.    LABS REVIEWED AT FACILITY:  TSH  REPORTED 12/21/2023 10:38  TSH H 4.370 mIU/L 0.270-4.200    CBC  REPORTED 12/11/2023 14:14  White Blood Cell Count   8.67 k/uL 3.70-11.00    RBC L 2.43 m/uL 3.90-5.20    Hemoglobin L 7.8 g/dL 11.5-15.5    Hematocrit L 24.7 % 36.0-46.0    MCV H 101.6 fL 80.0-100.0    MCH   32.1 pg 26.0-34.0    MCHC   31.6 g/dL 30.5-36.0    RDW-CV   14.2 % 11.5-15.0    Platelet Count   277 k/uL 150-400    MPV   10.0 fL 9.0-12.7    Absolute nRBC   <0.01 k/uL <0.01           Comp Metabolic Panel  REPORTED 12/11/2023 14:58  Protein, Total L 5.8 g/dL 6.3-8.0    Albumin L 3.2 g/dL 3.9-4.9    Calcium, Total   8.8 mg/dL 8.5-10.2    Bilirubin, Total   0.3 mg/dL 0.2-1.3    Alkaline Phosphatase H 188 U/L     AST   20 U/L 13-35    ALT    8 U/L 7-38    Glucose   83 mg/dL 74-99  BUN H 30 mg/dL 7-21    Creatinine H 1.11 mg/dL 0.58-0.96    Sodium   137 mmol/L 136-144    Potassium   4.4 mmol/L 3.7-5.1    Chloride   105 mmol/L     CO2 L 21 mmol/L 22-30    Anion Gap   11 mmol/L 9-18    Estimated Glomerular Filtration Rate L 48 mL/min/1.73 meters squared >=60      CBC  REPORTED 12/08/2023 19:03  White Blood Cell Count H 11.81 k/uL 3.70-11.00    RBC L 2.65 m/uL 3.90-5.20    Hemoglobin L 8.5 g/dL 11.5-15.5    Hematocrit L 26.7 % 36.0-46.0    MCV H 100.8 fL 80.0-100.0    MCH   32.1 pg 26.0-34.0    MCHC   31.8 g/dL 30.5-36.0    RDW-CV   13.9 % 11.5-15.0    Platelet Count   331 k/uL 150-400    MPV   9.7 fL 9.0-12.7    Absolute nRBC   <0.01 k/uL <0.01    CBC and Differential  REPORTED 12/04/2023 11:12  White Blood Cell Count   7.96 k/uL 3.70-11.00    RBC L 2.40 m/uL 3.90-5.20    Hemoglobin L 7.6 g/dL 11.5-15.5    Hematocrit L 23.8 % 36.0-46.0    MCV   99.2 fL 80.0-100.0    MCH   31.7 pg 26.0-34.0    MCHC   31.9 g/dL 30.5-36.0    RDW-CV   13.2 % 11.5-15.0    Platelet Count   260 k/uL 150-400    MPV   9.8 fL 9.0-12.7    Neut%   70.4 %    Abs Neut   5.60 k/uL 1.45-7.50    Lymph%   13.9 %    Abs Lymph   1.11 k/uL 1.00-4.00    Mono%   11.3 %    Abs Mono H 0.90 k/uL <0.87    Eosin%   3.0 %    Abs Eosin   0.24 k/uL <0.46    Baso%   0.5 %    Abs Baso   0.04 k/uL <0.11    Immature Gran %%   0.9 %    Abs Immature Gran   0.07 k/uL <0.10    NRBC   0.0 /100 WBC    Absolute nRBC   <0.01 k/uL <0.01    Diff Type   Auto               Comp Metabolic Panel  REPORTED 12/04/2023 11:24  Protein, Total L 6.0 g/dL 6.3-8.0    Albumin L 2.9 g/dL 3.9-4.9    Calcium, Total   8.8 mg/dL 8.5-10.2    Bilirubin, Total   0.6 mg/dL 0.2-1.3    Alkaline Phosphatase   92 U/L     AST   17 U/L 13-35    ALT L <5 U/L 7-38    Glucose   78 mg/dL 74-99  BUN H 45 mg/dL 7-21    Creatinine H 1.18 mg/dL 0.58-0.96    Sodium   136 mmol/L 136-144    Potassium   4.6 mmol/L 3.7-5.1    Chloride   " 101 mmol/L     CO2 L 21 mmol/L 22-30    Anion Gap   14 mmol/L 9-18    Estimated Glomerular Filtration Rate L 45 mL/min/1.73 meters squared >=60      MEDICATIONS REVIEWED AT FACILITY:  Routine periodic labs scheduled    Bowel protocol - MOM PRN, Docusate RS PRN, and Fleets Enema PRN  Meclizine 12.5 mg twice daily as needed  Voltaren gel 1% twice daily as needed  Nasal spray 2 drops each nostril every 8 hours as needed  Levothyroxine 175 mcg Monday through Friday and 200 mcg on Saturday and Sunday  Buspirone 10 mg 3 times a day  Sodium chloride 1 g daily  Macrobid 50 mg daily for prophylactic antibiotic recurrent UTI  Melatonin 3 mg at bedtime  Abilify 2 mg daily  Sertraline 75 mg daily  Diazepam 5 mg at bedtime  Colace 100 mg twice daily  Aspirin 81 mg daily  Gabapentin 100 mg at bedtime  Systane 1 drop both eyes 2 times a day  Multivitamin daily  MiraLAX 17 g daily  Tylenol 650 every 8 hours as needed  Tylenol 8-hour arthritis 1300 mg twice daily routine  Norco 5/325 mg 1 tab every 6 hours as needed  Flomax 0.4 mg daily  Metoprolol tartrate 12.5 mg twice daily with parameters  Norvasc 2.5 mg daily with parameters  Lisinopril 40 mg daily with parameters    Living will related issues reviewed-Code status: DNRCCA    OBJECTIVE:  /59   Pulse 62   Temp 36.3 °C (97.4 °F)   Resp 16   Ht 1.6 m (5' 3\")   Wt 62.8 kg (138 lb 6.4 oz)   SpO2 97%   BMI 24.52 kg/m²     Physical Exam  HENT:      Head: Normocephalic. [place]     Mouth/Throat:      Mouth: Mucous membranes are moist.   Cardiovascular:      Rate and Rhythm: Normal rate and regular rhythm.   Pulmonary:      Effort: Pulmonary effort is normal.      Breath sounds: Normal breath sounds.   Abdominal:      General: Abdomen is flat. Bowel sounds are normal.      Palpations: Abdomen is soft.   Genitourinary:     Comments: Genitalia not examined, Barker cath to CD  Musculoskeletal:      Cervical back: Neck supple.      Comments: Generalized weakness +1 pitting " edema, JAN hose on  Skin:     General: Skin is warm and dry.      Capillary Refill: Capillary refill takes less than 2 seconds. Healed left hip incision  Neurological:      General: No focal deficit present.      Mental Status: She is alert and oriented to person, place, and time. Forgetful and due to [place] difficult to maintain conversation.  Psychiatric:         Mood and Affect: Mood normal.     Assessment/Plan   Problem List Items Addressed This Visit       Anxiety    Depression    Fibromyalgia    HTN (hypertension), benign - Primary    [place] (hard of hearing)    Hyponatremia    Hypothyroidism    Osteoarthritis    Anemia due to stage 3a chronic kidney disease (CMS/HCC)    Urinary retention    History of recurrent UTIs    Barker catheter in place       Skin integrity:  Nursing to monitor skin integrity as patient is at risk for pressure injuries.  Wound care per nursing    See Facility notes for measurements/assessments  Turn and reposition Q 2 hours or more  Air mattress and when up in chair cushion reducing device  Dietician to evaluate and recommend:  Nutritional supplement:  Please monitor skin integrity and other pressure areas  Referral to wound clinic if appropriate:    PLAN:  Pt has been seen for regular monthly visit.    The patient is confined to a long term care facility.   Recent nursing evaluation and notes were reviewed.   Overall, patient is stable despite his/her chronic conditions.    #Fall, left femur fx in 11/2023, s/p repair: PT/OT continues for function, strength, and endurance. F/up Ortho 4/11/24  #Right upper leg pain: cont routine Tylenol and PRN Voltaren. If severe may need to order s-ray to follow up, could have Norco for severe pain as needed.   #Postop anemia: 12/4/23 - Hgb 7.6, will need to have f/up labs  #Urinary retention: f/up Dr Padilla 3/19/24, 16 french Barker to CD, cont Flomax   #Recurrent UTI: prophylaxis Macrobid to cont  #HTN/CKD: BP readings monitored and today 120/50's. Cont to  follow BP readings, cont M. Tartrate, Norvasc, Lisinopril, cont to follow labs, on 12/4/23 - BUN 45, Creat 1.18 and GFR 45, cont to avoid nephrotoxic drugs, adjust meds as needed  #h/o CHF: Per patient and son history of heart failure however no cardiology notes or reports, weight is stable patient is not having any fluid overload. Weight is 138.4# as of 1/17/24 - down from 12/20/23 at 143.5#.  #Hyponatremia: on NaCl tabs, Na level is 136.  #Chronic pain/fibra myalgia: cont gabapentin and routine Tylenol (right leg pain since PT - see notes above)  #Hypothyroidism: cont current dose of Levothyroxine and f/up TSH to be done  #Depression/Anxiety and insomnia: cont Sertraline and Abilify. Diazepam at HS routine. Melatonin at bedtime routine. Followed by [facility]. Mood is stable. Sleeping well. Cont to monitor for side effects from antidepressants.   Any decline or change in condition needs to be communicated with the physician or myself.    Discussion with nursing staff regarding ongoing care and management.  If needed, would communicate with family who are not present at this time.   We will continue with the medications noted above.    We will continue to follow the patient here at the facility.  If there are any questions regarding the patients plan of care do not hesitate to call MD or CNP.  Thank you.     Discharge planning: no plan for discharge, LTC resident, rehab potential    *Please note that nursing facility, outside laboratory agency, and  [facility] do not interface.     Completion of the note was done through Dragon voice recognition technology and may include   unintended or grammatical errors which may not have been recognized when finalizing the note.     Time:    JOCELIN Valverde-CNP

## 2024-01-25 ENCOUNTER — NURSING HOME VISIT (OUTPATIENT)
Dept: POST ACUTE CARE | Facility: EXTERNAL LOCATION | Age: 89
End: 2024-01-25
Payer: COMMERCIAL

## 2024-01-25 DIAGNOSIS — M79.651 RIGHT THIGH PAIN: Primary | ICD-10-CM

## 2024-01-25 DIAGNOSIS — I10 HTN (HYPERTENSION), BENIGN: ICD-10-CM

## 2024-01-25 DIAGNOSIS — M79.604 ACUTE LEG PAIN, RIGHT: ICD-10-CM

## 2024-01-25 PROCEDURE — 99309 SBSQ NF CARE MODERATE MDM 30: CPT | Performed by: NURSE PRACTITIONER

## 2024-01-25 NOTE — LETTER
"Patient: Jacqueline Sandoval  : 1935    Encounter Date: 2024    Name: Jacqueline Sandoval  YOB: 1935    ACUTE VISIT: LTC, Right leg pain    SUBJECTIVE:  The patient has been complaining to nursing and therapy that her right leg is achy and weak. Mrs. Sandoval is sitting up in her room in her WC. She appears comfortable and in no acute distress. She states that her right leg is giving her a lot of pain especially in therapy. She demonstrates lifting up in WC and states with the pain in thigh area, the leg is weaker. There is no swelling or discoloration. She denies CP or SOB. Left hip is pain free.     REVIEW OF SYSTEMS:   All review of systems are negative unless otherwise stated above under subjective.    LABS REVIEWED AT FACILITY:  Routine periodic labs ordered    MEDICATIONS REVIEWED AT FACILITY:  Muscle rub ordered PRN    Living will related issues reviewed-Code status: DNRCCA    OBJECTIVE:  /50   Pulse 58   Temp 36.4 °C (97.6 °F)   Resp 16   Ht 1.6 m (5' 3\")   Wt 61.7 kg (136 lb)   SpO2 97%   BMI 24.09 kg/m²     Physical Exam  HENT:      Head: Normocephalic. [place]     Mouth/Throat:      Mouth: Mucous membranes are moist.   Cardiovascular:      Rate and Rhythm: Normal rate and regular rhythm.   Pulmonary:      Effort: Pulmonary effort is normal.      Breath sounds: Normal breath sounds.   Abdominal:      General: Abdomen is flat. Bowel sounds are normal.      Palpations: Abdomen is soft.   Genitourinary:     Comments: Genitalia not examined, Barker cath to CD  Musculoskeletal:      Cervical back: Neck supple.      Comments: Generalized weakness, tenderness to right thigh, trace edema, JAN hose on  Skin:     General: Skin is warm and dry.      Capillary Refill: Capillary refill takes less than 2 seconds. Healed left hip incision  Neurological:      General: No focal deficit present.      Mental Status: She is alert and oriented to person, place, and time. Forgetful and due to [place] " difficult to maintain conversation.  Psychiatric:         Mood and Affect: Mood normal.     Assessment/Plan  Problem List Items Addressed This Visit       HTN (hypertension), benign    Acute leg pain, right    Right thigh pain - Primary       Skin integrity:  Nursing to monitor skin integrity as patient is at risk for pressure injuries.    PLAN:  Pt has been seen for an Acute visit.  The patient resides in a Long term care facility.  Recent nursing evaluation and notes were reviewed.   Overall, the patient is experiencing the following:  #Right upper leg thigh pain: xray right hip, pelvis and femur, Muscle rub cream ordered PRN - patient reports feels better when thigh is massaged. Could be compensating with right leg since left hip fx and repair causing tenderness.  #HTN: BP readings reviewed and stable 140/50's. Labs pending 2/2/24  Any decline or change in condition needs to be communicated with the physician or myself.    Discussion with nursing staff regarding ongoing care and management.  If needed, would communicate with family who are not present at this time.   There are no concerns at this time.  We will continue with the medications noted above.    We will continue to follow the patient here at the facility.    Discharge planning: no plan for discharge, LTC resident, rehab potential    *Please note that nursing facility, outside laboratory agency, and [facility] do not interface.     Completion of the note was done through Dragon voice recognition technology and may include   unintended or grammatical errors which may not have been recognized when finalizing the note.     Time:    GLORIA Valverde      Electronically Signed By: GLORIA Valverde   2/1/24 12:45 PM

## 2024-01-27 PROBLEM — M79.604 ACUTE LEG PAIN, RIGHT: Status: ACTIVE | Noted: 2024-01-27

## 2024-01-27 PROBLEM — Z97.8 FOLEY CATHETER IN PLACE: Status: ACTIVE | Noted: 2024-01-27

## 2024-01-27 PROBLEM — S72.002A LEFT DISPLACED FEMORAL NECK FRACTURE (MULTI): Status: RESOLVED | Noted: 2023-11-23 | Resolved: 2024-01-27

## 2024-01-27 PROBLEM — R39.9 URINARY SYMPTOM OR SIGN: Status: RESOLVED | Noted: 2023-06-20 | Resolved: 2024-01-27

## 2024-01-27 PROBLEM — N30.00 ACUTE CYSTITIS WITHOUT HEMATURIA: Status: RESOLVED | Noted: 2023-05-31 | Resolved: 2024-01-27

## 2024-01-27 PROBLEM — D64.9 POSTOPERATIVE ANEMIA: Status: RESOLVED | Noted: 2023-12-08 | Resolved: 2024-01-27

## 2024-01-27 PROBLEM — Z87.440 HISTORY OF RECURRENT UTIS: Status: ACTIVE | Noted: 2024-01-27

## 2024-01-27 PROBLEM — D63.1 ANEMIA DUE TO STAGE 3A CHRONIC KIDNEY DISEASE (MULTI): Status: ACTIVE | Noted: 2023-10-16

## 2024-01-27 PROBLEM — N39.0 FREQUENT UTI: Status: RESOLVED | Noted: 2023-08-22 | Resolved: 2024-01-27

## 2024-01-27 NOTE — PROGRESS NOTES
"Name: Jacqueline Sandoval  YOB: 1935    ACUTE VISIT: LTC, Right leg pain    SUBJECTIVE:  The patient has been complaining to nursing and therapy that her right leg is achy and weak. Mrs. Sandoval is sitting up in her room in her WC. She appears comfortable and in no acute distress. She states that her right leg is giving her a lot of pain especially in therapy. She demonstrates lifting up in WC and states with the pain in thigh area, the leg is weaker. There is no swelling or discoloration. She denies CP or SOB. Left hip is pain free.     REVIEW OF SYSTEMS:   All review of systems are negative unless otherwise stated above under subjective.    LABS REVIEWED AT FACILITY:  Routine periodic labs ordered    MEDICATIONS REVIEWED AT FACILITY:  Muscle rub ordered PRN    Living will related issues reviewed-Code status: DNRCCA    OBJECTIVE:  /50   Pulse 58   Temp 36.4 °C (97.6 °F)   Resp 16   Ht 1.6 m (5' 3\")   Wt 61.7 kg (136 lb)   SpO2 97%   BMI 24.09 kg/m²     Physical Exam  HENT:      Head: Normocephalic. [place]     Mouth/Throat:      Mouth: Mucous membranes are moist.   Cardiovascular:      Rate and Rhythm: Normal rate and regular rhythm.   Pulmonary:      Effort: Pulmonary effort is normal.      Breath sounds: Normal breath sounds.   Abdominal:      General: Abdomen is flat. Bowel sounds are normal.      Palpations: Abdomen is soft.   Genitourinary:     Comments: Genitalia not examined, Barker cath to CD  Musculoskeletal:      Cervical back: Neck supple.      Comments: Generalized weakness, tenderness to right thigh, trace edema, JAN hose on  Skin:     General: Skin is warm and dry.      Capillary Refill: Capillary refill takes less than 2 seconds. Healed left hip incision  Neurological:      General: No focal deficit present.      Mental Status: She is alert and oriented to person, place, and time. Forgetful and due to [place] difficult to maintain conversation.  Psychiatric:         Mood and Affect: Mood " normal.     Assessment/Plan   Problem List Items Addressed This Visit       HTN (hypertension), benign    Acute leg pain, right    Right thigh pain - Primary       Skin integrity:  Nursing to monitor skin integrity as patient is at risk for pressure injuries.    PLAN:  Pt has been seen for an Acute visit.  The patient resides in a Long term care facility.  Recent nursing evaluation and notes were reviewed.   Overall, the patient is experiencing the following:  #Right upper leg thigh pain: xray right hip, pelvis and femur, Muscle rub cream ordered PRN - patient reports feels better when thigh is massaged. Could be compensating with right leg since left hip fx and repair causing tenderness.  #HTN: BP readings reviewed and stable 140/50's. Labs pending 2/2/24  Any decline or change in condition needs to be communicated with the physician or myself.    Discussion with nursing staff regarding ongoing care and management.  If needed, would communicate with family who are not present at this time.   There are no concerns at this time.  We will continue with the medications noted above.    We will continue to follow the patient here at the facility.    Discharge planning: no plan for discharge, LTC resident, rehab potential    *Please note that nursing facility, outside laboratory agency, and  [facility] do not interface.     Completion of the note was done through Dragon voice recognition technology and may include   unintended or grammatical errors which may not have been recognized when finalizing the note.     Time:    JOCELIN Valverde-CNP

## 2024-02-01 VITALS
BODY MASS INDEX: 24.1 KG/M2 | OXYGEN SATURATION: 97 % | HEART RATE: 58 BPM | TEMPERATURE: 97.6 F | WEIGHT: 136 LBS | RESPIRATION RATE: 16 BRPM | DIASTOLIC BLOOD PRESSURE: 50 MMHG | HEIGHT: 63 IN | SYSTOLIC BLOOD PRESSURE: 147 MMHG

## 2024-02-01 PROBLEM — M79.651 RIGHT THIGH PAIN: Status: ACTIVE | Noted: 2024-02-01

## 2024-04-11 ENCOUNTER — OFFICE VISIT (OUTPATIENT)
Dept: ORTHOPEDIC SURGERY | Facility: CLINIC | Age: 89
End: 2024-04-11
Payer: COMMERCIAL

## 2024-04-11 ENCOUNTER — HOSPITAL ENCOUNTER (OUTPATIENT)
Dept: RADIOLOGY | Facility: CLINIC | Age: 89
Discharge: HOME | End: 2024-04-11
Payer: COMMERCIAL

## 2024-04-11 DIAGNOSIS — Z96.649 S/P HIP HEMIARTHROPLASTY: Primary | ICD-10-CM

## 2024-04-11 DIAGNOSIS — Z96.649 S/P HIP HEMIARTHROPLASTY: ICD-10-CM

## 2024-04-11 PROCEDURE — 99213 OFFICE O/P EST LOW 20 MIN: CPT | Performed by: ORTHOPAEDIC SURGERY

## 2024-04-11 PROCEDURE — 73502 X-RAY EXAM HIP UNI 2-3 VIEWS: CPT | Mod: LEFT SIDE | Performed by: ORTHOPAEDIC SURGERY

## 2024-04-11 PROCEDURE — 73502 X-RAY EXAM HIP UNI 2-3 VIEWS: CPT | Mod: LT

## 2024-04-11 NOTE — PROGRESS NOTES
Chief Complaint   Patient presents with    Left Hip - Follow-up     Post left hip hemiarthroplasty on 11-24-23  Xrays today        The patient is here for follow-up of their side: left hip Bhavik arthroplasty.  The patient has no hip pain.  The patient has no mechanical symptoms.  The patient is approximately 4 months postop.    Physical examination:    Examination of the side: left hip  Range of motion: Forward Flexion: 120 Internal Rotation: 30 External Rotation: 30 Abduction 30  Calf is soft, Homans negative  The patient has intact ankle dorsiflexion and plantarflexion.    Radiographs:  XR hip left with pelvis when performed 2 or 3 views  Interpreted By:  Margarito Koch,   STUDY:  XR HIP LEFT WITH PELVIS WHEN PERFORMED 2 OR 3 VIEWS; ; 4/11/2024 1:34  pm      INDICATION:  Signs/Symptoms:pain.      ACCESSION NUMBER(S):  [id number]      ORDERING CLINICIAN:  MARGARITO KOCH      FINDINGS:  Left hip films show a hip hemiarthroplasty in satisfactory position.  The hip is reduced. No fractures identified. No obvious loosening or  wear is appreciated. There is a right ischial and pubic rami fracture  that appears old and is united.          Signed by: Margarito Koch 4/11/2024 1:37 PM  Dictation workstation:   [id number]        Impression:  Status post side: left hip Bhavik arthroplasty    Plan:  PT for ambulation and walking daily instead of weekly  Activities as tolerated  Follow up as needed  All questions answered

## 2024-04-18 ENCOUNTER — NURSING HOME VISIT (OUTPATIENT)
Dept: POST ACUTE CARE | Facility: EXTERNAL LOCATION | Age: 89
End: 2024-04-18
Payer: COMMERCIAL

## 2024-04-18 VITALS
TEMPERATURE: 97.4 F | DIASTOLIC BLOOD PRESSURE: 60 MMHG | SYSTOLIC BLOOD PRESSURE: 160 MMHG | OXYGEN SATURATION: 96 % | HEART RATE: 61 BPM | RESPIRATION RATE: 16 BRPM | WEIGHT: 139.2 LBS | BODY MASS INDEX: 24.66 KG/M2 | HEIGHT: 63 IN

## 2024-04-18 DIAGNOSIS — I10 HTN (HYPERTENSION), BENIGN: Primary | ICD-10-CM

## 2024-04-18 DIAGNOSIS — E03.9 HYPOTHYROIDISM, UNSPECIFIED TYPE: ICD-10-CM

## 2024-04-18 PROCEDURE — 99309 SBSQ NF CARE MODERATE MDM 30: CPT | Performed by: NURSE PRACTITIONER

## 2024-04-18 NOTE — PROGRESS NOTES
Name: Jacqueline Sandoval  YOB: 1935    ACUTE VISIT: LTC, Elevated BP readings    SUBJECTIVE:  The patient is resting in bed with head of bed up. She is awake and appears to be comfortable and in no acute distress. Patient is concerned w/BP readings. Nursing reports that BP is 170 - 160/60's. Patient denies HA, CP or SOB. Pt is [place] and this does make conversation difficult.     REVIEW OF SYSTEMS:   All review of systems are negative unless otherwise stated above under subjective.    LABS REVIEWED AT FACILITY:  Free T4  REPORTED 04/15/2024 14:18  Free T4   1.5 ng/dL 0.9-1.7 PLDEF         TSH  REPORTED 04/15/2024 14:18  TSH L 0.144 mIU/L 0.270-4.200 PLDEF    CBC and Differential  REPORTED 04/04/2024 08:33  White Blood Cell Count   5.27 k/uL 3.70-11.00    RBC L 3.14 m/uL 3.90-5.20    Hemoglobin L 9.9 g/dL 11.5-15.5    Hematocrit L 29.2 % 36.0-46.0    MCV   93.0 fL 80.0-100.0    MCH   31.5 pg 26.0-34.0    MCHC   33.9 g/dL 30.5-36.0    RDW-CV   13.4 % 11.5-15.0    Platelet Count   175 k/uL 150-400    MPV   10.7 fL 9.0-12.7    Neut%   40.3 %    Abs Neut   2.12 k/uL 1.45-7.50    Lymph%   34.3 %    Abs Lymph   1.81 k/uL 1.00-4.00    Mono%   16.3 %    Abs Mono   0.86 k/uL <0.87    Eosin%   8.3 %    Abs Eosin   0.44 k/uL <0.46    Baso%   0.6 %    Abs Baso   0.03 k/uL <0.11    Immature Gran %%   0.2 %    Abs Immature Gran   <0.03 k/uL <0.10    NRBC   0.0 /100 WBC    Absolute nRBC   <0.01 k/uL <0.01    Diff Type   Auto               Comp Metabolic Panel  REPORTED 04/04/2024 09:02  Protein, Total L 6.0 g/dL 6.3-8.0    Albumin L 3.8 g/dL 3.9-4.9    Calcium, Total   9.1 mg/dL 8.5-10.2    Bilirubin, Total   0.2 mg/dL 0.2-1.3    Alkaline Phosphatase   75 U/L     AST L 11 U/L 13-35    ALT   8 U/L 7-38  Glucose   86 mg/dL 74-99  BUN   21 mg/dL 7-21    Creatinine H 1.12 mg/dL 0.58-0.96    Sodium   138 mmol/L 136-144    Potassium   4.2 mmol/L 3.7-5.1    Chloride   104 mmol/L     CO2 L 20 mmol/L 22-30    Anion Gap   " 14 mmol/L 9-18    Estimated Glomerular Filtration Rate L 47 mL/min/1.73 meters squared >=60      Allergies   Allergen Reactions    Penicillins Unknown    Sulfa (Sulfonamide Antibiotics) Unknown and Rash       Medication list reviewed here at the facility, please see facility list for complete list of medications.     Living will related issues reviewed-Code status: DNRCCA    OBJECTIVE:  /60   Pulse 61   Temp 36.3 °C (97.4 °F)   Resp 16   Ht 1.6 m (5' 3\")   Wt 63.1 kg (139 lb 3.2 oz)   SpO2 96% Comment: room air  BMI 24.66 kg/m²   Physical Exam  HENT:      Head: Normocephalic. [place]     Mouth/Throat:      Mouth: Mucous membranes are moist.   Cardiovascular:      Rate and Rhythm: Normal rate and regular rhythm.   Pulmonary:      Effort: Pulmonary effort is normal.      Breath sounds: Normal breath sounds.   Abdominal:      General: Abdomen is flat. Bowel sounds are normal.      Palpations: Abdomen is soft.   Genitourinary:     Comments: Genitalia not examined, Barker cath to CD  Musculoskeletal:      Cervical back: Neck supple.      Comments: Generalized weakness, tenderness to right thigh, trace edema, JAN hose on  Skin:     General: Skin is warm and dry.      Capillary Refill: Capillary refill takes less than 2 seconds. Healed left hip incision  Neurological:      General: No focal deficit present.      Mental Status: She is alert and oriented to person, place, and time. Forgetful and due to [place] difficult to maintain conversation.  Psychiatric:         Mood and Affect: Mood normal.     Assessment/Plan   Problem List Items Addressed This Visit       HTN (hypertension), benign - Primary    Hypothyroidism       Skin integrity:  Nursing to monitor skin integrity as patient is at risk for pressure injuries.    PLAN:  Pt has been seen for an Acute visit.  The patient resides in a Long term care facility.  Recent nursing evaluation and notes were reviewed.   #HTN: BP readings reviewed. BP at this time 160/60. " Cont Metoprolol tartrate 12.5 mg twice daily, Lisinopril 40 mg daily and will increase Amlodipine  to 5 mg daily. Recent labs reviewed. Cont to follow labs  #Hypothyroidism: Labs reviewed and decreased Levothyroxine to 100 mcg daily. Labs to be done in a few weeks.   Any decline or change in condition needs to be communicated with the physician or myself.    Discussion with nursing staff regarding ongoing care and management.  If needed, would communicate with family who are not present at this time.   There are no concerns at this time.  We will continue with the medications noted above.    We will continue to follow the patient here at the facility.    Discharge planning: no plan for discharge, LTC resident, rehab potential    *Please note that nursing facility, outside laboratory agency, and  [facility] do not interface.     Completion of the note was done through Dragon voice recognition technology and may include   unintended or grammatical errors which may not have been recognized when finalizing the note.     Time:    JOCELIN Valverde-CNP

## 2024-04-18 NOTE — LETTER
Patient: Jacqueline Sandoval  : 1935    Encounter Date: 2024    Name: Jacqueline Sandoval  YOB: 1935    ACUTE VISIT: LTC, Elevated BP readings    SUBJECTIVE:  The patient is resting in bed with head of bed up. She is awake and appears to be comfortable and in no acute distress. Patient is concerned w/BP readings. Nursing reports that BP is 170 - 160/60's. Patient denies HA, CP or SOB. Pt is [place] and this does make conversation difficult.     REVIEW OF SYSTEMS:   All review of systems are negative unless otherwise stated above under subjective.    LABS REVIEWED AT FACILITY:  Free T4  REPORTED 04/15/2024 14:18  Free T4   1.5 ng/dL 0.9-1.7 PLDEF         TSH  REPORTED 04/15/2024 14:18  TSH L 0.144 mIU/L 0.270-4.200 PLDEF    CBC and Differential  REPORTED 2024 08:33  White Blood Cell Count   5.27 k/uL 3.70-11.00    RBC L 3.14 m/uL 3.90-5.20    Hemoglobin L 9.9 g/dL 11.5-15.5    Hematocrit L 29.2 % 36.0-46.0    MCV   93.0 fL 80.0-100.0    MCH   31.5 pg 26.0-34.0    MCHC   33.9 g/dL 30.5-36.0    RDW-CV   13.4 % 11.5-15.0    Platelet Count   175 k/uL 150-400    MPV   10.7 fL 9.0-12.7    Neut%   40.3 %    Abs Neut   2.12 k/uL 1.45-7.50    Lymph%   34.3 %    Abs Lymph   1.81 k/uL 1.00-4.00    Mono%   16.3 %    Abs Mono   0.86 k/uL <0.87    Eosin%   8.3 %    Abs Eosin   0.44 k/uL <0.46    Baso%   0.6 %    Abs Baso   0.03 k/uL <0.11    Immature Gran %%   0.2 %    Abs Immature Gran   <0.03 k/uL <0.10    NRBC   0.0 /100 WBC    Absolute nRBC   <0.01 k/uL <0.01    Diff Type   Auto               Comp Metabolic Panel  REPORTED 2024 09:02  Protein, Total L 6.0 g/dL 6.3-8.0    Albumin L 3.8 g/dL 3.9-4.9    Calcium, Total   9.1 mg/dL 8.5-10.2    Bilirubin, Total   0.2 mg/dL 0.2-1.3    Alkaline Phosphatase   75 U/L     AST L 11 U/L 13-35    ALT   8 U/L 7-38  Glucose   86 mg/dL 74-99  BUN   21 mg/dL 7-21    Creatinine H 1.12 mg/dL 0.58-0.96    Sodium   138 mmol/L 136-144    Potassium   4.2 mmol/L 3.7-5.1   "  Chloride   104 mmol/L     CO2 L 20 mmol/L 22-30    Anion Gap   14 mmol/L 9-18    Estimated Glomerular Filtration Rate L 47 mL/min/1.73 meters squared >=60      Allergies   Allergen Reactions   • Penicillins Unknown   • Sulfa (Sulfonamide Antibiotics) Unknown and Rash       Medication list reviewed here at the facility, please see facility list for complete list of medications.     Living will related issues reviewed-Code status: DNRCCA    OBJECTIVE:  /60   Pulse 61   Temp 36.3 °C (97.4 °F)   Resp 16   Ht 1.6 m (5' 3\")   Wt 63.1 kg (139 lb 3.2 oz)   SpO2 96% Comment: room air  BMI 24.66 kg/m²   Physical Exam  HENT:      Head: Normocephalic. [place]     Mouth/Throat:      Mouth: Mucous membranes are moist.   Cardiovascular:      Rate and Rhythm: Normal rate and regular rhythm.   Pulmonary:      Effort: Pulmonary effort is normal.      Breath sounds: Normal breath sounds.   Abdominal:      General: Abdomen is flat. Bowel sounds are normal.      Palpations: Abdomen is soft.   Genitourinary:     Comments: Genitalia not examined, Barker cath to CD  Musculoskeletal:      Cervical back: Neck supple.      Comments: Generalized weakness, tenderness to right thigh, trace edema, JAN hose on  Skin:     General: Skin is warm and dry.      Capillary Refill: Capillary refill takes less than 2 seconds. Healed left hip incision  Neurological:      General: No focal deficit present.      Mental Status: She is alert and oriented to person, place, and time. Forgetful and due to [place] difficult to maintain conversation.  Psychiatric:         Mood and Affect: Mood normal.     Assessment/Plan  Problem List Items Addressed This Visit       HTN (hypertension), benign - Primary    Hypothyroidism       Skin integrity:  Nursing to monitor skin integrity as patient is at risk for pressure injuries.    PLAN:  Pt has been seen for an Acute visit.  The patient resides in a Long term care facility.  Recent nursing evaluation and notes " were reviewed.   #HTN: BP readings reviewed. BP at this time 160/60. Cont Metoprolol tartrate 12.5 mg twice daily, Lisinopril 40 mg daily and will increase Amlodipine  to 5 mg daily. Recent labs reviewed. Cont to follow labs  #Hypothyroidism: Labs reviewed and decreased Levothyroxine to 100 mcg daily. Labs to be done in a few weeks.   Any decline or change in condition needs to be communicated with the physician or myself.    Discussion with nursing staff regarding ongoing care and management.  If needed, would communicate with family who are not present at this time.   There are no concerns at this time.  We will continue with the medications noted above.    We will continue to follow the patient here at the facility.    Discharge planning: no plan for discharge, LTC resident, rehab potential    *Please note that nursing facility, outside laboratory agency, and [facility] do not interface.     Completion of the note was done through Dragon voice recognition technology and may include   unintended or grammatical errors which may not have been recognized when finalizing the note.     Time:    GLORIA Valverde      Electronically Signed By: GLORIA Valverde   4/22/24  3:18 PM

## 2024-04-29 ENCOUNTER — NURSING HOME VISIT (OUTPATIENT)
Dept: POST ACUTE CARE | Facility: EXTERNAL LOCATION | Age: 89
End: 2024-04-29
Payer: COMMERCIAL

## 2024-04-29 VITALS
SYSTOLIC BLOOD PRESSURE: 120 MMHG | WEIGHT: 136.8 LBS | RESPIRATION RATE: 16 BRPM | HEART RATE: 56 BPM | HEIGHT: 63 IN | DIASTOLIC BLOOD PRESSURE: 69 MMHG | OXYGEN SATURATION: 98 % | BODY MASS INDEX: 24.24 KG/M2 | TEMPERATURE: 97.2 F

## 2024-04-29 DIAGNOSIS — E03.9 HYPOTHYROIDISM, UNSPECIFIED TYPE: ICD-10-CM

## 2024-04-29 DIAGNOSIS — I10 HTN (HYPERTENSION), BENIGN: Primary | ICD-10-CM

## 2024-04-29 DIAGNOSIS — H91.93 BILATERAL HEARING LOSS, UNSPECIFIED HEARING LOSS TYPE: ICD-10-CM

## 2024-04-29 DIAGNOSIS — M79.7 FIBROMYALGIA: ICD-10-CM

## 2024-04-29 DIAGNOSIS — Z87.440 HISTORY OF RECURRENT UTIS: ICD-10-CM

## 2024-04-29 DIAGNOSIS — M19.90 OSTEOARTHRITIS, UNSPECIFIED OSTEOARTHRITIS TYPE, UNSPECIFIED SITE: ICD-10-CM

## 2024-04-29 DIAGNOSIS — R33.9 URINARY RETENTION: ICD-10-CM

## 2024-04-29 DIAGNOSIS — E87.1 HYPONATREMIA: ICD-10-CM

## 2024-04-29 DIAGNOSIS — Z97.8 FOLEY CATHETER IN PLACE: ICD-10-CM

## 2024-04-29 DIAGNOSIS — F41.9 ANXIETY: ICD-10-CM

## 2024-04-29 DIAGNOSIS — F32.A DEPRESSION, UNSPECIFIED DEPRESSION TYPE: ICD-10-CM

## 2024-04-29 PROCEDURE — 99309 SBSQ NF CARE MODERATE MDM 30: CPT | Performed by: NURSE PRACTITIONER

## 2024-04-29 NOTE — PROGRESS NOTES
Name: Jacqueline Sandoval  YOB: 1935    MONTHLY VISIT: LTC, Multiple problems    SUBJECTIVE:  Mrs. Sandoval is a 88 yr old female who is here at [facility] care as a LTC resident. She transitioned here after a hospitalization in Oct of 2022. PMH of fx left femur and repair on 11/24/23 (post op anemia due to blood loss and received one unit of pRBC's, post op CAP treated w/Levaquin - recovered, and urinary retention requiring Barker cath placement with failed attempts to pass voiding trial and started on Flomax - followed by urology Dr. Padilla), left humerus and repair 9/2022, recurrent UTI's, on prophylactic antibiotics, HTN, per patient and son CHF, fibromyalgia, Hyponatremia, anxiety/depression, and hypothyroidism. Patient is [place]. Patient is never a smoker. She is allergic to PCN and sulfa.   Jacqueline is up in her room and appears comfortable and in no acute distress. She c/o aches of a bruised area to right outer ear. Slight ecchymosis noted but no trauma or swelling. She denies HA, CP or SOB. BP meds adjusted and BP is 120 - 140/50 - 60s.     REVIEW OF SYSTEMS:  All review of systems are negative unless otherwise stated above under subjective.    LABS REVIEWED AT FACILITY:  Basic Metabolic Panl  REPORTED 04/25/2024 08:13  Glucose   87 mg/dL 74-99    BUN   19 mg/dL 7-21    Creatinine H 1.17 mg/dL 0.58-0.96    Sodium   138 mmol/L 136-144    Potassium   4.3 mmol/L 3.7-5.1    Chloride   104 mmol/L     CO2   23 mmol/L 22-30    Anion Gap   11 mmol/L 9-18    Calcium, Total   9.2 mg/dL 8.5-10.2    Estimated Glomerular Filtration Rate L 45 mL/min/1.73 meters squared >=60        Allergies   Allergen Reactions    Penicillins Unknown    Sulfa (Sulfonamide Antibiotics) Unknown and Rash       MEDICATIONS REVIEWED AT FACILITY:  Please see Nursing facility Medication EMR for full updated list.    Living will related issues reviewed-Code status: DNRCCA    OBJECTIVE:  /69   Pulse 56   Temp 36.2 °C (97.2 °F)   Resp 16  "  Ht 1.6 m (5' 3\")   Wt 62.1 kg (136 lb 12.8 oz)   SpO2 98% Comment: room air  BMI 24.23 kg/m²   Physical Exam  HENT:      Head: Normocephalic. [place], right outer ear slight ecchymosis     Mouth/Throat:      Mouth: Mucous membranes are moist.   Cardiovascular:      Rate and Rhythm: Normal rate and regular rhythm.   Pulmonary:      Effort: Pulmonary effort is normal.      Breath sounds: Normal breath sounds.   Abdominal:      General: Abdomen is flat. Bowel sounds are normal.      Palpations: Abdomen is soft.   Genitourinary:     Comments: Genitalia not examined, Barker cath to CD  Musculoskeletal:      Cervical back: Neck supple.      Comments: Generalized weakness +1 pitting edema, JAN hose on  Skin:     General: Skin is warm and dry.      Capillary Refill: Capillary refill takes less than 2 seconds. Healed left hip incision  Neurological:      General: No focal deficit present.      Mental Status: She is alert and oriented to person, place, and time. Forgetful and due to [place] difficult to maintain conversation.  Psychiatric:         Mood and Affect: Mood normal.     Assessment/Plan   Problem List Items Addressed This Visit       Anxiety    Depression    Fibromyalgia    HTN (hypertension), benign - Primary    [place] (hard of hearing)    Hyponatremia    Hypothyroidism    Osteoarthritis    Urinary retention    History of recurrent UTIs    Barker catheter in place       Skin integrity:  Nursing to monitor skin integrity as patient is at risk for pressure injuries.    PLAN:  Pt has been seen for monthly visit.    The patient is confined to a long term care facility.   Recent nursing evaluation and notes were reviewed.   #Urinary retention: Follows with Dr. Padilla,16 [place] Barker to CD, cont Flomax   #Recurrent UTI: prophylaxis Macrobid 50 mg daily to cont  #HTN/CKD: BP readings monitored and today 120/50's. Cont to follow BP readings, Amlodipine increased on 4/18 to 5 mg, cont M. Tartrate 12.5 mg bid, Lisinopril 40 mg " daily, cont to follow labs, Labs reviewed in 4/25, cont to avoid nephrotoxic drugs, adjust meds as needed  #h/o CHF: Per patient and son history of heart failure however no cardiology notes or reports, weight is stable patient is not having any fluid overload. Weight is 136.8# as of 4/24/24 - down from 12/20/23 at 143.5#.  #Hyponatremia: on NaCl tabs daily, Na level is 138.  #Chronic pain/fibra myalgia: cont gabapentin, routine Tylenol, Norco PRN, Voltaren gel, and Muscle rub PRN  #Hypothyroidism: Levothyroxine 100 mcg daily and f/up TSH 0.144 and T4 1.5 reviewed from 4/15 and TSH due in June.   #Depression/Anxiety and insomnia: cont Sertraline, Buspirone, and Diazepam at HS routine. Melatonin at bedtime routine. Followed by [facility]. Mood is stable. Sleeping well. Cont to monitor for side effects from antidepressants.   Any decline or change in condition needs to be communicated with the physician or myself.    Discussion with nursing staff regarding ongoing care and management.  Communication regarding patients status, overall condition, changes with plan (medications or treatments), and any questions from family completed if present.  If family not available, would communicate in person or via phone if needed.  We will continue with the plan and medications noted above.    We will continue to follow the patient here at the facility.    Discharge planning: no plan for discharge, LTC resident, rehab potential    *Please note that nursing facility, outside laboratory agency, and  [facility] do not interface.     Completion of the note was done through Dragon voice recognition technology and may include   unintended or grammatical errors which may not have been recognized when finalizing the note.     Time:    Rosetta Christy, APRN-CNP

## 2024-04-29 NOTE — LETTER
Patient: Jacqueline Sandoval  : 1935    Encounter Date: 2024    Name: Jacqueline Sandoval  YOB: 1935    MONTHLY VISIT: LTC, Multiple problems    SUBJECTIVE:  Mrs. Sandoval is a 88 yr old female who is here at [facility] as a LTC resident. She transitioned here after a hospitalization in Oct of 2022. PMH of fx left femur and repair on 23 (post op anemia due to blood loss and received one unit of pRBC's, post op CAP treated w/Levaquin - recovered, and urinary retention requiring Barker cath placement with failed attempts to pass voiding trial and started on Flomax - followed by urology Dr. Padilla), left humerus and repair 2022, recurrent UTI's, on prophylactic antibiotics, HTN, per patient and son CHF, fibromyalgia, Hyponatremia, anxiety/depression, and hypothyroidism. Patient is [place]. Patient is never a smoker. She is allergic to PCN and sulfa.   Jacqueline is up in her room and appears comfortable and in no acute distress. She c/o aches of a bruised area to right outer ear. Slight ecchymosis noted but no trauma or swelling. She denies HA, CP or SOB. BP meds adjusted and BP is 120 - 140/50 - 60s.     REVIEW OF SYSTEMS:  All review of systems are negative unless otherwise stated above under subjective.    LABS REVIEWED AT FACILITY:  Basic Metabolic Panl  REPORTED 2024 08:13  Glucose   87 mg/dL 74-99    BUN   19 mg/dL 7-21    Creatinine H 1.17 mg/dL 0.58-0.96    Sodium   138 mmol/L 136-144    Potassium   4.3 mmol/L 3.7-5.1    Chloride   104 mmol/L     CO2   23 mmol/L 22-30    Anion Gap   11 mmol/L 9-18    Calcium, Total   9.2 mg/dL 8.5-10.2    Estimated Glomerular Filtration Rate L 45 mL/min/1.73 meters squared >=60        Allergies   Allergen Reactions   • Penicillins Unknown   • Sulfa (Sulfonamide Antibiotics) Unknown and Rash       MEDICATIONS REVIEWED AT FACILITY:  Please see Nursing facility Medication EMR for full updated list.    Living will related issues reviewed-Code status:  "DNRCCA    OBJECTIVE:  /69   Pulse 56   Temp 36.2 °C (97.2 °F)   Resp 16   Ht 1.6 m (5' 3\")   Wt 62.1 kg (136 lb 12.8 oz)   SpO2 98% Comment: room air  BMI 24.23 kg/m²   Physical Exam  HENT:      Head: Normocephalic. [place], right outer ear slight ecchymosis     Mouth/Throat:      Mouth: Mucous membranes are moist.   Cardiovascular:      Rate and Rhythm: Normal rate and regular rhythm.   Pulmonary:      Effort: Pulmonary effort is normal.      Breath sounds: Normal breath sounds.   Abdominal:      General: Abdomen is flat. Bowel sounds are normal.      Palpations: Abdomen is soft.   Genitourinary:     Comments: Genitalia not examined, Barker cath to CD  Musculoskeletal:      Cervical back: Neck supple.      Comments: Generalized weakness +1 pitting edema, JAN hose on  Skin:     General: Skin is warm and dry.      Capillary Refill: Capillary refill takes less than 2 seconds. Healed left hip incision  Neurological:      General: No focal deficit present.      Mental Status: She is alert and oriented to person, place, and time. Forgetful and due to [place] difficult to maintain conversation.  Psychiatric:         Mood and Affect: Mood normal.     Assessment/Plan  Problem List Items Addressed This Visit       Anxiety    Depression    Fibromyalgia    HTN (hypertension), benign - Primary    [place] (hard of hearing)    Hyponatremia    Hypothyroidism    Osteoarthritis    Urinary retention    History of recurrent UTIs    Barker catheter in place       Skin integrity:  Nursing to monitor skin integrity as patient is at risk for pressure injuries.    PLAN:  Pt has been seen for monthly visit.    The patient is confined to a long term care facility.   Recent nursing evaluation and notes were reviewed.   #Urinary retention: Follows with Dr. Padilla,16 Yi Barker to CD, cont Flomax   #Recurrent UTI: prophylaxis Macrobid 50 mg daily to cont  #HTN/CKD: BP readings monitored and today 120/50's. Cont to follow BP readings, Amlodipine " increased on 4/18 to 5 mg, cont M. Tartrate 12.5 mg bid, Lisinopril 40 mg daily, cont to follow labs, Labs reviewed in 4/25, cont to avoid nephrotoxic drugs, adjust meds as needed  #h/o CHF: Per patient and son history of heart failure however no cardiology notes or reports, weight is stable patient is not having any fluid overload. Weight is 136.8# as of 4/24/24 - down from 12/20/23 at 143.5#.  #Hyponatremia: on NaCl tabs daily, Na level is 138.  #Chronic pain/fibra myalgia: cont gabapentin, routine Tylenol, Norco PRN, Voltaren gel, and Muscle rub PRN  #Hypothyroidism: Levothyroxine 100 mcg daily and f/up TSH 0.144 and T4 1.5 reviewed from 4/15 and TSH due in June.   #Depression/Anxiety and insomnia: cont Sertraline, Buspirone, and Diazepam at HS routine. Melatonin at bedtime routine. Followed by [facility]. Mood is stable. Sleeping well. Cont to monitor for side effects from antidepressants.   Any decline or change in condition needs to be communicated with the physician or myself.    Discussion with nursing staff regarding ongoing care and management.  Communication regarding patients status, overall condition, changes with plan (medications or treatments), and any questions from family completed if present.  If family not available, would communicate in person or via phone if needed.  We will continue with the plan and medications noted above.    We will continue to follow the patient here at the facility.    Discharge planning: no plan for discharge, LTC resident, rehab potential    *Please note that nursing facility, outside laboratory agency, and [facility] do not interface.     Completion of the note was done through Dragon voice recognition technology and may include   unintended or grammatical errors which may not have been recognized when finalizing the note.     Time:    GLORIA Valverde      Electronically Signed By: GLORIA Valverde   5/7/24  1:35 PM

## 2024-05-19 ENCOUNTER — APPOINTMENT (OUTPATIENT)
Dept: RADIOLOGY | Facility: HOSPITAL | Age: 89
End: 2024-05-19
Payer: COMMERCIAL

## 2024-05-19 ENCOUNTER — APPOINTMENT (OUTPATIENT)
Dept: CARDIOLOGY | Facility: HOSPITAL | Age: 89
End: 2024-05-19
Payer: COMMERCIAL

## 2024-05-19 ENCOUNTER — HOSPITAL ENCOUNTER (EMERGENCY)
Facility: HOSPITAL | Age: 89
Discharge: HOME | End: 2024-05-19
Payer: COMMERCIAL

## 2024-05-19 VITALS
DIASTOLIC BLOOD PRESSURE: 60 MMHG | BODY MASS INDEX: 25.19 KG/M2 | WEIGHT: 136.91 LBS | SYSTOLIC BLOOD PRESSURE: 119 MMHG | TEMPERATURE: 98.2 F | RESPIRATION RATE: 16 BRPM | HEIGHT: 62 IN | HEART RATE: 83 BPM | OXYGEN SATURATION: 95 %

## 2024-05-19 DIAGNOSIS — R51.9 ACUTE NONINTRACTABLE HEADACHE, UNSPECIFIED HEADACHE TYPE: Primary | ICD-10-CM

## 2024-05-19 DIAGNOSIS — N39.0 UTI (URINARY TRACT INFECTION), BACTERIAL: ICD-10-CM

## 2024-05-19 DIAGNOSIS — R53.1 GENERALIZED WEAKNESS: ICD-10-CM

## 2024-05-19 DIAGNOSIS — N17.9 ACUTE KIDNEY INJURY (CMS-HCC): ICD-10-CM

## 2024-05-19 DIAGNOSIS — A49.9 UTI (URINARY TRACT INFECTION), BACTERIAL: ICD-10-CM

## 2024-05-19 LAB
ALBUMIN SERPL BCP-MCNC: 3.8 G/DL (ref 3.4–5)
ALP SERPL-CCNC: 78 U/L (ref 33–136)
ALT SERPL W P-5'-P-CCNC: 8 U/L (ref 7–45)
ANION GAP SERPL CALC-SCNC: 13 MMOL/L (ref 10–20)
APPEARANCE UR: ABNORMAL
AST SERPL W P-5'-P-CCNC: 11 U/L (ref 9–39)
BACTERIA #/AREA URNS AUTO: ABNORMAL /HPF
BASOPHILS # BLD AUTO: 0.05 X10*3/UL (ref 0–0.1)
BASOPHILS NFR BLD AUTO: 0.3 %
BILIRUB SERPL-MCNC: 0.5 MG/DL (ref 0–1.2)
BILIRUB UR STRIP.AUTO-MCNC: NEGATIVE MG/DL
BUN SERPL-MCNC: 35 MG/DL (ref 6–23)
CALCIUM SERPL-MCNC: 9.2 MG/DL (ref 8.6–10.3)
CARDIAC TROPONIN I PNL SERPL HS: 9 NG/L (ref 0–13)
CHLORIDE SERPL-SCNC: 98 MMOL/L (ref 98–107)
CK SERPL-CCNC: 14 U/L (ref 0–215)
CO2 SERPL-SCNC: 26 MMOL/L (ref 21–32)
COLOR UR: YELLOW
CREAT SERPL-MCNC: 2.05 MG/DL (ref 0.5–1.05)
EGFRCR SERPLBLD CKD-EPI 2021: 23 ML/MIN/1.73M*2
EOSINOPHIL # BLD AUTO: 0.04 X10*3/UL (ref 0–0.4)
EOSINOPHIL NFR BLD AUTO: 0.2 %
ERYTHROCYTE [DISTWIDTH] IN BLOOD BY AUTOMATED COUNT: 14.6 % (ref 11.5–14.5)
FLUAV RNA RESP QL NAA+PROBE: NOT DETECTED
FLUBV RNA RESP QL NAA+PROBE: NOT DETECTED
GLUCOSE SERPL-MCNC: 88 MG/DL (ref 74–99)
GLUCOSE UR STRIP.AUTO-MCNC: NEGATIVE MG/DL
HCT VFR BLD AUTO: 35.8 % (ref 36–46)
HGB BLD-MCNC: 12 G/DL (ref 12–16)
HOLD SPECIMEN: NORMAL
HYALINE CASTS #/AREA URNS AUTO: ABNORMAL /LPF
IMM GRANULOCYTES # BLD AUTO: 0.13 X10*3/UL (ref 0–0.5)
IMM GRANULOCYTES NFR BLD AUTO: 0.7 % (ref 0–0.9)
KETONES UR STRIP.AUTO-MCNC: NEGATIVE MG/DL
LACTATE SERPL-SCNC: 1 MMOL/L (ref 0.4–2)
LEUKOCYTE ESTERASE UR QL STRIP.AUTO: ABNORMAL
LYMPHOCYTES # BLD AUTO: 1.91 X10*3/UL (ref 0.8–3)
LYMPHOCYTES NFR BLD AUTO: 10.6 %
MAGNESIUM SERPL-MCNC: 2.07 MG/DL (ref 1.6–2.4)
MCH RBC QN AUTO: 31.9 PG (ref 26–34)
MCHC RBC AUTO-ENTMCNC: 33.5 G/DL (ref 32–36)
MCV RBC AUTO: 95 FL (ref 80–100)
MONOCYTES # BLD AUTO: 1.05 X10*3/UL (ref 0.05–0.8)
MONOCYTES NFR BLD AUTO: 5.8 %
MUCOUS THREADS #/AREA URNS AUTO: ABNORMAL /LPF
NEUTROPHILS # BLD AUTO: 14.84 X10*3/UL (ref 1.6–5.5)
NEUTROPHILS NFR BLD AUTO: 82.4 %
NITRITE UR QL STRIP.AUTO: POSITIVE
NRBC BLD-RTO: 0 /100 WBCS (ref 0–0)
PH UR STRIP.AUTO: 6 [PH]
PLATELET # BLD AUTO: 202 X10*3/UL (ref 150–450)
POTASSIUM SERPL-SCNC: 4.6 MMOL/L (ref 3.5–5.3)
PROT SERPL-MCNC: 7 G/DL (ref 6.4–8.2)
PROT UR STRIP.AUTO-MCNC: ABNORMAL MG/DL
RBC # BLD AUTO: 3.76 X10*6/UL (ref 4–5.2)
RBC # UR STRIP.AUTO: ABNORMAL /UL
RBC #/AREA URNS AUTO: ABNORMAL /HPF
SARS-COV-2 RNA RESP QL NAA+PROBE: NOT DETECTED
SODIUM SERPL-SCNC: 132 MMOL/L (ref 136–145)
SP GR UR STRIP.AUTO: 1.01
TSH SERPL-ACNC: 2.54 MIU/L (ref 0.44–3.98)
UROBILINOGEN UR STRIP.AUTO-MCNC: <2 MG/DL
WBC # BLD AUTO: 18 X10*3/UL (ref 4.4–11.3)
WBC #/AREA URNS AUTO: >50 /HPF

## 2024-05-19 PROCEDURE — 93005 ELECTROCARDIOGRAM TRACING: CPT

## 2024-05-19 PROCEDURE — 36415 COLL VENOUS BLD VENIPUNCTURE: CPT

## 2024-05-19 PROCEDURE — 71045 X-RAY EXAM CHEST 1 VIEW: CPT

## 2024-05-19 PROCEDURE — 70450 CT HEAD/BRAIN W/O DYE: CPT | Performed by: RADIOLOGY

## 2024-05-19 PROCEDURE — 84484 ASSAY OF TROPONIN QUANT: CPT

## 2024-05-19 PROCEDURE — 99285 EMERGENCY DEPT VISIT HI MDM: CPT | Mod: 25

## 2024-05-19 PROCEDURE — 80053 COMPREHEN METABOLIC PANEL: CPT

## 2024-05-19 PROCEDURE — 81001 URINALYSIS AUTO W/SCOPE: CPT

## 2024-05-19 PROCEDURE — 2500000004 HC RX 250 GENERAL PHARMACY W/ HCPCS (ALT 636 FOR OP/ED)

## 2024-05-19 PROCEDURE — 84443 ASSAY THYROID STIM HORMONE: CPT

## 2024-05-19 PROCEDURE — 83735 ASSAY OF MAGNESIUM: CPT

## 2024-05-19 PROCEDURE — 2500000001 HC RX 250 WO HCPCS SELF ADMINISTERED DRUGS (ALT 637 FOR MEDICARE OP)

## 2024-05-19 PROCEDURE — 70450 CT HEAD/BRAIN W/O DYE: CPT

## 2024-05-19 PROCEDURE — 83605 ASSAY OF LACTIC ACID: CPT

## 2024-05-19 PROCEDURE — 96360 HYDRATION IV INFUSION INIT: CPT

## 2024-05-19 PROCEDURE — 82550 ASSAY OF CK (CPK): CPT

## 2024-05-19 PROCEDURE — 85025 COMPLETE CBC W/AUTO DIFF WBC: CPT

## 2024-05-19 PROCEDURE — 87636 SARSCOV2 & INF A&B AMP PRB: CPT

## 2024-05-19 PROCEDURE — 87086 URINE CULTURE/COLONY COUNT: CPT | Mod: ELYLAB

## 2024-05-19 PROCEDURE — 71045 X-RAY EXAM CHEST 1 VIEW: CPT | Mod: FOREIGN READ | Performed by: RADIOLOGY

## 2024-05-19 RX ORDER — HYDROCODONE BITARTRATE AND ACETAMINOPHEN 5; 325 MG/1; MG/1
1 TABLET ORAL ONCE
Status: COMPLETED | OUTPATIENT
Start: 2024-05-19 | End: 2024-05-19

## 2024-05-19 RX ORDER — TAMSULOSIN HYDROCHLORIDE 0.4 MG/1
0.4 CAPSULE ORAL DAILY
COMMUNITY

## 2024-05-19 RX ORDER — LEVOTHYROXINE SODIUM 100 UG/1
100 TABLET ORAL DAILY
COMMUNITY

## 2024-05-19 RX ORDER — POLYETHYLENE GLYCOL 3350 17 G/17G
17 POWDER, FOR SOLUTION ORAL DAILY
COMMUNITY

## 2024-05-19 RX ORDER — AMLODIPINE BESYLATE 5 MG/1
5 TABLET ORAL DAILY
COMMUNITY

## 2024-05-19 RX ORDER — LEVOFLOXACIN 500 MG/1
750 TABLET, FILM COATED ORAL DAILY
Qty: 8 TABLET | Refills: 0 | Status: SHIPPED | OUTPATIENT
Start: 2024-05-19 | End: 2024-05-24

## 2024-05-19 RX ORDER — MELATONIN 3 MG
3 CAPSULE ORAL NIGHTLY
COMMUNITY

## 2024-05-19 RX ORDER — MULTIVIT-MIN/IRON FUM/FOLIC AC 7.5 MG-4
1 TABLET ORAL DAILY
COMMUNITY

## 2024-05-19 RX ORDER — HYDROCODONE BITARTRATE AND ACETAMINOPHEN 5; 325 MG/1; MG/1
1 TABLET ORAL EVERY 6 HOURS PRN
COMMUNITY

## 2024-05-19 RX ADMIN — HYDROCODONE BITARTRATE AND ACETAMINOPHEN 1 TABLET: 5; 325 TABLET ORAL at 09:35

## 2024-05-19 RX ADMIN — SODIUM CHLORIDE 1000 ML: 9 INJECTION, SOLUTION INTRAVENOUS at 09:45

## 2024-05-19 ASSESSMENT — LIFESTYLE VARIABLES
TOTAL SCORE: 0
EVER HAD A DRINK FIRST THING IN THE MORNING TO STEADY YOUR NERVES TO GET RID OF A HANGOVER: NO
EVER FELT BAD OR GUILTY ABOUT YOUR DRINKING: NO
HAVE PEOPLE ANNOYED YOU BY CRITICIZING YOUR DRINKING: NO
HAVE YOU EVER FELT YOU SHOULD CUT DOWN ON YOUR DRINKING: NO

## 2024-05-19 ASSESSMENT — PAIN SCALES - GENERAL
PAINLEVEL_OUTOF10: 3
PAINLEVEL_OUTOF10: 5 - MODERATE PAIN

## 2024-05-19 ASSESSMENT — COLUMBIA-SUICIDE SEVERITY RATING SCALE - C-SSRS
6. HAVE YOU EVER DONE ANYTHING, STARTED TO DO ANYTHING, OR PREPARED TO DO ANYTHING TO END YOUR LIFE?: NO
2. HAVE YOU ACTUALLY HAD ANY THOUGHTS OF KILLING YOURSELF?: NO
1. IN THE PAST MONTH, HAVE YOU WISHED YOU WERE DEAD OR WISHED YOU COULD GO TO SLEEP AND NOT WAKE UP?: NO

## 2024-05-19 ASSESSMENT — PAIN - FUNCTIONAL ASSESSMENT
PAIN_FUNCTIONAL_ASSESSMENT: 0-10
PAIN_FUNCTIONAL_ASSESSMENT: 0-10

## 2024-05-19 ASSESSMENT — PAIN DESCRIPTION - LOCATION
LOCATION: HEAD
LOCATION: HEAD

## 2024-05-19 ASSESSMENT — PAIN DESCRIPTION - DESCRIPTORS
DESCRIPTORS: ACHING
DESCRIPTORS: ACHING

## 2024-05-19 ASSESSMENT — PAIN DESCRIPTION - PAIN TYPE: TYPE: ACUTE PAIN

## 2024-05-19 ASSESSMENT — PAIN DESCRIPTION - ORIENTATION: ORIENTATION: POSTERIOR

## 2024-05-19 NOTE — ED TRIAGE NOTES
Patient arrived via EMS from [facility].  Per EMS patient has not been taking her home medications.  Patient reports a posterior headache that she rates a 5/10.

## 2024-05-19 NOTE — ED PROVIDER NOTES
"HPI   No chief complaint on file.      History provided by: Patient, EMS    CC: Headache    HPI: 88-year-old female with a history of fibromyalgia, anemia, hypothyroidism, osteoarthritis, neurogenic bladder, and CKD presents the emergency department via EMS from [facility] for headaches.  According to EMS, patient is been complaining of an intermittent headache for the last few days.  She takes Norco chronically for her headaches, fibromyalgia and osteoarthritis.  EMS reports that the patient has not been altered according to the nursing staff but has been having some behavioral problems where she will refuse to take her medications.  Patient is alert and oriented x 4.  She is in no acute distress.  She characterized the headache as \"sharp \"and localized to the back of her head.  She denies it having sudden or abrupt onset or being the worst headache she ever had.  Says it feels similar to her previous headaches.  Denies fall or head injury.  Denies use of anticoagulants or antiplatelets that she knows of however she is a poor historian.  I reviewed her medication list and confirmed no use of anticoagulants or antiplatelets.  She denies vision changes.  Denies nausea vomiting.  Denies lightheadedness or dizziness.  Denies pain or injury elsewhere.  Denies numbness tingling or localized weakness in extremities.  She does report feeling generally weak and feeling \"chilly \".  Denies fevers.  Denies chest pain and shortness of breath.  Denies history of heart or lung disease.  Denies blood in the urine or stool, denies urgency frequency dysuria.  Denies flank pain.  She does have a chronic Barker and for her neurogenic bladder.  Denies all other systemic symptoms.    ROS: Negative unless mentioned in HPI    Social Hx: Denies tobacco, alcohol, drug use    Medical Hx: Allergy to penicillin and sulfa drugs.  Immunizations up-to-date.    Physical exam:    Constitutional: Patient is well-nourished and " well-developed.  Sitting comfortably in the room and in no distress.  Patient is very hard of hearing.  Oriented to person, place, time, and situation.    HEENT: Head is normocephalic, atraumatic. Patient's airway is patent.  Tympanic membranes are clear bilaterally.  Nasal mucosa clear.  Mouth with normal mucosa.  Throat is not erythematous and there are no oropharyngeal exudates, uvula is midline.  No obvious facial deformities.    Eyes: Clear bilaterally.  Pupils are equal round and reactive to light and accommodation.  Extraocular movements intact.      Cardiac: Regular rate, regular rhythm.  Heart sounds S1, S2.  No murmurs, rubs, or gallops.  PMI nondisplaced.  No JVD.    Respiratory: Regular respiratory rate and effort.  Breath sounds are clear and equal bilaterally, no adventitious lung sounds.  Patient is speaking in full sentences and is in no apparent respiratory distress. No use of accessory muscles.      Gastrointestinal: Abdomen is soft, nondistended, and nontender.  There are no obvious deformities.  No rebound tenderness or guarding.  Bowel sounds are normal active.    Genitourinary: No CVA or flank tenderness.    Musculoskeletal: No reproducible tenderness.  No obvious skin or bony deformities.  Patient has equal range of motion in all extremities and no strength deficiencies.  No muscle or joint tenderness. No back or neck tenderness.  Capillary refill less than 3 seconds.  Strong peripheral pulses.  No sensory deficits.    Neurological: Patient is alert and oriented.  No focal deficits.  5/5 strength in all extremities.  Cranial nerves II through XII intact. GCS15.  No slurred speech or facial drooping.  Upper and lower extremity coordination intact.  No neurological deficits.  NIH is 0.    Skin: Skin is normal color for race and is warm, dry, and intact.  No evidence of trauma.  No lesions, rashes, bruising, jaundice, or masses.    Psych: Appropriate mood and affect.  No apparent risk to self or  others.    Heme/lymph: No adenopathy, lymphadenopathy, or splenomegaly    Physical exam is otherwise negative unless stated above or in history of present illness.                          No data recorded                   Patient History   Past Medical History:   Diagnosis Date    Acute cystitis without hematuria 05/31/2023    Anemia     Anxiety     Dementia (Multi)     Falls     Fibromyalgia     Frequent UTI 08/22/2023    Heart failure (Multi)     Hypertension     Left displaced femoral neck fracture (Multi) 11/23/2023    Postoperative anemia 12/08/2023    Urinary symptom or sign 06/20/2023     Past Surgical History:   Procedure Laterality Date    OTHER SURGICAL HISTORY  10/05/2022    Humerus fracture repair     No family history on file.  Social History     Tobacco Use    Smoking status: Never    Smokeless tobacco: Never   Vaping Use    Vaping status: Never Used   Substance Use Topics    Alcohol use: Never    Drug use: Never       Physical Exam   ED Triage Vitals   Temp Pulse Resp BP   -- -- -- --      SpO2 Temp src Heart Rate Source Patient Position   -- -- -- --      BP Location FiO2 (%)     -- --       Physical Exam    ED Course & MDM   Diagnoses as of 05/19/24 1100   Acute nonintractable headache, unspecified headache type   Generalized weakness   Acute kidney injury (CMS-HCC)   UTI (urinary tract infection), bacterial   Patient updated on plan for lab testing, IV insertion, radiology imaging, and medications to be administered while in the ER (if indicated). Patient updated on expected wait times for testing and results. Patient provided my name and told to ask any staff member for questions or concerns if they should arise. Electronic medical record reviewed.     MDM    Upon initial assessment, patient was healthy non-toxic appearing and in no apparent distress.     Patient presented to the emergency department with the chief complaint of headache and generalized weakness.  Her breath sounds are clear and  equal bilaterally, 95% on room air.  The muffled heart sounds, JVD, murmur.  No peripheral edema or erythema.  No nuchal rigidity or meningeal signs.  She has no neurological deficits on exam or findings on suggest stroke.  On arrival to the emergency department, vital signs were within normal limits    Will give the patient IV normal saline and give her her daily dose of Norco for her discomfort given that she typically takes this for her headaches.  Will get basic blood work, EKG and troponin, thyroid function, creatinine kinase, head CT, chest x-ray, urinalysis, swab for COVID and the flu given her chills and headache.    Patient's EKG was performed at 912 and interpreted by me.  Sinus rhythm 79 beats minute.  No ST elevation or depression.  No prolonged QT.  Overall looks similar to her previous EKG, compared doing November 2023    Urinalysis does confirm a urinary tract infection.  Lactate, thyroid function, troponin, magnesium all within normal limits.  Swabs are negative.  Patient is a mild acute kidney injury with creatinine of 2.05, GFR 23, and BUN of 35.  CBC shows leukocytosis 18.0 with a left shift neutrophil count.  CT of the head reveals chronic changes but no intracranial mass or hemorrhage.  X-ray shows mild congestion but no pulmonary edema.  Also shows atelectasis versus potential pneumonia on the left side.    I discussed the results with the patient and her son who is her power of .  Patient states she is feeling much better and [facility].  I did discuss options with the son and he is agreeable, he would prefer for her to go [facility] to the [facility] rather than be admitted.  I did offer admission and discussed benefits of admission including IV antibiotics, hydration, and observation however they are both adamant that the patient would like to go [facility] to [facility].  I did discuss antibiotic options with the pharmacist given the patient has an  allergy to penicillin and Bactrim, agreed that levo floxacillin 750 mg once a day for 5 days would cover the potential pneumonia and urinary tract infection adequately.  Patient will take her at home medications for her headache as needed and she will follow-up with her primary care provider.  All questions and concerns addressed.  Reasons to return to ER discussed.  Patient verbalized understanding and agreement with the treatment plan and they remained hemodynamically stable in the ER.    This note was dictated using a speech recognition program.  While an attempt was made at proof-reading to minimize errors, minor errors in transcription may be present      Medical Decision Making      Procedure  Procedures     Alton Walter PA-C  05/19/24 1102       Alton Walter PA-C  05/19/24 1102

## 2024-05-21 LAB — BACTERIA UR CULT: ABNORMAL

## 2024-05-22 ENCOUNTER — TELEPHONE (OUTPATIENT)
Dept: PHARMACY | Facility: HOSPITAL | Age: 89
End: 2024-05-22
Payer: COMMERCIAL

## 2024-05-22 NOTE — PROGRESS NOTES
EDPD Note: Lab/Chart Reviewed    Reviewed Jacqueline Sandoval 's chart regarding a contaminated urine culture/result that was taken during their recent emergency room visit. The patient was transferred back to their rehab/LTC facility .Therefore, I have faxed this information to [facility] at fax number [phone] .    No results found for the last 90 days.    No further follow up needed from EDPD Team.     Wali Sheikh, PharmD

## 2024-05-25 LAB
ATRIAL RATE: 79 BPM
P OFFSET: 180 MS
P ONSET: 143 MS
Q ONSET: 221 MS
QRS COUNT: 14 BEATS
QRS DURATION: 82 MS
QT INTERVAL: 386 MS
QTC CALCULATION(BAZETT): 442 MS
QTC FREDERICIA: 423 MS
R AXIS: 46 DEGREES
T AXIS: 68 DEGREES
T OFFSET: 414 MS
VENTRICULAR RATE: 79 BPM

## 2024-06-20 ENCOUNTER — NURSING HOME VISIT (OUTPATIENT)
Dept: POST ACUTE CARE | Facility: EXTERNAL LOCATION | Age: 89
End: 2024-06-20
Payer: COMMERCIAL

## 2024-06-20 VITALS
DIASTOLIC BLOOD PRESSURE: 50 MMHG | HEART RATE: 61 BPM | HEIGHT: 63 IN | SYSTOLIC BLOOD PRESSURE: 145 MMHG | RESPIRATION RATE: 18 BRPM | TEMPERATURE: 97.5 F | OXYGEN SATURATION: 95 % | WEIGHT: 133.5 LBS | BODY MASS INDEX: 23.65 KG/M2

## 2024-06-20 DIAGNOSIS — Z97.8 FOLEY CATHETER IN PLACE: ICD-10-CM

## 2024-06-20 DIAGNOSIS — M79.7 FIBROMYALGIA: Primary | ICD-10-CM

## 2024-06-20 DIAGNOSIS — I10 HTN (HYPERTENSION), BENIGN: ICD-10-CM

## 2024-06-20 DIAGNOSIS — F32.A DEPRESSION, UNSPECIFIED DEPRESSION TYPE: ICD-10-CM

## 2024-06-20 DIAGNOSIS — E03.9 HYPOTHYROIDISM, UNSPECIFIED TYPE: ICD-10-CM

## 2024-06-20 DIAGNOSIS — E87.1 HYPONATREMIA: ICD-10-CM

## 2024-06-20 DIAGNOSIS — Z87.440 HISTORY OF RECURRENT UTIS: ICD-10-CM

## 2024-06-20 DIAGNOSIS — F41.9 ANXIETY: ICD-10-CM

## 2024-06-20 DIAGNOSIS — R33.9 URINARY RETENTION: ICD-10-CM

## 2024-06-20 DIAGNOSIS — H91.93 BILATERAL HEARING LOSS, UNSPECIFIED HEARING LOSS TYPE: ICD-10-CM

## 2024-06-20 PROCEDURE — 99309 SBSQ NF CARE MODERATE MDM 30: CPT | Performed by: NURSE PRACTITIONER

## 2024-06-20 RX ORDER — HYDROCODONE BITARTRATE AND ACETAMINOPHEN 5; 325 MG/1; MG/1
1 TABLET ORAL EVERY 6 HOURS PRN
Qty: 240 TABLET | Refills: 0 | Status: SHIPPED | OUTPATIENT
Start: 2024-06-20 | End: 2024-08-19

## 2024-06-20 RX ORDER — NALOXONE HYDROCHLORIDE 4 MG/.1ML
1 SPRAY NASAL AS NEEDED
Qty: 2 EACH | Refills: 0 | Status: SHIPPED | OUTPATIENT
Start: 2024-06-20

## 2024-06-20 NOTE — PROGRESS NOTES
"Name: Jacqueline Sandoval  YOB: 1935    MONTHLY VISIT: LTC, Multiple chronic conditions    SUBJECTIVE:  Mrs. Sandoval is a 88 yr old female who is here at [facility] as a LTC resident. She transitioned here after a hospitalization in Oct of 2022.   PMH of fx left femur and repair on 11/24/23 (post op anemia due to blood loss and received one unit of pRBC's, post op CAP treated w/Levaquin - recovered, and urinary retention requiring Barker cath placement with failed attempts to pass voiding trial and started on Flomax - followed by urology Dr. Padilla), left humerus and repair 9/2022, recurrent UTI's - on prophylactic antibiotics, HTN, per patient and son CHF, fibromyalgia, Hyponatremia, anxiety/depression, and hypothyroidism. Patient is [place]. Patient is never a smoker. She is allergic to PCN and sulfa.   Jacqueline is up in her room sitting in WC and appears comfortable and in no acute distress. She denies HA, CP or SOB. Barker to CD. Nursing reports no concerns.    REVIEW OF SYSTEMS:  All review of systems are negative unless otherwise stated above under subjective.    LABS REVIEWED AT FACILITY:  6/13/24  TSH - 1.210   5/17/24   UA C/S + Ecoli  5/2/24  Lipid panel abnormal's  HDL 39    Allergies   Allergen Reactions    Penicillins Unknown    Sulfa (Sulfonamide Antibiotics) Unknown and Rash       MEDICATIONS REVIEWED AT FACILITY:  Please see Nursing facility Medication EMR for full updated list.    Living will related issues reviewed-Code status: DNRCCA    OBJECTIVE:  /50   Pulse 61   Temp 36.4 °C (97.5 °F)   Resp 18   Ht 1.6 m (5' 3\")   Wt 60.6 kg (133 lb 8 oz)   SpO2 95% Comment: RA  BMI 23.65 kg/m²   Physical Exam  HENT:      Head: Normocephalic. [place], right outer ear slight ecchymosis     Mouth/Throat:      Mouth: Mucous membranes are moist.   Cardiovascular:      Rate and Rhythm: Normal rate and regular rhythm.   Pulmonary:      Effort: Pulmonary effort is normal.      Breath sounds: Normal breath " sounds.   Abdominal:      General: Abdomen is flat. Bowel sounds are normal.      Palpations: Abdomen is soft.   Genitourinary:     Comments: Genitalia not examined, Barker cath to CD  Musculoskeletal:      Cervical back: Neck supple.      Comments: Generalized weakness +1 pitting edema, JAN hose on  Skin:     General: Skin is warm and dry.      Capillary Refill: Capillary refill takes less than 2 seconds. Healed left hip incision  Neurological:      General: No focal deficit present.      Mental Status: She is alert and oriented to person, place, and time. Forgetful and due to [place] difficult to maintain conversation.  Psychiatric:         Mood and Affect: Mood normal.     Assessment/Plan   Problem List Items Addressed This Visit       Anxiety    Relevant Medications    diazePAM (Valium) 5 mg tablet    Depression    Fibromyalgia - Primary    Relevant Medications    HYDROcodone-acetaminophen (Norco) 5-325 mg tablet    naloxone (Narcan) 4 mg/0.1 mL nasal spray    HTN (hypertension), benign    [place] (hard of hearing)    Hyponatremia    Hypothyroidism    Urinary retention    History of recurrent UTIs    Barker catheter in place       Skin integrity:  Nursing to monitor skin integrity as patient is at risk for pressure injuries.  Turn and reposition Q 2 hours or more.  Air mattress and when up in chair cushion reducing device.  Dietician to evaluate and recommend.  Nutritional supplement to be implemented if needed.  Please monitor skin integrity and other pressure areas.    PLAN:  Pt has been seen for monthly visit.    The patient is confined to a long term care facility.   Recent nursing evaluation and notes were reviewed.   #Urinary retention: Follows with Dr. Padilla,16 [place] Barker to CD, cont Flomax   #Recurrent UTI: prophylaxis Macrobid 50 mg daily to cont  #HTN/CKD: BP readings monitored and range 140/50's. Cont to follow BP readings, Amlodipine increased on 4/18 to 5 mg, cont M. Tartrate 12.5 mg bid, Lisinopril 40 mg  daily, cont to follow labs, Labs reviewed, cont to avoid nephrotoxic drugs, adjust meds as needed  #h/o CHF: Per patient and son history of heart failure however no cardiology notes or reports, weight is stable patient is not having any fluid overload. Weight is 133.8# #Hyponatremia: stable Na level, on NaCl tabs daily.  #Chronic pain/fibra myalgia: cont gabapentin, routine Tylenol, Norco PRN, Voltaren gel, and Muscle rub PRN  #Hypothyroidism: labs reviewed, cont Levothyroxine 100 mcg daily   #Depression/Anxiety and insomnia: cont Sertraline, Buspirone, and Diazepam at HS routine. Melatonin at bedtime routine. Followed by [facility]. Mood is stable. Sleeping well. Cont to monitor for side effects from antidepressants.     Any decline or change in condition needs to be communicated with the physician or myself.    Discussion with nursing staff regarding ongoing care and management.  Communication regarding patients status, overall condition, changes with plan (medications or treatments), and any questions from family completed if present.  If family not available, would communicate in person or via phone if needed.  We will continue with the plan and medications noted above.    We will continue to follow the patient here at the facility.    Discharge planning: no plan for discharge, LTC resident, rehab potential    *Please note that nursing facility, outside laboratory agency, and  [facility] do not interface.     Completion of the note was done through Dragon voice recognition technology and may include   unintended or grammatical errors which may not have been recognized when finalizing the note.     Time:    Rosetta Christy, APRN-CNP

## 2024-06-20 NOTE — LETTER
"Patient: Jacqueline Sandoval  : 1935    Encounter Date: 2024    Name: Jacqueline Sandoval  YOB: 1935    MONTHLY VISIT: LTC, Multiple chronic conditions    SUBJECTIVE:  Mrs. Sandoval is a 88 yr old female who is here at [facility] as a LTC resident. She transitioned here after a hospitalization in Oct of 2022.   PMH of fx left femur and repair on 23 (post op anemia due to blood loss and received one unit of pRBC's, post op CAP treated w/Levaquin - recovered, and urinary retention requiring Barker cath placement with failed attempts to pass voiding trial and started on Flomax - followed by urology Dr. Padilla), left humerus and repair 2022, recurrent UTI's - on prophylactic antibiotics, HTN, per patient and son CHF, fibromyalgia, Hyponatremia, anxiety/depression, and hypothyroidism. Patient is [place]. Patient is never a smoker. She is allergic to PCN and sulfa.   Jacqueline is up in her room sitting in WC and appears comfortable and in no acute distress. She denies HA, CP or SOB. Barker to CD. Nursing reports no concerns.    REVIEW OF SYSTEMS:  All review of systems are negative unless otherwise stated above under subjective.    LABS REVIEWED AT FACILITY:  24  TSH - 1.210   24   UA C/S + Ecoli  24  Lipid panel abnormal's  HDL 39    Allergies   Allergen Reactions   • Penicillins Unknown   • Sulfa (Sulfonamide Antibiotics) Unknown and Rash       MEDICATIONS REVIEWED AT FACILITY:  Please see Nursing facility Medication EMR for full updated list.    Living will related issues reviewed-Code status: DNRCCA    OBJECTIVE:  /50   Pulse 61   Temp 36.4 °C (97.5 °F)   Resp 18   Ht 1.6 m (5' 3\")   Wt 60.6 kg (133 lb 8 oz)   SpO2 95% Comment: RA  BMI 23.65 kg/m²   Physical Exam  HENT:      Head: Normocephalic. [place], right outer ear slight ecchymosis     Mouth/Throat:      Mouth: Mucous membranes are moist.   Cardiovascular:      Rate and Rhythm: Normal rate and regular rhythm.   Pulmonary:      " Effort: Pulmonary effort is normal.      Breath sounds: Normal breath sounds.   Abdominal:      General: Abdomen is flat. Bowel sounds are normal.      Palpations: Abdomen is soft.   Genitourinary:     Comments: Genitalia not examined, Barker cath to CD  Musculoskeletal:      Cervical back: Neck supple.      Comments: Generalized weakness +1 pitting edema, JAN hose on  Skin:     General: Skin is warm and dry.      Capillary Refill: Capillary refill takes less than 2 seconds. Healed left hip incision  Neurological:      General: No focal deficit present.      Mental Status: She is alert and oriented to person, place, and time. Forgetful and due to [place] difficult to maintain conversation.  Psychiatric:         Mood and Affect: Mood normal.     Assessment/Plan  Problem List Items Addressed This Visit       Anxiety    Relevant Medications    diazePAM (Valium) 5 mg tablet    Depression    Fibromyalgia - Primary    Relevant Medications    HYDROcodone-acetaminophen (Norco) 5-325 mg tablet    naloxone (Narcan) 4 mg/0.1 mL nasal spray    HTN (hypertension), benign    [place] (hard of hearing)    Hyponatremia    Hypothyroidism    Urinary retention    History of recurrent UTIs    Barker catheter in place       Skin integrity:  Nursing to monitor skin integrity as patient is at risk for pressure injuries.  Turn and reposition Q 2 hours or more.  Air mattress and when up in chair cushion reducing device.  Dietician to evaluate and recommend.  Nutritional supplement to be implemented if needed.  Please monitor skin integrity and other pressure areas.    PLAN:  Pt has been seen for monthly visit.    The patient is confined to a long term care facility.   Recent nursing evaluation and notes were reviewed.   #Urinary retention: Follows with Dr. Padilla,16 Thai Barker to CD, cont Flomax   #Recurrent UTI: prophylaxis Macrobid 50 mg daily to cont  #HTN/CKD: BP readings monitored and range 140/50's. Cont to follow BP readings, Amlodipine  increased on 4/18 to 5 mg, cont M. Tartrate 12.5 mg bid, Lisinopril 40 mg daily, cont to follow labs, Labs reviewed, cont to avoid nephrotoxic drugs, adjust meds as needed  #h/o CHF: Per patient and son history of heart failure however no cardiology notes or reports, weight is stable patient is not having any fluid overload. Weight is 133.8# #Hyponatremia: stable Na level, on NaCl tabs daily.  #Chronic pain/fibra myalgia: cont gabapentin, routine Tylenol, Norco PRN, Voltaren gel, and Muscle rub PRN  #Hypothyroidism: labs reviewed, cont Levothyroxine 100 mcg daily   #Depression/Anxiety and insomnia: cont Sertraline, Buspirone, and Diazepam at HS routine. Melatonin at bedtime routine. Followed by [facility]. Mood is stable. Sleeping well. Cont to monitor for side effects from antidepressants.     Any decline or change in condition needs to be communicated with the physician or myself.    Discussion with nursing staff regarding ongoing care and management.  Communication regarding patients status, overall condition, changes with plan (medications or treatments), and any questions from family completed if present.  If family not available, would communicate in person or via phone if needed.  We will continue with the plan and medications noted above.    We will continue to follow the patient here at the facility.    Discharge planning: no plan for discharge, LTC resident, rehab potential    *Please note that nursing facility, outside laboratory agency, and [facility] do not interface.     Completion of the note was done through Dragon voice recognition technology and may include   unintended or grammatical errors which may not have been recognized when finalizing the note.     Time:    GLORIA Valverde        Electronically Signed By: GLORIA Valverde   6/30/24  6:10 PM

## 2024-06-22 RX ORDER — DIAZEPAM 5 MG/1
5 TABLET ORAL NIGHTLY
Qty: 90 TABLET | Refills: 0 | Status: SHIPPED | OUTPATIENT
Start: 2024-06-22 | End: 2024-09-20

## 2024-08-23 ENCOUNTER — DOCUMENTATION (OUTPATIENT)
Dept: POST ACUTE CARE | Facility: EXTERNAL LOCATION | Age: 89
End: 2024-08-23
Payer: COMMERCIAL

## 2024-08-23 DIAGNOSIS — M79.7 FIBROMYALGIA: ICD-10-CM

## 2024-08-23 RX ORDER — HYDROCODONE BITARTRATE AND ACETAMINOPHEN 5; 325 MG/1; MG/1
1 TABLET ORAL EVERY 6 HOURS PRN
Qty: 240 TABLET | Refills: 0 | Status: SHIPPED | OUTPATIENT
Start: 2024-08-23 | End: 2024-10-22

## 2024-08-29 ENCOUNTER — NURSING HOME VISIT (OUTPATIENT)
Dept: POST ACUTE CARE | Facility: EXTERNAL LOCATION | Age: 89
End: 2024-08-29
Payer: COMMERCIAL

## 2024-08-29 DIAGNOSIS — I10 HTN (HYPERTENSION), BENIGN: Primary | ICD-10-CM

## 2024-08-29 DIAGNOSIS — M19.90 OSTEOARTHRITIS, UNSPECIFIED OSTEOARTHRITIS TYPE, UNSPECIFIED SITE: ICD-10-CM

## 2024-08-29 DIAGNOSIS — R33.9 URINARY RETENTION: ICD-10-CM

## 2024-08-29 DIAGNOSIS — F41.9 ANXIETY: ICD-10-CM

## 2024-08-29 DIAGNOSIS — Z87.440 HISTORY OF RECURRENT UTIS: ICD-10-CM

## 2024-08-29 DIAGNOSIS — Z97.8 FOLEY CATHETER IN PLACE: ICD-10-CM

## 2024-08-29 DIAGNOSIS — E87.1 HYPONATREMIA: ICD-10-CM

## 2024-08-29 DIAGNOSIS — E03.9 HYPOTHYROIDISM, UNSPECIFIED TYPE: ICD-10-CM

## 2024-08-29 DIAGNOSIS — F32.A DEPRESSION, UNSPECIFIED DEPRESSION TYPE: ICD-10-CM

## 2024-08-29 DIAGNOSIS — M79.7 FIBROMYALGIA: ICD-10-CM

## 2024-08-29 PROCEDURE — 99309 SBSQ NF CARE MODERATE MDM 30: CPT | Performed by: NURSE PRACTITIONER

## 2024-08-29 NOTE — LETTER
Patient: Jacqueline Sandoval  : 1935    Encounter Date: 2024    Name: Jacqueline Sandoval  YOB: 1935    MONTHLY VISIT: Long term care resident, f/up on multiple chronic conditions    SUBJECTIVE:  Mrs. Sandoval is a 88 yr old female who is here at [facility] as a LTC resident. She transitioned here after a hospitalization in Oct of 2022.   PMH of fx left femur and repair on 23 (post op anemia due to blood loss and received one unit of pRBC's, post op CAP treated w/Levaquin - recovered, and urinary retention requiring Barker cath placement with failed attempts to pass voiding trial and started on Flomax - previously followed by urology Dr. Padilla), left humerus and repair 2022, recurrent UTI's - on prophylactic antibiotics, HTN, per patient and son CHF, fibromyalgia, Hyponatremia, anxiety/depression, and hypothyroidism. Patient is [place]. Patient is never a smoker. She is allergic to PCN and sulfa.   Jacqueline is up in her room sitting in WC and appears comfortable and in no acute distress. She denies HA, CP or SOB. Barker to CD. Pt expresses that she does not want to take the NaCl tabs. Nursing reports no concerns.    REVIEW OF SYSTEMS:  All review of systems are negative unless otherwise stated above under subjective.    LABS REVIEWED AT FACILITY:  Pending labs 24    Allergies   Allergen Reactions   • Penicillins Unknown   • Sulfa (Sulfonamide Antibiotics) Unknown and Rash       MEDICATIONS REVIEWED AT FACILITY:  Please see Nursing facility Medication EMR for full updated list.    Living will related issues reviewed-Code status: DNRCCA    OBJECTIVE:  /57   Pulse 65   Temp 36.4 °C (97.5 °F)   Resp 16   Wt 64 kg (141 lb)   BMI 24.98 kg/m²   Physical Exam  HENT:      Head: Normocephalic. [place], right outer ear slight ecchymosis     Mouth/Throat:      Mouth: Mucous membranes are moist.   Cardiovascular:      Rate and Rhythm: Normal rate and regular rhythm.   Pulmonary:      Effort: Pulmonary  effort is normal.      Breath sounds: Normal breath sounds.   Abdominal:      General: Abdomen is flat. Bowel sounds are normal.      Palpations: Abdomen is soft.   Genitourinary:     Comments: Genitalia not examined, Barker cath to CD  Musculoskeletal:      Cervical back: Neck supple.      Comments: Generalized weakness +1 pitting edema, JAN hose on  Skin:     General: Skin is warm and dry.      Capillary Refill: Capillary refill takes less than 2 seconds. Healed left hip incision  Neurological:      General: No focal deficit present.      Mental Status: She is alert and oriented to person, place, and time. Forgetful and due to [place] difficult to maintain conversation.  Psychiatric:         Mood and Affect: Mood normal.      Assessment/Plan  Problem List Items Addressed This Visit       Anxiety    Depression    Fibromyalgia    HTN (hypertension), benign - Primary    Hyponatremia    Hypothyroidism    Osteoarthritis    Urinary retention    History of recurrent UTIs    Barker catheter in place       Skin integrity:  Nursing to monitor skin integrity as patient is at risk for pressure injuries.  Turn and reposition Q 2 hours or more.  Air mattress and when up in chair cushion reducing device.  Dietician to evaluate and recommend.  Nutritional supplement to be implemented if needed.  Please monitor skin integrity and other pressure areas.    PLAN:  Pt has been seen for monthly visit.    The patient is confined to a long term care facility.   Recent nursing evaluation and notes were reviewed.     #Urinary retention: Previously followed with Dr. Padilla,16 french Barker to , cont Flomax     #Recurrent UTI: prophylaxis Macrobid 50 mg daily to cont    #HTN/CKD: BP readings monitored and range 130/50's. Cont to follow BP readings, Amlodipine increased on 4/18 to 5 mg, cont M. Tartrate 12.5 mg bid, Lisinopril 40 mg daily, cont to follow labs, pending 8/30, cont to avoid nephrotoxic drugs, adjust meds as needed    #h/o CHF: Per  patient and son history of heart failure however no cardiology notes or reports, weight is stable patient is not having any fluid overload. Weight is 141#     #Hyponatremia: stable Na level, on NaCl tabs daily. Will see labs on 8/30 and dc NaCl tabs if stable    #Chronic pain/fibra myalgia: cont gabapentin, routine Tylenol, Norco PRN, Voltaren gel, and Muscle rub PRN    #Hypothyroidism: labs reviewed, cont Levothyroxine 100 mcg daily, last TSH in June 2024    #Depression/Anxiety and insomnia: cont Sertraline 75 mg daily, Buspirone 10 mg TID, and Diazepam 2.5 mg at HS routine. Melatonin 3 mg at bedtime routine. Followed by [facility]. Mood is stable. Sleeping well. Cont to monitor for side effects from antidepressants.     Any decline or change in condition needs to be communicated with the physician or myself.    Discussion with nursing staff regarding ongoing care and management.  Communication regarding patients status, overall condition, changes with plan (medications or treatments), and any questions from family completed if present.  If family not available, would communicate in person or via phone if needed.  We will continue with the plan and medications noted above.    We will continue to follow the patient here at the facility.    Discharge planning:   No plan for discharge, LTC resident, rehab potential    *Please note that nursing facility, outside laboratory agency, and [facility] do not interface.     Completion of the note was done through Dragon voice recognition technology and may include   unintended or grammatical errors which may not have been recognized when finalizing the note.     Time:    GLORIA Valverde      Electronically Signed By: GLORIA Valverde   9/4/24  1:02 PM

## 2024-09-04 VITALS
HEART RATE: 65 BPM | RESPIRATION RATE: 16 BRPM | DIASTOLIC BLOOD PRESSURE: 57 MMHG | BODY MASS INDEX: 24.98 KG/M2 | TEMPERATURE: 97.5 F | SYSTOLIC BLOOD PRESSURE: 133 MMHG | WEIGHT: 141 LBS

## 2024-09-04 NOTE — PROGRESS NOTES
Name: Jacqueline Sandoval  YOB: 1935    MONTHLY VISIT: Long term care resident, f/up on multiple chronic conditions    SUBJECTIVE:  Mrs. Sandoval is a 88 yr old female who is here at [facility] care as a LTC resident. She transitioned here after a hospitalization in Oct of 2022.   PMH of fx left femur and repair on 11/24/23 (post op anemia due to blood loss and received one unit of pRBC's, post op CAP treated w/Levaquin - recovered, and urinary retention requiring Barker cath placement with failed attempts to pass voiding trial and started on Flomax - previously followed by urology Dr. Padilla), left humerus and repair 9/2022, recurrent UTI's - on prophylactic antibiotics, HTN, per patient and son CHF, fibromyalgia, Hyponatremia, anxiety/depression, and hypothyroidism. Patient is [place]. Patient is never a smoker. She is allergic to PCN and sulfa.   Jacqueline is up in her room sitting in WC and appears comfortable and in no acute distress. She denies HA, CP or SOB. Barker to CD. Pt expresses that she does not want to take the NaCl tabs. Nursing reports no concerns.    REVIEW OF SYSTEMS:  All review of systems are negative unless otherwise stated above under subjective.    LABS REVIEWED AT FACILITY:  Pending labs 8/30/24    Allergies   Allergen Reactions    Penicillins Unknown    Sulfa (Sulfonamide Antibiotics) Unknown and Rash       MEDICATIONS REVIEWED AT FACILITY:  Please see Nursing facility Medication EMR for full updated list.    Living will related issues reviewed-Code status: DNRCCA    OBJECTIVE:  /57   Pulse 65   Temp 36.4 °C (97.5 °F)   Resp 16   Wt 64 kg (141 lb)   BMI 24.98 kg/m²   Physical Exam  HENT:      Head: Normocephalic. [place], right outer ear slight ecchymosis     Mouth/Throat:      Mouth: Mucous membranes are moist.   Cardiovascular:      Rate and Rhythm: Normal rate and regular rhythm.   Pulmonary:      Effort: Pulmonary effort is normal.      Breath sounds: Normal breath sounds.   Abdominal:       General: Abdomen is flat. Bowel sounds are normal.      Palpations: Abdomen is soft.   Genitourinary:     Comments: Genitalia not examined, Barker cath to CD  Musculoskeletal:      Cervical back: Neck supple.      Comments: Generalized weakness +1 pitting edema, JAN hose on  Skin:     General: Skin is warm and dry.      Capillary Refill: Capillary refill takes less than 2 seconds. Healed left hip incision  Neurological:      General: No focal deficit present.      Mental Status: She is alert and oriented to person, place, and time. Forgetful and due to [place] difficult to maintain conversation.  Psychiatric:         Mood and Affect: Mood normal.      Assessment/Plan   Problem List Items Addressed This Visit       Anxiety    Depression    Fibromyalgia    HTN (hypertension), benign - Primary    Hyponatremia    Hypothyroidism    Osteoarthritis    Urinary retention    History of recurrent UTIs    Barker catheter in place       Skin integrity:  Nursing to monitor skin integrity as patient is at risk for pressure injuries.  Turn and reposition Q 2 hours or more.  Air mattress and when up in chair cushion reducing device.  Dietician to evaluate and recommend.  Nutritional supplement to be implemented if needed.  Please monitor skin integrity and other pressure areas.    PLAN:  Pt has been seen for monthly visit.    The patient is confined to a long term care facility.   Recent nursing evaluation and notes were reviewed.     #Urinary retention: Previously followed with Dr. Padilla,16 french Barker to CD, cont Flomax     #Recurrent UTI: prophylaxis Macrobid 50 mg daily to cont    #HTN/CKD: BP readings monitored and range 130/50's. Cont to follow BP readings, Amlodipine increased on 4/18 to 5 mg, cont M. Tartrate 12.5 mg bid, Lisinopril 40 mg daily, cont to follow labs, pending 8/30, cont to avoid nephrotoxic drugs, adjust meds as needed    #h/o CHF: Per patient and son history of heart failure however no cardiology notes or  reports, weight is stable patient is not having any fluid overload. Weight is 141#     #Hyponatremia: stable Na level, on NaCl tabs daily. Will see labs on 8/30 and dc NaCl tabs if stable    #Chronic pain/fibra myalgia: cont gabapentin, routine Tylenol, Norco PRN, Voltaren gel, and Muscle rub PRN    #Hypothyroidism: labs reviewed, cont Levothyroxine 100 mcg daily, last TSH in June 2024    #Depression/Anxiety and insomnia: cont Sertraline 75 mg daily, Buspirone 10 mg TID, and Diazepam 2.5 mg at HS routine. Melatonin 3 mg at bedtime routine. Followed by [facility]. Mood is stable. Sleeping well. Cont to monitor for side effects from antidepressants.     Any decline or change in condition needs to be communicated with the physician or myself.    Discussion with nursing staff regarding ongoing care and management.  Communication regarding patients status, overall condition, changes with plan (medications or treatments), and any questions from family completed if present.  If family not available, would communicate in person or via phone if needed.  We will continue with the plan and medications noted above.    We will continue to follow the patient here at the facility.    Discharge planning:   No plan for discharge, LTC resident, rehab potential    *Please note that nursing facility, outside laboratory agency, and  [facility] do not interface.     Completion of the note was done through Dragon voice recognition technology and may include   unintended or grammatical errors which may not have been recognized when finalizing the note.     Time:    Rosetta Christy, APRN-CNP

## 2024-10-07 ENCOUNTER — NURSING HOME VISIT (OUTPATIENT)
Dept: POST ACUTE CARE | Facility: EXTERNAL LOCATION | Age: 89
End: 2024-10-07
Payer: COMMERCIAL

## 2024-10-07 DIAGNOSIS — R33.9 URINARY RETENTION: ICD-10-CM

## 2024-10-07 DIAGNOSIS — R39.9 UTI SYMPTOMS: Primary | ICD-10-CM

## 2024-10-07 DIAGNOSIS — E03.9 HYPOTHYROIDISM, UNSPECIFIED TYPE: ICD-10-CM

## 2024-10-07 DIAGNOSIS — Z87.440 HISTORY OF RECURRENT UTIS: ICD-10-CM

## 2024-10-07 DIAGNOSIS — I10 HTN (HYPERTENSION), BENIGN: ICD-10-CM

## 2024-10-07 DIAGNOSIS — Z97.8 FOLEY CATHETER IN PLACE: ICD-10-CM

## 2024-10-07 PROCEDURE — 99309 SBSQ NF CARE MODERATE MDM 30: CPT | Performed by: NURSE PRACTITIONER

## 2024-10-07 NOTE — LETTER
"Patient: Jacqueline Sandoval  : 1935    Encounter Date: 10/07/2024    Name: Jacqueline Sandoval  YOB: 1935    ACUTE VISIT: Long term care resident, UA sent, c/o bladder spasms and pain    SUBJECTIVE:  The patient is sitting up in her WC this afternoon. Nursing reported that the patient is c/o feeling like she needs to urinate. Patient is not having fevers or chills. Patient states she feels like she has to constantly urinate along with burning even though she has a Morrison catheter.   Pt. has a Chronic morrison catheter for chronic urinary retention. Morrison cath was changed and UA sent. She is currently on Macrobid 50 mg daily as a prophylaxis for UTI. Flomax was increased to 0.8 mg daily. As UA C/S is pending we will start Pyridium 100 mg TID x 48 hours.     REVIEW OF SYSTEMS:   All review of systems are negative unless otherwise stated above under subjective.    LABS REVIEWED AT FACILITY:  UA C/S pending    10/4/24  UA showed mixed microbiota     10/3/24  TSH 3.110 and Free T4 1.4    10/2/24 CMP and CBC abnormal's  Albumin 3.6, BUN 28, Creat 1.56, GFR 32, H/H 10/29.9, Total protein 5.8, RBC 3.06    Allergies   Allergen Reactions   • Penicillins Unknown   • Sulfa (Sulfonamide Antibiotics) Unknown and Rash       MEDICATIONS REVIEWED AT FACILITY:  Please see Nursing facility Medication EMR for full updated list.    Living will related issues reviewed-Code status: DNRCCA    OBJECTIVE:  BP (!) 132/48   Pulse 60   Temp 36.6 °C (97.8 °F)   Resp 16   Ht 1.6 m (5' 3\")   Wt 60.5 kg (133 lb 4.8 oz)   SpO2 98% Comment: RA  BMI 23.61 kg/m²   Physical Exam  Constitutional:       Appearance: She is normal weight.   HENT:      Head: Normocephalic.   Cardiovascular:      Rate and Rhythm: Normal rate and regular rhythm.   Pulmonary:      Effort: Pulmonary effort is normal.      Breath sounds: Normal breath sounds.   Abdominal:      General: Abdomen is flat. Bowel sounds are normal.      Palpations: Abdomen is soft. "   Genitourinary:     Comments: Barker to CD - cloudy colored pale yellow urine  Musculoskeletal:         General: Tenderness present.   Skin:     General: Skin is warm and dry.   Neurological:      General: No focal deficit present.      Mental Status: She is alert.      Comments: Oriented x2  [place]   Psychiatric:         Mood and Affect: Mood normal.         Assessment/Plan  Problem List Items Addressed This Visit       HTN (hypertension), benign    Hypothyroidism    Urinary retention    History of recurrent UTIs    Barker catheter in place     Other Visit Diagnoses       UTI symptoms    -  Primary            Skin integrity:  Nursing to monitor skin integrity as patient is at risk for pressure injuries.  Turn and reposition Q 2 hours or more.  Air mattress and when up in chair cushion reducing device.  Dietician to evaluate and recommend.  Nutritional supplement to be implemented if needed.  Please monitor skin integrity and other pressure areas.    PLAN:  Pt has been seen for an Acute visit.  The patient resides in a Long term care facility.  Recent nursing evaluation and notes were reviewed.     UTI symptoms:  Flomax increased 0.8 mg daily  Pyridium 100 mg TID x 48 hr  UA C/S pending  Macrobid 50 mg daily prophylaxis    Hypothyroid:  TSH reviewed  Cont Levothyroxine 100 mcg daily    HTN:   Monitor and follow BP readings.   Continue home meds - Amlodipine 5 mg daily, Lisinopril 40 mg daily, Metoprolol tartrate 12.5 mg BID  Labs to be done intermittently and adjust meds as needed.  BP readings reviewed -  130/50's  Labs reviewed - see above  Avoid nephrotoxic drugs.  Discussion with Jacqueline Sandoval regarding goals of BP readings to be less than 120/80, daily monitoring, medication compliance and life style changes.       Any decline or change in condition needs to be communicated with the physician or myself.    Discussion with nursing staff regarding ongoing care and management.  Communication regarding patients  status, overall condition, changes with plan (medications or treatments), and any questions from family completed if present.  If family not available, would communicate in person or via phone if needed.  We will continue with the medications noted above.    We will continue to follow the patient here at the facility.    Discharge planning:   No plan for discharge, LTC resident, rehab potential    *Please note that nursing facility, outside laboratory agency, and [facility] do not interface.     Completion of the note was done through Dragon voice recognition technology and may include   unintended or grammatical errors which may not have been recognized when finalizing the note.     Time:    GLORIA Valverde      Electronically Signed By: GLORIA Valverde   10/14/24  3:28 PM

## 2024-10-14 VITALS
SYSTOLIC BLOOD PRESSURE: 132 MMHG | BODY MASS INDEX: 23.62 KG/M2 | HEART RATE: 60 BPM | TEMPERATURE: 97.8 F | OXYGEN SATURATION: 98 % | DIASTOLIC BLOOD PRESSURE: 48 MMHG | WEIGHT: 133.3 LBS | RESPIRATION RATE: 16 BRPM | HEIGHT: 63 IN

## 2024-10-14 NOTE — PROGRESS NOTES
"Name: Jacqueline Sandoval  YOB: 1935    ACUTE VISIT: Long term care resident, UA sent, c/o bladder spasms and pain    SUBJECTIVE:  The patient is sitting up in her WC this afternoon. Nursing reported that the patient is c/o feeling like she needs to urinate. Patient is not having fevers or chills. Patient states she feels like she has to constantly urinate along with burning even though she has a Morrison catheter.   Pt. has a Chronic morrison catheter for chronic urinary retention. Morrison cath was changed and UA sent. She is currently on Macrobid 50 mg daily as a prophylaxis for UTI. Flomax was increased to 0.8 mg daily. As UA C/S is pending we will start Pyridium 100 mg TID x 48 hours.     REVIEW OF SYSTEMS:   All review of systems are negative unless otherwise stated above under subjective.    LABS REVIEWED AT FACILITY:  UA C/S pending    10/4/24  UA showed mixed microbiota     10/3/24  TSH 3.110 and Free T4 1.4    10/2/24 CMP and CBC abnormal's  Albumin 3.6, BUN 28, Creat 1.56, GFR 32, H/H 10/29.9, Total protein 5.8, RBC 3.06    Allergies   Allergen Reactions    Penicillins Unknown    Sulfa (Sulfonamide Antibiotics) Unknown and Rash       MEDICATIONS REVIEWED AT FACILITY:  Please see Nursing facility Medication EMR for full updated list.    Living will related issues reviewed-Code status: DNRCCA    OBJECTIVE:  BP (!) 132/48   Pulse 60   Temp 36.6 °C (97.8 °F)   Resp 16   Ht 1.6 m (5' 3\")   Wt 60.5 kg (133 lb 4.8 oz)   SpO2 98% Comment: RA  BMI 23.61 kg/m²   Physical Exam  Constitutional:       Appearance: She is normal weight.   HENT:      Head: Normocephalic.   Cardiovascular:      Rate and Rhythm: Normal rate and regular rhythm.   Pulmonary:      Effort: Pulmonary effort is normal.      Breath sounds: Normal breath sounds.   Abdominal:      General: Abdomen is flat. Bowel sounds are normal.      Palpations: Abdomen is soft.   Genitourinary:     Comments: Morrison to CD - cloudy colored pale yellow " urine  Musculoskeletal:         General: Tenderness present.   Skin:     General: Skin is warm and dry.   Neurological:      General: No focal deficit present.      Mental Status: She is alert.      Comments: Oriented x2  [place]   Psychiatric:         Mood and Affect: Mood normal.         Assessment/Plan   Problem List Items Addressed This Visit       HTN (hypertension), benign    Hypothyroidism    Urinary retention    History of recurrent UTIs    Barker catheter in place     Other Visit Diagnoses       UTI symptoms    -  Primary            Skin integrity:  Nursing to monitor skin integrity as patient is at risk for pressure injuries.  Turn and reposition Q 2 hours or more.  Air mattress and when up in chair cushion reducing device.  Dietician to evaluate and recommend.  Nutritional supplement to be implemented if needed.  Please monitor skin integrity and other pressure areas.    PLAN:  Pt has been seen for an Acute visit.  The patient resides in a Long term care facility.  Recent nursing evaluation and notes were reviewed.     UTI symptoms:  Flomax increased 0.8 mg daily  Pyridium 100 mg TID x 48 hr  UA C/S pending  Macrobid 50 mg daily prophylaxis    Hypothyroid:  TSH reviewed  Cont Levothyroxine 100 mcg daily    HTN:   Monitor and follow BP readings.   Continue home meds - Amlodipine 5 mg daily, Lisinopril 40 mg daily, Metoprolol tartrate 12.5 mg BID  Labs to be done intermittently and adjust meds as needed.  BP readings reviewed -  130/50's  Labs reviewed - see above  Avoid nephrotoxic drugs.  Discussion with Jacqueline Sandoval regarding goals of BP readings to be less than 120/80, daily monitoring, medication compliance and life style changes.       Any decline or change in condition needs to be communicated with the physician or myself.    Discussion with nursing staff regarding ongoing care and management.  Communication regarding patients status, overall condition, changes with plan (medications or treatments), and  any questions from family completed if present.  If family not available, would communicate in person or via phone if needed.  We will continue with the medications noted above.    We will continue to follow the patient here at the facility.    Discharge planning:   No plan for discharge, LTC resident, rehab potential    *Please note that nursing facility, outside laboratory agency, and  [facility] do not interface.     Completion of the note was done through Dragon voice recognition technology and may include   unintended or grammatical errors which may not have been recognized when finalizing the note.     Time:    JOCELIN Valverde-CNP

## 2024-11-19 ENCOUNTER — DOCUMENTATION (OUTPATIENT)
Dept: POST ACUTE CARE | Facility: EXTERNAL LOCATION | Age: 89
End: 2024-11-19
Payer: COMMERCIAL

## 2024-11-19 DIAGNOSIS — M79.7 FIBROMYALGIA: ICD-10-CM

## 2024-11-19 RX ORDER — HYDROCODONE BITARTRATE AND ACETAMINOPHEN 5; 325 MG/1; MG/1
1 TABLET ORAL EVERY 6 HOURS PRN
Qty: 240 TABLET | Refills: 0 | Status: SHIPPED | OUTPATIENT
Start: 2024-11-19 | End: 2025-01-18

## 2024-12-12 ENCOUNTER — NURSING HOME VISIT (OUTPATIENT)
Dept: POST ACUTE CARE | Facility: EXTERNAL LOCATION | Age: 89
End: 2024-12-12
Payer: COMMERCIAL

## 2024-12-12 DIAGNOSIS — H91.90 HOH (HARD OF HEARING): ICD-10-CM

## 2024-12-12 DIAGNOSIS — I10 HTN (HYPERTENSION), BENIGN: Primary | ICD-10-CM

## 2024-12-12 DIAGNOSIS — Z87.440 HISTORY OF RECURRENT UTIS: ICD-10-CM

## 2024-12-12 DIAGNOSIS — Z97.8 FOLEY CATHETER IN PLACE: ICD-10-CM

## 2024-12-12 DIAGNOSIS — H01.00A BLEPHARITIS OF BOTH UPPER AND LOWER EYELID OF RIGHT EYE, UNSPECIFIED TYPE: ICD-10-CM

## 2024-12-12 DIAGNOSIS — R33.9 URINARY RETENTION: ICD-10-CM

## 2024-12-12 DIAGNOSIS — F41.9 ANXIETY: ICD-10-CM

## 2024-12-12 DIAGNOSIS — M79.7 FIBROMYALGIA: ICD-10-CM

## 2024-12-12 DIAGNOSIS — E03.9 HYPOTHYROIDISM, UNSPECIFIED TYPE: ICD-10-CM

## 2024-12-12 DIAGNOSIS — M19.90 OSTEOARTHRITIS, UNSPECIFIED OSTEOARTHRITIS TYPE, UNSPECIFIED SITE: ICD-10-CM

## 2024-12-12 DIAGNOSIS — F32.A DEPRESSION, UNSPECIFIED DEPRESSION TYPE: ICD-10-CM

## 2024-12-12 PROCEDURE — 99309 SBSQ NF CARE MODERATE MDM 30: CPT | Performed by: NURSE PRACTITIONER

## 2024-12-12 NOTE — LETTER
"Patient: Jacqueline Sandoval  : 1935    Encounter Date: 2024    Name: Jacqueline Sandoval  YOB: 1935    MONTHLY VISIT: Long term care resident    SUBJECTIVE:  The patient is resting in bed this afternoon.  Nursing reports the patient was complaining about her right eye.  At this time there is some irritation noted to the lash line of the right eye.  The sclera is clear.  Patient denies chest pain shortness of breath.  Barker catheter continues to be in place draining clear yellow urine.  Patient continues on prophylactic antibiotic for UTI.     REVIEW OF SYSTEMS:  All review of systems are negative unless otherwise stated above under subjective.    Allergies   Allergen Reactions   • Penicillins Unknown   • Sulfa (Sulfonamide Antibiotics) Unknown and Rash       MEDICATIONS REVIEWED AT FACILITY:  Please see nursing facility medication list.    Living will related issues reviewed-Code status: DNRCCA    OBJECTIVE:  /58   Pulse 58   Temp 36.8 °C (98.2 °F)   Resp 18   Ht 1.6 m (5' 3\")   Wt 62.9 kg (138 lb 9.6 oz)   SpO2 98% Comment: RA  BMI 24.55 kg/m²   Physical Exam  Constitutional:       Appearance: She is normal weight.   HENT:      Head: Normocephalic. Right eye upper and lower lash line is erythremic and edematous  Cardiovascular:      Rate and Rhythm: Normal rate and regular rhythm.   Pulmonary:      Effort: Pulmonary effort is normal.      Breath sounds: Normal breath sounds.   Abdominal:      General: Abdomen is flat. Bowel sounds are normal.      Palpations: Abdomen is soft.   Genitourinary:     Comments: [place] to CD - clear pale yellow urine  Musculoskeletal:         General: Tenderness present.   Skin:     General: Skin is warm and dry.   Neurological:      General: No focal deficit present.      Mental Status: She is alert.      Comments: Oriented x2  [place]   Psychiatric:         Mood and Affect: Mood normal.        Assessment/Plan  Problem List Items Addressed This Visit       " Anxiety    Depression    Fibromyalgia    HTN (hypertension), benign - Primary    [place] (hard of hearing)    Hypothyroidism    Osteoarthritis    Urinary retention    History of recurrent UTIs    Barker catheter in place     Other Visit Diagnoses       Blepharitis of both upper and lower eyelid of right eye, unspecified type                Skin integrity:  Nursing to monitor skin integrity as patient is at risk for pressure injuries.  Turn and reposition Q 2 hours or more.  Air mattress and when up in chair cushion reducing device.  Dietician to evaluate and recommend.  Nutritional supplement to be implemented if needed.  Please monitor skin integrity and other pressure areas.    PLAN:  Pt has been seen for monthly visit.    The patient is confined to a long term care facility.   Recent nursing evaluation and notes were reviewed.     Blepharitis:  Wash with baby shampoo daily  Warm compresses 4 times daily and apply erythromycin ointment after x 7 days.    UTI prophylaxis:  Macrobid 50 mg daily prophylaxis     Urinary retention:  Chronic Barker catheter    Depression/Anxiety:  Vistaril 25 mg every 8 as needed  BuSpar 10 mg 3 times daily  Diazepam 2.5 mg at bedtime  Zoloft 125 mg daily  Followed by mind care    CAD:  Aspirin 81 mg daily    Pain, OA:  Voltaren gel  Tylenol arthritis pain medication 2 tabs twice a day  Norco 1 tab every 6 hours as needed  Muscle rub cream as needed  Gabapentin 200 mg nightly    Hypothyroid:  TSH reviewed 10/3/24  TSH 3.110 and T4 1.4  Cont Levothyroxine 100 mcg daily     HTN:   Monitor and follow BP readings.   Continue home meds - Amlodipine 5 mg daily, Lisinopril 40 mg daily, Metoprolol tartrate 12.5 mg BID  Labs to be done intermittently and adjust meds as needed.  BP readings reviewed -  140/50's  Avoid nephrotoxic drugs.    Any decline or change in condition needs to be communicated with the physician or myself.    Discussion with nursing staff regarding ongoing care and  management.  Communication regarding patients status, overall condition, changes with plan (medications or treatments), and any questions from family completed if present.  If family not available, would communicate in person or via phone if needed.  We will continue with the plan and medications noted above.    We will continue to follow the patient here at the facility.    Discharge planning:   No plan for discharge, LTC resident, rehab potential    *Please note that nursing facility notes, facility EMR, outside laboratory agency, and  EMR do not interface.     Completion of the note was done through Dragon voice recognition technology and may include unintended or grammatical errors which may not have been recognized when finalizing the note.     Time:    GLORIA Valverde      Electronically Signed By: GLORIA Valverde   12/18/24  7:00 PM

## 2024-12-18 VITALS
WEIGHT: 138.6 LBS | DIASTOLIC BLOOD PRESSURE: 58 MMHG | BODY MASS INDEX: 24.56 KG/M2 | RESPIRATION RATE: 18 BRPM | HEIGHT: 63 IN | HEART RATE: 58 BPM | TEMPERATURE: 98.2 F | OXYGEN SATURATION: 98 % | SYSTOLIC BLOOD PRESSURE: 140 MMHG

## 2024-12-18 NOTE — PROGRESS NOTES
"Name: Jacqueline Sandoval  YOB: 1935    MONTHLY VISIT: Long term care resident    SUBJECTIVE:  The patient is resting in bed this afternoon.  Nursing reports the patient was complaining about her right eye.  At this time there is some irritation noted to the lash line of the right eye.  The sclera is clear.  Patient denies chest pain shortness of breath.  Barker catheter continues to be in place draining clear yellow urine.  Patient continues on prophylactic antibiotic for UTI.     REVIEW OF SYSTEMS:  All review of systems are negative unless otherwise stated above under subjective.    Allergies   Allergen Reactions    Penicillins Unknown    Sulfa (Sulfonamide Antibiotics) Unknown and Rash       MEDICATIONS REVIEWED AT FACILITY:  Please see nursing facility medication list.    Living will related issues reviewed-Code status: DNRCCA    OBJECTIVE:  /58   Pulse 58   Temp 36.8 °C (98.2 °F)   Resp 18   Ht 1.6 m (5' 3\")   Wt 62.9 kg (138 lb 9.6 oz)   SpO2 98% Comment: RA  BMI 24.55 kg/m²   Physical Exam  Constitutional:       Appearance: She is normal weight.   HENT:      Head: Normocephalic. Right eye upper and lower lash line is erythremic and edematous  Cardiovascular:      Rate and Rhythm: Normal rate and regular rhythm.   Pulmonary:      Effort: Pulmonary effort is normal.      Breath sounds: Normal breath sounds.   Abdominal:      General: Abdomen is flat. Bowel sounds are normal.      Palpations: Abdomen is soft.   Genitourinary:     Comments: Barker to CD - clear pale yellow urine  Musculoskeletal:         General: Tenderness present.   Skin:     General: Skin is warm and dry.   Neurological:      General: No focal deficit present.      Mental Status: She is alert.      Comments: Oriented x2  [place]   Psychiatric:         Mood and Affect: Mood normal.        Assessment/Plan   Problem List Items Addressed This Visit       Anxiety    Depression    Fibromyalgia    HTN (hypertension), benign - Primary "    [place] (hard of hearing)    Hypothyroidism    Osteoarthritis    Urinary retention    History of recurrent UTIs    Barker catheter in place     Other Visit Diagnoses       Blepharitis of both upper and lower eyelid of right eye, unspecified type                Skin integrity:  Nursing to monitor skin integrity as patient is at risk for pressure injuries.  Turn and reposition Q 2 hours or more.  Air mattress and when up in chair cushion reducing device.  Dietician to evaluate and recommend.  Nutritional supplement to be implemented if needed.  Please monitor skin integrity and other pressure areas.    PLAN:  Pt has been seen for monthly visit.    The patient is confined to a long term care facility.   Recent nursing evaluation and notes were reviewed.     Blepharitis:  Wash with baby shampoo daily  Warm compresses 4 times daily and apply erythromycin ointment after x 7 days.    UTI prophylaxis:  Macrobid 50 mg daily prophylaxis     Urinary retention:  Chronic Barker catheter    Depression/Anxiety:  Vistaril 25 mg every 8 as needed  BuSpar 10 mg 3 times daily  Diazepam 2.5 mg at bedtime  Zoloft 125 mg daily  Followed by mind care    CAD:  Aspirin 81 mg daily    Pain, OA:  Voltaren gel  Tylenol arthritis pain medication 2 tabs twice a day  Norco 1 tab every 6 hours as needed  Muscle rub cream as needed  Gabapentin 200 mg nightly    Hypothyroid:  TSH reviewed 10/3/24  TSH 3.110 and T4 1.4  Cont Levothyroxine 100 mcg daily     HTN:   Monitor and follow BP readings.   Continue home meds - Amlodipine 5 mg daily, Lisinopril 40 mg daily, Metoprolol tartrate 12.5 mg BID  Labs to be done intermittently and adjust meds as needed.  BP readings reviewed -  140/50's  Avoid nephrotoxic drugs.    Any decline or change in condition needs to be communicated with the physician or myself.    Discussion with nursing staff regarding ongoing care and management.  Communication regarding patients status, overall condition, changes with plan  (medications or treatments), and any questions from family completed if present.  If family not available, would communicate in person or via phone if needed.  We will continue with the plan and medications noted above.    We will continue to follow the patient here at the facility.    Discharge planning:   No plan for discharge, LTC resident, rehab potential    *Please note that nursing facility notes, facility EMR, outside laboratory agency, and  EMR do not interface.     Completion of the note was done through Dragon voice recognition technology and may include unintended or grammatical errors which may not have been recognized when finalizing the note.     Time:    Rosetta Christy, APRN-CNP

## 2025-01-03 ENCOUNTER — APPOINTMENT (OUTPATIENT)
Dept: RADIOLOGY | Facility: HOSPITAL | Age: OVER 89
End: 2025-01-03
Payer: COMMERCIAL

## 2025-01-03 ENCOUNTER — HOSPITAL ENCOUNTER (INPATIENT)
Facility: HOSPITAL | Age: OVER 89
End: 2025-01-03
Attending: STUDENT IN AN ORGANIZED HEALTH CARE EDUCATION/TRAINING PROGRAM | Admitting: HOSPITALIST
Payer: COMMERCIAL

## 2025-01-03 ENCOUNTER — APPOINTMENT (OUTPATIENT)
Dept: CARDIOLOGY | Facility: HOSPITAL | Age: OVER 89
End: 2025-01-03
Payer: COMMERCIAL

## 2025-01-03 DIAGNOSIS — G93.40 ENCEPHALOPATHY ACUTE: ICD-10-CM

## 2025-01-03 DIAGNOSIS — R00.1 BRADYCARDIA: Primary | ICD-10-CM

## 2025-01-03 DIAGNOSIS — T83.511A URINARY TRACT INFECTION ASSOCIATED WITH INDWELLING URETHRAL CATHETER, INITIAL ENCOUNTER (CMS-HCC): ICD-10-CM

## 2025-01-03 DIAGNOSIS — I10 HTN (HYPERTENSION), BENIGN: ICD-10-CM

## 2025-01-03 DIAGNOSIS — I50.9 HEART FAILURE, UNSPECIFIED: ICD-10-CM

## 2025-01-03 DIAGNOSIS — G93.40 ENCEPHALOPATHY, UNSPECIFIED TYPE: ICD-10-CM

## 2025-01-03 DIAGNOSIS — E03.9 HYPOTHYROIDISM, UNSPECIFIED TYPE: ICD-10-CM

## 2025-01-03 DIAGNOSIS — N39.0 URINARY TRACT INFECTION ASSOCIATED WITH INDWELLING URETHRAL CATHETER, INITIAL ENCOUNTER (CMS-HCC): ICD-10-CM

## 2025-01-03 DIAGNOSIS — I50.43 CHF (CONGESTIVE HEART FAILURE), NYHA CLASS I, ACUTE ON CHRONIC, COMBINED: ICD-10-CM

## 2025-01-03 LAB
ALBUMIN SERPL BCP-MCNC: 3.7 G/DL (ref 3.4–5)
ALP SERPL-CCNC: 73 U/L (ref 33–136)
ALT SERPL W P-5'-P-CCNC: 7 U/L (ref 7–45)
AMMONIA PLAS-SCNC: 30 UMOL/L (ref 16–53)
ANION GAP SERPL CALC-SCNC: 15 MMOL/L (ref 10–20)
APAP SERPL-MCNC: <10 UG/ML
APPEARANCE UR: ABNORMAL
AST SERPL W P-5'-P-CCNC: 10 U/L (ref 9–39)
BACTERIA #/AREA URNS AUTO: ABNORMAL /HPF
BASE EXCESS BLDV CALC-SCNC: -7.5 MMOL/L (ref -2–3)
BASOPHILS # BLD AUTO: 0.03 X10*3/UL (ref 0–0.1)
BASOPHILS NFR BLD AUTO: 0.3 %
BILIRUB SERPL-MCNC: 0.3 MG/DL (ref 0–1.2)
BILIRUB UR STRIP.AUTO-MCNC: NEGATIVE MG/DL
BODY TEMPERATURE: ABNORMAL
BUN SERPL-MCNC: 46 MG/DL (ref 6–23)
CALCIUM SERPL-MCNC: 9 MG/DL (ref 8.6–10.3)
CARDIAC TROPONIN I PNL SERPL HS: 5 NG/L (ref 0–13)
CARDIAC TROPONIN I PNL SERPL HS: 7 NG/L (ref 0–13)
CHLORIDE SERPL-SCNC: 106 MMOL/L (ref 98–107)
CO2 SERPL-SCNC: 18 MMOL/L (ref 21–32)
COLOR UR: ABNORMAL
CREAT SERPL-MCNC: 2.12 MG/DL (ref 0.5–1.05)
EGFRCR SERPLBLD CKD-EPI 2021: 22 ML/MIN/1.73M*2
EOSINOPHIL # BLD AUTO: 0.29 X10*3/UL (ref 0–0.4)
EOSINOPHIL NFR BLD AUTO: 3.3 %
ERYTHROCYTE [DISTWIDTH] IN BLOOD BY AUTOMATED COUNT: 13.9 % (ref 11.5–14.5)
ETHANOL SERPL-MCNC: <10 MG/DL
FLUAV RNA RESP QL NAA+PROBE: NOT DETECTED
FLUBV RNA RESP QL NAA+PROBE: NOT DETECTED
GLUCOSE SERPL-MCNC: 86 MG/DL (ref 74–99)
GLUCOSE UR STRIP.AUTO-MCNC: NORMAL MG/DL
HCO3 BLDV-SCNC: 17.4 MMOL/L (ref 22–26)
HCT VFR BLD AUTO: 32.2 % (ref 36–46)
HGB BLD-MCNC: 11.1 G/DL (ref 12–16)
HOLD SPECIMEN: NORMAL
IMM GRANULOCYTES # BLD AUTO: 0.04 X10*3/UL (ref 0–0.5)
IMM GRANULOCYTES NFR BLD AUTO: 0.5 % (ref 0–0.9)
INHALED O2 CONCENTRATION: 21 %
KETONES UR STRIP.AUTO-MCNC: NEGATIVE MG/DL
LACTATE SERPL-SCNC: 0.5 MMOL/L (ref 0.4–2)
LEUKOCYTE ESTERASE UR QL STRIP.AUTO: ABNORMAL
LYMPHOCYTES # BLD AUTO: 0.81 X10*3/UL (ref 0.8–3)
LYMPHOCYTES NFR BLD AUTO: 9.3 %
MAGNESIUM SERPL-MCNC: 2.48 MG/DL (ref 1.6–2.4)
MCH RBC QN AUTO: 32.4 PG (ref 26–34)
MCHC RBC AUTO-ENTMCNC: 34.5 G/DL (ref 32–36)
MCV RBC AUTO: 94 FL (ref 80–100)
MONOCYTES # BLD AUTO: 0.43 X10*3/UL (ref 0.05–0.8)
MONOCYTES NFR BLD AUTO: 5 %
MUCOUS THREADS #/AREA URNS AUTO: ABNORMAL /LPF
NEUTROPHILS # BLD AUTO: 7.08 X10*3/UL (ref 1.6–5.5)
NEUTROPHILS NFR BLD AUTO: 81.6 %
NITRITE UR QL STRIP.AUTO: NEGATIVE
NRBC BLD-RTO: 0 /100 WBCS (ref 0–0)
OXYHGB MFR BLDV: 67.9 % (ref 45–75)
PCO2 BLDV: 33 MM HG (ref 41–51)
PH BLDV: 7.33 PH (ref 7.33–7.43)
PH UR STRIP.AUTO: 5.5 [PH]
PLATELET # BLD AUTO: 297 X10*3/UL (ref 150–450)
PO2 BLDV: 42 MM HG (ref 35–45)
POTASSIUM SERPL-SCNC: 4.4 MMOL/L (ref 3.5–5.3)
PROT SERPL-MCNC: 6.7 G/DL (ref 6.4–8.2)
PROT UR STRIP.AUTO-MCNC: ABNORMAL MG/DL
RBC # BLD AUTO: 3.43 X10*6/UL (ref 4–5.2)
RBC # UR STRIP.AUTO: ABNORMAL /UL
RBC #/AREA URNS AUTO: >20 /HPF
SALICYLATES SERPL-MCNC: <3 MG/DL
SAO2 % BLDV: 70 % (ref 45–75)
SARS-COV-2 RNA RESP QL NAA+PROBE: NOT DETECTED
SODIUM SERPL-SCNC: 135 MMOL/L (ref 136–145)
SP GR UR STRIP.AUTO: 1.01
SQUAMOUS #/AREA URNS AUTO: ABNORMAL /HPF
T4 FREE SERPL-MCNC: 0.66 NG/DL (ref 0.61–1.12)
TSH SERPL-ACNC: 15.66 MIU/L (ref 0.44–3.98)
UROBILINOGEN UR STRIP.AUTO-MCNC: NORMAL MG/DL
VANCOMYCIN SERPL-MCNC: 15.5 UG/ML (ref 5–20)
WBC # BLD AUTO: 8.7 X10*3/UL (ref 4.4–11.3)
WBC #/AREA URNS AUTO: >50 /HPF
WBC CLUMPS #/AREA URNS AUTO: ABNORMAL /HPF
YEAST BUDDING #/AREA UR COMP ASSIST: PRESENT /HPF
YEAST HYPHAE #/AREA UR COMP ASSIST: PRESENT /HPF

## 2025-01-03 PROCEDURE — 99223 1ST HOSP IP/OBS HIGH 75: CPT | Performed by: HOSPITALIST

## 2025-01-03 PROCEDURE — 84443 ASSAY THYROID STIM HORMONE: CPT | Performed by: STUDENT IN AN ORGANIZED HEALTH CARE EDUCATION/TRAINING PROGRAM

## 2025-01-03 PROCEDURE — 87636 SARSCOV2 & INF A&B AMP PRB: CPT | Performed by: STUDENT IN AN ORGANIZED HEALTH CARE EDUCATION/TRAINING PROGRAM

## 2025-01-03 PROCEDURE — 80143 DRUG ASSAY ACETAMINOPHEN: CPT | Performed by: STUDENT IN AN ORGANIZED HEALTH CARE EDUCATION/TRAINING PROGRAM

## 2025-01-03 PROCEDURE — 51702 INSERT TEMP BLADDER CATH: CPT

## 2025-01-03 PROCEDURE — 70450 CT HEAD/BRAIN W/O DYE: CPT

## 2025-01-03 PROCEDURE — 71045 X-RAY EXAM CHEST 1 VIEW: CPT | Mod: FOREIGN READ | Performed by: RADIOLOGY

## 2025-01-03 PROCEDURE — 81001 URINALYSIS AUTO W/SCOPE: CPT | Performed by: STUDENT IN AN ORGANIZED HEALTH CARE EDUCATION/TRAINING PROGRAM

## 2025-01-03 PROCEDURE — 87040 BLOOD CULTURE FOR BACTERIA: CPT | Mod: ELYLAB | Performed by: STUDENT IN AN ORGANIZED HEALTH CARE EDUCATION/TRAINING PROGRAM

## 2025-01-03 PROCEDURE — 99497 ADVNCD CARE PLAN 30 MIN: CPT | Performed by: STUDENT IN AN ORGANIZED HEALTH CARE EDUCATION/TRAINING PROGRAM

## 2025-01-03 PROCEDURE — 71045 X-RAY EXAM CHEST 1 VIEW: CPT

## 2025-01-03 PROCEDURE — 2060000001 HC INTERMEDIATE ICU ROOM DAILY

## 2025-01-03 PROCEDURE — 85025 COMPLETE CBC W/AUTO DIFF WBC: CPT | Performed by: STUDENT IN AN ORGANIZED HEALTH CARE EDUCATION/TRAINING PROGRAM

## 2025-01-03 PROCEDURE — 36415 COLL VENOUS BLD VENIPUNCTURE: CPT | Performed by: STUDENT IN AN ORGANIZED HEALTH CARE EDUCATION/TRAINING PROGRAM

## 2025-01-03 PROCEDURE — 93010 ELECTROCARDIOGRAM REPORT: CPT | Performed by: INTERNAL MEDICINE

## 2025-01-03 PROCEDURE — 93005 ELECTROCARDIOGRAM TRACING: CPT

## 2025-01-03 PROCEDURE — 87077 CULTURE AEROBIC IDENTIFY: CPT | Mod: ELYLAB | Performed by: STUDENT IN AN ORGANIZED HEALTH CARE EDUCATION/TRAINING PROGRAM

## 2025-01-03 PROCEDURE — 84484 ASSAY OF TROPONIN QUANT: CPT | Performed by: STUDENT IN AN ORGANIZED HEALTH CARE EDUCATION/TRAINING PROGRAM

## 2025-01-03 PROCEDURE — 82140 ASSAY OF AMMONIA: CPT | Performed by: STUDENT IN AN ORGANIZED HEALTH CARE EDUCATION/TRAINING PROGRAM

## 2025-01-03 PROCEDURE — 96365 THER/PROPH/DIAG IV INF INIT: CPT

## 2025-01-03 PROCEDURE — 83735 ASSAY OF MAGNESIUM: CPT | Performed by: STUDENT IN AN ORGANIZED HEALTH CARE EDUCATION/TRAINING PROGRAM

## 2025-01-03 PROCEDURE — 80320 DRUG SCREEN QUANTALCOHOLS: CPT | Performed by: STUDENT IN AN ORGANIZED HEALTH CARE EDUCATION/TRAINING PROGRAM

## 2025-01-03 PROCEDURE — 82805 BLOOD GASES W/O2 SATURATION: CPT | Performed by: STUDENT IN AN ORGANIZED HEALTH CARE EDUCATION/TRAINING PROGRAM

## 2025-01-03 PROCEDURE — 80053 COMPREHEN METABOLIC PANEL: CPT | Performed by: STUDENT IN AN ORGANIZED HEALTH CARE EDUCATION/TRAINING PROGRAM

## 2025-01-03 PROCEDURE — 70450 CT HEAD/BRAIN W/O DYE: CPT | Performed by: RADIOLOGY

## 2025-01-03 PROCEDURE — 99285 EMERGENCY DEPT VISIT HI MDM: CPT | Mod: 25 | Performed by: STUDENT IN AN ORGANIZED HEALTH CARE EDUCATION/TRAINING PROGRAM

## 2025-01-03 PROCEDURE — 83605 ASSAY OF LACTIC ACID: CPT | Performed by: STUDENT IN AN ORGANIZED HEALTH CARE EDUCATION/TRAINING PROGRAM

## 2025-01-03 PROCEDURE — 84439 ASSAY OF FREE THYROXINE: CPT | Performed by: STUDENT IN AN ORGANIZED HEALTH CARE EDUCATION/TRAINING PROGRAM

## 2025-01-03 PROCEDURE — 37799 UNLISTED PX VASCULAR SURGERY: CPT

## 2025-01-03 PROCEDURE — 2500000004 HC RX 250 GENERAL PHARMACY W/ HCPCS (ALT 636 FOR OP/ED): Mod: JZ | Performed by: STUDENT IN AN ORGANIZED HEALTH CARE EDUCATION/TRAINING PROGRAM

## 2025-01-03 PROCEDURE — 80202 ASSAY OF VANCOMYCIN: CPT

## 2025-01-03 PROCEDURE — 2500000004 HC RX 250 GENERAL PHARMACY W/ HCPCS (ALT 636 FOR OP/ED): Performed by: HOSPITALIST

## 2025-01-03 PROCEDURE — 96375 TX/PRO/DX INJ NEW DRUG ADDON: CPT

## 2025-01-03 RX ORDER — LEVOTHYROXINE SODIUM 125 UG/1
125 TABLET ORAL DAILY
Status: DISPENSED | OUTPATIENT
Start: 2025-01-04

## 2025-01-03 RX ORDER — ASPIRIN 81 MG/1
81 TABLET ORAL DAILY
Status: DISPENSED | OUTPATIENT
Start: 2025-01-03

## 2025-01-03 RX ORDER — SODIUM CHLORIDE 9 MG/ML
75 INJECTION, SOLUTION INTRAVENOUS CONTINUOUS
Status: ACTIVE | OUTPATIENT
Start: 2025-01-03 | End: 2025-01-04

## 2025-01-03 RX ORDER — CEFTRIAXONE 1 G/50ML
1 INJECTION, SOLUTION INTRAVENOUS EVERY 24 HOURS
Status: DISPENSED | OUTPATIENT
Start: 2025-01-04

## 2025-01-03 RX ORDER — CEFTRIAXONE 1 G/50ML
1 INJECTION, SOLUTION INTRAVENOUS ONCE
Status: COMPLETED | OUTPATIENT
Start: 2025-01-03 | End: 2025-01-03

## 2025-01-03 RX ORDER — VANCOMYCIN HYDROCHLORIDE 1 G/20ML
INJECTION, POWDER, LYOPHILIZED, FOR SOLUTION INTRAVENOUS DAILY PRN
Status: DISCONTINUED | OUTPATIENT
Start: 2025-01-03 | End: 2025-01-05

## 2025-01-03 RX ORDER — LEVOTHYROXINE SODIUM 100 UG/1
100 TABLET ORAL DAILY
Status: DISCONTINUED | OUTPATIENT
Start: 2025-01-04 | End: 2025-01-03

## 2025-01-03 RX ORDER — HEPARIN SODIUM 5000 [USP'U]/ML
5000 INJECTION, SOLUTION INTRAVENOUS; SUBCUTANEOUS EVERY 8 HOURS SCHEDULED
Status: DISPENSED | OUTPATIENT
Start: 2025-01-03

## 2025-01-03 RX ORDER — AMLODIPINE BESYLATE 5 MG/1
5 TABLET ORAL DAILY
Status: DISCONTINUED | OUTPATIENT
Start: 2025-01-03 | End: 2025-01-05

## 2025-01-03 RX ADMIN — HEPARIN SODIUM 5000 UNITS: 5000 INJECTION INTRAVENOUS; SUBCUTANEOUS at 21:12

## 2025-01-03 RX ADMIN — CEFTRIAXONE SODIUM 1 G: 1 INJECTION, SOLUTION INTRAVENOUS at 11:56

## 2025-01-03 RX ADMIN — SODIUM CHLORIDE 75 ML/HR: 9 INJECTION, SOLUTION INTRAVENOUS at 21:12

## 2025-01-03 RX ADMIN — VANCOMYCIN HYDROCHLORIDE 1250 MG: 1.25 INJECTION, POWDER, LYOPHILIZED, FOR SOLUTION INTRAVENOUS at 12:28

## 2025-01-03 SDOH — SOCIAL STABILITY: SOCIAL INSECURITY: ARE YOU OR HAVE YOU BEEN THREATENED OR ABUSED PHYSICALLY, EMOTIONALLY, OR SEXUALLY BY ANYONE?: UNABLE TO ASSESS

## 2025-01-03 SDOH — SOCIAL STABILITY: SOCIAL INSECURITY: HAVE YOU HAD ANY THOUGHTS OF HARMING ANYONE ELSE?: UNABLE TO ASSESS

## 2025-01-03 SDOH — SOCIAL STABILITY: SOCIAL INSECURITY: HAVE YOU HAD THOUGHTS OF HARMING ANYONE ELSE?: UNABLE TO ASSESS

## 2025-01-03 SDOH — SOCIAL STABILITY: SOCIAL INSECURITY: HAS ANYONE EVER THREATENED TO HURT YOUR FAMILY OR YOUR PETS?: UNABLE TO ASSESS

## 2025-01-03 SDOH — SOCIAL STABILITY: SOCIAL INSECURITY: DOES ANYONE TRY TO KEEP YOU FROM HAVING/CONTACTING OTHER FRIENDS OR DOING THINGS OUTSIDE YOUR HOME?: UNABLE TO ASSESS

## 2025-01-03 SDOH — SOCIAL STABILITY: SOCIAL INSECURITY: ARE THERE ANY APPARENT SIGNS OF INJURIES/BEHAVIORS THAT COULD BE RELATED TO ABUSE/NEGLECT?: UNABLE TO ASSESS

## 2025-01-03 SDOH — SOCIAL STABILITY: SOCIAL INSECURITY: DO YOU FEEL ANYONE HAS EXPLOITED OR TAKEN ADVANTAGE OF YOU FINANCIALLY OR OF YOUR PERSONAL PROPERTY?: UNABLE TO ASSESS

## 2025-01-03 SDOH — SOCIAL STABILITY: SOCIAL INSECURITY: ABUSE: ADULT

## 2025-01-03 SDOH — SOCIAL STABILITY: SOCIAL INSECURITY: DO YOU FEEL UNSAFE GOING BACK TO THE PLACE WHERE YOU ARE LIVING?: UNABLE TO ASSESS

## 2025-01-03 SDOH — SOCIAL STABILITY: SOCIAL INSECURITY: WERE YOU ABLE TO COMPLETE ALL THE BEHAVIORAL HEALTH SCREENINGS?: NO

## 2025-01-03 ASSESSMENT — COGNITIVE AND FUNCTIONAL STATUS - GENERAL
DRESSING REGULAR UPPER BODY CLOTHING: A LITTLE
TURNING FROM BACK TO SIDE WHILE IN FLAT BAD: A LOT
TURNING FROM BACK TO SIDE WHILE IN FLAT BAD: A LOT
PERSONAL GROOMING: A LOT
CLIMB 3 TO 5 STEPS WITH RAILING: A LOT
PERSONAL GROOMING: A LOT
HELP NEEDED FOR BATHING: A LOT
DRESSING REGULAR UPPER BODY CLOTHING: A LOT
PATIENT BASELINE BEDBOUND: NO
EATING MEALS: A LOT
MOBILITY SCORE: 12
DRESSING REGULAR LOWER BODY CLOTHING: A LOT
HELP NEEDED FOR BATHING: A LOT
STANDING UP FROM CHAIR USING ARMS: A LOT
TOILETING: A LOT
MOBILITY SCORE: 12
MOVING TO AND FROM BED TO CHAIR: A LOT
MOVING FROM LYING ON BACK TO SITTING ON SIDE OF FLAT BED WITH BEDRAILS: A LOT
DAILY ACTIVITIY SCORE: 15
DRESSING REGULAR LOWER BODY CLOTHING: A LITTLE
STANDING UP FROM CHAIR USING ARMS: A LOT
WALKING IN HOSPITAL ROOM: A LOT
CLIMB 3 TO 5 STEPS WITH RAILING: A LOT
TOILETING: A LOT
WALKING IN HOSPITAL ROOM: A LOT
DAILY ACTIVITIY SCORE: 12
EATING MEALS: A LITTLE
MOVING TO AND FROM BED TO CHAIR: A LOT
MOVING FROM LYING ON BACK TO SITTING ON SIDE OF FLAT BED WITH BEDRAILS: A LOT

## 2025-01-03 ASSESSMENT — PAIN SCALES - GENERAL
PAINLEVEL_OUTOF10: 0 - NO PAIN
PAINLEVEL_OUTOF10: 0 - NO PAIN

## 2025-01-03 ASSESSMENT — ACTIVITIES OF DAILY LIVING (ADL)
WALKS IN HOME: NEEDS ASSISTANCE
FEEDING YOURSELF: INDEPENDENT
TOILETING: NEEDS ASSISTANCE
GROOMING: NEEDS ASSISTANCE
JUDGMENT_ADEQUATE_SAFELY_COMPLETE_DAILY_ACTIVITIES: NO
ADEQUATE_TO_COMPLETE_ADL: YES
PATIENT'S MEMORY ADEQUATE TO SAFELY COMPLETE DAILY ACTIVITIES?: NO
DRESSING YOURSELF: NEEDS ASSISTANCE
BATHING: NEEDS ASSISTANCE
HEARING - RIGHT EAR: DIFFICULTY WITH NOISE
LACK_OF_TRANSPORTATION: PATIENT UNABLE TO ANSWER
HEARING - LEFT EAR: DIFFICULTY WITH NOISE
ASSISTIVE_DEVICE: WHEELCHAIR

## 2025-01-03 ASSESSMENT — LIFESTYLE VARIABLES
AUDIT-C TOTAL SCORE: -1
HOW MANY STANDARD DRINKS CONTAINING ALCOHOL DO YOU HAVE ON A TYPICAL DAY: PATIENT UNABLE TO ANSWER
SKIP TO QUESTIONS 9-10: 0
AUDIT-C TOTAL SCORE: -1
HOW OFTEN DO YOU HAVE 6 OR MORE DRINKS ON ONE OCCASION: PATIENT UNABLE TO ANSWER
HOW OFTEN DO YOU HAVE A DRINK CONTAINING ALCOHOL: PATIENT UNABLE TO ANSWER

## 2025-01-03 ASSESSMENT — PATIENT HEALTH QUESTIONNAIRE - PHQ9
SUM OF ALL RESPONSES TO PHQ9 QUESTIONS 1 & 2: 0
1. LITTLE INTEREST OR PLEASURE IN DOING THINGS: NOT AT ALL
2. FEELING DOWN, DEPRESSED OR HOPELESS: NOT AT ALL

## 2025-01-03 ASSESSMENT — PAIN - FUNCTIONAL ASSESSMENT: PAIN_FUNCTIONAL_ASSESSMENT: UNABLE TO SELF-REPORT

## 2025-01-03 NOTE — H&P
History and Physical        ASSESSMENT AND PLAN:     Acute metabolic encephalopathy   History of dementia  History of anxiety  -P/W worsening confusion for several days after being started on ertapenem  -Etiology is multifactorial from IV Ertapenem,  acute kidney injury and multiple sedative medications.  -Per MAR pt is on Percocet, Ativan, gabapentin, Abilify, Zoloft, Valium, buspirone, Robaxin;    -CT head shows no acute findings    Plan:  -Discontinue ertapenem  -Hold all sedative medication for now  -If patient still continues to be confused, will consult neurology.    History of urinary tract infection with an indwelling Barker catheter present on admission  - UA  leukocyte esterase,Follow-up with urine cultures  -Previous cultures grew multidrug-resistant E. coli and Enterococcus; she has been receiving IV ertapenem via PICC line.  -Discontinue ertapenem  -Continue vancomycin and ceftriaxone.    Acute kidney injury on chronic kidney disease stage III  -Most likely prerenal from dehydration, from poor oral intake  -Scr 2.12, Scr baseline 1.6  -Gentle IV hydration  -Hold lisinopril    Hypothyroidism  -Suboptimal control, TSH 15.66 Free T4.6  -Will need to verify her dose.    Junctional bradycardia  -Most likely related to medication, possible hypothyroidism suboptimally controlled  -Discontinue beta-blocker and sedative medications and continue to monitor    Hypertension  -Continue amlodipine.    CODE STATUS: Spoke with son, Mj [phone] over the phone, CODE STATUS DNR CC. He does not want to make any decisions in regards to a pacemaker, he is hoping that his mom's mentation improves and she can eventually make that decision.     VTE Prophylaxis: Heparin subcu      Nel Gudino MD    HISTORY OF PRESENT ILLNESS:   Chief Complaint: Confusion    History Of Present Illness:    Jacqueline Sandoval is a 89 y.o. female with a significant past medical history of anxiety, dementia, fibromyalgia,  hypertension, history of chronic indwelling Barker catheter for neurogenic bladder, chronic kidney disease stage III, history of heart failure who presented with worsening confusion.    Most of the information was gathered from a conversation with her son Mj over the phone and ER records.  According to her son, she was recently diagnosed with a urinary tract infection, her urine culture grew E. coli and Enterococcus, she was started on ertapenem IV.  Her son states that after starting the ertapenem, her confusion progressively got worse over the last couple of days which prompted them to come to the emergency room.    In the emergency room, patient was noted to be severely confused, CT head showed no acute findings, serum creatinine 2.1.  Initial ECG showed sinus bradycardia, subsequently after that patient had a junctional rhythm.    She is unable to provide a review of systems for me but appears hemodynamically stable.  Per her last hospitalization records, she at baseline is Alert oriented x 2.        Review of systems: Unable to obtain  PAST HISTORIES:       Past Medical History:  She has a past medical history of Acute cystitis without hematuria (05/31/2023), Anemia, Anxiety, Dementia, Depression, Falls, Fibromyalgia, Frequent UTI (08/22/2023), Heart failure, Hypertension, Left displaced femoral neck fracture (11/23/2023), Postoperative anemia (12/08/2023), Renal disorder, and Urinary symptom or sign (06/20/2023).    Past Surgical History:  She has a past surgical history that includes Other surgical history (10/05/2022) and Joint replacement.      Social History:  She reports that she has never smoked. She has never used smokeless tobacco. She reports that she does not drink alcohol and does not use drugs.    Family History:  No family history on file.     Allergies:  Penicillins and Sulfa (sulfonamide antibiotics)    OBJECTIVE:       Last Recorded Vitals:  Vitals:    01/03/25 1235 01/03/25 1335 01/03/25  1413 01/03/25 1435   BP: 161/53 (!) 151/104 110/72 109/65   BP Location:   Left arm    Patient Position:   Sitting    Pulse: 54 52 (!) 41 (!) 42   Resp: 16 18 20 16   Temp:       TempSrc:       SpO2: 97% 96% 98% 96%   Weight:       Height:           Last I/O:  No intake/output data recorded.    Physical Exam      PHYSICAL EXAM:   GENERAL: Laying in bed, does not appear to be in any distress.  Confused.  HEENT: HEAD: Normocephalic atraumatic.  Neck: Supple.  Eyes: Pupils are reactive to direct light.   CVS: S1, S2 heard. Regular rate and rhythm  LUNGS: Clear to auscultate bilaterally. No wheezing or rhonchi appreciated.  ABDOMEN: Soft, nontender to palpate. Positive bowel sounds. No guarding or rebound appreciated.  NEUROLOGICAL: No focal neurological deficits appreciated. Cranial nerves are grossly intact.  EXTREMITIES: Dorsalis pedis pulses can be appreciated.  Right lower extremity internally rotated.  SKIN:  Grossly intact, warm and dry.  GENITOURINARY: Barker catheter in place, draining urine.      Scheduled Medications    PRN Medications    Continuous Medications      Outpatient Medications:  Prior to Admission medications    Medication Sig Start Date End Date Taking? Authorizing Provider   acetaminophen (Tylenol 8 HOUR) 650 mg ER tablet Take 2 tablets (1,300 mg) by mouth 2 times a day. Do not crush, chew, or split.    Historical Provider, MD   amLODIPine (Norvasc) 5 mg tablet Take 1 tablet (5 mg) by mouth once daily.    Historical Provider, MD   amLODIPine-benazepriL (Lotrel) 2.5-10 mg capsule Take 1 capsule by mouth once daily. 7/14/21   Historical Provider, MD   ARIPiprazole (Abilify) 2 mg tablet Take 1 tablet (2 mg) by mouth once daily.    Historical Provider, MD   aspirin 81 mg EC tablet Take 1 tablet (81 mg) by mouth once daily.    Historical Provider, MD   busPIRone (Buspar) 15 mg tablet Take 10 mg by mouth 3 times a day. 8/30/21   Historical Provider, MD   diazePAM (Valium) 5 mg tablet Take 1 tablet (5  mg) by mouth once daily at bedtime. 6/22/24 9/20/24  GLORIA Valverde   diclofenac sodium (Voltaren) 1 % gel gel Apply 4.5 inches (4 g) topically 2 times a day as needed (pain).    Historical Provider, MD   docusate sodium (Colace) 100 mg capsule Take 1 capsule (100 mg) by mouth twice a day. 9/23/22   Historical Provider, MD   famotidine (Pepcid) 20 mg tablet Take 1 tablet (20 mg) by mouth once daily.    Historical Provider, MD   gabapentin (Neurontin) 100 mg capsule Take 1 capsule (100 mg) by mouth once daily at bedtime.    Historical Provider, MD   HYDROcodone-acetaminophen (Norco) 5-325 mg tablet Take 1 tablet by mouth every 6 hours if needed for severe pain (7 - 10). 11/19/24 1/18/25  GLORIA Valverde   levothyroxine (Synthroid, Levoxyl) 100 mcg tablet Take 1 tablet (100 mcg) by mouth early in the morning.. Take on an empty stomach at the same time each day, either 30 to 60 minutes prior to breakfast    Historical Provider, MD   levothyroxine (Synthroid, Levoxyl) 175 mcg tablet Take 1 tablet (175 mcg) by mouth once daily in the morning. Take before meals. Monday through Friday    Historical Provider, MD   levothyroxine (Synthroid, Levoxyl) 200 mcg tablet Take 1 tablet (200 mcg) by mouth once daily in the morning. Take before meals. Saturday and Sunday    Historical Provider, MD   lisinopril 40 mg tablet Take 1 tablet (40 mg) by mouth once daily.    Historical Provider, MD   meclizine (Antivert) 25 mg tablet Take 0.5 tablets (12.5 mg) by mouth 3 times a day as needed for dizziness.    Historical Provider, MD   melatonin 3 mg capsule Take 3 mg by mouth once daily at bedtime.    Historical Provider, MD   methocarbamol (Robaxin) 500 mg tablet Take 1 tablet (500 mg) by mouth every 8 hours if needed for muscle spasms for up to 7 days. 11/27/23 12/4/23  GLORIA Deras   metoprolol succinate XL (Toprol-XL) 25 mg 24 hr tablet Take 0.5 tablets (12.5 mg) by mouth 2 times a day. Do not crush or  chew.    Historical Provider, MD   multivitamin with minerals tablet Take 1 tablet by mouth once daily.    Historical Provider, MD   naloxone (Narcan) 4 mg/0.1 mL nasal spray Administer 1 spray (4 mg) into affected nostril(s) if needed for opioid reversal. May repeat every 2-3 minutes if needed, alternating nostrils, until medical assistance becomes available. 6/20/24   Rosetta Christy APRN-CNP   nitrofurantoin (Macrodantin) 50 mg capsule Take 1 capsule (50 mg) by mouth once daily.    Historical Provider, MD   oxybutynin (Ditropan) 5 mg tablet Take 1 tablet (5 mg) by mouth once daily.    Historical Provider, MD   polyethylene glycol (Glycolax, Miralax) 17 gram packet Take 17 g by mouth once daily.    Historical Provider, MD   sertraline (Zoloft) 50 mg tablet Take 1.5 tablets (75 mg) by mouth once daily.    Historical Provider, MD   sodium chloride 1,000 mg tablet Take 1 tablet (1 g) by mouth once daily.    Historical Provider, MD   tamsulosin (Flomax) 0.4 mg 24 hr capsule Take 1 capsule (0.4 mg) by mouth once daily.    Historical Provider, MD   white petrolatum-mineral oiL (Tears Naturale PM) 94-3 % ophthalmic ointment Apply 1 Application to both eyes 2 times a day.    Historical Provider, MD       LABS AND IMAGING:     Labs:  Results for orders placed or performed during the hospital encounter of 01/03/25 (from the past 24 hours)   CBC and Auto Differential   Result Value Ref Range    WBC 8.7 4.4 - 11.3 x10*3/uL    nRBC 0.0 0.0 - 0.0 /100 WBCs    RBC 3.43 (L) 4.00 - 5.20 x10*6/uL    Hemoglobin 11.1 (L) 12.0 - 16.0 g/dL    Hematocrit 32.2 (L) 36.0 - 46.0 %    MCV 94 80 - 100 fL    MCH 32.4 26.0 - 34.0 pg    MCHC 34.5 32.0 - 36.0 g/dL    RDW 13.9 11.5 - 14.5 %    Platelets 297 150 - 450 x10*3/uL    Neutrophils % 81.6 40.0 - 80.0 %    Immature Granulocytes %, Automated 0.5 0.0 - 0.9 %    Lymphocytes % 9.3 13.0 - 44.0 %    Monocytes % 5.0 2.0 - 10.0 %    Eosinophils % 3.3 0.0 - 6.0 %    Basophils % 0.3 0.0 - 2.0 %     Neutrophils Absolute 7.08 (H) 1.60 - 5.50 x10*3/uL    Immature Granulocytes Absolute, Automated 0.04 0.00 - 0.50 x10*3/uL    Lymphocytes Absolute 0.81 0.80 - 3.00 x10*3/uL    Monocytes Absolute 0.43 0.05 - 0.80 x10*3/uL    Eosinophils Absolute 0.29 0.00 - 0.40 x10*3/uL    Basophils Absolute 0.03 0.00 - 0.10 x10*3/uL   Comprehensive Metabolic Panel   Result Value Ref Range    Glucose 86 74 - 99 mg/dL    Sodium 135 (L) 136 - 145 mmol/L    Potassium 4.4 3.5 - 5.3 mmol/L    Chloride 106 98 - 107 mmol/L    Bicarbonate 18 (L) 21 - 32 mmol/L    Anion Gap 15 10 - 20 mmol/L    Urea Nitrogen 46 (H) 6 - 23 mg/dL    Creatinine 2.12 (H) 0.50 - 1.05 mg/dL    eGFR 22 (L) >60 mL/min/1.73m*2    Calcium 9.0 8.6 - 10.3 mg/dL    Albumin 3.7 3.4 - 5.0 g/dL    Alkaline Phosphatase 73 33 - 136 U/L    Total Protein 6.7 6.4 - 8.2 g/dL    AST 10 9 - 39 U/L    Bilirubin, Total 0.3 0.0 - 1.2 mg/dL    ALT 7 7 - 45 U/L   Magnesium   Result Value Ref Range    Magnesium 2.48 (H) 1.60 - 2.40 mg/dL   Lactate   Result Value Ref Range    Lactate 0.5 0.4 - 2.0 mmol/L   TSH with reflex to Free T4 if abnormal   Result Value Ref Range    Thyroid Stimulating Hormone 15.66 (H) 0.44 - 3.98 mIU/L   Acute Toxicology Panel, Blood   Result Value Ref Range    Acetaminophen <10.0 10.0 - 30.0 ug/mL    Salicylate  <3 4 - 20 mg/dL    Alcohol <10 <=10 mg/dL   Blood Culture    Specimen: Peripheral Venipuncture; Blood culture   Result Value Ref Range    Blood Culture Loaded on Instrument - Culture in progress    Blood Culture    Specimen: Peripheral Venipuncture; Blood culture   Result Value Ref Range    Blood Culture Loaded on Instrument - Culture in progress    Troponin I, High Sensitivity, Initial   Result Value Ref Range    Troponin I, High Sensitivity 7 0 - 13 ng/L   Ammonia   Result Value Ref Range    Ammonia 30 16 - 53 umol/L   Thyroxine, Free   Result Value Ref Range    Thyroxine, Free 0.66 0.61 - 1.12 ng/dL   Urinalysis with Reflex Culture and Microscopic    Result Value Ref Range    Color, Urine Light-Orange (N) Light-Yellow, Yellow, Dark-Yellow    Appearance, Urine Ex.Turbid (N) Clear    Specific Gravity, Urine 1.014 1.005 - 1.035    pH, Urine 5.5 5.0, 5.5, 6.0, 6.5, 7.0, 7.5, 8.0    Protein, Urine 100 (2+) (A) NEGATIVE, 10 (TRACE), 20 (TRACE) mg/dL    Glucose, Urine Normal Normal mg/dL    Blood, Urine 1.0 (3+) (A) NEGATIVE    Ketones, Urine NEGATIVE NEGATIVE mg/dL    Bilirubin, Urine NEGATIVE NEGATIVE    Urobilinogen, Urine Normal Normal mg/dL    Nitrite, Urine NEGATIVE NEGATIVE    Leukocyte Esterase, Urine 500 Edward/µL (A) NEGATIVE   Microscopic Only, Urine   Result Value Ref Range    WBC, Urine >50 (A) 1-5, NONE /HPF    WBC Clumps, Urine MANY Reference range not established. /HPF    RBC, Urine >20 (A) NONE, 1-2, 3-5 /HPF    Squamous Epithelial Cells, Urine 1-9 (SPARSE) Reference range not established. /HPF    Bacteria, Urine 4+ (A) NONE SEEN /HPF    Budding Yeast, Urine PRESENT (A) NONE /HPF    Yeast Hyphae, Urine PRESENT (A) NONE /HPF    Mucus, Urine FEW Reference range not established. /LPF   Sars-CoV-2 and Influenza A/B PCR   Result Value Ref Range    Flu A Result Not Detected Not Detected    Flu B Result Not Detected Not Detected    Coronavirus 2019, PCR Not Detected Not Detected   ECG 12 lead   Result Value Ref Range    Ventricular Rate 56 BPM    Atrial Rate 56 BPM    ID Interval 206 ms    QRS Duration 88 ms    QT Interval 472 ms    QTC Calculation(Bazett) 455 ms    P Axis 56 degrees    R Axis -6 degrees    T Axis 99 degrees    QRS Count 9 beats    Q Onset 217 ms    P Onset 114 ms    P Offset 167 ms    T Offset 453 ms    QTC Fredericia 461 ms   Blood Gas Venous   Result Value Ref Range    POCT pH, Venous 7.33 7.33 - 7.43 pH    POCT pCO2, Venous 33 (L) 41 - 51 mm Hg    POCT pO2, Venous 42 35 - 45 mm Hg    POCT SO2, Venous 70 45 - 75 %    POCT Oxy Hemoglobin, Venous 67.9 45.0 - 75.0 %    POCT Base Excess, Venous -7.5 (L) -2.0 - 3.0 mmol/L    POCT HCO3  Calculated, Venous 17.4 (L) 22.0 - 26.0 mmol/L    Patient Temperature      FiO2 21 %   Troponin, High Sensitivity, 1 Hour   Result Value Ref Range    Troponin I, High Sensitivity 5 0 - 13 ng/L        Imaging:  ECG 12 lead  Sinus bradycardia  Cannot rule out Anterior infarct , age undetermined  Abnormal ECG  When compared with ECG of 19-MAY-2024 09:12,  Sinus rhythm has replaced Junctional rhythm  Minimal criteria for Anterior infarct are now Present    See ED provider note for full interpretation and clinical correlation  CT head wo IV contrast  Narrative: Interpreted By:  Fer Montemayor,   STUDY:  CT HEAD WO IV CONTRAST; 1/3/2025 12:54 pm      INDICATION:  Signs/Symptoms:ams.      COMPARISON:  05/19/2024      ACCESSION NUMBER(S):  [id number]      ORDERING CLINICIAN:  ROCHELLE GOMEZ      TECHNIQUE:  Contiguous axial CT images were obtained through the head at 5 mm  slice thickness without contrast administration.      FINDINGS:  INTRACRANIAL:  Mild diffuse volume loss is seen bilaterally. Mild-to-moderate  subcortical and periventricular white matter changes are seen.  The  grey-white differentiation is intact. There is no mass effect or  midline shift. There is no extraaxial fluid collection. There is no  intracranial hemorrhage.  The calvarium  is unremarkable.      EXTRACRANIAL:  Visualized paranasal sinuses and mastoids are clear.      Impression: 1. Diffuse volume loss and periventricular white matter changes, most  consistent with small vessel ischemic disease.  2. No evidence of acute cortical infarct or intracranial hemorrhage.          MACRO:  None      Signed by: Fer Montemayor 1/3/2025 1:08 PM  Dictation workstation:   [id number]  XR chest 1 view  Narrative: STUDY:  Chest Radiograph;  1/3/2025 at 10:53 AM  INDICATION:  Altered mental status.  COMPARISON:  5/19/2024 Chest XR, 11/23/2023 Chest XR  ACCESSION NUMBER(S):  [id number]  ORDERING CLINICIAN:  ROCHELLE GOMEZ  TECHNIQUE:  Frontal chest was  obtained at 1053 hours.  FINDINGS:  There is a left pleural effusion with adjacent atelectasis.  Heart  size is top normal.  There is no evidence of a pneumothorax.  Impression: 1.  Left pleural effusion with adjacent atelectasis.  Signed by Rick Camargo MD        Normal for race

## 2025-01-03 NOTE — ED PROVIDER NOTES
HPI: The patient is a 89-year-old with history of anxiety, dementia, fibromyalgia, hypertension, chronic Barker with frequent UTIs who is DNR CC and normally alert and oriented x 3 who presents to the Emergency Department with a chief complaint of altered mental status.  Patient is not able to contribute to her history due to confusion but per report of EMS who received report from the facility where she gets care, patient was diagnosed with a UTI and placed on ertapenem via PICC line.  The patient reportedly has become more confused since this started and was sent here for further assessment and care.     PAST MEDICAL HISTORY:  as per HPI  ALLERGIES:  as per HPI  MEDICATIONS:  as per HPI  FAMILY HISTORY: as per HPI  SURGICAL HISTORY: as per HPI  SOCIAL HISTORY: as per HPI     PHYSICAL EXAM:  VITAL SIGNS: Nursing notes reviewed.  GENERAL: Alert but drowsy.  EYES:   Eyes track.  Pupils equally round and reactive to light.  No photophobia  ENT:  Airway patent.  RESPIRATORY:  Nonlabored breathing.  Clear bilaterally  CARDIOVASCULAR:  [Regular rate.] [Regular rhythm.]  Radial pulses equal bilaterally.  No murmur.  GASTROINTESTINAL:  No distension.  Nontender  MUSCULOSKELETAL:  No deformity.   NEUROLOGICAL:  Awake.  Oriented to person only.  Moves all 4 extremities to command.  GCS 14  SKIN:  Dry.  NECK: No meningismus        MEDICAL DECISION MAKING (MDM):     DIAGNOSTIC STUDIES  Labs: Acute tox panel, ammonia level, blood cultures, VBG, CBC, CMP, lactic and magnesium, COVID and flu swab, troponin, TSH, UA  Radiology: CT head, chest x-ray     EKG 1157  Per my interpretation:  Electrocardiogram ECG  RATE: 56  RHYTHM: [Sinus]  AXIS: [Normal]  INTERVALS: [Normal]  ST-T WAVE CHANGES: [Normal]  ABNORMALITIES/COMPARISON: Unchanged most recent EKG on May 19, 2024    EKG 1408  Per my interpretation:  Electrocardiogram ECG  RATE: 41  RHYTHM: Junctional bradycardia  AXIS: Left axis  INTERVALS: [Normal]  ST-T WAVE CHANGES:  [Normal]  ABNORMALITIES/COMPARISON: Patient has a junctional bradycardia without any P waves which is a change in rhythm from EKG from earlier today.         REVIEW OF OLD RECORDS: Reviewed the DC summary from 11/27/2023     SUMMARY:  The patient is admitted to the Emergency Department for evaluation of above. Complete history and physical examination was performed by me.  Patient presents with progressive encephalopathy for the last few days.  Patient has reassuring vital signs and does not appear septic but, patient is also being treated for a multidrug-resistant and I am worried that she could be coming encephalopathic due to a worsening infection so I wanted to keep her on antibiotics.  Patient has history of CKD and ertapenem does cause encephalopathy especially in elderly patients with chronic kidney disease so I discontinued the ertapenem and based on the culture results that I found in the chart, I treated patient with vancomycin and ceftriaxone given that the 2 bacteria in her urine are susceptible to these.  Low suspicion for meningitis given no photophobia or neck stiffness.  Low suspicion for endocarditis.  Patient has a mild nonanion gap metabolic acidosis and stable CKD.  Troponin was normal and lactate was normal.  Stable anemia and she reassuringly did not have a significant leukocytosis.  TSH was elevated so a T4 level was sent but she does not seem to have myxedema coma.  Tylenol alcohol and aspirin levels were undetectable.  We did exchange her Barker and recent a UA from the new catheter to see if she had a persistent UTI despite the ertapenem.    Patient developed a stable bradycardia while she was here.  Patient was not symptomatic and never dropped her pressure but her heart rate dropped to the 40s and EKG did not show any P waves consistent with sick sinus syndrome.  I discussed this with the patient's son who is now at the bedside and he reports that the patient is DNR CC but would be  interested in some life preserving measures including potentially pacer.  He reports that he would not want the patient to receive a pacer and less the patient consented and at this time, patient does not have capacity.  The patient's son reports he would like to see how the patient does over the next few days and wait until she is alert enough to make her own decision regarding a pacer.  Given the patient has been stable, I suspect that the patient likely has had episodes of the junctional bradycardia frequently and the likelihood that she will remain stable over the next few days is high.  I did discuss with the son that it is possible she could decompensate in the interval and he expressed understanding of this.    CT head and the rest of her workup was reassuring.  Patient remains encephalopathic but is protecting her airway.  Patient will be admitted for further evaluation and care.     DIAGNOSIS:    Encephalopathy  Cystitis  Bradycardia     DISPOSITION:    1) admission       Kevin Mancera MD  01/03/25 [id number]

## 2025-01-04 ENCOUNTER — APPOINTMENT (OUTPATIENT)
Dept: RADIOLOGY | Facility: HOSPITAL | Age: OVER 89
End: 2025-01-04
Payer: COMMERCIAL

## 2025-01-04 ENCOUNTER — APPOINTMENT (OUTPATIENT)
Dept: CARDIOLOGY | Facility: HOSPITAL | Age: OVER 89
End: 2025-01-04
Payer: COMMERCIAL

## 2025-01-04 LAB
ALBUMIN SERPL BCP-MCNC: 3.7 G/DL (ref 3.4–5)
ALP SERPL-CCNC: 73 U/L (ref 33–136)
ALT SERPL W P-5'-P-CCNC: 6 U/L (ref 7–45)
ANION GAP SERPL CALC-SCNC: 17 MMOL/L (ref 10–20)
AST SERPL W P-5'-P-CCNC: 10 U/L (ref 9–39)
ATRIAL RATE: 56 BPM
ATRIAL RATE: 63 BPM
BILIRUB SERPL-MCNC: 0.3 MG/DL (ref 0–1.2)
BUN SERPL-MCNC: 40 MG/DL (ref 6–23)
CALCIUM SERPL-MCNC: 8.7 MG/DL (ref 8.6–10.3)
CHLORIDE SERPL-SCNC: 110 MMOL/L (ref 98–107)
CO2 SERPL-SCNC: 13 MMOL/L (ref 21–32)
CREAT SERPL-MCNC: 1.84 MG/DL (ref 0.5–1.05)
EGFRCR SERPLBLD CKD-EPI 2021: 26 ML/MIN/1.73M*2
ERYTHROCYTE [DISTWIDTH] IN BLOOD BY AUTOMATED COUNT: 14.1 % (ref 11.5–14.5)
GLUCOSE SERPL-MCNC: 75 MG/DL (ref 74–99)
HCT VFR BLD AUTO: 30.7 % (ref 36–46)
HGB BLD-MCNC: 10.4 G/DL (ref 12–16)
MAGNESIUM SERPL-MCNC: 2.29 MG/DL (ref 1.6–2.4)
MCH RBC QN AUTO: 31.8 PG (ref 26–34)
MCHC RBC AUTO-ENTMCNC: 33.9 G/DL (ref 32–36)
MCV RBC AUTO: 94 FL (ref 80–100)
NRBC BLD-RTO: 0 /100 WBCS (ref 0–0)
P AXIS: 56 DEGREES
P AXIS: 68 DEGREES
P OFFSET: 161 MS
P OFFSET: 167 MS
P ONSET: 114 MS
P ONSET: 117 MS
PLATELET # BLD AUTO: 254 X10*3/UL (ref 150–450)
POTASSIUM SERPL-SCNC: 4.4 MMOL/L (ref 3.5–5.3)
PR INTERVAL: 200 MS
PR INTERVAL: 206 MS
PROT SERPL-MCNC: 6.4 G/DL (ref 6.4–8.2)
Q ONSET: 217 MS
Q ONSET: 217 MS
QRS COUNT: 10 BEATS
QRS COUNT: 9 BEATS
QRS DURATION: 88 MS
QRS DURATION: 92 MS
QT INTERVAL: 462 MS
QT INTERVAL: 472 MS
QTC CALCULATION(BAZETT): 455 MS
QTC CALCULATION(BAZETT): 472 MS
QTC FREDERICIA: 461 MS
QTC FREDERICIA: 469 MS
R AXIS: -14 DEGREES
R AXIS: -6 DEGREES
RBC # BLD AUTO: 3.27 X10*6/UL (ref 4–5.2)
SODIUM SERPL-SCNC: 136 MMOL/L (ref 136–145)
T AXIS: 48 DEGREES
T AXIS: 99 DEGREES
T OFFSET: 448 MS
T OFFSET: 453 MS
VANCOMYCIN SERPL-MCNC: 12 UG/ML (ref 5–20)
VENTRICULAR RATE: 56 BPM
VENTRICULAR RATE: 63 BPM
WBC # BLD AUTO: 8.5 X10*3/UL (ref 4.4–11.3)

## 2025-01-04 PROCEDURE — 83735 ASSAY OF MAGNESIUM: CPT | Performed by: HOSPITALIST

## 2025-01-04 PROCEDURE — 80053 COMPREHEN METABOLIC PANEL: CPT | Performed by: HOSPITALIST

## 2025-01-04 PROCEDURE — 93005 ELECTROCARDIOGRAM TRACING: CPT

## 2025-01-04 PROCEDURE — 37799 UNLISTED PX VASCULAR SURGERY: CPT | Performed by: HOSPITALIST

## 2025-01-04 PROCEDURE — 2500000001 HC RX 250 WO HCPCS SELF ADMINISTERED DRUGS (ALT 637 FOR MEDICARE OP): Performed by: HOSPITALIST

## 2025-01-04 PROCEDURE — 85027 COMPLETE CBC AUTOMATED: CPT | Performed by: HOSPITALIST

## 2025-01-04 PROCEDURE — 2500000004 HC RX 250 GENERAL PHARMACY W/ HCPCS (ALT 636 FOR OP/ED): Performed by: HOSPITALIST

## 2025-01-04 PROCEDURE — 82435 ASSAY OF BLOOD CHLORIDE: CPT | Performed by: HOSPITALIST

## 2025-01-04 PROCEDURE — 99223 1ST HOSP IP/OBS HIGH 75: CPT | Performed by: INTERNAL MEDICINE

## 2025-01-04 PROCEDURE — 73502 X-RAY EXAM HIP UNI 2-3 VIEWS: CPT | Mod: LEFT SIDE | Performed by: RADIOLOGY

## 2025-01-04 PROCEDURE — 93010 ELECTROCARDIOGRAM REPORT: CPT | Performed by: INTERNAL MEDICINE

## 2025-01-04 PROCEDURE — 80202 ASSAY OF VANCOMYCIN: CPT

## 2025-01-04 PROCEDURE — 73502 X-RAY EXAM HIP UNI 2-3 VIEWS: CPT | Mod: LT

## 2025-01-04 PROCEDURE — 99232 SBSQ HOSP IP/OBS MODERATE 35: CPT | Performed by: HOSPITALIST

## 2025-01-04 PROCEDURE — 2060000001 HC INTERMEDIATE ICU ROOM DAILY

## 2025-01-04 PROCEDURE — 2500000004 HC RX 250 GENERAL PHARMACY W/ HCPCS (ALT 636 FOR OP/ED)

## 2025-01-04 RX ORDER — SODIUM BICARBONATE 650 MG/1
650 TABLET ORAL 2 TIMES DAILY
Status: DISPENSED | OUTPATIENT
Start: 2025-01-04

## 2025-01-04 RX ORDER — ACETAMINOPHEN 650 MG/1
650 SUPPOSITORY RECTAL EVERY 4 HOURS PRN
Status: ACTIVE | OUTPATIENT
Start: 2025-01-04

## 2025-01-04 RX ORDER — ACETAMINOPHEN 160 MG/5ML
650 SOLUTION ORAL EVERY 4 HOURS PRN
Status: ACTIVE | OUTPATIENT
Start: 2025-01-04

## 2025-01-04 RX ORDER — ACETAMINOPHEN 325 MG/1
650 TABLET ORAL EVERY 4 HOURS PRN
Status: DISPENSED | OUTPATIENT
Start: 2025-01-04

## 2025-01-04 RX ADMIN — ACETAMINOPHEN 650 MG: 325 TABLET ORAL at 10:51

## 2025-01-04 RX ADMIN — HEPARIN SODIUM 5000 UNITS: 5000 INJECTION INTRAVENOUS; SUBCUTANEOUS at 16:28

## 2025-01-04 RX ADMIN — HEPARIN SODIUM 5000 UNITS: 5000 INJECTION INTRAVENOUS; SUBCUTANEOUS at 06:22

## 2025-01-04 RX ADMIN — SODIUM CHLORIDE 75 ML/HR: 9 INJECTION, SOLUTION INTRAVENOUS at 06:34

## 2025-01-04 RX ADMIN — CEFTRIAXONE SODIUM 1 G: 1 INJECTION, SOLUTION INTRAVENOUS at 13:18

## 2025-01-04 RX ADMIN — VANCOMYCIN HYDROCHLORIDE 500 MG: 500 INJECTION, POWDER, LYOPHILIZED, FOR SOLUTION INTRAVENOUS at 14:19

## 2025-01-04 RX ADMIN — ASPIRIN 81 MG: 81 TABLET, COATED ORAL at 10:51

## 2025-01-04 RX ADMIN — SODIUM BICARBONATE 650 MG: 650 TABLET ORAL at 20:15

## 2025-01-04 RX ADMIN — SODIUM BICARBONATE 650 MG: 650 TABLET ORAL at 10:51

## 2025-01-04 ASSESSMENT — COGNITIVE AND FUNCTIONAL STATUS - GENERAL
STANDING UP FROM CHAIR USING ARMS: TOTAL
TURNING FROM BACK TO SIDE WHILE IN FLAT BAD: A LOT
PERSONAL GROOMING: TOTAL
DRESSING REGULAR UPPER BODY CLOTHING: TOTAL
DAILY ACTIVITIY SCORE: 6
MOBILITY SCORE: 8
MOVING FROM LYING ON BACK TO SITTING ON SIDE OF FLAT BED WITH BEDRAILS: A LOT
MOBILITY SCORE: 8
TOILETING: TOTAL
HELP NEEDED FOR BATHING: TOTAL
EATING MEALS: TOTAL
MOVING TO AND FROM BED TO CHAIR: TOTAL
TURNING FROM BACK TO SIDE WHILE IN FLAT BAD: A LOT
CLIMB 3 TO 5 STEPS WITH RAILING: TOTAL
TOILETING: TOTAL
MOVING FROM LYING ON BACK TO SITTING ON SIDE OF FLAT BED WITH BEDRAILS: A LOT
CLIMB 3 TO 5 STEPS WITH RAILING: TOTAL
HELP NEEDED FOR BATHING: TOTAL
PERSONAL GROOMING: TOTAL
DRESSING REGULAR UPPER BODY CLOTHING: TOTAL
DRESSING REGULAR LOWER BODY CLOTHING: TOTAL
DRESSING REGULAR LOWER BODY CLOTHING: TOTAL
DAILY ACTIVITIY SCORE: 6
WALKING IN HOSPITAL ROOM: TOTAL
EATING MEALS: TOTAL
MOVING TO AND FROM BED TO CHAIR: TOTAL
WALKING IN HOSPITAL ROOM: TOTAL
STANDING UP FROM CHAIR USING ARMS: TOTAL

## 2025-01-04 ASSESSMENT — PAIN SCALES - GENERAL: PAINLEVEL_OUTOF10: 0 - NO PAIN

## 2025-01-04 ASSESSMENT — PAIN - FUNCTIONAL ASSESSMENT: PAIN_FUNCTIONAL_ASSESSMENT: 0-10

## 2025-01-04 NOTE — CONSULTS
Inpatient consult to cardiology  Consult performed by: Leo aSndy MD  Consult ordered by: Flako Jacobs MD  Reason for consult: Symptomatic bradycardia  Assessment/Recommendations: patient was seen, chart reviewed.Agree with holding AV node blocking agents.  Continue telemetry  EKG daily  If she develops symptomatic bradycardia, we may have to discuss with family members option of pacemaker implantation  Keep potassium equal more than 4.0, magnesium equal more than 2.0.  Risk factor modification and lifestyle modification discussed with patient. Diet , exercise and hydration discussed with patient.    Please excuse any errors in grammar or translation related to this dictation.  Voice recognition software was utilized to prepare this document.          History Of Present Illness:    Jacqueline Sandoval is a 89 y.o. female presenting with mental status changes.    Patient has a history of anxiety, dementia, fibromyalgia, hypertension and chronic indwelling Barker catheter for neurogenic bladder, chronic kidney disease.  Patient presented for worsening mental status changes.  Patient was recently placed in ertapenem and confusion or worsening the last few days..    Emergency department vital signs were stable with a blood pressure 140/80 mmHg heart rate of 71 bpm respiratory rate was 18 temperature 35.9.  Saturation 97%.  Initial EKG was consistent with sinus bradycardia rate of 56 bpm QRS ration 80 ms QT corrected 455 ms.  Subsequent EKG consistent with junctional bradycardia rate of 41 bpm QRS duration 74 ms QT corrected 430 ms.  This EKG was at 2 PM January 3, 2025.  EKG January 4, 2025 point 8 in the morning.  Sinus rhythm rate of 63 bpm rotation 92 ms QT corrected 472 ms  Laboratory on admission shows sodium 135 potassium 4.4 BUN 46 creatinine 2.2 ALT 7 AST 18 magnesium 2.48 lactate 0.5 thirds 2.7 subsequent troponin 5 TSH 15.66 WBC 8.5 hemoglobin 10.4 hematocrit 30.7 platelet count 254    Beta-blockers were  discontinued during the admission.    Currently she is back in sinus rhythm.  No new symptoms     Last Recorded Vitals:  Vitals:    01/04/25 0204 01/04/25 0401 01/04/25 0808 01/04/25 1214   BP: (!) 194/77 (!) 186/75 (!) 213/95 158/78   BP Location:  Left arm  Left arm   Patient Position:  Lying     Pulse: 65 63 75 66   Resp:  16  18   Temp:  36.1 °C (97 °F) 36.5 °C (97.7 °F) 36.6 °C (97.9 °F)   TempSrc:  Temporal  Temporal   SpO2:  96% 97% 96%   Weight:       Height:           Last Labs:  CBC - 1/4/2025:  8:40 AM  8.5 10.4 254    30.7      CMP - 1/4/2025:  8:40 AM  8.7 6.4 10 --- 0.3   _ 3.7 6 73      PTT - No results in last year.  _   _ _     Troponin I, High Sensitivity   Date/Time Value Ref Range Status   01/03/2025 12:32 PM 5 0 - 13 ng/L Final   01/03/2025 11:20 AM 7 0 - 13 ng/L Final   05/19/2024 09:27 AM 9 0 - 13 ng/L Final     BNP   Date/Time Value Ref Range Status   09/20/2022 05:46 AM 60 0 - 99 pg/mL Final     Comment:     .  <100 pg/mL - Heart failure unlikely  100-299 pg/mL - Intermediate probability of acute heart  .               failure exacerbation. Correlate with clinical  .               context and patient history.    >=300 pg/mL - Heart Failure likely. Correlate with clinical  .               context and patient history.  BNP testing is performed using different testing   methodology at [facility] than at other   [facility] hospitals. Direct result comparisons should   only be made within the same method.       Hemoglobin A1C   Date/Time Value Ref Range Status   05/20/2021 08:56 AM 5.3 4.0 - 5.6 % Final      Last I/O:  I/O last 3 completed shifts:  In: 50 (0.8 mL/kg) [IV Piggyback:50]  Out: 550 (8.7 mL/kg) [Urine:550 (0.2 mL/kg/hr)]  Weight: 62.9 kg     Past Cardiology Tests (Last 3 Years):  EKG:  Electrocardiogram, 12-lead PRN ACS symptoms 01/04/2025 (Preliminary)      ECG 12 lead 01/03/2025 (Preliminary)      ECG 12 lead 05/19/2024    Echo:  No results found for this or any previous  "visit from the past 1095 days.    Ejection Fractions:  No results found for: \"EF\"  Cath:  No results found for this or any previous visit from the past 1095 days.    Stress Test:  No results found for this or any previous visit from the past 1095 days.    Cardiac Imaging:  No results found for this or any previous visit from the past 1095 days.      Past Medical History:  She has a past medical history of Acute cystitis without hematuria (05/31/2023), Anemia, Anxiety, Dementia, Depression, Falls, Fibromyalgia, Frequent UTI (08/22/2023), Heart failure, Hypertension, Left displaced femoral neck fracture (11/23/2023), Postoperative anemia (12/08/2023), Renal disorder, and Urinary symptom or sign (06/20/2023).    Past Surgical History:  She has a past surgical history that includes Other surgical history (10/05/2022) and Joint replacement.      Social History:  She reports that she has never smoked. She has never used smokeless tobacco. She reports that she does not drink alcohol and does not use drugs.    Family History:  No family history on file.     Allergies:  Penicillins and Sulfa (sulfonamide antibiotics)    Inpatient Medications:  Scheduled medications   Medication Dose Route Frequency    [Held by provider] amLODIPine  5 mg oral Daily    aspirin  81 mg oral Daily    cefTRIAXone  1 g intravenous q24h    heparin (porcine)  5,000 Units subcutaneous q8h MEGHAN    levothyroxine  125 mcg oral Daily    sodium bicarbonate  650 mg oral BID    vancomycin  500 mg intravenous q24h     PRN medications   Medication    acetaminophen    Or    acetaminophen    Or    acetaminophen    vancomycin     Continuous Medications   Medication Dose Last Rate    sodium chloride 0.9%  75 mL/hr 75 mL/hr (01/04/25 0634)     Outpatient Medications:  Current Outpatient Medications   Medication Instructions    acetaminophen (TYLENOL 8 HOUR) 1,300 mg, oral, 2 times daily, Do not crush, chew, or split.    amLODIPine (NORVASC) 5 mg, oral, Daily    " amLODIPine-benazepriL (Lotrel) 2.5-10 mg capsule 1 capsule, oral, Daily RT    ARIPiprazole (ABILIFY) 2 mg, oral, Daily    aspirin 81 mg EC tablet 1 tablet, oral, Daily    busPIRone (BUSPAR) 10 mg, oral, 3 times daily    diazePAM (VALIUM) 5 mg, oral, Nightly    diclofenac sodium (VOLTAREN) 4 g, Topical, 2 times daily PRN    docusate sodium (COLACE) 100 mg, oral, 2 times daily    famotidine (PEPCID) 20 mg, oral, Daily    gabapentin (NEURONTIN) 100 mg, oral, Nightly    HYDROcodone-acetaminophen (Norco) 5-325 mg tablet 1 tablet, oral, Every 6 hours PRN    levothyroxine (SYNTHROID, LEVOXYL) 175 mcg, oral, Daily before breakfast, Monday through Friday    levothyroxine (SYNTHROID, LEVOXYL) 200 mcg, oral, Daily before breakfast, Saturday and Sunday     levothyroxine (SYNTHROID, LEVOXYL) 100 mcg, oral, Daily, Take on an empty stomach at the same time each day, either 30 to 60 minutes prior to breakfast    lisinopril 40 mg, oral, Daily    meclizine (ANTIVERT) 12.5 mg, oral, 3 times daily PRN    melatonin 3 mg, oral, Nightly    methocarbamol (ROBAXIN) 500 mg, oral, Every 8 hours PRN    metoprolol succinate XL (TOPROL-XL) 12.5 mg, oral, 2 times daily, Do not crush or chew.    multivitamin with minerals tablet 1 tablet, oral, Daily    naloxone (NARCAN) 4 mg, nasal, As needed, May repeat every 2-3 minutes if needed, alternating nostrils, until medical assistance becomes available.    nitrofurantoin (MACRODANTIN) 50 mg, oral, Daily    oxybutynin (DITROPAN) 5 mg, oral, Daily    polyethylene glycol (GLYCOLAX, MIRALAX) 17 g, oral, Daily    sertraline (ZOLOFT) 75 mg, oral, Daily    sodium chloride 1 g, oral, Daily    tamsulosin (FLOMAX) 0.4 mg, oral, Daily    white petrolatum-mineral oiL (Tears Naturale PM) 94-3 % ophthalmic ointment 1 Application, Both Eyes, 2 times daily       Physical Exam:    PHYSICAL EXAM:   GENERAL: Laying in bed, does not appear to be in any distress.  Confused.  HEENT: HEAD: Normocephalic atraumatic.  Neck:  Supple.  Eyes: Pupils are reactive to direct light.   CVS: S1, S2 heard. Regular rate and rhythm  LUNGS: Clear to auscultate bilaterally. No wheezing or rhonchi appreciated.  ABDOMEN: Soft, nontender to palpate. Positive bowel sounds. No guarding or rebound appreciated.  NEUROLOGICAL: No focal neurological deficits appreciated. Cranial nerves are grossly intact.  EXTREMITIES: Dorsalis pedis pulses can be appreciated.  Right lower extremity internally rotated.  SKIN:  Grossly intact, warm and dry.  GENITOURINARY: Barker catheter in place, draining urine.     Assessment/Plan   Clinical impression    1.  Mental status changes  2.  Dementia  3.  Sinus node dysfunction with episodes of bradycardia rates as low as20 bpm currently back in sinus rhythm  4.  UTI  5.  Hypothyroidism  6.  Hypertension    PICC - Adult Right (Active)   Site Assessment Clean;Dry;Intact 01/04/25 0900   Dressing Status Clean;Dry;Occlusive 01/04/25 0900   Number of days: 1       Urethral Catheter (Active)   Output (mL) 350 mL 01/04/25 0643   Number of days: 1       Code Status:  DNR and No Intubation    I spent 60 minutes in the professional and overall care of this patient.        Leo Sandy MD

## 2025-01-04 NOTE — PROGRESS NOTES
Vancomycin Dosing by Pharmacy- FOLLOW UP    Jacqueline Sandoval is a 89 y.o. year old female who Pharmacy has been consulted for vancomycin dosing for other uti . Based on the patient's indication and renal status this patient is being dosed based on a goal AUC of 400-600.     Renal function is currently scr=1.84 cl=18.5.    Current vancomycin dose: 1250 mg x 1 dose, then 500 mg q24hr    Most recent random level: 12 mcg/mL    Visit Vitals  BP (!) 213/95   Pulse 75   Temp 36.5 °C (97.7 °F)   Resp 16        Lab Results   Component Value Date    CREATININE 1.84 (H) 2025    CREATININE 2.12 (H) 2025    CREATININE 2.05 (H) 2024    CREATININE 1.57 (H) 2023        Patient weight is as follows:   Vitals:    25 1042   Weight: 62.9 kg (138 lb 10.7 oz)       Cultures:  No results found for the encounter in last 14 days.       I/O last 3 completed shifts:  In: 50 (0.8 mL/kg) [IV Piggyback:50]  Out: 550 (8.7 mL/kg) [Urine:550 (0.2 mL/kg/hr)]  Weight: 62.9 kg   I/O during current shift:  No intake/output data recorded.    Temp (24hrs), Av.2 °C (97.1 °F), Min:35.9 °C (96.6 °F), Max:36.5 °C (97.7 °F)      Assessment/Plan    Within goal AUC range. Continue current vancomycin regimen.    This dosing regimen is predicted by [employer] to result in the following pharmacokinetic parameters  Regimen: 500 mg IV every 24 hours.  Start time: 12:28 on 2025  Exposure target: AUC24 (range)400-600 mg/L.hr   ZSN86-22: 401 mg/L.hr  AUC24,ss: 453 mg/L.hr  Probability of AUC24 > 400: 73 %  Ctrough,ss: 15.5 mg/L  Probability of Ctrough,ss > 20: 16 %    The next level will be obtained on  at 0500. May be obtained sooner if clinically indicated.   Will continue to monitor renal function daily while on vancomycin and order serum creatinine at least every 48 hours if not already ordered.  Follow for continued vancomycin needs, clinical response, and signs/symptoms of toxicity.       Michelle Love, PharmD

## 2025-01-04 NOTE — PROGRESS NOTES
PROGRESS NOTE    ASSESSMENT AND PLAN:     Acute metabolic encephalopathy   History of dementia  History of anxiety  -P/W worsening confusion for several days after being started on ertapenem  -Etiology is multifactorial from IV Ertapenem,  acute kidney injury and multiple sedative medications.  -Per MAR pt is on Percocet, Ativan, gabapentin, Abilify, Zoloft, Valium, buspirone, Robaxin;    -CT head shows no acute findings     Plan:  -Still remains confused, but was able to tell me her son's name, speech is more garbled to today.  -Continue vancomycin, ceftriaxone IV.  -MRI of brain ordered, depending on if patient tolerate it.     History of urinary tract infection with an indwelling Barker catheter present on admission  -Previous cultures grew multidrug-resistant E. coli and Enterococcus; she has been receiving IV ertapenem via PICC line.  -Discontinue ertapenem  -Continue vancomycin and ceftriaxone.  -Urine culture pending.     Acute kidney injury on chronic kidney disease stage III  Metabolic acidosis  -Most likely prerenal from dehydration, from poor oral intake  -Overall improving  -Scr trend 2.12-->1.84; baseline creatinine 1.6  -Gentle IV hydration  -Hold lisinopril     Hypothyroidism  -Suboptimal control, TSH 15.66 Free T4.6  -Increase to levothyroxine to 125 mcg p.o. daily     Junctional bradycardia  -Most likely related to medication, possible hypothyroidism suboptimally controlled  -Seen by cardiology, who feels there is an intrinsic sinus node dysfunction.  Recommends pacemaker once her confusion has improved.  -Currently remains in sinus rhythm heart rates in the 60s to 70s.  -Will obtain an echocardiogram     Hypertension  -Restart amlodipine if her blood pressure continues to be elevated.     CODE STATUS: Spoke with sonMj [phone] over the phone, CODE STATUS DNR, he is okay with ICU transfer and pacemaker placement.     VTE Prophylaxis: Heparin subcu      SUBJECTIVE:   Admit Date:  "1/3/2025    Interval History: Significantly confused but a little bit better than yesterday, more slurring of words.      OBJECTIVE:   Vitals: BP (!) 213/95   Pulse 75   Temp 36.5 °C (97.7 °F)   Resp 16   Ht 1.6 m (5' 3\")   Wt 62.9 kg (138 lb 10.7 oz)   SpO2 97%   BMI 24.56 kg/m²    Wt Readings from Last 3 Encounters:   01/03/25 62.9 kg (138 lb 10.7 oz)   12/12/24 62.9 kg (138 lb 9.6 oz)   10/07/24 60.5 kg (133 lb 4.8 oz)      24HR INTAKE/OUTPUT:    Intake/Output Summary (Last 24 hours) at 1/4/2025 1103  Last data filed at 1/4/2025 0643  Gross per 24 hour   Intake 50 ml   Output 550 ml   Net -500 ml       PHYSICAL EXAM:   GENERAL: Laying in bed, does not appear to be in any distress.   HEENT: HEAD: Normocephalic atraumatic.  Neck: Supple.  Eyes: Pupils are reactive to direct light.   CVS: S1, S2 heard. Regular rate and rhythm  LUNGS: Clear to auscultate bilaterally. No wheezing or rhonchi appreciated.  ABDOMEN: Soft, nontender to palpate. Positive bowel sounds. No guarding or rebound appreciated.  NEUROLOGICAL: No focal neurological deficits appreciated. Cranial nerves are grossly intact.  EXTREMITIES: No edema appreciated.  SKIN:  Grossly intact, warm and dry.      LABS/IMAGING AND MEDICATIONS:   Scheduled Meds:[Held by provider] amLODIPine, 5 mg, oral, Daily  aspirin, 81 mg, oral, Daily  cefTRIAXone, 1 g, intravenous, q24h  heparin (porcine), 5,000 Units, subcutaneous, q8h MEGHAN  levothyroxine, 125 mcg, oral, Daily  sodium bicarbonate, 650 mg, oral, BID  vancomycin, 500 mg, intravenous, q24h      PRN Meds:PRN medications: acetaminophen **OR** acetaminophen **OR** acetaminophen, vancomycin    No lab exists for component: \"CBC\"   No lab exists for component: \"CMP\"   No lab exists for component: \"TROPONIN\"          No lab exists for component: \"LIPIDS\"       No lab exists for component: \"URINALYSIS\"          BMP:  Results from last 7 days   Lab Units 01/04/25  0840 01/03/25  1120   SODIUM mmol/L 136 135* " "  POTASSIUM mmol/L 4.4 4.4   CHLORIDE mmol/L 110* 106   CO2 mmol/L 13* 18*   BUN mg/dL 40* 46*   CREATININE mg/dL 1.84* 2.12*       CBC:  Results from last 7 days   Lab Units 01/04/25  0840 01/03/25  1120   WBC AUTO x10*3/uL 8.5 8.7   RBC AUTO x10*6/uL 3.27* 3.43*   HEMOGLOBIN g/dL 10.4* 11.1*   HEMATOCRIT % 30.7* 32.2*   MCV fL 94 94   MCH pg 31.8 32.4   MCHC g/dL 33.9 34.5   RDW % 14.1 13.9   PLATELETS AUTO x10*3/uL 254 297       Cardiac Enzymes:   Results from last 7 days   Lab Units 01/03/25  1232 01/03/25  1120   TROPHS ng/L 5 7         Hepatic Function Panel:  Results from last 7 days   Lab Units 01/04/25  0840 01/03/25  1120   ALK PHOS U/L 73 73   ALT U/L 6* 7   AST U/L 10 10   PROTEIN TOTAL g/dL 6.4 6.7   BILIRUBIN TOTAL mg/dL 0.3 0.3       Magnesium:  Results from last 7 days   Lab Units 01/04/25  0840   MAGNESIUM mg/dL 2.29       Pro-BNP:  No results found for: \"PROBNP\"    INR:        TSH:  Lab Results   Component Value Date    TSH 15.66 (H) 01/03/2025       CMP:  Results from last 7 days   Lab Units 01/04/25  0840 01/03/25  1120   SODIUM mmol/L 136 135*   POTASSIUM mmol/L 4.4 4.4   CHLORIDE mmol/L 110* 106   CO2 mmol/L 13* 18*   BUN mg/dL 40* 46*   CREATININE mg/dL 1.84* 2.12*   GLUCOSE mg/dL 75 86   CALCIUM mg/dL 8.7 9.0   PROTEIN TOTAL g/dL 6.4 6.7   BILIRUBIN TOTAL mg/dL 0.3 0.3   ALK PHOS U/L 73 73   AST U/L 10 10   ALT U/L 6* 7     "

## 2025-01-04 NOTE — PROGRESS NOTES
01/04/25 1606   Discharge Planning   Living Arrangements   (LTC)   Support Systems Children   Assistance Needed PTA - lives at LTC facility. Staff provide all care.   Type of Residence Nursing home/residential care   Do you have animals or pets at home? No   Home or Post Acute Services Post acute facilities (Rehab/SNF/etc)   Type of Post Acute Facility Services Long term care   Expected Discharge Disposition Inter  ([facility])   Does the patient need discharge transport arranged? Yes   RoundTrip coordination needed? Yes   Patient Choice   Provider Choice list and CMS website (https://medicare.gov/care-compare#search) for post-acute Quality and Resource Measure Data were provided and reviewed with: Patient;Family   Patient / Family choosing to utilize agency / facility established prior to hospitalization Yes     Referral sent in [facility]port to [facility] to confirm if pt is LTC and can return. Had previous positive cultures that grew multidrug-resistant E. coli and Enterococcus, PICC placed at LTC and she has been receiving IV Ertapenem .

## 2025-01-04 NOTE — CONSULTS
Inpatient consult to Cardiology  Consult performed by: Flako Jacobs MD  Consult ordered by: Nel Gudino MD  Reason for consult: BRADYCARDIA      Cardiology Consult Note      Date:   1/4/2025  Patient name:  Jacqueline Sandoval  Date of admission:  1/3/2025 10:34 AM  MRN:   [id number]  YOB: 1935  Time of Consult:  10:13 AM    Consulting Cardiologist: Dr. Flako More APRN, CNP      Admission Diagnosis:     Encephalopathy, unspecified type      History of Present Illness:      Jacqueline Sandoval is a 89 y.o.  female patient with a history of dementia, UTI with chronic indwelling Barker catheter, acute on chronic CKD stage III hypothyroidism will presented with increased confusion which was felt to be secondary to relative dehydration and possible UTI.  The patient is a poor historian and most of the history was obtained from review of the chart.  Apparently according to the son, she was previously coherent, but was recently diagnosed with a UTI for which she was started on IV antibiotics.  She noticed that she got more progressively confused after she was started on the antibiotic so he brought her to the emergency room.  Initial workup showed a negative CT scan for any acute stroke.  An initial EKG shows sinus bradycardia which on repeat EKG showed junctional rhythm with heart rate of 41 bpm.  The patient reportedly had a heart rate sometimes into the mid 30s in the night when she was sleeping.  However she remained hemodynamically stable.  She has no prior history of coronary artery disease.  She was on beta-blockers which was discontinued since this admission and currently heart rate is in the 70s.      Past Medical History:     Past Medical History:   Diagnosis Date    Acute cystitis without hematuria 05/31/2023    Anemia     Anxiety     Dementia     Depression     Falls     Fibromyalgia     Frequent UTI 08/22/2023    Heart failure     Hypertension     Left  displaced femoral neck fracture 11/23/2023    Postoperative anemia 12/08/2023    Renal disorder     Urinary symptom or sign 06/20/2023       Past Surgical History:     Past Surgical History:   Procedure Laterality Date    JOINT REPLACEMENT      OTHER SURGICAL HISTORY  10/05/2022    Humerus fracture repair       Family History:     No family history on file.    Social History:     Social History     Tobacco Use    Smoking status: Never    Smokeless tobacco: Never   Vaping Use    Vaping status: Never Used   Substance Use Topics    Alcohol use: Never    Drug use: Never       Allergies:     Allergies   Allergen Reactions    Penicillins Unknown    Sulfa (Sulfonamide Antibiotics) Unknown and Rash       CURRENT HOSPITAL MEDICATIONS    [Held by provider] amLODIPine, 5 mg, oral, Daily  aspirin, 81 mg, oral, Daily  cefTRIAXone, 1 g, intravenous, q24h  heparin (porcine), 5,000 Units, subcutaneous, q8h MEGHAN  levothyroxine, 125 mcg, oral, Daily  sodium bicarbonate, 650 mg, oral, BID  vancomycin, 500 mg, intravenous, q24h      sodium chloride 0.9%, 75 mL/hr, Last Rate: 75 mL/hr (01/04/25 0634)      Current Outpatient Medications   Medication Instructions    acetaminophen (TYLENOL 8 HOUR) 1,300 mg, oral, 2 times daily, Do not crush, chew, or split.    amLODIPine (NORVASC) 5 mg, oral, Daily    amLODIPine-benazepriL (Lotrel) 2.5-10 mg capsule 1 capsule, oral, Daily RT    ARIPiprazole (ABILIFY) 2 mg, oral, Daily    aspirin 81 mg EC tablet 1 tablet, oral, Daily    busPIRone (BUSPAR) 10 mg, oral, 3 times daily    diazePAM (VALIUM) 5 mg, oral, Nightly    diclofenac sodium (VOLTAREN) 4 g, Topical, 2 times daily PRN    docusate sodium (COLACE) 100 mg, oral, 2 times daily    famotidine (PEPCID) 20 mg, oral, Daily    gabapentin (NEURONTIN) 100 mg, oral, Nightly    HYDROcodone-acetaminophen (Norco) 5-325 mg tablet 1 tablet, oral, Every 6 hours PRN    levothyroxine (SYNTHROID, LEVOXYL) 175 mcg, oral, Daily before breakfast, Monday through  Friday    levothyroxine (SYNTHROID, LEVOXYL) 200 mcg, oral, Daily before breakfast, Saturday and Sunday     levothyroxine (SYNTHROID, LEVOXYL) 100 mcg, oral, Daily, Take on an empty stomach at the same time each day, either 30 to 60 minutes prior to breakfast    lisinopril 40 mg, oral, Daily    meclizine (ANTIVERT) 12.5 mg, oral, 3 times daily PRN    melatonin 3 mg, oral, Nightly    methocarbamol (ROBAXIN) 500 mg, oral, Every 8 hours PRN    metoprolol succinate XL (TOPROL-XL) 12.5 mg, oral, 2 times daily, Do not crush or chew.    multivitamin with minerals tablet 1 tablet, oral, Daily    naloxone (NARCAN) 4 mg, nasal, As needed, May repeat every 2-3 minutes if needed, alternating nostrils, until medical assistance becomes available.    nitrofurantoin (MACRODANTIN) 50 mg, oral, Daily    oxybutynin (DITROPAN) 5 mg, oral, Daily    polyethylene glycol (GLYCOLAX, MIRALAX) 17 g, oral, Daily    sertraline (ZOLOFT) 75 mg, oral, Daily    sodium chloride 1 g, oral, Daily    tamsulosin (FLOMAX) 0.4 mg, oral, Daily    white petrolatum-mineral oiL (Tears Naturale PM) 94-3 % ophthalmic ointment 1 Application, Both Eyes, 2 times daily        Review of Systems:      12 point review of systems was obtained in detail and is negative other than that detailed above.    Vital Signs:     Vitals:    01/03/25 2328 01/04/25 0204 01/04/25 0401 01/04/25 0808   BP: (!) 201/84 (!) 194/77 (!) 186/75 (!) 213/95   BP Location: Left arm  Left arm    Patient Position: Lying  Lying    Pulse: 69 65 63 75   Resp: 16  16    Temp: 36.1 °C (97 °F)  36.1 °C (97 °F) 36.5 °C (97.7 °F)   TempSrc: Temporal  Temporal    SpO2: 94%  96% 97%   Weight:       Height:           Intake/Output Summary (Last 24 hours) at 1/4/2025 1013  Last data filed at 1/4/2025 0643  Gross per 24 hour   Intake 50 ml   Output 550 ml   Net -500 ml       Wt Readings from Last 4 Encounters:   01/03/25 62.9 kg (138 lb 10.7 oz)   12/12/24 62.9 kg (138 lb 9.6 oz)   10/07/24 60.5 kg (133 lb  "4.8 oz)   08/29/24 64 kg (141 lb)       Physical Examination:     GENERAL APPEARANCE: Well developed, well nourished, in no acute distress.  She is very confused and somewhat combative.  CHEST: Symmetric and non-tender.  INTEGUMENT: Skin warm and dry, without gross excoriationis or lesions.  HEENT: No gross abnormalities of conjunctiva, teeth, gums, oral mucosa  NECK: Supple, no JVD, no bruit. Thyroid not palpable. Carotid upstrokes normal.  NEURO/PSHCY: Awake but confused and somewhat combative.    LUNGS: Clear to auscultation bilaterally; normal respiratory effort.  HEART: Rate and rhythm regular with no evident murmur; no gallop appreciated. There are no rubs, clicks or heaves. PMI nondisplaced.  ABDOMEN: Soft, nontender, no palpable hepatosplenomegaly, no mases, no bruits. Abdominal aorta not noted to be enlarged.  MUSCULOSKELETAL: Ambulatory with normal tandem gait.  EXTREMITIES: Warm with good color, no clubbing or cyanois. There is no edema noted.  PERIPHERAL VASCULAR: Pulses present and equally palpable; 2+ throughout. No femoral bruits.      Lab:     CBC:   Lab Results   Component Value Date    WBC 8.5 01/04/2025    RBC 3.27 (L) 01/04/2025    HGB 10.4 (L) 01/04/2025    HCT 30.7 (L) 01/04/2025     01/04/2025        CMP:    Lab Results   Component Value Date     01/04/2025    K 4.4 01/04/2025     (H) 01/04/2025    CO2 13 (L) 01/04/2025    BUN 40 (H) 01/04/2025    CREATININE 1.84 (H) 01/04/2025    GLUCOSE 75 01/04/2025    CALCIUM 8.7 01/04/2025       Magnesium:    Lab Results   Component Value Date    MG 2.29 01/04/2025       Lipid Profile:    No results found for: \"CHLPL\", \"TRIG\", \"HDL\", \"LDLCALC\", \"LDLDIRECT\"    TSH:    Lab Results   Component Value Date    TSH 15.66 (H) 01/03/2025       BNP:   Lab Results   Component Value Date    BNP 60 09/20/2022        PT/INR:    No results found for: \"PROTIME\", \"INR\"    HgBA1c:    Lab Results   Component Value Date    HGBA1C 5.3 05/20/2021 "       BMP:  Lab Results   Component Value Date     01/04/2025     (L) 01/03/2025    K 4.4 01/04/2025    K 4.4 01/03/2025     (H) 01/04/2025     01/03/2025    CO2 13 (L) 01/04/2025    CO2 18 (L) 01/03/2025    BUN 40 (H) 01/04/2025    BUN 46 (H) 01/03/2025    CREATININE 1.84 (H) 01/04/2025    CREATININE 2.12 (H) 01/03/2025       CBC:  Lab Results   Component Value Date    WBC 8.5 01/04/2025    WBC 8.7 01/03/2025    RBC 3.27 (L) 01/04/2025    RBC 3.43 (L) 01/03/2025    HGB 10.4 (L) 01/04/2025    HGB 11.1 (L) 01/03/2025    HCT 30.7 (L) 01/04/2025    HCT 32.2 (L) 01/03/2025    MCV 94 01/04/2025    MCV 94 01/03/2025    MCH 31.8 01/04/2025    MCH 32.4 01/03/2025    MCHC 33.9 01/04/2025    MCHC 34.5 01/03/2025    RDW 14.1 01/04/2025    RDW 13.9 01/03/2025     01/04/2025     01/03/2025       Cardiac Enzymes:    Lab Results   Component Value Date    TROPHS 5 01/03/2025    TROPHS 7 01/03/2025    TROPHS 9 05/19/2024       Hepatic Function Panel:    Lab Results   Component Value Date    ALKPHOS 73 01/04/2025    ALT 6 (L) 01/04/2025    AST 10 01/04/2025    PROT 6.4 01/04/2025    BILITOT 0.3 01/04/2025       Diagnostic Studies:     Electrocardiogram, 12-lead PRN ACS symptoms    Result Date: 1/4/2025  Normal sinus rhythm Anterolateral infarct (cited on or before 03-JAN-2025) Abnormal ECG When compared with ECG of 03-JAN-2025 14:08, (unconfirmed) Sinus rhythm has replaced Junctional rhythm Vent. rate has increased BY  22 BPM Serial changes of Anterior infarct Present    ECG 12 lead    Result Date: 1/3/2025  Sinus bradycardia Cannot rule out Anterior infarct , age undetermined Abnormal ECG When compared with ECG of 19-MAY-2024 09:12, Sinus rhythm has replaced Junctional rhythm Minimal criteria for Anterior infarct are now Present See ED provider note for full interpretation and clinical correlation    CT head wo IV contrast    Result Date: 1/3/2025  Interpreted By:  Fer Montemayor, STUDY: CT HEAD  WO IV CONTRAST; 1/3/2025 12:54 pm   INDICATION: Signs/Symptoms:ams.   COMPARISON: 05/19/2024   ACCESSION NUMBER(S): [id number]   ORDERING CLINICIAN: ROCHELLE GOMEZ   TECHNIQUE: Contiguous axial CT images were obtained through the head at 5 mm slice thickness without contrast administration.   FINDINGS: INTRACRANIAL: Mild diffuse volume loss is seen bilaterally. Mild-to-moderate subcortical and periventricular white matter changes are seen.  The grey-white differentiation is intact. There is no mass effect or midline shift. There is no extraaxial fluid collection. There is no intracranial hemorrhage.  The calvarium  is unremarkable.   EXTRACRANIAL: Visualized paranasal sinuses and mastoids are clear.       1. Diffuse volume loss and periventricular white matter changes, most consistent with small vessel ischemic disease. 2. No evidence of acute cortical infarct or intracranial hemorrhage.     MACRO: None   Signed by: Fer Montemayor 1/3/2025 1:08 PM Dictation workstation:   [id number]    XR chest 1 view    Result Date: 1/3/2025  STUDY: Chest Radiograph;  1/3/2025 at 10:53 AM INDICATION: Altered mental status. COMPARISON: 5/19/2024 Chest XR, 11/23/2023 Chest XR ACCESSION NUMBER(S): [id number] ORDERING CLINICIAN: ROCHELLE GOMEZ TECHNIQUE:  Frontal chest was obtained at 1053 hours. FINDINGS: There is a left pleural effusion with adjacent atelectasis.  Heart size is top normal.  There is no evidence of a pneumothorax.    1.  Left pleural effusion with adjacent atelectasis. Signed by Rick Camargo MD      Radiology:     CT head wo IV contrast   Final Result   1. Diffuse volume loss and periventricular white matter changes, most   consistent with small vessel ischemic disease.   2. No evidence of acute cortical infarct or intracranial hemorrhage.             MACRO:   None        Signed by: Fer Montemayor 1/3/2025 1:08 PM   Dictation workstation:   [id number]      XR chest 1 view   Final Result   1.  Left pleural  effusion with adjacent atelectasis.   Signed by Rick Camargo MD          Problem List:     Patient Active Problem List   Diagnosis    Anxiety    Depression    Fibromyalgia    HTN (hypertension), benign    [place] (hard of hearing)    Hyponatremia    Hypothyroidism    Osteoarthritis    Vertigo    Anemia due to stage 3a chronic kidney disease (Multi)    Urinary retention    Acute leg pain, right    History of recurrent UTIs    Barker catheter in place    Right thigh pain    Encephalopathy, unspecified type       ASSESSMENT & PLAN:   1.  Bradycardia: She probably has underlying sick sinus syndrome with tacky bradycardia syndrome.  She was on a very low-dose of Toprol-XL at 12.5 mg twice a day which will not explain this degree of bradycardia, with junctional escape rhythm in the 30s to 40s.  Awake heart rate appears to be within normal range.  We will get EP consult for further evaluation and in the meantime, we will get an echocardiogram to rule out any significant cardiac structural abnormalities.  2.  Hypothyroidism: This may also be contributing to relative bradycardia, but will not explain this degree of bradycardia that tends to occur especially at night.  Will defer to the primary team for optimal management of her thyroid status.  3.  Confusion: This is felt to be secondary to recurrent UTI and relative dehydration and recent IV antibiotics as noted above.  We will defer to primary team for continued management.        Thank you for the opportunity to participate in the care of your patient.  Please do not hesitate to call if you have any questions.    Electronically signed by Flako Jacobs MD, on 1/4/2025 at 10:13 AM

## 2025-01-04 NOTE — CARE PLAN
The patient's goals for the shift include      The clinical goals for the shift include remain free of falls

## 2025-01-04 NOTE — ACP (ADVANCE CARE PLANNING)
Confirming Previous Code Status: DNR-CC.      Advance Care Planning Note     Discussion Date: 01/03/25   Discussion Participants: son (Mj)    The patient wishes to discuss Advance Care Planning today and the following is a brief summary of our discussion.     Patient has capacity to make their own medical decisions: No  Health Care Agent/Surrogate Decision Maker documented in chart: Yes    Documents on file and valid:  Advance Directive/Living Will: Yes   Health Care Power of : No      Communication of Medical Status/Prognosis/Treatment Goals/Options:  I was notified by nursing of patient's significant bradycardia with heart rate consistently in upper 20s to upper 30s.  Chart was reviewed and patient was noted to be DNR-CC.  I was notified by admitting provider that they had discussed the possibility of needing transfer to ICU for temporary pacing versus continuous chronotropic medications.  Patient is notably encephalopathic and unable to make decisions for herself at this time.  Patient's son Mj at bedside.  We then began to discuss goals of care in light of her current medical issues.  Mj stated that about 2 years ago they had a discussion with their PCP regarding CODE STATUS and per patient's wishes she was made DNR-CC.  I confirmed that it was the patient herself that made this decision.  I explained to Mj that temporary pacing, transfer to ICU, and continuous chronotropic medications do not align with her current code status.  I then explained that pursuing these options would require changing her CODE STATUS and removing comfort care status.  I also advised him of the risks of potential temporary pacing including discomfort, need for procedure including central line (with risks of infection, pain, bleeding, etc), as well as likelihood of requiring sedation which would put her at risk for respiratory depression and further decompensation.  Mj voiced that he understood and that he would  like to discuss with his children before making any decisions.  Following his conversation with his children, Mj indicated that they would like to change CODE STATUS to DNR/DNI to allow for potential interventions as mentioned above in case she does decompensate.  He strongly feels that his mother would feel the same way in this situation.  As follows we will change CODE STATUS to DNR/DNI and continue to closely monitor the patient on telemetry.  ICU team has been notified and is aware in case she becomes unstable and requires transfer.      Treatment Decisions  Goals of Care: survival is prioritized, if goals for quality or survival can reasonably be achieved     Follow Up Plan  -Change CODE STATUS to DNR/DNI, comfort care status removed per family's wishes  -Continue to closely monitor patient on telemetry  -ICU aware of patient in case of need for transfer for potential interventions listed in discussion above  -Primary nurse updated with plan of care      Time Statement: Total face to face time spent on advance care planning was 45 minutes with 35 minutes spent in counseling, including the explanation.    Cesar Zavala MD  1/3/2025 9:03 PM

## 2025-01-05 ENCOUNTER — HOSPITAL ENCOUNTER (INPATIENT)
Dept: CARDIOLOGY | Facility: HOSPITAL | Age: OVER 89
Discharge: HOME | End: 2025-01-05
Payer: COMMERCIAL

## 2025-01-05 VITALS
HEART RATE: 92 BPM | TEMPERATURE: 98.1 F | OXYGEN SATURATION: 99 % | HEIGHT: 63 IN | RESPIRATION RATE: 16 BRPM | WEIGHT: 138.67 LBS | BODY MASS INDEX: 24.57 KG/M2 | DIASTOLIC BLOOD PRESSURE: 66 MMHG | SYSTOLIC BLOOD PRESSURE: 158 MMHG

## 2025-01-05 VITALS
SYSTOLIC BLOOD PRESSURE: 183 MMHG | TEMPERATURE: 98.1 F | DIASTOLIC BLOOD PRESSURE: 79 MMHG | HEART RATE: 92 BPM | OXYGEN SATURATION: 97 %

## 2025-01-05 LAB
ANION GAP SERPL CALC-SCNC: 16 MMOL/L (ref 10–20)
ATRIAL RATE: 312 BPM
ATRIAL RATE: 77 BPM
BACTERIA BLD CULT: NORMAL
BACTERIA BLD CULT: NORMAL
BACTERIA UR CULT: ABNORMAL
BUN SERPL-MCNC: 27 MG/DL (ref 6–23)
CALCIUM SERPL-MCNC: 8.6 MG/DL (ref 8.6–10.3)
CHLORIDE SERPL-SCNC: 110 MMOL/L (ref 98–107)
CO2 SERPL-SCNC: 16 MMOL/L (ref 21–32)
CREAT SERPL-MCNC: 1.44 MG/DL (ref 0.5–1.05)
EGFRCR SERPLBLD CKD-EPI 2021: 35 ML/MIN/1.73M*2
ERYTHROCYTE [DISTWIDTH] IN BLOOD BY AUTOMATED COUNT: 14.3 % (ref 11.5–14.5)
GLUCOSE SERPL-MCNC: 80 MG/DL (ref 74–99)
HCT VFR BLD AUTO: 29.3 % (ref 36–46)
HGB BLD-MCNC: 10 G/DL (ref 12–16)
MAGNESIUM SERPL-MCNC: 1.93 MG/DL (ref 1.6–2.4)
MCH RBC QN AUTO: 32.5 PG (ref 26–34)
MCHC RBC AUTO-ENTMCNC: 34.1 G/DL (ref 32–36)
MCV RBC AUTO: 95 FL (ref 80–100)
NRBC BLD-RTO: 0 /100 WBCS (ref 0–0)
P AXIS: 45 DEGREES
P OFFSET: 175 MS
P ONSET: 113 MS
PLATELET # BLD AUTO: 261 X10*3/UL (ref 150–450)
POTASSIUM SERPL-SCNC: 4.1 MMOL/L (ref 3.5–5.3)
PR INTERVAL: 196 MS
Q ONSET: 211 MS
Q ONSET: 223 MS
QRS COUNT: 13 BEATS
QRS COUNT: 7 BEATS
QRS DURATION: 74 MS
QRS DURATION: 92 MS
QT INTERVAL: 400 MS
QT INTERVAL: 518 MS
QTC CALCULATION(BAZETT): 427 MS
QTC CALCULATION(BAZETT): 452 MS
QTC FREDERICIA: 434 MS
QTC FREDERICIA: 456 MS
R AXIS: -4 DEGREES
R AXIS: -6 DEGREES
RBC # BLD AUTO: 3.08 X10*6/UL (ref 4–5.2)
SODIUM SERPL-SCNC: 138 MMOL/L (ref 136–145)
T AXIS: 105 DEGREES
T AXIS: 81 DEGREES
T OFFSET: 411 MS
T OFFSET: 482 MS
VANCOMYCIN SERPL-MCNC: 12 UG/ML (ref 5–20)
VENTRICULAR RATE: 41 BPM
VENTRICULAR RATE: 77 BPM
WBC # BLD AUTO: 7.7 X10*3/UL (ref 4.4–11.3)

## 2025-01-05 PROCEDURE — 99233 SBSQ HOSP IP/OBS HIGH 50: CPT | Performed by: INTERNAL MEDICINE

## 2025-01-05 PROCEDURE — 80048 BASIC METABOLIC PNL TOTAL CA: CPT | Performed by: HOSPITALIST

## 2025-01-05 PROCEDURE — 2500000004 HC RX 250 GENERAL PHARMACY W/ HCPCS (ALT 636 FOR OP/ED)

## 2025-01-05 PROCEDURE — 2500000002 HC RX 250 W HCPCS SELF ADMINISTERED DRUGS (ALT 637 FOR MEDICARE OP, ALT 636 FOR OP/ED): Performed by: STUDENT IN AN ORGANIZED HEALTH CARE EDUCATION/TRAINING PROGRAM

## 2025-01-05 PROCEDURE — 2500000001 HC RX 250 WO HCPCS SELF ADMINISTERED DRUGS (ALT 637 FOR MEDICARE OP): Performed by: HOSPITALIST

## 2025-01-05 PROCEDURE — 83735 ASSAY OF MAGNESIUM: CPT | Performed by: HOSPITALIST

## 2025-01-05 PROCEDURE — 2060000001 HC INTERMEDIATE ICU ROOM DAILY

## 2025-01-05 PROCEDURE — 2500000004 HC RX 250 GENERAL PHARMACY W/ HCPCS (ALT 636 FOR OP/ED): Performed by: HOSPITALIST

## 2025-01-05 PROCEDURE — 85027 COMPLETE CBC AUTOMATED: CPT | Performed by: HOSPITALIST

## 2025-01-05 PROCEDURE — 37799 UNLISTED PX VASCULAR SURGERY: CPT | Performed by: HOSPITALIST

## 2025-01-05 PROCEDURE — 2500000004 HC RX 250 GENERAL PHARMACY W/ HCPCS (ALT 636 FOR OP/ED): Performed by: STUDENT IN AN ORGANIZED HEALTH CARE EDUCATION/TRAINING PROGRAM

## 2025-01-05 PROCEDURE — 80202 ASSAY OF VANCOMYCIN: CPT

## 2025-01-05 PROCEDURE — 93005 ELECTROCARDIOGRAM TRACING: CPT

## 2025-01-05 PROCEDURE — 93010 ELECTROCARDIOGRAM REPORT: CPT | Performed by: INTERNAL MEDICINE

## 2025-01-05 PROCEDURE — 2500000002 HC RX 250 W HCPCS SELF ADMINISTERED DRUGS (ALT 637 FOR MEDICARE OP, ALT 636 FOR OP/ED): Performed by: HOSPITALIST

## 2025-01-05 PROCEDURE — 99232 SBSQ HOSP IP/OBS MODERATE 35: CPT | Performed by: HOSPITALIST

## 2025-01-05 RX ORDER — ARIPIPRAZOLE 2 MG/1
2 TABLET ORAL DAILY
Status: DISPENSED | OUTPATIENT
Start: 2025-01-05

## 2025-01-05 RX ORDER — VANCOMYCIN HYDROCHLORIDE 750 MG/150ML
750 INJECTION, SOLUTION INTRAVENOUS EVERY 24 HOURS
Status: DISCONTINUED | OUTPATIENT
Start: 2025-01-05 | End: 2025-01-05

## 2025-01-05 RX ORDER — GABAPENTIN 100 MG/1
100 CAPSULE ORAL NIGHTLY
Status: DISPENSED | OUTPATIENT
Start: 2025-01-05

## 2025-01-05 RX ORDER — VANCOMYCIN HYDROCHLORIDE 1 G/20ML
INJECTION, POWDER, LYOPHILIZED, FOR SOLUTION INTRAVENOUS DAILY PRN
Status: DISPENSED | OUTPATIENT
Start: 2025-01-05

## 2025-01-05 RX ORDER — HALOPERIDOL 5 MG/ML
2 INJECTION INTRAMUSCULAR ONCE
Status: COMPLETED | OUTPATIENT
Start: 2025-01-05 | End: 2025-01-05

## 2025-01-05 RX ORDER — VANCOMYCIN HYDROCHLORIDE 750 MG/150ML
750 INJECTION, SOLUTION INTRAVENOUS EVERY 24 HOURS
Status: DISPENSED | OUTPATIENT
Start: 2025-01-05

## 2025-01-05 RX ORDER — HALOPERIDOL 5 MG/ML
5 INJECTION INTRAMUSCULAR ONCE
Status: DISCONTINUED | OUTPATIENT
Start: 2025-01-05 | End: 2025-01-05

## 2025-01-05 RX ORDER — AMLODIPINE BESYLATE 5 MG/1
2.5 TABLET ORAL DAILY
Status: DISPENSED | OUTPATIENT
Start: 2025-01-05

## 2025-01-05 RX ORDER — QUETIAPINE FUMARATE 25 MG/1
25 TABLET, FILM COATED ORAL NIGHTLY
Status: DISPENSED | OUTPATIENT
Start: 2025-01-05

## 2025-01-05 RX ADMIN — HEPARIN SODIUM 5000 UNITS: 5000 INJECTION INTRAVENOUS; SUBCUTANEOUS at 15:07

## 2025-01-05 RX ADMIN — AMLODIPINE BESYLATE 2.5 MG: 5 TABLET ORAL at 10:00

## 2025-01-05 RX ADMIN — HEPARIN SODIUM 5000 UNITS: 5000 INJECTION INTRAVENOUS; SUBCUTANEOUS at 05:49

## 2025-01-05 RX ADMIN — QUETIAPINE FUMARATE 25 MG: 25 TABLET, FILM COATED ORAL at 22:18

## 2025-01-05 RX ADMIN — ARIPIPRAZOLE 2 MG: 2 TABLET ORAL at 12:33

## 2025-01-05 RX ADMIN — GABAPENTIN 100 MG: 100 CAPSULE ORAL at 19:44

## 2025-01-05 RX ADMIN — SODIUM BICARBONATE 650 MG: 650 TABLET ORAL at 19:44

## 2025-01-05 RX ADMIN — ASPIRIN 81 MG: 81 TABLET, COATED ORAL at 10:00

## 2025-01-05 RX ADMIN — HEPARIN SODIUM 5000 UNITS: 5000 INJECTION INTRAVENOUS; SUBCUTANEOUS at 00:51

## 2025-01-05 RX ADMIN — HEPARIN SODIUM 5000 UNITS: 5000 INJECTION INTRAVENOUS; SUBCUTANEOUS at 21:00

## 2025-01-05 RX ADMIN — SODIUM BICARBONATE 650 MG: 650 TABLET ORAL at 10:00

## 2025-01-05 RX ADMIN — LEVOTHYROXINE SODIUM 125 MCG: 125 TABLET ORAL at 05:49

## 2025-01-05 RX ADMIN — HALOPERIDOL LACTATE 2 MG: 5 INJECTION, SOLUTION INTRAMUSCULAR at 03:41

## 2025-01-05 RX ADMIN — CEFTRIAXONE SODIUM 1 G: 1 INJECTION, SOLUTION INTRAVENOUS at 12:33

## 2025-01-05 RX ADMIN — VANCOMYCIN HYDROCHLORIDE 750 MG: 750 INJECTION, SOLUTION INTRAVENOUS at 18:22

## 2025-01-05 ASSESSMENT — PAIN SCALES - GENERAL
PAINLEVEL_OUTOF10: 0 - NO PAIN
PAINLEVEL_OUTOF10: 0 - NO PAIN

## 2025-01-05 ASSESSMENT — COGNITIVE AND FUNCTIONAL STATUS - GENERAL
MOBILITY SCORE: 8
DAILY ACTIVITIY SCORE: 6
TOILETING: TOTAL
PERSONAL GROOMING: TOTAL
MOVING TO AND FROM BED TO CHAIR: TOTAL
MOVING FROM LYING ON BACK TO SITTING ON SIDE OF FLAT BED WITH BEDRAILS: A LOT
CLIMB 3 TO 5 STEPS WITH RAILING: TOTAL
EATING MEALS: TOTAL
STANDING UP FROM CHAIR USING ARMS: TOTAL
MOBILITY SCORE: 8
STANDING UP FROM CHAIR USING ARMS: TOTAL
MOVING FROM LYING ON BACK TO SITTING ON SIDE OF FLAT BED WITH BEDRAILS: A LOT
HELP NEEDED FOR BATHING: TOTAL
DRESSING REGULAR LOWER BODY CLOTHING: TOTAL
DRESSING REGULAR LOWER BODY CLOTHING: TOTAL
HELP NEEDED FOR BATHING: TOTAL
WALKING IN HOSPITAL ROOM: TOTAL
PERSONAL GROOMING: TOTAL
EATING MEALS: TOTAL
WALKING IN HOSPITAL ROOM: TOTAL
CLIMB 3 TO 5 STEPS WITH RAILING: TOTAL
TOILETING: TOTAL
TURNING FROM BACK TO SIDE WHILE IN FLAT BAD: A LOT
DAILY ACTIVITIY SCORE: 6
TURNING FROM BACK TO SIDE WHILE IN FLAT BAD: A LOT
DRESSING REGULAR UPPER BODY CLOTHING: TOTAL
MOVING TO AND FROM BED TO CHAIR: TOTAL
DRESSING REGULAR UPPER BODY CLOTHING: TOTAL

## 2025-01-05 ASSESSMENT — PAIN - FUNCTIONAL ASSESSMENT
PAIN_FUNCTIONAL_ASSESSMENT: 0-10
PAIN_FUNCTIONAL_ASSESSMENT: 0-10

## 2025-01-05 NOTE — CONSULTS
Vancomycin Dosing by Pharmacy- FOLLOW UP    Jacqueline Sandoval is a 89 y.o. year old female who Pharmacy has been consulted for vancomycin dosing for other UTI . Based on the patient's indication and renal status this patient is being dosed based on a goal AUC of 400-600.     Renal function is currently improving.    Current vancomycin dose: 750 mg given every 24 hours      Visit Vitals  /66   Pulse 75   Temp 36.7 °C (98.1 °F) (Temporal)   Resp 16        Lab Results   Component Value Date    CREATININE 1.44 (H) 2025    CREATININE 1.84 (H) 2025    CREATININE 2.12 (H) 2025    CREATININE 2.05 (H) 2024        Patient weight is as follows:   Vitals:    25 1042   Weight: 62.9 kg (138 lb 10.7 oz)       Cultures:  Susceptibility data for the encounter in last 14 days.  Collected Specimen Info Organism   25 Urine from Clean Catch/Voided Candida albicans        I/O last 3 completed shifts:  In: 50 (0.8 mL/kg) [IV Piggyback:50]  Out: 2825 (44.9 mL/kg) [Urine:2825 (1.2 mL/kg/hr)]  Weight: 62.9 kg   I/O during current shift:  I/O this shift:  In: 50 [IV Piggyback:50]  Out: 550 [Urine:550]    Temp (24hrs), Av.6 °C (97.9 °F), Min:36.3 °C (97.3 °F), Max:36.9 °C (98.4 °F)      Assessment/Plan    Within goal AUC range. Continue current vancomycin regimen.  Consult restarted per attending physician based on NH culture results.  The next level will be obtained on 24 at 0500. May be obtained sooner if clinically indicated.   Will continue to monitor renal function daily while on vancomycin and order serum creatinine at least every 48 hours if not already ordered.  Follow for continued vancomycin needs, clinical response, and signs/symptoms of toxicity.       Marianna Fleming, CassidyD

## 2025-01-05 NOTE — PROGRESS NOTES
PROGRESS NOTE    ASSESSMENT AND PLAN:     Acute metabolic encephalopathy   History of dementia  History of anxiety  -P/W worsening confusion for several days after being started on ertapenem  -Etiology is multifactorial from IV Ertapenem,  acute kidney injury and multiple sedative medications.  -Per MAR pt is on Percocet, Ativan, gabapentin, Abilify, Zoloft, Valium, buspirone, Robaxin;    -CT head shows no acute findings     Plan:  -She is less confused today.  Restart Abilify  -Continue vancomycin, ceftriaxone IV.     History of urinary tract infection with an indwelling Barker catheter present on admission  -Previous cultures grew multidrug-resistant E. coli and Enterococcus; she has been receiving IV ertapenem via PICC line.  Reviewed [facility] cultures  -Discontinue ertapenem  -Continue vancomycin and ceftriaxone.  -Urine culture here shows no growth  -Discontinue vancomycin, Enterococcus facialis is sensitive to ampicillin, however she is allergic to penicillins.  Once her kidney function improves we will switch to Macrodantin.     Acute kidney injury on chronic kidney disease stage III  Metabolic acidosis  -Most likely prerenal from dehydration, from poor oral intake  -Overall improving  -Scr trend 2.12-->1.84; baseline creatinine 1.6  -Gentle IV hydration  -Hold lisinopril     Hypothyroidism  -Suboptimal control, TSH 15.66 Free T4.6  -Increase to levothyroxine to 125 mcg p.o. daily     Junctional bradycardia  -Most likely related to medication, possible hypothyroidism suboptimally controlled  -Seen by cardiology, who feels there is an intrinsic sinus node dysfunction.  Recommends pacemaker once her confusion has improved.  -Currently remains in sinus rhythm heart rates in the 60s to 70s.  -Will obtain an echocardiogram     Hypertension  -Restart amlodipine if her blood pressure continues to be elevated.    9.  Fibromyalgia  -She continues to complain of pain, restart Abilify and  "gabapentin.    SUBJECTIVE:   Admit Date: 1/3/2025    Interval History: Has no active complaints, she is more lucid today.  Still continues to have intermittent confusion      OBJECTIVE:   Vitals: /66   Pulse 75   Temp 36.7 °C (98.1 °F) (Temporal)   Resp 16   Ht 1.6 m (5' 3\")   Wt 62.9 kg (138 lb 10.7 oz)   SpO2 99%   BMI 24.56 kg/m²    Wt Readings from Last 3 Encounters:   01/03/25 62.9 kg (138 lb 10.7 oz)   12/12/24 62.9 kg (138 lb 9.6 oz)   10/07/24 60.5 kg (133 lb 4.8 oz)      24HR INTAKE/OUTPUT:    Intake/Output Summary (Last 24 hours) at 1/5/2025 1244  Last data filed at 1/5/2025 0649  Gross per 24 hour   Intake 50 ml   Output 2275 ml   Net -2225 ml       PHYSICAL EXAM:   GENERAL: Laying in bed, does not appear to be in any distress.   HEENT: HEAD: Normocephalic atraumatic.  Neck: Supple.  Eyes: Pupils are reactive to direct light.   CVS: S1, S2 heard. Regular rate and rhythm  LUNGS: Clear to auscultate bilaterally. No wheezing or rhonchi appreciated.  ABDOMEN: Soft, nontender to palpate. Positive bowel sounds. No guarding or rebound appreciated.  NEUROLOGICAL: No focal neurological deficits appreciated. Cranial nerves are grossly intact.  EXTREMITIES: No edema appreciated.  SKIN:  Grossly intact, warm and dry.      LABS/IMAGING AND MEDICATIONS:   Scheduled Meds:amLODIPine, 2.5 mg, oral, Daily  ARIPiprazole, 2 mg, oral, Daily  aspirin, 81 mg, oral, Daily  cefTRIAXone, 1 g, intravenous, q24h  heparin (porcine), 5,000 Units, subcutaneous, q8h MEGHAN  levothyroxine, 125 mcg, oral, Daily  sodium bicarbonate, 650 mg, oral, BID  vancomycin, 750 mg, intravenous, q24h      PRN Meds:PRN medications: acetaminophen **OR** acetaminophen **OR** acetaminophen, vancomycin        BMP:  Results from last 7 days   Lab Units 01/05/25  0618 01/04/25  0840 01/03/25  1120   SODIUM mmol/L 138 136 135*   POTASSIUM mmol/L 4.1 4.4 4.4   CHLORIDE mmol/L 110* 110* 106   CO2 mmol/L 16* 13* 18*   BUN mg/dL 27* 40* 46*   CREATININE " "mg/dL 1.44* 1.84* 2.12*       CBC:  Results from last 7 days   Lab Units 01/05/25  0618 01/04/25  0840 01/03/25  1120   WBC AUTO x10*3/uL 7.7 8.5 8.7   RBC AUTO x10*6/uL 3.08* 3.27* 3.43*   HEMOGLOBIN g/dL 10.0* 10.4* 11.1*   HEMATOCRIT % 29.3* 30.7* 32.2*   MCV fL 95 94 94   MCH pg 32.5 31.8 32.4   MCHC g/dL 34.1 33.9 34.5   RDW % 14.3 14.1 13.9   PLATELETS AUTO x10*3/uL 261 254 297       Cardiac Enzymes:   Results from last 7 days   Lab Units 01/03/25  1232 01/03/25  1120   TROPHS ng/L 5 7         Hepatic Function Panel:  Results from last 7 days   Lab Units 01/04/25  0840 01/03/25  1120   ALK PHOS U/L 73 73   ALT U/L 6* 7   AST U/L 10 10   PROTEIN TOTAL g/dL 6.4 6.7   BILIRUBIN TOTAL mg/dL 0.3 0.3       Magnesium:  Results from last 7 days   Lab Units 01/05/25  0618   MAGNESIUM mg/dL 1.93       Pro-BNP:  No results found for: \"PROBNP\"    INR:        TSH:  Lab Results   Component Value Date    TSH 15.66 (H) 01/03/2025         CMP:  Results from last 7 days   Lab Units 01/05/25  0618 01/04/25  0840 01/03/25  1120   SODIUM mmol/L 138 136 135*   POTASSIUM mmol/L 4.1 4.4 4.4   CHLORIDE mmol/L 110* 110* 106   CO2 mmol/L 16* 13* 18*   BUN mg/dL 27* 40* 46*   CREATININE mg/dL 1.44* 1.84* 2.12*   GLUCOSE mg/dL 80 75 86   CALCIUM mg/dL 8.6 8.7 9.0   PROTEIN TOTAL g/dL  --  6.4 6.7   BILIRUBIN TOTAL mg/dL  --  0.3 0.3   ALK PHOS U/L  --  73 73   AST U/L  --  10 10   ALT U/L  --  6* 7     "

## 2025-01-05 NOTE — PROGRESS NOTES
Vancomycin Dosing by Pharmacy- FOLLOW UP    Jacqueline Sandoval is a 89 y.o. year old female who Pharmacy has been consulted for vancomycin dosing for UTI. Based on the patient's indication and renal status this patient is being dosed based on a goal AUC of 400-600.     Renal function is currently slightly improving.    Current vancomycin dose: 500 mg given every 24 hours    Estimated vancomycin AUC on current dose: 355 mg/L.hr     Visit Vitals  /72 (BP Location: Right arm, Patient Position: Lying)   Pulse 75   Temp 36.7 °C (98.1 °F) (Temporal)   Resp 16        Lab Results   Component Value Date    CREATININE 1.44 (H) 2025    CREATININE 1.84 (H) 2025    CREATININE 2.12 (H) 2025    CREATININE 2.05 (H) 2024        Patient weight is as follows:   Vitals:    25 1042   Weight: 62.9 kg (138 lb 10.7 oz)       Cultures:  No results found for the encounter in last 14 days.       I/O last 3 completed shifts:  In: 50 (0.8 mL/kg) [IV Piggyback:50]  Out: 2825 (44.9 mL/kg) [Urine:2825 (1.2 mL/kg/hr)]  Weight: 62.9 kg   I/O during current shift:  No intake/output data recorded.    Temp (24hrs), Av.6 °C (97.8 °F), Min:36.3 °C (97.3 °F), Max:36.7 °C (98.1 °F)      Assessment/Plan    Below goal AUC. Orders placed for new vancomcyin regimen of 750 every 24 hours to begin at 1400 25.     This dosing regimen is predicted by [employer] to result in the following pharmacokinetic parameters:    Regimen: 750 mg IV every 24 hours.  Start time: 14:19 on 2025  Exposure target: AUC24 (range)400-600 mg/L.hr   HOS89-82: 453 mg/L.hr  AUC24,ss: 519 mg/L.hr  Probability of AUC24 > 400: 92 %  Ctrough,ss: 16.7 mg/L  Probability of Ctrough,ss > 20: 23 %    The next level will be obtained on   at 0500. May be obtained sooner if clinically indicated.   Will continue to monitor renal function daily while on vancomycin and order serum creatinine at least every 48 hours if not already ordered.  Follow for  continued vancomycin needs, clinical response, and signs/symptoms of toxicity.       Michelle Love, PharmD

## 2025-01-05 NOTE — CARE PLAN
Problem: Pain - Adult  Goal: Verbalizes/displays adequate comfort level or baseline comfort level  Outcome: Progressing     Problem: Safety - Adult  Goal: Free from fall injury  Outcome: Progressing     Problem: Discharge Planning  Goal: Discharge to home or other facility with appropriate resources  Outcome: Progressing     Problem: Chronic Conditions and Co-morbidities  Goal: Patient's chronic conditions and co-morbidity symptoms are monitored and maintained or improved  Outcome: Progressing     Problem: Skin  Goal: Decreased wound size/increased tissue granulation at next dressing change  Outcome: Progressing  Goal: Participates in plan/prevention/treatment measures  Outcome: Progressing  Goal: Prevent/manage excess moisture  Outcome: Progressing  Goal: Prevent/minimize sheer/friction injuries  Outcome: Progressing  Goal: Promote/optimize nutrition  Outcome: Progressing  Goal: Promote skin healing  Outcome: Progressing   The patient's goals for the shift include      The clinical goals for the shift include patient will remain hemodynamically stable by the end of shift.

## 2025-01-05 NOTE — PROGRESS NOTES
Vancomycin Dosing by Pharmacy- Cessation of Therapy    Consult to pharmacy for vancomycin dosing has been discontinued by the prescriber, pharmacy will sign off at this time.    Please call pharmacy if there are further questions or re-enter a consult if vancomycin is resumed.     Allyn Flynn, [facility]

## 2025-01-05 NOTE — PROGRESS NOTES
[facility] Progress Note               Rounding Cardiologist:  Leo Sandy MD, MD   Primary Cardiologist: Dr. Leo Sandy    Date:  1/5/2025  Patient:  Jacqueline Sandoval  YOB: 1935  MRN:  [id number]   Admit Date:  1/3/2025      SUBJECTIVE    Jacqueline Sandoval was seen and examined today at bedside.  Still confused  No episodes of bradycardia overnight    Sodium 138 potassium 4.1 BUN 27 creatinine 1.44 calcium 8.6 magnesium 1.3 WBC 7.7 hemoglobin 10.0, 20-9.3 platelet count 261    VITALS     Vitals:    01/05/25 0057 01/05/25 0428 01/05/25 0723 01/05/25 1155   BP: (!) 134/98 (!) 202/86 160/72 158/66   BP Location: Left arm Left arm Right arm    Patient Position: Lying Lying Lying    Pulse: 85 79 75    Resp: 20 17 16    Temp: 36.7 °C (98.1 °F) 36.5 °C (97.7 °F) 36.7 °C (98.1 °F)    TempSrc: Temporal Temporal Temporal    SpO2: 96% 98% 99%    Weight:       Height:           Intake/Output Summary (Last 24 hours) at 1/5/2025 1234  Last data filed at 1/5/2025 0649  Gross per 24 hour   Intake 50 ml   Output 2275 ml   Net -2225 ml       [unfilled]    PHYSICAL EXAM     PHYSICAL EXAM:   GENERAL: Laying in bed, does not appear to be in any distress.  Confused.  HEENT: HEAD: Normocephalic atraumatic.  Neck: Supple.  Eyes: Pupils are reactive to direct light.   CVS: S1, S2 heard. Regular rate and rhythm  LUNGS: Clear to auscultate bilaterally. No wheezing or rhonchi appreciated.  ABDOMEN: Soft, nontender to palpate. Positive bowel sounds. No guarding or rebound appreciated.  NEUROLOGICAL: No focal neurological deficits appreciated. Cranial nerves are grossly intact.  EXTREMITIES: Dorsalis pedis pulses can be appreciated.  Right lower extremity internally rotated.  SKIN:  Grossly intact, warm and dry.  GENITOURINARY: Barker catheter in place, draining urine.      DIAGNOSTIC RESULTS   EKG:     Telemetry: Sinus rhythm.      LAB DATA   BMP:  @LABRCNT(Na:3,K:3,CL:3,CO2:3,Bun:3,Creatinine:3,Glu:3, CA:3,LABGLOM)@    Cardiac  "Enzymes:  @LABRCNT(CKTOTAL:3,CKMB:3,CKMBINDEX:3,TROPONINI:3)@    CBC:   Lab Results   Component Value Date    WBC 7.7 01/05/2025    RBC 3.08 (L) 01/05/2025    HGB 10.0 (L) 01/05/2025    HCT 29.3 (L) 01/05/2025     01/05/2025       CMP:    Lab Results   Component Value Date     01/05/2025    K 4.1 01/05/2025     (H) 01/05/2025    CO2 16 (L) 01/05/2025    BUN 27 (H) 01/05/2025    CREATININE 1.44 (H) 01/05/2025    GLUCOSE 80 01/05/2025    CALCIUM 8.6 01/05/2025       Hepatic Function Panel:    Lab Results   Component Value Date    ALKPHOS 73 01/04/2025    ALT 6 (L) 01/04/2025    AST 10 01/04/2025    PROT 6.4 01/04/2025    BILITOT 0.3 01/04/2025       Magnesium:    Lab Results   Component Value Date    MG 1.93 01/05/2025       PT/INR:  No results found for: \"PROTIME\", \"INR\"  @LABRCNT(inr:3)@     HgBA1c:  No components found for: \"LABA1C\"    Lipid Profile:  No results found for: \"CHLPL\", \"TRIG\", \"HDL\", \"LDLCALC\", \"LDLDIRECT\"    TSH:    Lab Results   Component Value Date    TSH 15.66 (H) 01/03/2025       ABG:  No results found for: \"PH\"    PRO-BNP: No results found for: \"PROBNP\"       RADIOLOGY     CT head wo IV contrast   Final Result   1. Diffuse volume loss and periventricular white matter changes, most   consistent with small vessel ischemic disease.   2. No evidence of acute cortical infarct or intracranial hemorrhage.             MACRO:   None        Signed by: Fer Montemayor 1/3/2025 1:08 PM   Dictation workstation:   [id number]      XR chest 1 view   Final Result   1.  Left pleural effusion with adjacent atelectasis.   Signed by Rick Camargo MD      Transthoracic Echo (TTE) Limited    (Results Pending)   MR brain wo IV contrast    (Results Pending)   Transthoracic Echo (TTE) Complete    (Results Pending)   XR hip left with pelvis when performed 2 or 3 views    (Results Pending)       [unfilled]      CURRENT MEDICATIONS    amLODIPine, 2.5 mg, oral, Daily  ARIPiprazole, 2 mg, oral, Daily  aspirin, " 81 mg, oral, Daily  cefTRIAXone, 1 g, intravenous, q24h  heparin (porcine), 5,000 Units, subcutaneous, q8h MEGHAN  levothyroxine, 125 mcg, oral, Daily  sodium bicarbonate, 650 mg, oral, BID  vancomycin, 750 mg, intravenous, q24h             ASSESSMENT     Assessment & Plan  Encephalopathy, unspecified type        Patient Active Problem List   Diagnosis    Anxiety    Depression    Fibromyalgia    HTN (hypertension), benign    [place] (hard of hearing)    Hyponatremia    Hypothyroidism    Osteoarthritis    Vertigo    Anemia due to stage 3a chronic kidney disease (Multi)    Urinary retention    Acute leg pain, right    History of recurrent UTIs    Barker catheter in place    Right thigh pain    Encephalopathy, unspecified type     Clinical impression     1.  Mental status changes  2.  Dementia  3.  Sinus node dysfunction with episodes of bradycardia rates as low as20 bpm currently back in sinus rhythm  4.  UTI  5.  Hypothyroidism  6.  Hypertension    PLAN     Patient should continue with current medical therapy.  Avoid AV node blocking agents  No significant bradycardia overnight  Continue telemetry during this admission  Patient will be seen my office in the next 2 to 4 weeks with a Holter monitor for reevaluation of bradycardia    Leo Sandy MD      Please do not hesitate to call with questions.  Electronically signed by Leo Sandy MD, [facility] on 1/5/2025 at 12:34 PM

## 2025-01-06 ENCOUNTER — APPOINTMENT (OUTPATIENT)
Dept: CARDIOLOGY | Facility: HOSPITAL | Age: OVER 89
End: 2025-01-06
Payer: COMMERCIAL

## 2025-01-06 LAB
ANION GAP SERPL CALC-SCNC: 14 MMOL/L (ref 10–20)
AORTIC VALVE MEAN GRADIENT: 4 MMHG
AORTIC VALVE PEAK VELOCITY: 1.38 M/S
ATRIAL RATE: 77 BPM
AV PEAK GRADIENT: 8 MMHG
AVA (PEAK VEL): 2.12 CM2
AVA (VTI): 2.43 CM2
BODY SURFACE AREA: 1.67 M2
BUN SERPL-MCNC: 19 MG/DL (ref 6–23)
CALCIUM SERPL-MCNC: 8.9 MG/DL (ref 8.6–10.3)
CHLORIDE SERPL-SCNC: 107 MMOL/L (ref 98–107)
CO2 SERPL-SCNC: 19 MMOL/L (ref 21–32)
CREAT SERPL-MCNC: 1.25 MG/DL (ref 0.5–1.05)
EGFRCR SERPLBLD CKD-EPI 2021: 41 ML/MIN/1.73M*2
EJECTION FRACTION APICAL 4 CHAMBER: 57.5
EJECTION FRACTION: 60 %
ERYTHROCYTE [DISTWIDTH] IN BLOOD BY AUTOMATED COUNT: 14 % (ref 11.5–14.5)
GLUCOSE SERPL-MCNC: 92 MG/DL (ref 74–99)
HCT VFR BLD AUTO: 30 % (ref 36–46)
HGB BLD-MCNC: 10.4 G/DL (ref 12–16)
LEFT ATRIUM VOLUME AREA LENGTH INDEX BSA: 26.5 ML/M2
LEFT VENTRICLE INTERNAL DIMENSION DIASTOLE: 4.4 CM (ref 3.5–6)
LEFT VENTRICULAR OUTFLOW TRACT DIAMETER: 1.9 CM
LV EJECTION FRACTION BIPLANE: 58 %
MAGNESIUM SERPL-MCNC: 1.73 MG/DL (ref 1.6–2.4)
MCH RBC QN AUTO: 31.7 PG (ref 26–34)
MCHC RBC AUTO-ENTMCNC: 34.7 G/DL (ref 32–36)
MCV RBC AUTO: 92 FL (ref 80–100)
MITRAL VALVE E/A RATIO: 0.7
NRBC BLD-RTO: 0 /100 WBCS (ref 0–0)
P AXIS: 45 DEGREES
P OFFSET: 175 MS
P ONSET: 113 MS
PLATELET # BLD AUTO: 253 X10*3/UL (ref 150–450)
POTASSIUM SERPL-SCNC: 3.8 MMOL/L (ref 3.5–5.3)
PR INTERVAL: 196 MS
Q ONSET: 211 MS
QRS COUNT: 13 BEATS
QRS DURATION: 92 MS
QT INTERVAL: 400 MS
QTC CALCULATION(BAZETT): 452 MS
QTC FREDERICIA: 434 MS
R AXIS: -4 DEGREES
RBC # BLD AUTO: 3.28 X10*6/UL (ref 4–5.2)
RIGHT VENTRICLE FREE WALL PEAK S': 18.4 CM/S
RIGHT VENTRICLE PEAK SYSTOLIC PRESSURE: 34.6 MMHG
SODIUM SERPL-SCNC: 136 MMOL/L (ref 136–145)
T AXIS: 105 DEGREES
T OFFSET: 411 MS
TRICUSPID ANNULAR PLANE SYSTOLIC EXCURSION: 3.2 CM
VANCOMYCIN SERPL-MCNC: 16.4 UG/ML (ref 5–20)
VENTRICULAR RATE: 77 BPM
WBC # BLD AUTO: 8.3 X10*3/UL (ref 4.4–11.3)

## 2025-01-06 PROCEDURE — 93005 ELECTROCARDIOGRAM TRACING: CPT

## 2025-01-06 PROCEDURE — 2500000002 HC RX 250 W HCPCS SELF ADMINISTERED DRUGS (ALT 637 FOR MEDICARE OP, ALT 636 FOR OP/ED): Performed by: STUDENT IN AN ORGANIZED HEALTH CARE EDUCATION/TRAINING PROGRAM

## 2025-01-06 PROCEDURE — 93306 TTE W/DOPPLER COMPLETE: CPT | Performed by: INTERNAL MEDICINE

## 2025-01-06 PROCEDURE — C8929 TTE W OR WO FOL WCON,DOPPLER: HCPCS

## 2025-01-06 PROCEDURE — 2500000002 HC RX 250 W HCPCS SELF ADMINISTERED DRUGS (ALT 637 FOR MEDICARE OP, ALT 636 FOR OP/ED): Performed by: HOSPITALIST

## 2025-01-06 PROCEDURE — 37799 UNLISTED PX VASCULAR SURGERY: CPT | Performed by: HOSPITALIST

## 2025-01-06 PROCEDURE — 99232 SBSQ HOSP IP/OBS MODERATE 35: CPT | Performed by: HOSPITALIST

## 2025-01-06 PROCEDURE — 83735 ASSAY OF MAGNESIUM: CPT | Performed by: HOSPITALIST

## 2025-01-06 PROCEDURE — 2060000001 HC INTERMEDIATE ICU ROOM DAILY

## 2025-01-06 PROCEDURE — 99231 SBSQ HOSP IP/OBS SF/LOW 25: CPT | Performed by: NURSE PRACTITIONER

## 2025-01-06 PROCEDURE — 2500000004 HC RX 250 GENERAL PHARMACY W/ HCPCS (ALT 636 FOR OP/ED): Performed by: HOSPITALIST

## 2025-01-06 PROCEDURE — 2500000001 HC RX 250 WO HCPCS SELF ADMINISTERED DRUGS (ALT 637 FOR MEDICARE OP): Performed by: HOSPITALIST

## 2025-01-06 PROCEDURE — 80048 BASIC METABOLIC PNL TOTAL CA: CPT | Performed by: HOSPITALIST

## 2025-01-06 PROCEDURE — 85027 COMPLETE CBC AUTOMATED: CPT | Performed by: HOSPITALIST

## 2025-01-06 PROCEDURE — 80202 ASSAY OF VANCOMYCIN: CPT

## 2025-01-06 RX ORDER — METOPROLOL TARTRATE 25 MG/1
12.5 TABLET, FILM COATED ORAL 2 TIMES DAILY
Status: DISCONTINUED | OUTPATIENT
Start: 2025-01-06 | End: 2025-01-07

## 2025-01-06 RX ORDER — AMLODIPINE BESYLATE 5 MG/1
5 TABLET ORAL DAILY
Status: DISCONTINUED | OUTPATIENT
Start: 2025-01-07 | End: 2025-01-09 | Stop reason: HOSPADM

## 2025-01-06 RX ORDER — AMLODIPINE BESYLATE 5 MG/1
2.5 TABLET ORAL ONCE
Status: COMPLETED | OUTPATIENT
Start: 2025-01-06 | End: 2025-01-06

## 2025-01-06 RX ORDER — GABAPENTIN 100 MG/1
200 CAPSULE ORAL NIGHTLY
Status: DISCONTINUED | OUTPATIENT
Start: 2025-01-06 | End: 2025-01-09 | Stop reason: HOSPADM

## 2025-01-06 RX ORDER — METOPROLOL TARTRATE 25 MG/1
0.5 TABLET, FILM COATED ORAL 2 TIMES DAILY
COMMUNITY
End: 2025-01-09 | Stop reason: HOSPADM

## 2025-01-06 RX ORDER — GABAPENTIN 100 MG/1
200 CAPSULE ORAL NIGHTLY
COMMUNITY

## 2025-01-06 RX ORDER — BUSPIRONE HYDROCHLORIDE 10 MG/1
10 TABLET ORAL 3 TIMES DAILY
COMMUNITY
End: 2025-01-09 | Stop reason: HOSPADM

## 2025-01-06 RX ADMIN — QUETIAPINE FUMARATE 25 MG: 25 TABLET, FILM COATED ORAL at 20:06

## 2025-01-06 RX ADMIN — AMLODIPINE BESYLATE 2.5 MG: 5 TABLET ORAL at 17:22

## 2025-01-06 RX ADMIN — METOPROLOL TARTRATE 12.5 MG: 25 TABLET, FILM COATED ORAL at 20:06

## 2025-01-06 RX ADMIN — AMLODIPINE BESYLATE 2.5 MG: 5 TABLET ORAL at 09:54

## 2025-01-06 RX ADMIN — ASPIRIN 81 MG: 81 TABLET, COATED ORAL at 09:54

## 2025-01-06 RX ADMIN — HEPARIN SODIUM 5000 UNITS: 5000 INJECTION INTRAVENOUS; SUBCUTANEOUS at 14:09

## 2025-01-06 RX ADMIN — GABAPENTIN 200 MG: 100 CAPSULE ORAL at 20:06

## 2025-01-06 RX ADMIN — CEFTRIAXONE SODIUM 1 G: 1 INJECTION, SOLUTION INTRAVENOUS at 13:50

## 2025-01-06 RX ADMIN — HEPARIN SODIUM 5000 UNITS: 5000 INJECTION INTRAVENOUS; SUBCUTANEOUS at 05:12

## 2025-01-06 RX ADMIN — SODIUM BICARBONATE 650 MG: 650 TABLET ORAL at 09:54

## 2025-01-06 RX ADMIN — ARIPIPRAZOLE 2 MG: 2 TABLET ORAL at 09:54

## 2025-01-06 RX ADMIN — SODIUM BICARBONATE 650 MG: 650 TABLET ORAL at 20:06

## 2025-01-06 RX ADMIN — ACETAMINOPHEN 650 MG: 325 TABLET ORAL at 20:06

## 2025-01-06 RX ADMIN — LEVOTHYROXINE SODIUM 125 MCG: 125 TABLET ORAL at 05:12

## 2025-01-06 RX ADMIN — PERFLUTREN 1.5 ML OF DILUTION: 6.52 INJECTION, SUSPENSION INTRAVENOUS at 09:10

## 2025-01-06 RX ADMIN — HEPARIN SODIUM 5000 UNITS: 5000 INJECTION INTRAVENOUS; SUBCUTANEOUS at 21:35

## 2025-01-06 NOTE — CARE PLAN
The patient's goals for the shift include ZANDER     The clinical goals for the shift include pt will remain free from injury      Problem: Pain - Adult  Goal: Verbalizes/displays adequate comfort level or baseline comfort level  Outcome: Progressing     Problem: Safety - Adult  Goal: Free from fall injury  Outcome: Progressing     Problem: Discharge Planning  Goal: Discharge to home or other facility with appropriate resources  Outcome: Progressing     Problem: Chronic Conditions and Co-morbidities  Goal: Patient's chronic conditions and co-morbidity symptoms are monitored and maintained or improved  Outcome: Progressing     Problem: Skin  Goal: Decreased wound size/increased tissue granulation at next dressing change  Outcome: Progressing  Flowsheets (Taken 1/5/2025 1607 by Karlene Quintana RN)  Decreased wound size/increased tissue granulation at next dressing change: Promote sleep for wound healing  Goal: Participates in plan/prevention/treatment measures  Outcome: Progressing  Flowsheets (Taken 1/5/2025 1607 by Karlene Quintana RN)  Participates in plan/prevention/treatment measures: Elevate heels  Goal: Prevent/manage excess moisture  Outcome: Progressing  Flowsheets (Taken 1/5/2025 1607 by Karlene Quintana RN)  Prevent/manage excess moisture: Cleanse incontinence/protect with barrier cream  Goal: Prevent/minimize sheer/friction injuries  Outcome: Progressing  Flowsheets (Taken 1/5/2025 2114)  Prevent/minimize sheer/friction injuries:   Turn/reposition every 2 hours/use positioning/transfer devices   Use pull sheet  Goal: Promote/optimize nutrition  Outcome: Progressing  Flowsheets (Taken 1/5/2025 2114)  Promote/optimize nutrition: Offer water/supplements/favorite foods  Goal: Promote skin healing  Outcome: Progressing  Flowsheets (Taken 1/5/2025 2114)  Promote skin healing:   Turn/reposition every 2 hours/use positioning/transfer devices   Rotate device position/do not position patient on device   Protective  dressings over bony prominences     Problem: Fall/Injury  Goal: Not fall by end of shift  Outcome: Progressing  Goal: Be free from injury by end of the shift  Outcome: Progressing  Goal: Verbalize understanding of personal risk factors for fall in the hospital  Outcome: Progressing  Goal: Verbalize understanding of risk factor reduction measures to prevent injury from fall in the home  Outcome: Progressing  Goal: Use assistive devices by end of the shift  Outcome: Progressing  Goal: Pace activities to prevent fatigue by end of the shift  Outcome: Progressing

## 2025-01-06 NOTE — PROGRESS NOTES
PROGRESS NOTE    ASSESSMENT AND PLAN:     Acute metabolic encephalopathy   History of dementia  History of anxiety  -P/W worsening confusion for several days after being started on ertapenem  -Etiology is multifactorial from IV Ertapenem,  acute kidney injury and multiple sedative medications.  -Per MAR pt is on Percocet, Ativan, gabapentin, Abilify, Zoloft, Valium, buspirone, Robaxin;    -CT head shows no acute findings     Plan:  -She is less confused today.    -Continue ceftriaxone  -Spoke to the son today, who still feels that she is significantly confused compared to her baseline, will consult neurology.     History of urinary tract infection with an indwelling Barker catheter present on admission  -Previous cultures grew multidrug-resistant E. coli and Enterococcus; she has been receiving IV ertapenem via PICC line.  Reviewed [facility] cultures  -Discontinue ertapenem  -Continue vancomycin and ceftriaxone.  -Urine culture here shows no growth  -Discontinue vancomycin, spoke to ID who felt that the Enterococcus is most likely contamination, continue ceftriaxone.  Can potentially be discharged on ceftriaxone.     Acute kidney injury on chronic kidney disease stage III  Metabolic acidosis  -Most likely prerenal from dehydration, from poor oral intake  -Overall improving  -Scr trend 2.12-->1.84-->1.2; baseline creatinine 1.6  -Gentle IV hydration  -Hold lisinopril     Hypothyroidism  -Suboptimal control, TSH 15.66 Free T4.6  -Increase to levothyroxine to 125 mcg p.o. daily     Junctional bradycardia  -Most likely related to medication, possible hypothyroidism suboptimally controlled  -Currently remains in sinus rhythm heart rates in the 60s to 70s.  -Echocardiogram shows preserved EF.  -Seen by EP cardiology, recommends outpatient follow-up, no need for urgent pacemaker.  Hold Lopressor on discharge.     Hypertension  -Increase her am amlodipine to 5 mg p.o. daily     9.  Fibromyalgia  -Restarted gabapentin,  "increase to 200 mg.    Disposition:    SUBJECTIVE:   Admit Date: 1/3/2025    Interval History: Mildly confused, has no active complaints.      OBJECTIVE:   Vitals: BP (!) 185/83   Pulse 86   Temp 36.7 °C (98.1 °F) (Temporal)   Resp 18   Ht 1.6 m (5' 3\")   Wt 62.9 kg (138 lb 10.7 oz)   SpO2 97%   BMI 24.56 kg/m²    Wt Readings from Last 3 Encounters:   01/03/25 62.9 kg (138 lb 10.7 oz)   12/12/24 62.9 kg (138 lb 9.6 oz)   10/07/24 60.5 kg (133 lb 4.8 oz)      24HR INTAKE/OUTPUT:    Intake/Output Summary (Last 24 hours) at 1/6/2025 1555  Last data filed at 1/6/2025 0600  Gross per 24 hour   Intake 450 ml   Output 1200 ml   Net -750 ml       PHYSICAL EXAM:   GENERAL: Laying in bed, does not appear to be in any distress.   HEENT: HEAD: Normocephalic atraumatic.  Neck: Supple.  Eyes: Pupils are reactive to direct light.   CVS: S1, S2 heard. Regular rate and rhythm  LUNGS: Clear to auscultate bilaterally. No wheezing or rhonchi appreciated.  ABDOMEN: Soft, nontender to palpate. Positive bowel sounds. No guarding or rebound appreciated.  NEUROLOGICAL: No focal neurological deficits appreciated. Cranial nerves are grossly intact.  Confused  EXTREMITIES: No edema appreciated.  SKIN:  Grossly intact, warm and dry.      LABS/IMAGING AND MEDICATIONS:   Scheduled Meds:amLODIPine, 2.5 mg, oral, Daily  ARIPiprazole, 2 mg, oral, Daily  aspirin, 81 mg, oral, Daily  cefTRIAXone, 1 g, intravenous, q24h  gabapentin, 100 mg, oral, Nightly  heparin (porcine), 5,000 Units, subcutaneous, q8h MEGHAN  levothyroxine, 125 mcg, oral, Daily  QUEtiapine, 25 mg, oral, Nightly  sodium bicarbonate, 650 mg, oral, BID      PRN Meds:PRN medications: acetaminophen **OR** acetaminophen **OR** acetaminophen    No lab exists for component: \"CBC\"   No lab exists for component: \"CMP\"   No lab exists for component: \"TROPONIN\"          No lab exists for component: \"LIPIDS\"       No lab exists for component: \"URINALYSIS\"          BMP:  Results from last 7 " "days   Lab Units 01/06/25  0548 01/05/25  0618 01/04/25  0840   SODIUM mmol/L 136 138 136   POTASSIUM mmol/L 3.8 4.1 4.4   CHLORIDE mmol/L 107 110* 110*   CO2 mmol/L 19* 16* 13*   BUN mg/dL 19 27* 40*   CREATININE mg/dL 1.25* 1.44* 1.84*       CBC:  Results from last 7 days   Lab Units 01/06/25  0548 01/05/25  0618 01/04/25  0840   WBC AUTO x10*3/uL 8.3 7.7 8.5   RBC AUTO x10*6/uL 3.28* 3.08* 3.27*   HEMOGLOBIN g/dL 10.4* 10.0* 10.4*   HEMATOCRIT % 30.0* 29.3* 30.7*   MCV fL 92 95 94   MCH pg 31.7 32.5 31.8   MCHC g/dL 34.7 34.1 33.9   RDW % 14.0 14.3 14.1   PLATELETS AUTO x10*3/uL 253 261 254       Cardiac Enzymes:   Results from last 7 days   Lab Units 01/03/25  1232 01/03/25  1120   TROPHS ng/L 5 7         Hepatic Function Panel:  Results from last 7 days   Lab Units 01/04/25  0840 01/03/25  1120   ALK PHOS U/L 73 73   ALT U/L 6* 7   AST U/L 10 10   PROTEIN TOTAL g/dL 6.4 6.7   BILIRUBIN TOTAL mg/dL 0.3 0.3       Magnesium:  Results from last 7 days   Lab Units 01/06/25  0548   MAGNESIUM mg/dL 1.73       Pro-BNP:  No results found for: \"PROBNP\"    INR:        TSH:  No results found for: \"TSH\"    Lipid Profile:        No lab exists for component: \"LABVLDL\"    HgbA1C:        Lactate Level:  No lab exists for component: \"LACTA\"    CMP:  Results from last 7 days   Lab Units 01/06/25  0548 01/05/25  0618 01/04/25  0840 01/03/25  1120   SODIUM mmol/L 136 138 136 135*   POTASSIUM mmol/L 3.8 4.1 4.4 4.4   CHLORIDE mmol/L 107 110* 110* 106   CO2 mmol/L 19* 16* 13* 18*   BUN mg/dL 19 27* 40* 46*   CREATININE mg/dL 1.25* 1.44* 1.84* 2.12*   GLUCOSE mg/dL 92 80 75 86   CALCIUM mg/dL 8.9 8.6 8.7 9.0   PROTEIN TOTAL g/dL  --   --  6.4 6.7   BILIRUBIN TOTAL mg/dL  --   --  0.3 0.3   ALK PHOS U/L  --   --  73 73   AST U/L  --   --  10 10   ALT U/L  --   --  6* 7     "

## 2025-01-06 NOTE — POST-PROCEDURE NOTE
Patient was having worsening confusion, possible d/t polypharm and/or new Invanz. Has shown some improvement with mental status since having med adjustment. Will return to [facility] when medically cleared.

## 2025-01-06 NOTE — PROGRESS NOTES
Cardiology Progress Note  Patient: Jacqueline Sandoval  Unit/Bed: 809/809-A  YOB: 1935  MRN: [id number]  Acct: [id number]   Admitting Diagnosis:   Bradycardia [R00.1]  Urinary tract infection associated with indwelling urethral catheter, initial encounter (CMS-[facility]) [T83.511A, N39.0]  Encephalopathy, unspecified type [G93.40]  Date:  1/3/2025  Hospital Day: 3  Attending: Nel Mendez*   Rounding INDRA/Cardiologist:  Cindy Simpson, APRN-CNP,        Primary Cardiologist: Dr. Leo Sandy      Complaint:  Chief Complaint   Patient presents with    Altered Mental Status     Pt sent to ER for increased AMS x 1 week. Pt diagnosed with UTI 1 week ago and is being treated for UTI.        SUBJECTIVE    No further episodes of bradycardia  Heart rate remains in the 70's  Patient confused/ at baseline        VITALS   Visit Vitals  BP (!) 173/94 (Patient Position: Sitting)   Pulse 78   Temp 36.7 °C (98.1 °F) (Temporal)   Resp 18        I/O:    Intake/Output Summary (Last 24 hours) at 1/6/2025 1108  Last data filed at 1/6/2025 0600  Gross per 24 hour   Intake 500 ml   Output 1750 ml   Net -1250 ml        Allergies:  Allergies   Allergen Reactions    Penicillins Unknown    Sulfa (Sulfonamide Antibiotics) Unknown and Rash        PHYSICAL EXAM   Physical Exam  Constitutional:       Appearance: Normal appearance.   HENT:      Head: Normocephalic and atraumatic.      Nose: Nose normal.      Mouth/Throat:      Mouth: Mucous membranes are moist.   Eyes:      Conjunctiva/sclera: Conjunctivae normal.   Cardiovascular:      Rate and Rhythm: Normal rate and regular rhythm.      Pulses: Normal pulses.      Heart sounds: Normal heart sounds.   Pulmonary:      Effort: Pulmonary effort is normal.      Breath sounds: Normal breath sounds.   Musculoskeletal:         General: Normal range of motion.   Skin:     General: Skin is warm and dry.   Neurological:      General: No focal deficit present.      Mental Status: She is  alert. Mental status is at baseline. She is disoriented.   Psychiatric:         Mood and Affect: Mood normal.         Behavior: Behavior normal.           LABS     Results for orders placed or performed during the hospital encounter of 01/03/25 (from the past 24 hours)   ECG 12 Lead   Result Value Ref Range    Ventricular Rate 77 BPM    Atrial Rate 77 BPM    LA Interval 196 ms    QRS Duration 92 ms    QT Interval 400 ms    QTC Calculation(Bazett) 452 ms    P Axis 45 degrees    R Axis -4 degrees    T Axis 105 degrees    QRS Count 13 beats    Q Onset 211 ms    P Onset 113 ms    P Offset 175 ms    T Offset 411 ms    QTC Fredericia 434 ms   Magnesium   Result Value Ref Range    Magnesium 1.73 1.60 - 2.40 mg/dL   Basic Metabolic Panel   Result Value Ref Range    Glucose 92 74 - 99 mg/dL    Sodium 136 136 - 145 mmol/L    Potassium 3.8 3.5 - 5.3 mmol/L    Chloride 107 98 - 107 mmol/L    Bicarbonate 19 (L) 21 - 32 mmol/L    Anion Gap 14 10 - 20 mmol/L    Urea Nitrogen 19 6 - 23 mg/dL    Creatinine 1.25 (H) 0.50 - 1.05 mg/dL    eGFR 41 (L) >60 mL/min/1.73m*2    Calcium 8.9 8.6 - 10.3 mg/dL   CBC   Result Value Ref Range    WBC 8.3 4.4 - 11.3 x10*3/uL    nRBC 0.0 0.0 - 0.0 /100 WBCs    RBC 3.28 (L) 4.00 - 5.20 x10*6/uL    Hemoglobin 10.4 (L) 12.0 - 16.0 g/dL    Hematocrit 30.0 (L) 36.0 - 46.0 %    MCV 92 80 - 100 fL    MCH 31.7 26.0 - 34.0 pg    MCHC 34.7 32.0 - 36.0 g/dL    RDW 14.0 11.5 - 14.5 %    Platelets 253 150 - 450 x10*3/uL   Vancomycin   Result Value Ref Range    Vancomycin 16.4 5.0 - 20.0 ug/mL   ECG 12 Lead   Result Value Ref Range    Ventricular Rate 75 BPM    Atrial Rate 75 BPM    LA Interval 182 ms    QRS Duration 96 ms    QT Interval 412 ms    QTC Calculation(Bazett) 460 ms    P Axis 46 degrees    R Axis -19 degrees    T Axis 80 degrees    QRS Count 12 beats    Q Onset 217 ms    P Onset 126 ms    P Offset 180 ms    T Offset 423 ms    QTC Fredericia 443 ms   Transthoracic Echo (TTE) Complete   Result Value Ref  Range    AV mn grad 4 mmHg    AV pk jocelin 1.38 m/s    LV Biplane EF 58 %    LVOT diam 1.90 cm    MV E/A ratio 0.70     Tricuspid annular plane systolic excursion 3.2 cm    LA vol index A/L 26.5 ml/m2    LV EF 60 %    RV free wall pk S' 18.40 cm/s    RVSP 34.6 mmHg    LVIDd 4.40 cm    Aortic Valve Area by Continuity of Peak Velocity 2.12 cm2    AV pk grad 8 mmHg    Aortic Valve Area by Continuity of VTI 2.43 cm2    LV A4C EF 57.5         Scheduled medications  amLODIPine, 2.5 mg, oral, Daily  ARIPiprazole, 2 mg, oral, Daily  aspirin, 81 mg, oral, Daily  cefTRIAXone, 1 g, intravenous, q24h  gabapentin, 100 mg, oral, Nightly  heparin (porcine), 5,000 Units, subcutaneous, q8h MEGHAN  levothyroxine, 125 mcg, oral, Daily  QUEtiapine, 25 mg, oral, Nightly  sodium bicarbonate, 650 mg, oral, BID  vancomycin, 750 mg, intravenous, q24h      Continuous medications     PRN medications  PRN medications: acetaminophen **OR** acetaminophen **OR** acetaminophen, vancomycin     Transthoracic Echo (TTE) Complete    Result Date: 1/6/2025          [place] [address]  Tel [phone] Fax [phone] TRANSTHORACIC ECHOCARDIOGRAM REPORT Patient Name:       BERNARDO FLORES Thompson Physician:    [id number] Donal Sierra DO Study Date:         1/6/2025            Ordering Provider:    [id number] MARCIN VICTOR MRN/PID:            [id number]            Fellow: Accession#:         [id number]        Nurse:                Amauri Freeman RN Date of Birth/Age:  1935 / 89 years Sonographer:          Ro Olmos RDCS Gender Assigned at  F                   Additional Staff:     Suzie Costa Birth: Height:             160.02 cm           Admit Date:           1/3/2025 Weight:             62.60 kg             Admission Status:     Inpatient -                                                               Routine BSA / BMI:          1.65 m2 / 24.45     Department Location:  [facility]                     kg/m2                                     Echo Lab Blood Pressure: 170 /78 mmHg Study Type:    TRANSTHORACIC ECHO (TTE) COMPLETE Diagnosis/ICD: Bradycardia, unspecified-R00.1; Heart failure, unspecified-I50.9 Indication:    Bradycardia, Heart Failure CPT Codes:     Echo Complete w Full [id number] Patient History: Pertinent History: HTN and Anemia, CKD, Dementia, Anxiety, Osteioarthritis,                    Fibromyalgia, Bradycardia. Study Detail: The following Echo studies were performed: 2D, M-Mode, Doppler and               color flow. Technically challenging study due to poor acoustic               windows, body habitus, prominent lung artifact and               osteoarthritis/fibromyalgia. Definity used as a contrast agent for               endocardial border definition. Total contrast used for this               procedure was 1.5 mL via IV push.  PHYSICIAN INTERPRETATION: Left Ventricle: The left ventricular systolic function is normal, with a visually estimated ejection fraction of 60%. There are no regional wall motion abnormalities. The left ventricular cavity size is normal. There is normal septal and mildly increased posterior left ventricular wall thickness. Spectral Doppler shows a Grade I (impaired relaxation pattern) of left ventricular diastolic filling with normal left atrial filling pressure. LV Wall Scoring: All segments are normal. Left Atrium: The left atrium is upper limits of normal in size. Right Ventricle: The right ventricle is upper limits of normal in size. There is low normal right ventricular systolic function. Right Atrium: The right atrium is normal in size. Aortic Valve: The aortic valve appears structurally normal. There is no evidence of aortic valve stenosis. The aortic valve  dimensionless index is 0.86. There is trace aortic valve regurgitation. The peak instantaneous gradient of the aortic valve is 8 mmHg. The mean gradient of the aortic valve is 4 mmHg. Mitral Valve: The mitral valve is normal in structure. There is no evidence of mitral valve stenosis. There is normal mitral valve leaflet mobility. There is mild mitral annular calcification. There is no evidence of mitral valve regurgitation. Tricuspid Valve: The tricuspid valve is structurally normal. There is normal tricuspid valve leaflet mobility. No evidence of tricuspid regurgitation. Pulmonic Valve: The pulmonic valve is structurally normal. There is no indication of pulmonic valve regurgitation. Pericardium: No pericardial effusion noted. Aorta: The aortic root is normal. Pulmonary Artery: The main pulmonary artery is normal in size, and position, with normal bifurcation into the left and right pulmonary arteries. The tricuspid regurgitant velocity is 2.81 m/s, and with an estimated right atrial pressure of 3 mmHg, the estimated pulmonary artery pressure is mildly elevated with the RVSP at 34.6 mmHg. Systemic Veins: The inferior vena cava appears normal in size. In comparison to the previous echocardiogram(s): There are no prior studies on this patient for comparison purposes.  CONCLUSIONS:  1. The left ventricular systolic function is normal, with a visually estimated ejection fraction of 60%.  2. Spectral Doppler shows a Grade I (impaired relaxation pattern) of left ventricular diastolic filling with normal left atrial filling pressure.  3. There is low normal right ventricular systolic function.  4. There is no evidence of mitral valve stenosis.  5. No evidence of mitral valve regurgitation.  6. No evidence of tricuspid regurgitation.  7. Aortic valve stenosis is not present.  8. The main pulmonary artery is normal in size, and position, with normal bifurcation into the left and right pulmonary arteries. RECOMMENDATIONS:  Technically suboptimal and limited study, therefore accuracy of above interpretation could be substantially diminished. Clinical correlation is advised. Consider additional imaging modalities if clinically indicated.  QUANTITATIVE DATA SUMMARY:  2D MEASUREMENTS:           Normal Ranges: Ao Root d:       2.80 cm   (2.0-3.7cm) LAs:             3.00 cm   (2.7-4.0cm) IVSd:            0.90 cm   (0.6-1.1cm) LVPWd:           1.10 cm   (0.6-1.1cm) LVIDd:           4.40 cm   (3.9-5.9cm) LVIDs:           3.10 cm LV Mass Index:   89.5 g/m2 LV % FS          29.5 %  LA VOLUME:                    Normal Ranges: LA Vol A4C:        59.1 ml    (22+/-6mL/m2) LA Vol A2C:        28.7 ml LA Vol BP:         43.8 ml LA Vol Index A4C:  35.8ml/m2 LA Vol Index A2C:  17.4 ml/m2 LA Vol Index BP:   26.5 ml/m2 LA Area A4C:       19.2 cm2 LA Area A2C:       12.6 cm2 LA Major Axis A4C: 5.3 cm LA Major Axis A2C: 4.7 cm LA Volume Index:   25.4 ml/m2 LA Vol A4C:        56.2 ml LA Vol A2C:        27.5 ml LA Vol Index BSA:  25.3 ml/m2  RA VOLUME BY A/L METHOD:           Normal Ranges: RA Vol A4C:              16.0 ml   (8.3-19.5ml) RA Vol Index A4C:        9.7 ml/m2 RA Area A4C:             9.3 cm2 RA Major Axis A4C:       4.6 cm  AORTA MEASUREMENTS:         Normal Ranges: Asc Ao, d:          3.20 cm (2.1-3.4cm)  LV SYSTOLIC FUNCTION BY 2D PLANIMETRY (MOD):                      Normal Ranges: EF-A4C View:    58 % (>=55%) EF-A2C View:    59 % EF-Biplane:     58 % EF-Visual:      60 % LV EF Reported: 60 %  LV DIASTOLIC FUNCTION:           Normal Ranges: MV Peak E:             0.82 m/s  (0.7-1.2 m/s) MV Peak A:             1.17 m/s  (0.42-0.7 m/s) E/A Ratio:             0.70      (1.0-2.2) MV e'                  0.102 m/s (>8.0) MV lateral e'          0.15 m/s MV medial e'           0.05 m/s E/e' Ratio:            7.97      (<8.0)  MITRAL VALVE:          Normal Ranges: MV DT:        140 msec (150-240msec)  AORTIC VALVE:                     Normal Ranges:  AoV Vmax:                1.38 m/s (<=1.7m/s) AoV Peak P.6 mmHg (<20mmHg) AoV Mean P.0 mmHg (1.7-11.5mmHg) LVOT Max Vicente:            1.03 m/s (<=1.1m/s) AoV VTI:                 31.40 cm (18-25cm) LVOT VTI:                26.90 cm LVOT Diameter:           1.90 cm  (1.8-2.4cm) AoV Area, VTI:           2.43 cm2 (2.5-5.5cm2) AoV Area,Vmax:           2.12 cm2 (2.5-4.5cm2) AoV Dimensionless Index: 0.86  RIGHT VENTRICLE: TAPSE: 32.0 mm RV s'  0.18 m/s  TRICUSPID VALVE/RVSP:          Normal Ranges: Peak TR Velocity:     2.81 m/s RV Syst Pressure:     35 mmHg  (< 30mmHg) IVC Diam:             1.70 cm  PULMONIC VALVE:          Normal Ranges: PV Accel Time:  98 msec  (>120ms) PV Max Vicente:     0.8 m/s  (0.6-0.9m/s) PV Max P.9 mmHg  Doug Sierra DO Electronically signed on 2025 at 11:06:44 AM  Wall Scoring  ** Final **     XR hip left with pelvis when performed 2 or 3 views    Result Date: 2025  Interpreted By:  Nakul Flores, STUDY: XR HIP LEFT WITH PELVIS WHEN PERFORMED 2 OR 3 VIEWS;  2025 2:03 pm   INDICATION: Signs/Symptoms:pain.     COMPARISON: Left hip series 2024   ACCESSION NUMBER(S): [id number]   ORDERING CLINICIAN: MARCIN VICTOR   TECHNIQUE: Three views of the left hip   FINDINGS: No interval radiographic change. Bones and orthopedic hardware intact and in good alignment       No acute osseous or hardware fracture or hardware malalignment     MACRO: None   Signed by: Nakul Flores 2025 8:42 AM Dictation workstation:   [id number]    ECG 12 Lead    Result Date: 2025  Normal sinus rhythm Low voltage QRS Nonspecific ST and T wave abnormality Abnormal ECG When compared with ECG of 2025 13:08, Nonspecific T wave abnormality, worse in Lateral leads    ECG 12 Lead    Result Date: 2025  Normal sinus rhythm Low voltage QRS Nonspecific ST and T wave abnormality Abnormal ECG When compared with ECG of 2025 08:34, Nonspecific  T wave abnormality now evident in Lateral leads Confirmed by Flako Jacobs ([id number]) on 1/6/2025 6:31:57 AM    ECG 12 lead    Result Date: 1/5/2025  Junctional bradycardia Low voltage QRS Cannot rule out Anteroseptal infarct (cited on or before 03-JAN-2025) Abnormal ECG When compared with ECG of 03-JAN-2025 14:08, (unconfirmed) Nonspecific T wave abnormality no longer evident in Inferior leads T wave inversion less evident in Lateral leads Confirmed by Leo Sandy ([id number]) on 1/5/2025 12:35:33 PM    Electrocardiogram, 12-lead PRN ACS symptoms    Result Date: 1/4/2025  Normal sinus rhythm Anterolateral infarct (cited on or before 03-JAN-2025) Abnormal ECG When compared with ECG of 03-JAN-2025 14:08, (unconfirmed) Sinus rhythm has replaced Junctional rhythm Vent. rate has increased BY  22 BPM Serial changes of Anterior infarct Present Confirmed by Leo Sandy ([id number]) on 1/4/2025 1:13:58 PM    ECG 12 lead    Result Date: 1/4/2025  Sinus bradycardia Cannot rule out Anterior infarct , age undetermined Abnormal ECG When compared with ECG of 19-MAY-2024 09:12, Sinus rhythm has replaced Junctional rhythm Minimal criteria for Anterior infarct are now Present See ED provider note for full interpretation and clinical correlation Confirmed by Leo Sandy ([id number]) on 1/4/2025 1:13:20 PM    CT head wo IV contrast    Result Date: 1/3/2025  Interpreted By:  Fer Montemayor, STUDY: CT HEAD WO IV CONTRAST; 1/3/2025 12:54 pm   INDICATION: Signs/Symptoms:ams.   COMPARISON: 05/19/2024   ACCESSION NUMBER(S): [id number]   ORDERING CLINICIAN: ROCHELLE GOMEZ   TECHNIQUE: Contiguous axial CT images were obtained through the head at 5 mm slice thickness without contrast administration.   FINDINGS: INTRACRANIAL: Mild diffuse volume loss is seen bilaterally. Mild-to-moderate subcortical and periventricular white matter changes are seen.  The grey-white differentiation is intact. There is no mass effect or midline shift. There is no extraaxial  fluid collection. There is no intracranial hemorrhage.  The calvarium  is unremarkable.   EXTRACRANIAL: Visualized paranasal sinuses and mastoids are clear.       1. Diffuse volume loss and periventricular white matter changes, most consistent with small vessel ischemic disease. 2. No evidence of acute cortical infarct or intracranial hemorrhage.     MACRO: None   Signed by: Fer Montemayor 1/3/2025 1:08 PM Dictation workstation:   [id number]    XR chest 1 view    Result Date: 1/3/2025  STUDY: Chest Radiograph;  1/3/2025 at 10:53 AM INDICATION: Altered mental status. COMPARISON: 5/19/2024 Chest XR, 11/23/2023 Chest XR ACCESSION NUMBER(S): [id number] ORDERING CLINICIAN: ROCHELLE GOMEZ TECHNIQUE:  Frontal chest was obtained at 1053 hours. FINDINGS: There is a left pleural effusion with adjacent atelectasis.  Heart size is top normal.  There is no evidence of a pneumothorax.    1.  Left pleural effusion with adjacent atelectasis. Signed by Rick Camargo MD       Encounter Date: 01/03/25   ECG 12 Lead   Result Value    Ventricular Rate 75    Atrial Rate 75    GA Interval 182    QRS Duration 96    QT Interval 412    QTC Calculation(Bazett) 460    P Axis 46    R Axis -19    T Axis 80    QRS Count 12    Q Onset 217    P Onset 126    P Offset 180    T Offset 423    QTC Fredericia 443    Narrative    Normal sinus rhythm  Low voltage QRS  Nonspecific ST and T wave abnormality  Abnormal ECG  When compared with ECG of 05-JAN-2025 13:08,  Nonspecific T wave abnormality, worse in Lateral leads        Tele Monitoring:SR 70's    Assessment     Patient Active Problem List   Diagnosis    Anxiety    Depression    Fibromyalgia    HTN (hypertension), benign    [place] (hard of hearing)    Hyponatremia    Hypothyroidism    Osteoarthritis    Vertigo    Anemia due to stage 3a chronic kidney disease (Multi)    Urinary retention    Acute leg pain, right    History of recurrent UTIs    Bakrer catheter in place    Right thigh pain     Encephalopathy, unspecified type    Clinical impression     1.  Mental status changes  2.  Dementia  3.  Sinus node dysfunction with episodes of bradycardia rates as low as20 bpm currently back in sinus rhythm  4.  UTI  5.  Hypothyroidism  6.  Hypertension      Plan:  Avoid all AV nicola agents  Telemetry stable with sinus rhythm in the 70s-no further episodes of bradycardia  Patient to transfer to [facility] for long-term care  Okay to discharge per EP service  Outpatient follow-up with a 48-hour Holter monitor in 3 weeks and appointment with Dr. Sandy after that            Electronically signed by GLORIA Barriga on 1/6/2025 at 11:08 AM

## 2025-01-06 NOTE — PROGRESS NOTES
Vancomycin Dosing by Pharmacy- Cessation of Therapy    Consult to pharmacy for vancomycin dosing has been discontinued by the prescriber, pharmacy will sign off at this time.    Please call pharmacy if there are further questions or re-enter a consult if vancomycin is resumed.     JUANI Harris. Ph.

## 2025-01-06 NOTE — PROGRESS NOTES
Vancomycin Dosing by Pharmacy- FOLLOW UP    Jacqueline Sandoval is a 89 y.o. year old female who Pharmacy has been consulted for vancomycin dosing for other (UTI) . Based on the patient's indication and renal status this patient is being dosed based on a goal AUC of 400-600.     Renal function is currently improving.    Current vancomycin dose: 750 mg given every 24 hours    Estimated vancomycin AUC on current dose: 487 mg/L.hr     Visit Vitals  /78   Pulse 72   Temp 36.7 °C (98.1 °F) (Temporal)   Resp 18        Lab Results   Component Value Date    CREATININE 1.25 (H) 2025    CREATININE 1.44 (H) 2025    CREATININE 1.84 (H) 2025    CREATININE 2.12 (H) 2025        Patient weight is as follows:   Vitals:    25 1042   Weight: 62.9 kg (138 lb 10.7 oz)       Cultures:  Susceptibility data for the encounter in last 14 days.  Collected Specimen Info Organism   25 Urine from Clean Catch/Voided Candida albicans        I/O last 3 completed shifts:  In: 500 (7.9 mL/kg) [P.O.:300; IV Piggyback:200]  Out: 3175 (50.5 mL/kg) [Urine:3175 (1.4 mL/kg/hr)]  Weight: 62.9 kg   I/O during current shift:  No intake/output data recorded.    Temp (24hrs), Av.7 °C (98.1 °F), Min:36.5 °C (97.7 °F), Max:36.9 °C (98.4 °F)      Assessment/Plan    Within goal AUC range. Continue current vancomycin regimen.    This dosing regimen is predicted by [employer] to result in the following pharmacokinetic parameters:  <Regimen: 750 mg IV every 24 hours.  Start time: 18:00 on 2025  Exposure target: AUC24 (range)400-600 mg/L.hr   [id number]: 454 mg/L.hr  AUC24,ss: 487 mg/L.hr  Probability of AUC24 > 400: 90 %  Ctrough,ss: 15.5 mg/L  Probability of Ctrough,ss > 20: 12 %  The next level will be obtained on 2025 at 1000. May be obtained sooner if clinically indicated.   Will continue to monitor renal function daily while on vancomycin and order serum creatinine at least every 48 hours if not already  ordered.  Follow for continued vancomycin needs, clinical response, and signs/symptoms of toxicity.       VASILE Ibanez, JUANI. Ph.

## 2025-01-07 ENCOUNTER — APPOINTMENT (OUTPATIENT)
Dept: CARDIOLOGY | Facility: HOSPITAL | Age: OVER 89
End: 2025-01-07
Payer: COMMERCIAL

## 2025-01-07 ENCOUNTER — APPOINTMENT (OUTPATIENT)
Dept: RADIOLOGY | Facility: HOSPITAL | Age: OVER 89
End: 2025-01-07
Payer: COMMERCIAL

## 2025-01-07 ENCOUNTER — APPOINTMENT (OUTPATIENT)
Dept: NEUROLOGY | Facility: HOSPITAL | Age: OVER 89
End: 2025-01-07
Payer: COMMERCIAL

## 2025-01-07 LAB
ANION GAP SERPL CALC-SCNC: 14 MMOL/L (ref 10–20)
BACTERIA BLD CULT: NORMAL
BACTERIA BLD CULT: NORMAL
BUN SERPL-MCNC: 16 MG/DL (ref 6–23)
CALCIUM SERPL-MCNC: 8.8 MG/DL (ref 8.6–10.3)
CHLORIDE SERPL-SCNC: 106 MMOL/L (ref 98–107)
CO2 SERPL-SCNC: 23 MMOL/L (ref 21–32)
CREAT SERPL-MCNC: 1.24 MG/DL (ref 0.5–1.05)
EGFRCR SERPLBLD CKD-EPI 2021: 42 ML/MIN/1.73M*2
ERYTHROCYTE [DISTWIDTH] IN BLOOD BY AUTOMATED COUNT: 13.9 % (ref 11.5–14.5)
FOLATE SERPL-MCNC: 4.5 NG/ML
GLUCOSE SERPL-MCNC: 91 MG/DL (ref 74–99)
HCT VFR BLD AUTO: 29.3 % (ref 36–46)
HGB BLD-MCNC: 10.2 G/DL (ref 12–16)
MAGNESIUM SERPL-MCNC: 1.64 MG/DL (ref 1.6–2.4)
MCH RBC QN AUTO: 31.9 PG (ref 26–34)
MCHC RBC AUTO-ENTMCNC: 34.8 G/DL (ref 32–36)
MCV RBC AUTO: 92 FL (ref 80–100)
NRBC BLD-RTO: 0 /100 WBCS (ref 0–0)
PLATELET # BLD AUTO: 228 X10*3/UL (ref 150–450)
POTASSIUM SERPL-SCNC: 3.6 MMOL/L (ref 3.5–5.3)
RBC # BLD AUTO: 3.2 X10*6/UL (ref 4–5.2)
SODIUM SERPL-SCNC: 139 MMOL/L (ref 136–145)
VIT B12 SERPL-MCNC: 867 PG/ML (ref 211–911)
WBC # BLD AUTO: 7 X10*3/UL (ref 4.4–11.3)

## 2025-01-07 PROCEDURE — 93005 ELECTROCARDIOGRAM TRACING: CPT

## 2025-01-07 PROCEDURE — 99223 1ST HOSP IP/OBS HIGH 75: CPT | Performed by: PSYCHIATRY & NEUROLOGY

## 2025-01-07 PROCEDURE — 2060000001 HC INTERMEDIATE ICU ROOM DAILY

## 2025-01-07 PROCEDURE — 83735 ASSAY OF MAGNESIUM: CPT | Performed by: HOSPITALIST

## 2025-01-07 PROCEDURE — 95816 EEG AWAKE AND DROWSY: CPT

## 2025-01-07 PROCEDURE — 95816 EEG AWAKE AND DROWSY: CPT | Performed by: PSYCHIATRY & NEUROLOGY

## 2025-01-07 PROCEDURE — 2500000004 HC RX 250 GENERAL PHARMACY W/ HCPCS (ALT 636 FOR OP/ED): Performed by: STUDENT IN AN ORGANIZED HEALTH CARE EDUCATION/TRAINING PROGRAM

## 2025-01-07 PROCEDURE — 2500000004 HC RX 250 GENERAL PHARMACY W/ HCPCS (ALT 636 FOR OP/ED): Performed by: HOSPITALIST

## 2025-01-07 PROCEDURE — 2500000001 HC RX 250 WO HCPCS SELF ADMINISTERED DRUGS (ALT 637 FOR MEDICARE OP): Performed by: HOSPITALIST

## 2025-01-07 PROCEDURE — 70551 MRI BRAIN STEM W/O DYE: CPT | Performed by: RADIOLOGY

## 2025-01-07 PROCEDURE — 80048 BASIC METABOLIC PNL TOTAL CA: CPT | Performed by: HOSPITALIST

## 2025-01-07 PROCEDURE — 37799 UNLISTED PX VASCULAR SURGERY: CPT | Performed by: HOSPITALIST

## 2025-01-07 PROCEDURE — 82607 VITAMIN B-12: CPT

## 2025-01-07 PROCEDURE — 70551 MRI BRAIN STEM W/O DYE: CPT

## 2025-01-07 PROCEDURE — 99232 SBSQ HOSP IP/OBS MODERATE 35: CPT | Performed by: STUDENT IN AN ORGANIZED HEALTH CARE EDUCATION/TRAINING PROGRAM

## 2025-01-07 PROCEDURE — 85027 COMPLETE CBC AUTOMATED: CPT | Performed by: HOSPITALIST

## 2025-01-07 PROCEDURE — 82746 ASSAY OF FOLIC ACID SERUM: CPT

## 2025-01-07 PROCEDURE — 2500000002 HC RX 250 W HCPCS SELF ADMINISTERED DRUGS (ALT 637 FOR MEDICARE OP, ALT 636 FOR OP/ED): Performed by: STUDENT IN AN ORGANIZED HEALTH CARE EDUCATION/TRAINING PROGRAM

## 2025-01-07 PROCEDURE — 2500000002 HC RX 250 W HCPCS SELF ADMINISTERED DRUGS (ALT 637 FOR MEDICARE OP, ALT 636 FOR OP/ED): Performed by: HOSPITALIST

## 2025-01-07 RX ORDER — HYDRALAZINE HYDROCHLORIDE 20 MG/ML
10 INJECTION INTRAMUSCULAR; INTRAVENOUS EVERY 6 HOURS PRN
Status: DISCONTINUED | OUTPATIENT
Start: 2025-01-07 | End: 2025-01-09 | Stop reason: HOSPADM

## 2025-01-07 RX ORDER — LORAZEPAM 2 MG/ML
1 INJECTION INTRAMUSCULAR
Status: ACTIVE | OUTPATIENT
Start: 2025-01-07 | End: 2025-01-08

## 2025-01-07 RX ADMIN — HEPARIN SODIUM 5000 UNITS: 5000 INJECTION INTRAVENOUS; SUBCUTANEOUS at 15:11

## 2025-01-07 RX ADMIN — HEPARIN SODIUM 5000 UNITS: 5000 INJECTION INTRAVENOUS; SUBCUTANEOUS at 05:56

## 2025-01-07 RX ADMIN — GABAPENTIN 200 MG: 100 CAPSULE ORAL at 20:23

## 2025-01-07 RX ADMIN — QUETIAPINE FUMARATE 25 MG: 25 TABLET, FILM COATED ORAL at 20:23

## 2025-01-07 RX ADMIN — ASPIRIN 81 MG: 81 TABLET, COATED ORAL at 11:20

## 2025-01-07 RX ADMIN — HYDRALAZINE HYDROCHLORIDE 10 MG: 20 INJECTION INTRAMUSCULAR; INTRAVENOUS at 21:35

## 2025-01-07 RX ADMIN — CEFTRIAXONE SODIUM 1 G: 1 INJECTION, SOLUTION INTRAVENOUS at 11:20

## 2025-01-07 RX ADMIN — LEVOTHYROXINE SODIUM 125 MCG: 125 TABLET ORAL at 05:56

## 2025-01-07 RX ADMIN — HEPARIN SODIUM 5000 UNITS: 5000 INJECTION INTRAVENOUS; SUBCUTANEOUS at 23:59

## 2025-01-07 ASSESSMENT — COGNITIVE AND FUNCTIONAL STATUS - GENERAL
DAILY ACTIVITIY SCORE: 6
EATING MEALS: TOTAL
MOVING FROM LYING ON BACK TO SITTING ON SIDE OF FLAT BED WITH BEDRAILS: A LOT
MOVING TO AND FROM BED TO CHAIR: TOTAL
DRESSING REGULAR UPPER BODY CLOTHING: TOTAL
DRESSING REGULAR LOWER BODY CLOTHING: TOTAL
STANDING UP FROM CHAIR USING ARMS: TOTAL
PERSONAL GROOMING: TOTAL
CLIMB 3 TO 5 STEPS WITH RAILING: TOTAL
MOBILITY SCORE: 8
TURNING FROM BACK TO SIDE WHILE IN FLAT BAD: A LOT
HELP NEEDED FOR BATHING: TOTAL
TOILETING: TOTAL
WALKING IN HOSPITAL ROOM: TOTAL

## 2025-01-07 ASSESSMENT — PAIN - FUNCTIONAL ASSESSMENT: PAIN_FUNCTIONAL_ASSESSMENT: 0-10

## 2025-01-07 ASSESSMENT — PAIN SCALES - GENERAL: PAINLEVEL_OUTOF10: 0 - NO PAIN

## 2025-01-07 NOTE — PROGRESS NOTES
PROGRESS NOTE    ASSESSMENT AND PLAN:     Acute metabolic encephalopathy   History of dementia  History of anxiety  -P/W worsening confusion for several days after being started on ertapenem  -Etiology is multifactorial from IV Ertapenem,  acute kidney injury and multiple sedative medications.  -Per MAR pt is on Percocet, Ativan, gabapentin, Abilify, Zoloft, Valium, buspirone, Robaxin;    -CT head shows no acute findings     Plan:  -Confusion appears to be improving.  -Neuro consulted for evaluation.  Recommendations appreciated.  MRI brain with and without to rule out structural cause of change as well as EEG, TSH, B12, folate, and ammonia were recommended for evaluation.  Will follow results of testing.     History of urinary tract infection with an indwelling Barker catheter present on admission  -Previous cultures grew multidrug-resistant E. coli and Enterococcus; she has been receiving IV ertapenem via PICC line.  -Ertapenem was discontinued during hospitalization due to concerns about causing confusion.  -Was transition to vancomycin/ceftriaxone and de-escalated to ceftriaxone alone.  -Urine culture here shows no growth  -Prior provider discussed antibiotics with ID who felt that the Enterococcus is most likely contamination, continued ceftriaxone advised.  Can potentially be discharged on ceftriaxone.     Acute kidney injury on chronic kidney disease stage III  Metabolic acidosis  -Most likely prerenal from dehydration, from poor oral intake  -Overall improving  -Scr trend 2.12-->1.84-->1.2; creatinine appears to be stable at baseline.  -Creatinine improved with IV hydration which is since been discontinued.  -Will continue to hold lisinopril and consider reinitiation based on BP control.     Hypothyroidism  -Suboptimal control, TSH 15.66 Free T4.6  -Levothyroxine dosing was increased to 125 mcg p.o. daily     Junctional bradycardia  -Most likely related to medication, possible hypothyroidism suboptimally  "controlled  -Currently remains in sinus rhythm heart rates in the 60s to 70s.  -Echocardiogram shows preserved EF.  -Seen by EP cardiology, recommends outpatient follow-up, no need for urgent pacemaker.  Hold Lopressor on discharge.  -Family is not amenable for pacemaker placement should it become required.     Hypertension  -Increase her am amlodipine to 5 mg p.o. daily  -Continue to hold lisinopril     9.  Fibromyalgia  -Restarted gabapentin, increase to 200 mg.    Disposition:    SUBJECTIVE:   Admit Date: 1/3/2025    Patient had no acute events overnight.  Sleeping at time of evaluation but easily awoke to verbal stimuli.  Oriented to self knows she is in the hospital at [place].  Did not know the year.  Denies any difficulty breathing or pain.  Further ROS was unremarkable.      OBJECTIVE:   Vitals: BP (!) 193/90 (BP Location: Left arm, Patient Position: Lying)   Pulse (!) 43   Temp 36 °C (96.8 °F) (Temporal)   Resp 16   Ht 1.6 m (5' 3\")   Wt 62.9 kg (138 lb 10.7 oz)   SpO2 96%   BMI 24.56 kg/m²    Wt Readings from Last 3 Encounters:   01/03/25 62.9 kg (138 lb 10.7 oz)   12/12/24 62.9 kg (138 lb 9.6 oz)   10/07/24 60.5 kg (133 lb 4.8 oz)      24HR INTAKE/OUTPUT:    Intake/Output Summary (Last 24 hours) at 1/7/2025 1231  Last data filed at 1/7/2025 0800  Gross per 24 hour   Intake --   Output 2500 ml   Net -2500 ml       PHYSICAL EXAM:   General: Well-developed elderly female in mild distress  HEENT: Clear sclera, EOMI, trachea midline, moist mucous membranes  Respiratory: Lungs clear to auscultation, with no wheezes or rhonchi appreciated  Cardiovascular: S1 and S2 auscultated, no murmurs clicks or rubs  Abdomen: Soft, nontender, nondistended  Skin: Warm, dry    LABS/IMAGING AND MEDICATIONS:   Scheduled Meds:amLODIPine, 5 mg, oral, Daily  aspirin, 81 mg, oral, Daily  cefTRIAXone, 1 g, intravenous, q24h  gabapentin, 200 mg, oral, Nightly  heparin (porcine), 5,000 Units, subcutaneous, q8h MEGHAN  levothyroxine, " "125 mcg, oral, Daily  QUEtiapine, 25 mg, oral, Nightly      PRN Meds:PRN medications: acetaminophen **OR** acetaminophen **OR** acetaminophen    No lab exists for component: \"CBC\"   No lab exists for component: \"CMP\"   No lab exists for component: \"TROPONIN\"          No lab exists for component: \"LIPIDS\"       No lab exists for component: \"URINALYSIS\"          BMP:  Results from last 7 days   Lab Units 01/07/25  0510 01/06/25  0548 01/05/25  0618   SODIUM mmol/L 139 136 138   POTASSIUM mmol/L 3.6 3.8 4.1   CHLORIDE mmol/L 106 107 110*   CO2 mmol/L 23 19* 16*   BUN mg/dL 16 19 27*   CREATININE mg/dL 1.24* 1.25* 1.44*       CBC:  Results from last 7 days   Lab Units 01/07/25  0510 01/06/25  0548 01/05/25  0618   WBC AUTO x10*3/uL 7.0 8.3 7.7   RBC AUTO x10*6/uL 3.20* 3.28* 3.08*   HEMOGLOBIN g/dL 10.2* 10.4* 10.0*   HEMATOCRIT % 29.3* 30.0* 29.3*   MCV fL 92 92 95   MCH pg 31.9 31.7 32.5   MCHC g/dL 34.8 34.7 34.1   RDW % 13.9 14.0 14.3   PLATELETS AUTO x10*3/uL 228 253 261       Cardiac Enzymes:   Results from last 7 days   Lab Units 01/03/25  1232 01/03/25  1120   TROPHS ng/L 5 7         Hepatic Function Panel:  Results from last 7 days   Lab Units 01/04/25  0840 01/03/25  1120   ALK PHOS U/L 73 73   ALT U/L 6* 7   AST U/L 10 10   PROTEIN TOTAL g/dL 6.4 6.7   BILIRUBIN TOTAL mg/dL 0.3 0.3       Magnesium:  Results from last 7 days   Lab Units 01/07/25  0510   MAGNESIUM mg/dL 1.64       Pro-BNP:  No results found for: \"PROBNP\"    INR:        TSH:  No results found for: \"TSH\"    Lipid Profile:        No lab exists for component: \"LABVLDL\"    HgbA1C:        Lactate Level:  No lab exists for component: \"LACTA\"    CMP:  Results from last 7 days   Lab Units 01/07/25  0510 01/06/25  0548 01/05/25  0618 01/04/25  0840 01/03/25  1120   SODIUM mmol/L 139 136 138 136 135*   POTASSIUM mmol/L 3.6 3.8 4.1 4.4 4.4   CHLORIDE mmol/L 106 107 110* 110* 106   CO2 mmol/L 23 19* 16* 13* 18*   BUN mg/dL 16 19 27* 40* 46*   CREATININE " mg/dL 1.24* 1.25* 1.44* 1.84* 2.12*   GLUCOSE mg/dL 91 92 80 75 86   CALCIUM mg/dL 8.8 8.9 8.6 8.7 9.0   PROTEIN TOTAL g/dL  --   --   --  6.4 6.7   BILIRUBIN TOTAL mg/dL  --   --   --  0.3 0.3   ALK PHOS U/L  --   --   --  73 73   AST U/L  --   --   --  10 10   ALT U/L  --   --   --  6* 7

## 2025-01-07 NOTE — PROGRESS NOTES
Per MD, pt's son declined Pacemaker placement. Planned for OP halter monitor. Neuro following, pt ordered for EEG today. Will dc with PICC and IV abx, changed to Ceftriaxone.

## 2025-01-07 NOTE — DOCUMENTATION CLARIFICATION NOTE
"    PATIENT:               BERNARDO TANNER  ACCT #:                  [id number]  MRN:                       [id number]  :                       1935  ADMIT DATE:       1/3/2025 10:34 AM  DISCH DATE:  RESPONDING PROVIDER #:        [id number]          PROVIDER RESPONSE TEXT:    Chronic Diastolic Congestive Heart Failure    CDI QUERY TEXT:    Clarification      Instruction:    Based on your assessment of the patient and the clinical information, please provide the requested documentation by clicking on the appropriate radio button and enter any additional information if prompted.    Question: Please further clarify the type and acuity of congestive heart failure    When answering this query, please exercise your independent professional judgment. The fact that a question is being asked, does not imply that any particular answer is desired or expected.    The patient's clinical indicators include:  Clinical Information: Pt presents w/ ams and found to have CAUTI    Clinical Indicators:    Per the HP dated 1/3/25 - \"89 y.o. female with a significant past medical history of anxiety, dementia, fibromyalgia, hypertension, history of chronic indwelling Barker catheter for neurogenic bladder, chronic kidney disease stage III, history of heart failure\"    Per TTE dated 25 - \"The left ventricular systolic function is normal, with a visually estimated ejection fraction of 60%. There are no regional wall motion abnormalities. The left ventricular cavity size is normal.\"    Treatment:    Risk Factors:  Options provided:  -- Chronic Diastolic Congestive Heart Failure  -- Other - I will add my own diagnosis  -- Refer to Clinical Documentation Reviewer    Query created by: Toney Self on 2025 10:37 AM      Electronically signed by:  MARCIN VICTOR MD 2025 2:41 PM          "

## 2025-01-07 NOTE — CONSULTS
Inpatient consult to Neurology  Consult performed by: JOCELIN Noel-CNP  Consult ordered by: Nel Gudino MD          History Of Present Illness  Jacqueline Sandoval is a 89 y.o. female presenting with worsening confusion from baseline in the setting of underlying dementia and UTI. Over the course of the hospital stay and treatment for UTI, her confusion has not improved. PMH of anxiety, dementia, fibromyalgia, hypertension, history of chronic indwelling Barker catheter for neurogenic bladder, chronic kidney disease stage III, history of heart failure.  Pt. Seen and examined. She is pleasantly confused without aggression or agitation. She did sleep overnight with Seroquel and Gabapentin and this seems to have helped with her mentation.   Review of systems are negative unless otherwise specified in HPI.    Past Medical History  Past Medical History:   Diagnosis Date    Acute cystitis without hematuria 05/31/2023    Anemia     Anxiety     Dementia     Depression     Falls     Fibromyalgia     Frequent UTI 08/22/2023    Heart failure     Hypertension     Left displaced femoral neck fracture 11/23/2023    Postoperative anemia 12/08/2023    Renal disorder     Urinary symptom or sign 06/20/2023     Surgical History  Past Surgical History:   Procedure Laterality Date    JOINT REPLACEMENT      OTHER SURGICAL HISTORY  10/05/2022    Humerus fracture repair     Social History  Social History     Tobacco Use    Smoking status: Never    Smokeless tobacco: Never   Vaping Use    Vaping status: Never Used   Substance Use Topics    Alcohol use: Never    Drug use: Never     Allergies  Penicillins and Sulfa (sulfonamide antibiotics)  Medications Prior to Admission   Medication Sig Dispense Refill Last Dose/Taking    acetaminophen (Tylenol 8 HOUR) 650 mg ER tablet Take 2 tablets (1,300 mg) by mouth 2 times a day. Do not crush, chew, or split.   Past Week    amLODIPine (Norvasc) 5 mg tablet Take 1 tablet (5 mg) by mouth  once daily.   Past Week    aspirin 81 mg EC tablet Take 1 tablet (81 mg) by mouth once daily.   Past Week    busPIRone (Buspar) 10 mg tablet Take 1 tablet (10 mg) by mouth 3 times a day.   Past Week    docusate sodium (Colace) 100 mg capsule Take 1 capsule (100 mg) by mouth twice a day.   Past Week    gabapentin (Neurontin) 100 mg capsule Take 2 capsules (200 mg) by mouth once daily at bedtime.   Past Week    HYDROcodone-acetaminophen (Norco) 5-325 mg tablet Take 1 tablet by mouth every 6 hours if needed for severe pain (7 - 10). 240 tablet 0 Past Week    lactobacillus acidophilus & bulgar (Lactinex) 1 million cell chewable tablet 1 tablet once daily.   Past Week    levothyroxine (Synthroid, Levoxyl) 100 mcg tablet Take 1 tablet (100 mcg) by mouth early in the morning.. Take on an empty stomach at the same time each day, either 30 to 60 minutes prior to breakfast   Past Week    lisinopril 40 mg tablet Take 1 tablet (40 mg) by mouth once daily.   Past Week    melatonin 3 mg capsule Take 3 mg by mouth once daily at bedtime.   Past Week    metoprolol tartrate (Lopressor) 25 mg tablet Take 0.5 tablets (12.5 mg) by mouth 2 times a day.   Past Week    multivitamin with minerals tablet Take 1 tablet by mouth once daily.   Past Week    nitrofurantoin (Macrodantin) 50 mg capsule Take 1 capsule (50 mg) by mouth once daily.   Past Week    polyethylene glycol (Glycolax, Miralax) 17 gram packet Take 17 g by mouth once daily.   Past Week    tamsulosin (Flomax) 0.4 mg 24 hr capsule Take 2 capsules (0.8 mg) by mouth once daily.   Past Week    white petrolatum-mineral oiL (Tears Naturale PM) 94-3 % ophthalmic ointment Apply 1 Application to both eyes 2 times a day.   Past Week    diazePAM (Valium) 5 mg tablet Take 1 tablet (5 mg) by mouth once daily at bedtime. (Patient not taking: Reported on 1/6/2025) 90 tablet 0 Not Taking    diclofenac sodium (Voltaren) 1 % gel gel Apply 4.5 inches (4 g) topically 2 times a day as needed (pain).    Unknown    meclizine (Antivert) 25 mg tablet Take 0.5 tablets (12.5 mg) by mouth 3 times a day as needed for dizziness.   Unknown    methocarbamol (Robaxin) 500 mg tablet Take 1 tablet (500 mg) by mouth every 8 hours if needed for muscle spasms for up to 7 days. 21 tablet 0     naloxone (Narcan) 4 mg/0.1 mL nasal spray Administer 1 spray (4 mg) into affected nostril(s) if needed for opioid reversal. May repeat every 2-3 minutes if needed, alternating nostrils, until medical assistance becomes available. 2 each 0 Unknown       Review of Systems  Neurological Exam  Mental Status   Oriented only to person. Speech is normal. Language is fluent with no aphasia.    Cranial Nerves  CN III, IV, VI: Extraocular movements intact bilaterally. No nystagmus.   Right pupil: 3 mm. Round. Reactive to light. Reactive to accommodation.   Left pupil: 3 mm. Round. Reactive to light. Reactive to accommodation.  CN V:  Right: Facial sensation is normal.  Left: Facial sensation is normal on the left.  CN VII:  Right: There is no facial weakness.  Left: There is no facial weakness.  CN IX, X:  Right: Palate is normal.  Left: Palate is normal.  CN XI:  Right: Trapezius strength is normal.  Left: Trapezius strength is normal.  CN XII: Tongue midline without atrophy or fasciculations.    Motor  Normal muscle bulk throughout. Normal muscle tone. Strength is 5/5 in all four extremities except as noted.  Generalized weakness-nonfocal.    Sensory  Light touch is normal in upper and lower extremities.     Reflexes  Deep tendon reflexes are 2+ and symmetric in all four extremities.    Physical Exam  HENT:      Right Ear: Decreased hearing noted.      Left Ear: Decreased hearing noted.   Eyes:      Extraocular Movements: EOM normal. No nystagmus.   Neurological:      Deep Tendon Reflexes: Reflexes are normal and symmetric.   Psychiatric:         Speech: Speech normal.         Last Recorded Vitals  Blood pressure 176/75, pulse (!) 48, temperature  "36.2 °C (97.2 °F), temperature source Temporal, resp. rate 16, height 1.6 m (5' 3\"), weight 62.9 kg (138 lb 10.7 oz), SpO2 97%.    Relevant Results  Results for orders placed or performed during the hospital encounter of 01/03/25 (from the past 24 hours)   Transthoracic Echo (TTE) Complete   Result Value Ref Range    AV mn grad 4 mmHg    AV pk jocelin 1.38 m/s    LV Biplane EF 58 %    LVOT diam 1.90 cm    MV E/A ratio 0.70     Tricuspid annular plane systolic excursion 3.2 cm    LA vol index A/L 26.5 ml/m2    LV EF 60 %    RV free wall pk S' 18.40 cm/s    RVSP 34.6 mmHg    LVIDd 4.40 cm    Aortic Valve Area by Continuity of Peak Velocity 2.12 cm2    AV pk grad 8 mmHg    Aortic Valve Area by Continuity of VTI 2.43 cm2    LV A4C EF 57.5     BSA 1.67 m2   Magnesium   Result Value Ref Range    Magnesium 1.64 1.60 - 2.40 mg/dL   Basic Metabolic Panel   Result Value Ref Range    Glucose 91 74 - 99 mg/dL    Sodium 139 136 - 145 mmol/L    Potassium 3.6 3.5 - 5.3 mmol/L    Chloride 106 98 - 107 mmol/L    Bicarbonate 23 21 - 32 mmol/L    Anion Gap 14 10 - 20 mmol/L    Urea Nitrogen 16 6 - 23 mg/dL    Creatinine 1.24 (H) 0.50 - 1.05 mg/dL    eGFR 42 (L) >60 mL/min/1.73m*2    Calcium 8.8 8.6 - 10.3 mg/dL   CBC   Result Value Ref Range    WBC 7.0 4.4 - 11.3 x10*3/uL    nRBC 0.0 0.0 - 0.0 /100 WBCs    RBC 3.20 (L) 4.00 - 5.20 x10*6/uL    Hemoglobin 10.2 (L) 12.0 - 16.0 g/dL    Hematocrit 29.3 (L) 36.0 - 46.0 %    MCV 92 80 - 100 fL    MCH 31.9 26.0 - 34.0 pg    MCHC 34.8 32.0 - 36.0 g/dL    RDW 13.9 11.5 - 14.5 %    Platelets 228 150 - 450 x10*3/uL   ECG 12 Lead   Result Value Ref Range    Ventricular Rate 70 BPM    Atrial Rate 70 BPM    NM Interval 184 ms    QRS Duration 94 ms    QT Interval 438 ms    QTC Calculation(Bazett) 473 ms    P Axis 53 degrees    R Axis -18 degrees    T Axis 48 degrees    QRS Count 12 beats    Q Onset 222 ms    P Onset 130 ms    P Offset 182 ms    T Offset 441 ms    QTC Fredericia 461 ms   ECG 12 " Lead    Result Date: 1/7/2025  Normal sinus rhythm Anterior infarct , age undetermined Abnormal ECG When compared with ECG of 06-JAN-2025 06:29, (unconfirmed) Anterior infarct is now Present Nonspecific T wave abnormality, worse in Anterior leads    Transthoracic Echo (TTE) Complete    Result Date: 1/6/2025          [place] [id number]  Tel [phone] Fax [phone] TRANSTHORACIC ECHOCARDIOGRAM REPORT Patient Name:       BERNARDO TANNER      Reading Physician:    [id number] Donal Sierra DO Study Date:         1/6/2025            Ordering Provider:    [id number] MARCIN VICTOR MRN/PID:            [id number]            Fellow: Accession#:         [id number]        Nurse:                Amauri Freeman RN Date of Birth/Age:  1935 / 89 years Sonographer:          Ro Olmos RDCS Gender Assigned at  F                   Additional Staff:     Suzie Costa Birth: Height:             160.02 cm           Admit Date:           1/3/2025 Weight:             62.60 kg            Admission Status:     Inpatient -                                                               Routine BSA / BMI:          1.65 m2 / 24.45     Department Location:  [place] [address]                                     Echo Lab Blood Pressure: 170 /78 mmHg Study Type:    TRANSTHORACIC ECHO (TTE) COMPLETE Diagnosis/ICD: Bradycardia, unspecified-R00.1; Heart failure, unspecified-I50.9 Indication:    Bradycardia, Heart Failure CPT Codes:     Echo Complete w Full [id number] Patient History: Pertinent History: HTN and Anemia, CKD, Dementia, Anxiety, Osteioarthritis,                    Fibromyalgia, Bradycardia. Study Detail: The following Echo studies were performed: 2D, M-Mode, Doppler and                color flow. Technically challenging study due to poor acoustic               windows, body habitus, prominent lung artifact and               osteoarthritis/fibromyalgia. Definity used as a contrast agent for               endocardial border definition. Total contrast used for this               procedure was 1.5 mL via IV push.  PHYSICIAN INTERPRETATION: Left Ventricle: The left ventricular systolic function is normal, with a visually estimated ejection fraction of 60%. There are no regional wall motion abnormalities. The left ventricular cavity size is normal. There is normal septal and mildly increased posterior left ventricular wall thickness. Spectral Doppler shows a Grade I (impaired relaxation pattern) of left ventricular diastolic filling with normal left atrial filling pressure. LV Wall Scoring: All segments are normal. Left Atrium: The left atrium is upper limits of normal in size. Right Ventricle: The right ventricle is upper limits of normal in size. There is low normal right ventricular systolic function. Right Atrium: The right atrium is normal in size. Aortic Valve: The aortic valve appears structurally normal. There is no evidence of aortic valve stenosis. The aortic valve dimensionless index is 0.86. There is trace aortic valve regurgitation. The peak instantaneous gradient of the aortic valve is 8 mmHg. The mean gradient of the aortic valve is 4 mmHg. Mitral Valve: The mitral valve is normal in structure. There is no evidence of mitral valve stenosis. There is normal mitral valve leaflet mobility. There is mild mitral annular calcification. There is no evidence of mitral valve regurgitation. Tricuspid Valve: The tricuspid valve is structurally normal. There is normal tricuspid valve leaflet mobility. No evidence of tricuspid regurgitation. Pulmonic Valve: The pulmonic valve is structurally normal. There is no indication of pulmonic valve regurgitation. Pericardium: No pericardial effusion  noted. Aorta: The aortic root is normal. Pulmonary Artery: The main pulmonary artery is normal in size, and position, with normal bifurcation into the left and right pulmonary arteries. The tricuspid regurgitant velocity is 2.81 m/s, and with an estimated right atrial pressure of 3 mmHg, the estimated pulmonary artery pressure is mildly elevated with the RVSP at 34.6 mmHg. Systemic Veins: The inferior vena cava appears normal in size. In comparison to the previous echocardiogram(s): There are no prior studies on this patient for comparison purposes.  CONCLUSIONS:  1. The left ventricular systolic function is normal, with a visually estimated ejection fraction of 60%.  2. Spectral Doppler shows a Grade I (impaired relaxation pattern) of left ventricular diastolic filling with normal left atrial filling pressure.  3. There is low normal right ventricular systolic function.  4. There is no evidence of mitral valve stenosis.  5. No evidence of mitral valve regurgitation.  6. No evidence of tricuspid regurgitation.  7. Aortic valve stenosis is not present.  8. The main pulmonary artery is normal in size, and position, with normal bifurcation into the left and right pulmonary arteries. RECOMMENDATIONS: Technically suboptimal and limited study, therefore accuracy of above interpretation could be substantially diminished. Clinical correlation is advised. Consider additional imaging modalities if clinically indicated.  QUANTITATIVE DATA SUMMARY:  2D MEASUREMENTS:           Normal Ranges: Ao Root d:       2.80 cm   (2.0-3.7cm) LAs:             3.00 cm   (2.7-4.0cm) IVSd:            0.90 cm   (0.6-1.1cm) LVPWd:           1.10 cm   (0.6-1.1cm) LVIDd:           4.40 cm   (3.9-5.9cm) LVIDs:           3.10 cm LV Mass Index:   89.5 g/m2 LV % FS          29.5 %  LA VOLUME:                    Normal Ranges: LA Vol A4C:        59.1 ml    (22+/-6mL/m2) LA Vol A2C:        28.7 ml LA Vol BP:         43.8 ml LA Vol Index A4C:  35.8ml/m2  LA Vol Index A2C:  17.4 ml/m2 LA Vol Index BP:   26.5 ml/m2 LA Area A4C:       19.2 cm2 LA Area A2C:       12.6 cm2 LA Major Axis A4C: 5.3 cm LA Major Axis A2C: 4.7 cm LA Volume Index:   25.4 ml/m2 LA Vol A4C:        56.2 ml LA Vol A2C:        27.5 ml LA Vol Index BSA:  25.3 ml/m2  RA VOLUME BY A/L METHOD:           Normal Ranges: RA Vol A4C:              16.0 ml   (8.3-19.5ml) RA Vol Index A4C:        9.7 ml/m2 RA Area A4C:             9.3 cm2 RA Major Axis A4C:       4.6 cm  AORTA MEASUREMENTS:         Normal Ranges: Asc Ao, d:          3.20 cm (2.1-3.4cm)  LV SYSTOLIC FUNCTION BY 2D PLANIMETRY (MOD):                      Normal Ranges: EF-A4C View:    58 % (>=55%) EF-A2C View:    59 % EF-Biplane:     58 % EF-Visual:      60 % LV EF Reported: 60 %  LV DIASTOLIC FUNCTION:           Normal Ranges: MV Peak E:             0.82 m/s  (0.7-1.2 m/s) MV Peak A:             1.17 m/s  (0.42-0.7 m/s) E/A Ratio:             0.70      (1.0-2.2) MV e'                  0.102 m/s (>8.0) MV lateral e'          0.15 m/s MV medial e'           0.05 m/s E/e' Ratio:            7.97      (<8.0)  MITRAL VALVE:          Normal Ranges: MV DT:        140 msec (150-240msec)  AORTIC VALVE:                     Normal Ranges: AoV Vmax:                1.38 m/s (<=1.7m/s) AoV Peak P.6 mmHg (<20mmHg) AoV Mean P.0 mmHg (1.7-11.5mmHg) LVOT Max Vicente:            1.03 m/s (<=1.1m/s) AoV VTI:                 31.40 cm (18-25cm) LVOT VTI:                26.90 cm LVOT Diameter:           1.90 cm  (1.8-2.4cm) AoV Area, VTI:           2.43 cm2 (2.5-5.5cm2) AoV Area,Vmax:           2.12 cm2 (2.5-4.5cm2) AoV Dimensionless Index: 0.86  RIGHT VENTRICLE: TAPSE: 32.0 mm RV s'  0.18 m/s  TRICUSPID VALVE/RVSP:          Normal Ranges: Peak TR Velocity:     2.81 m/s RV Syst Pressure:     35 mmHg  (< 30mmHg) IVC Diam:             1.70 cm  PULMONIC VALVE:          Normal Ranges: PV Accel Time:  98 msec  (>120ms) PV Max Vicente:     0.8 m/s   (0.6-0.9m/s) PV Max P.9 mmHg  [id number] Donal Sierra DO Electronically signed on 2025 at 11:06:44 AM  Wall Scoring  ** Final **     ECG 12 Lead    Result Date: 2025  Normal sinus rhythm Low voltage QRS Nonspecific ST and T wave abnormality Abnormal ECG When compared with ECG of 2025 13:08, Nonspecific T wave abnormality, worse in Lateral leads    ECG 12 Lead    Result Date: 2025  Normal sinus rhythm Low voltage QRS Nonspecific ST and T wave abnormality Abnormal ECG When compared with ECG of 2025 08:34, Nonspecific T wave abnormality now evident in Lateral leads Confirmed by Flako Jacobs ([id number]) on 2025 6:31:57 AM   Scheduled medications   Medication Dose Route Frequency    amLODIPine  5 mg oral Daily    aspirin  81 mg oral Daily    cefTRIAXone  1 g intravenous q24h    gabapentin  200 mg oral Nightly    heparin (porcine)  5,000 Units subcutaneous q8h MEGHAN    levothyroxine  125 mcg oral Daily    QUEtiapine  25 mg oral Nightly    sodium bicarbonate  650 mg oral BID     PRN medications   Medication    acetaminophen    Or    acetaminophen    Or    acetaminophen                       [place] Coma Scale  Best Eye Response: Spontaneous  Best Verbal Response: Confused  Best Motor Response: Withdraws to pain  Ken Coma Scale Score: 12                 I have personally reviewed the following imaging results ECG 12 Lead    Result Date: 2025  Normal sinus rhythm Anterior infarct , age undetermined Abnormal ECG When compared with ECG of 2025 06:29, (unconfirmed) Anterior infarct is now Present Nonspecific T wave abnormality, worse in Anterior leads    Transthoracic Echo (TTE) Complete    Result Date: 2025          [place] [id number]  Tel [phone] Fax [phone] TRANSTHORACIC ECHOCARDIOGRAM REPORT Patient Name:       BERNARDO FLORES      Reading Physician:    Doug Mansfield                                                                Mariela DO Study Date:         1/6/2025            Ordering Provider:    [id number] MARCIN GRAYMARIELA MRN/PID:            [id number]            Fellow: Accession#:         [id number]        Nurse:                Amauri Freeman RN Date of Birth/Age:  1935 / 89 years Sonographer:          Ro Olmos RDCS Gender Assigned at  F                   Additional Staff:     Suzie Costa Birth: Height:             160.02 cm           Admit Date:           1/3/2025 Weight:             62.60 kg            Admission Status:     Inpatient -                                                               Routine BSA / BMI:          1.65 m2 / 24.45     Department Location:  [place] [address]                                     Echo Lab Blood Pressure: 170 /78 mmHg Study Type:    TRANSTHORACIC ECHO (TTE) COMPLETE Diagnosis/ICD: Bradycardia, unspecified-R00.1; Heart failure, unspecified-I50.9 Indication:    Bradycardia, Heart Failure CPT Codes:     Echo Complete w Full Doppler-[id number] Patient History: Pertinent History: HTN and Anemia, CKD, Dementia, Anxiety, Osteioarthritis,                    Fibromyalgia, Bradycardia. Study Detail: The following Echo studies were performed: 2D, M-Mode, Doppler and               color flow. Technically challenging study due to poor acoustic               windows, body habitus, prominent lung artifact and               osteoarthritis/fibromyalgia. Definity used as a contrast agent for               endocardial border definition. Total contrast used for this               procedure was 1.5 mL via IV push.  PHYSICIAN INTERPRETATION: Left Ventricle: The left ventricular systolic function is normal, with a visually estimated ejection fraction of 60%. There are no regional wall motion abnormalities. The left ventricular cavity size is normal.  There is normal septal and mildly increased posterior left ventricular wall thickness. Spectral Doppler shows a Grade I (impaired relaxation pattern) of left ventricular diastolic filling with normal left atrial filling pressure. LV Wall Scoring: All segments are normal. Left Atrium: The left atrium is upper limits of normal in size. Right Ventricle: The right ventricle is upper limits of normal in size. There is low normal right ventricular systolic function. Right Atrium: The right atrium is normal in size. Aortic Valve: The aortic valve appears structurally normal. There is no evidence of aortic valve stenosis. The aortic valve dimensionless index is 0.86. There is trace aortic valve regurgitation. The peak instantaneous gradient of the aortic valve is 8 mmHg. The mean gradient of the aortic valve is 4 mmHg. Mitral Valve: The mitral valve is normal in structure. There is no evidence of mitral valve stenosis. There is normal mitral valve leaflet mobility. There is mild mitral annular calcification. There is no evidence of mitral valve regurgitation. Tricuspid Valve: The tricuspid valve is structurally normal. There is normal tricuspid valve leaflet mobility. No evidence of tricuspid regurgitation. Pulmonic Valve: The pulmonic valve is structurally normal. There is no indication of pulmonic valve regurgitation. Pericardium: No pericardial effusion noted. Aorta: The aortic root is normal. Pulmonary Artery: The main pulmonary artery is normal in size, and position, with normal bifurcation into the left and right pulmonary arteries. The tricuspid regurgitant velocity is 2.81 m/s, and with an estimated right atrial pressure of 3 mmHg, the estimated pulmonary artery pressure is mildly elevated with the RVSP at 34.6 mmHg. Systemic Veins: The inferior vena cava appears normal in size. In comparison to the previous echocardiogram(s): There are no prior studies on this patient for comparison purposes.  CONCLUSIONS:  1. The  left ventricular systolic function is normal, with a visually estimated ejection fraction of 60%.  2. Spectral Doppler shows a Grade I (impaired relaxation pattern) of left ventricular diastolic filling with normal left atrial filling pressure.  3. There is low normal right ventricular systolic function.  4. There is no evidence of mitral valve stenosis.  5. No evidence of mitral valve regurgitation.  6. No evidence of tricuspid regurgitation.  7. Aortic valve stenosis is not present.  8. The main pulmonary artery is normal in size, and position, with normal bifurcation into the left and right pulmonary arteries. RECOMMENDATIONS: Technically suboptimal and limited study, therefore accuracy of above interpretation could be substantially diminished. Clinical correlation is advised. Consider additional imaging modalities if clinically indicated.  QUANTITATIVE DATA SUMMARY:  2D MEASUREMENTS:           Normal Ranges: Ao Root d:       2.80 cm   (2.0-3.7cm) LAs:             3.00 cm   (2.7-4.0cm) IVSd:            0.90 cm   (0.6-1.1cm) LVPWd:           1.10 cm   (0.6-1.1cm) LVIDd:           4.40 cm   (3.9-5.9cm) LVIDs:           3.10 cm LV Mass Index:   89.5 g/m2 LV % FS          29.5 %  LA VOLUME:                    Normal Ranges: LA Vol A4C:        59.1 ml    (22+/-6mL/m2) LA Vol A2C:        28.7 ml LA Vol BP:         43.8 ml LA Vol Index A4C:  35.8ml/m2 LA Vol Index A2C:  17.4 ml/m2 LA Vol Index BP:   26.5 ml/m2 LA Area A4C:       19.2 cm2 LA Area A2C:       12.6 cm2 LA Major Axis A4C: 5.3 cm LA Major Axis A2C: 4.7 cm LA Volume Index:   25.4 ml/m2 LA Vol A4C:        56.2 ml LA Vol A2C:        27.5 ml LA Vol Index BSA:  25.3 ml/m2  RA VOLUME BY A/L METHOD:           Normal Ranges: RA Vol A4C:              16.0 ml   (8.3-19.5ml) RA Vol Index A4C:        9.7 ml/m2 RA Area A4C:             9.3 cm2 RA Major Axis A4C:       4.6 cm  AORTA MEASUREMENTS:         Normal Ranges: Asc Ao, d:          3.20 cm (2.1-3.4cm)  LV SYSTOLIC  FUNCTION BY 2D PLANIMETRY (MOD):                      Normal Ranges: EF-A4C View:    58 % (>=55%) EF-A2C View:    59 % EF-Biplane:     58 % EF-Visual:      60 % LV EF Reported: 60 %  LV DIASTOLIC FUNCTION:           Normal Ranges: MV Peak E:             0.82 m/s  (0.7-1.2 m/s) MV Peak A:             1.17 m/s  (0.42-0.7 m/s) E/A Ratio:             0.70      (1.0-2.2) MV e'                  0.102 m/s (>8.0) MV lateral e'          0.15 m/s MV medial e'           0.05 m/s E/e' Ratio:            7.97      (<8.0)  MITRAL VALVE:          Normal Ranges: MV DT:        140 msec (150-240msec)  AORTIC VALVE:                     Normal Ranges: AoV Vmax:                1.38 m/s (<=1.7m/s) AoV Peak P.6 mmHg (<20mmHg) AoV Mean P.0 mmHg (1.7-11.5mmHg) LVOT Max Vicente:            1.03 m/s (<=1.1m/s) AoV VTI:                 31.40 cm (18-25cm) LVOT VTI:                26.90 cm LVOT Diameter:           1.90 cm  (1.8-2.4cm) AoV Area, VTI:           2.43 cm2 (2.5-5.5cm2) AoV Area,Vmax:           2.12 cm2 (2.5-4.5cm2) AoV Dimensionless Index: 0.86  RIGHT VENTRICLE: TAPSE: 32.0 mm RV s'  0.18 m/s  TRICUSPID VALVE/RVSP:          Normal Ranges: Peak TR Velocity:     2.81 m/s RV Syst Pressure:     35 mmHg  (< 30mmHg) IVC Diam:             1.70 cm  PULMONIC VALVE:          Normal Ranges: PV Accel Time:  98 msec  (>120ms) PV Max Vicente:     0.8 m/s  (0.6-0.9m/s) PV Max P.9 mmHg  Doug Sierra DO Electronically signed on 2025 at 11:06:44 AM  Wall Scoring  ** Final **     XR hip left with pelvis when performed 2 or 3 views    Result Date: 2025  Interpreted By:  Nakul Flores, STUDY: XR HIP LEFT WITH PELVIS WHEN PERFORMED 2 OR 3 VIEWS;  2025 2:03 pm   INDICATION: Signs/Symptoms:pain.     COMPARISON: Left hip series 2024   ACCESSION NUMBER(S): [id number]   ORDERING CLINICIAN: MARCIN VICTOR   TECHNIQUE: Three views of the left hip   FINDINGS: No interval radiographic  change. Bones and orthopedic hardware intact and in good alignment       No acute osseous or hardware fracture or hardware malalignment     MACRO: None   Signed by: Nakul Flores 1/6/2025 8:42 AM Dictation workstation:   [id number]    ECG 12 Lead    Result Date: 1/6/2025  Normal sinus rhythm Low voltage QRS Nonspecific ST and T wave abnormality Abnormal ECG When compared with ECG of 05-JAN-2025 13:08, Nonspecific T wave abnormality, worse in Lateral leads    ECG 12 Lead    Result Date: 1/6/2025  Normal sinus rhythm Low voltage QRS Nonspecific ST and T wave abnormality Abnormal ECG When compared with ECG of 04-JAN-2025 08:34, Nonspecific T wave abnormality now evident in Lateral leads Confirmed by Flako Jacobs ([id number]) on 1/6/2025 6:31:57 AM    ECG 12 lead    Result Date: 1/5/2025  Junctional bradycardia Low voltage QRS Cannot rule out Anteroseptal infarct (cited on or before 03-JAN-2025) Abnormal ECG When compared with ECG of 03-JAN-2025 14:08, (unconfirmed) Nonspecific T wave abnormality no longer evident in Inferior leads T wave inversion less evident in Lateral leads Confirmed by Leo Sandy ([id number]) on 1/5/2025 12:35:33 PM    Electrocardiogram, 12-lead PRN ACS symptoms    Result Date: 1/4/2025  Normal sinus rhythm Anterolateral infarct (cited on or before 03-JAN-2025) Abnormal ECG When compared with ECG of 03-JAN-2025 14:08, (unconfirmed) Sinus rhythm has replaced Junctional rhythm Vent. rate has increased BY  22 BPM Serial changes of Anterior infarct Present Confirmed by Leo Sandy ([id number]) on 1/4/2025 1:13:58 PM    ECG 12 lead    Result Date: 1/4/2025  Sinus bradycardia Cannot rule out Anterior infarct , age undetermined Abnormal ECG When compared with ECG of 19-MAY-2024 09:12, Sinus rhythm has replaced Junctional rhythm Minimal criteria for Anterior infarct are now Present See ED provider note for full interpretation and clinical correlation Confirmed by Leo Sandy ([id number]) on 1/4/2025 1:13:20 PM    CT  head wo IV contrast    Result Date: 1/3/2025  Interpreted By:  Fer Montemayor, STUDY: CT HEAD WO IV CONTRAST; 1/3/2025 12:54 pm   INDICATION: Signs/Symptoms:ams.   COMPARISON: 05/19/2024   ACCESSION NUMBER(S): [id number]   ORDERING CLINICIAN: ROCHELLE GOMEZ   TECHNIQUE: Contiguous axial CT images were obtained through the head at 5 mm slice thickness without contrast administration.   FINDINGS: INTRACRANIAL: Mild diffuse volume loss is seen bilaterally. Mild-to-moderate subcortical and periventricular white matter changes are seen.  The grey-white differentiation is intact. There is no mass effect or midline shift. There is no extraaxial fluid collection. There is no intracranial hemorrhage.  The calvarium  is unremarkable.   EXTRACRANIAL: Visualized paranasal sinuses and mastoids are clear.       1. Diffuse volume loss and periventricular white matter changes, most consistent with small vessel ischemic disease. 2. No evidence of acute cortical infarct or intracranial hemorrhage.     MACRO: None   Signed by: Fer Montemayor 1/3/2025 1:08 PM Dictation workstation:   [id number]    XR chest 1 view    Result Date: 1/3/2025  STUDY: Chest Radiograph;  1/3/2025 at 10:53 AM INDICATION: Altered mental status. COMPARISON: 5/19/2024 Chest XR, 11/23/2023 Chest XR ACCESSION NUMBER(S): [id number] ORDERING CLINICIAN: ROCHELLE GOMEZ TECHNIQUE:  Frontal chest was obtained at 1053 hours. FINDINGS: There is a left pleural effusion with adjacent atelectasis.  Heart size is top normal.  There is no evidence of a pneumothorax.    1.  Left pleural effusion with adjacent atelectasis. Signed by Rick Camargo MD  .      Assessment/Plan   Assessment & Plan  Encephalopathy, unspecified type      Impression:  Worsening confusion from baseline in the setting of underlying dementia and UTI.   Recommendations:   R/o any metabolic cause that could be contributing to change in mental status (ie infection, tox screen including medications with  narrow TI, electrolyte imbalance, meningitis)      MRI w/wo to r/o structural cause of change in mental status (stroke, tumor, severe white matter disease)     EEG to r/o seizure, encephalopathy     TSH, B12, Folate, Ammonia     This patient is at high risk for hospital delirium:   Recommend treating underlying medical conditions.  Can try melatonin 5 mg at 2000 if poor sleep. Minimize use of benzodiazepines, anticholinergics, opiates as these can worsen mentation.   Nonpharmacologic: Family at bedside for familiarity, promote good sleep hygiene, good nutrition and hydration, pain control, get up in chair as tolerated.              JOCELIN Noel-CNP    Patient seen and examined agree with the plan.  Continue with the current treatment.  EEG was done which did not show any spike-wave discharges.  Rest of the labs were pending.  Continue with PT OT evaluation and social service look into disposition    Will follow with you.    Due to technical limitations of voice recognition and human error, this note may not accurately reflect the care of the patient.    Roverto Almonte MD neurology

## 2025-01-08 ENCOUNTER — APPOINTMENT (OUTPATIENT)
Dept: CARDIOLOGY | Facility: HOSPITAL | Age: OVER 89
End: 2025-01-08
Payer: COMMERCIAL

## 2025-01-08 PROCEDURE — 99233 SBSQ HOSP IP/OBS HIGH 50: CPT | Performed by: PSYCHIATRY & NEUROLOGY

## 2025-01-08 PROCEDURE — 2500000004 HC RX 250 GENERAL PHARMACY W/ HCPCS (ALT 636 FOR OP/ED): Performed by: HOSPITALIST

## 2025-01-08 PROCEDURE — 2500000002 HC RX 250 W HCPCS SELF ADMINISTERED DRUGS (ALT 637 FOR MEDICARE OP, ALT 636 FOR OP/ED): Performed by: STUDENT IN AN ORGANIZED HEALTH CARE EDUCATION/TRAINING PROGRAM

## 2025-01-08 PROCEDURE — 93005 ELECTROCARDIOGRAM TRACING: CPT

## 2025-01-08 PROCEDURE — 2060000001 HC INTERMEDIATE ICU ROOM DAILY

## 2025-01-08 PROCEDURE — 99239 HOSP IP/OBS DSCHRG MGMT >30: CPT | Performed by: STUDENT IN AN ORGANIZED HEALTH CARE EDUCATION/TRAINING PROGRAM

## 2025-01-08 PROCEDURE — 2500000002 HC RX 250 W HCPCS SELF ADMINISTERED DRUGS (ALT 637 FOR MEDICARE OP, ALT 636 FOR OP/ED): Performed by: HOSPITALIST

## 2025-01-08 PROCEDURE — 2500000001 HC RX 250 WO HCPCS SELF ADMINISTERED DRUGS (ALT 637 FOR MEDICARE OP): Performed by: HOSPITALIST

## 2025-01-08 RX ORDER — QUETIAPINE FUMARATE 25 MG/1
25 TABLET, FILM COATED ORAL NIGHTLY
Qty: 30 TABLET | Refills: 2 | Status: SHIPPED | OUTPATIENT
Start: 2025-01-08 | End: 2025-04-08

## 2025-01-08 RX ORDER — LEVOTHYROXINE SODIUM 125 UG/1
125 TABLET ORAL DAILY
Qty: 30 TABLET | Refills: 2 | Status: SHIPPED | OUTPATIENT
Start: 2025-01-09 | End: 2025-04-09

## 2025-01-08 RX ADMIN — ASPIRIN 81 MG: 81 TABLET, COATED ORAL at 08:32

## 2025-01-08 RX ADMIN — AMLODIPINE BESYLATE 5 MG: 5 TABLET ORAL at 08:32

## 2025-01-08 RX ADMIN — QUETIAPINE FUMARATE 25 MG: 25 TABLET, FILM COATED ORAL at 20:06

## 2025-01-08 RX ADMIN — HEPARIN SODIUM 5000 UNITS: 5000 INJECTION INTRAVENOUS; SUBCUTANEOUS at 16:42

## 2025-01-08 RX ADMIN — CEFTRIAXONE SODIUM 1 G: 1 INJECTION, SOLUTION INTRAVENOUS at 12:00

## 2025-01-08 RX ADMIN — GABAPENTIN 200 MG: 100 CAPSULE ORAL at 20:06

## 2025-01-08 RX ADMIN — HEPARIN SODIUM 5000 UNITS: 5000 INJECTION INTRAVENOUS; SUBCUTANEOUS at 08:32

## 2025-01-08 RX ADMIN — ACETAMINOPHEN 650 MG: 325 TABLET ORAL at 20:13

## 2025-01-08 RX ADMIN — LEVOTHYROXINE SODIUM 125 MCG: 125 TABLET ORAL at 06:58

## 2025-01-08 ASSESSMENT — PAIN - FUNCTIONAL ASSESSMENT
PAIN_FUNCTIONAL_ASSESSMENT: 0-10

## 2025-01-08 ASSESSMENT — COGNITIVE AND FUNCTIONAL STATUS - GENERAL
CLIMB 3 TO 5 STEPS WITH RAILING: TOTAL
EATING MEALS: A LITTLE
DRESSING REGULAR LOWER BODY CLOTHING: TOTAL
MOBILITY SCORE: 9
STANDING UP FROM CHAIR USING ARMS: TOTAL
DAILY ACTIVITIY SCORE: 8
DRESSING REGULAR UPPER BODY CLOTHING: TOTAL
TURNING FROM BACK TO SIDE WHILE IN FLAT BAD: A LOT
HELP NEEDED FOR BATHING: TOTAL
TOILETING: TOTAL
STANDING UP FROM CHAIR USING ARMS: TOTAL
CLIMB 3 TO 5 STEPS WITH RAILING: TOTAL
MOVING TO AND FROM BED TO CHAIR: A LOT
WALKING IN HOSPITAL ROOM: TOTAL
MOBILITY SCORE: 9
DRESSING REGULAR LOWER BODY CLOTHING: TOTAL
HELP NEEDED FOR BATHING: TOTAL
PERSONAL GROOMING: TOTAL
DAILY ACTIVITIY SCORE: 8
PERSONAL GROOMING: TOTAL
MOVING FROM LYING ON BACK TO SITTING ON SIDE OF FLAT BED WITH BEDRAILS: A LOT
MOVING TO AND FROM BED TO CHAIR: A LOT
TOILETING: TOTAL
DRESSING REGULAR UPPER BODY CLOTHING: TOTAL
EATING MEALS: A LITTLE
TURNING FROM BACK TO SIDE WHILE IN FLAT BAD: A LOT
MOVING FROM LYING ON BACK TO SITTING ON SIDE OF FLAT BED WITH BEDRAILS: A LOT
WALKING IN HOSPITAL ROOM: TOTAL

## 2025-01-08 ASSESSMENT — PAIN SCALES - GENERAL
PAINLEVEL_OUTOF10: 3
PAINLEVEL_OUTOF10: 0 - NO PAIN

## 2025-01-08 ASSESSMENT — PAIN DESCRIPTION - LOCATION: LOCATION: BACK

## 2025-01-08 ASSESSMENT — PAIN DESCRIPTION - ORIENTATION: ORIENTATION: RIGHT;LEFT;MID

## 2025-01-08 NOTE — PROGRESS NOTES
"Bernardo Tanner is a 89 y.o. female on day 5 of admission presenting with Encephalopathy, unspecified type.    Subjective   Patient is much more alert awake.  Less confused.  No seizure or seizure-like activity no new weakness or numbness.  Please refer to the previous notes for details.       Last Recorded Vitals  Blood pressure (!) 182/77, pulse 70, temperature 36.6 °C (97.9 °F), resp. rate 18, height 1.6 m (5' 3\"), weight 62.9 kg (138 lb 10.7 oz), SpO2 95%.    Physical Exam/Neurological Exam  Patient is alert awake and able to follow simple commands.  Not in acute distress    Cardiovascular respiratory normal S1-S2 with coarse breath sounds bilaterally    Motor exam revealed normal tone and bulk and she was able to move all 4 extremities    Rest of the physical and neuroexam was unchanged.  Relevant Results  Recent Results (from the past 24 hours)   ECG 12 Lead    Collection Time: 01/08/25  7:16 AM   Result Value Ref Range    Ventricular Rate 63 BPM    Atrial Rate 63 BPM    MS Interval 196 ms    QRS Duration 92 ms    QT Interval 408 ms    QTC Calculation(Bazett) 417 ms    P Axis 48 degrees    R Axis -20 degrees    T Axis 16 degrees    QRS Count 10 beats    Q Onset 221 ms    P Onset 123 ms    P Offset 178 ms    T Offset 425 ms    QTC Fredericia 414 ms                  Ken Coma Scale  Best Eye Response: Spontaneous  Best Verbal Response: Confused  Best Motor Response: Follows commands  Ken Coma Scale Score: 14                Transthoracic Echo (TTE) Complete    Result Date: 1/6/2025          [place] [id number]  Tel [phone] Fax [phone] TRANSTHORACIC ECHOCARDIOGRAM REPORT Patient Name:       BERNARDO TANNER      Reading Physician:    [id number] Donal Sierra DO Study Date:         1/6/2025            Ordering Provider:    Francis BURCH" VALENTE MRN/PID:            [id number]            Fellow: Accession#:         [id number]        Nurse:                Amauri Freeman RN Date of Birth/Age:  1935 / 89 years Sonographer:          Ro Olmos RDCS Gender Assigned at  F                   Additional Staff:     Suzie Farheenjodi Birth: Height:             160.02 cm           Admit Date:           1/3/2025 Weight:             62.60 kg            Admission Status:     Inpatient -                                                               Routine BSA / BMI:          1.65 m2 / 24.45     [facility]                                     Echo Lab Blood Pressure: 170 /78 mmHg Study Type:    TRANSTHORACIC ECHO (TTE) COMPLETE Diagnosis/ICD: Bradycardia, unspecified-R00.1; Heart failure, unspecified-I50.9 Indication:    Bradycardia, Heart Failure CPT Codes:     Echo Complete w Full [id number] Patient History: Pertinent History: HTN and Anemia, CKD, Dementia, Anxiety, Osteioarthritis,                    Fibromyalgia, Bradycardia. Study Detail: The following Echo studies were performed: 2D, M-Mode, Doppler and               color flow. Technically challenging study due to poor acoustic               windows, body habitus, prominent lung artifact and               osteoarthritis/fibromyalgia. Definity used as a contrast agent for               endocardial border definition. Total contrast used for this               procedure was 1.5 mL via IV push.  PHYSICIAN INTERPRETATION: Left Ventricle: The left ventricular systolic function is normal, with a visually estimated ejection fraction of 60%. There are no regional wall motion abnormalities. The left ventricular cavity size is normal. There is normal septal and mildly increased posterior left ventricular wall thickness. Spectral Doppler shows a Grade I (impaired relaxation pattern) of left  ventricular diastolic filling with normal left atrial filling pressure. LV Wall Scoring: All segments are normal. Left Atrium: The left atrium is upper limits of normal in size. Right Ventricle: The right ventricle is upper limits of normal in size. There is low normal right ventricular systolic function. Right Atrium: The right atrium is normal in size. Aortic Valve: The aortic valve appears structurally normal. There is no evidence of aortic valve stenosis. The aortic valve dimensionless index is 0.86. There is trace aortic valve regurgitation. The peak instantaneous gradient of the aortic valve is 8 mmHg. The mean gradient of the aortic valve is 4 mmHg. Mitral Valve: The mitral valve is normal in structure. There is no evidence of mitral valve stenosis. There is normal mitral valve leaflet mobility. There is mild mitral annular calcification. There is no evidence of mitral valve regurgitation. Tricuspid Valve: The tricuspid valve is structurally normal. There is normal tricuspid valve leaflet mobility. No evidence of tricuspid regurgitation. Pulmonic Valve: The pulmonic valve is structurally normal. There is no indication of pulmonic valve regurgitation. Pericardium: No pericardial effusion noted. Aorta: The aortic root is normal. Pulmonary Artery: The main pulmonary artery is normal in size, and position, with normal bifurcation into the left and right pulmonary arteries. The tricuspid regurgitant velocity is 2.81 m/s, and with an estimated right atrial pressure of 3 mmHg, the estimated pulmonary artery pressure is mildly elevated with the RVSP at 34.6 mmHg. Systemic Veins: The inferior vena cava appears normal in size. In comparison to the previous echocardiogram(s): There are no prior studies on this patient for comparison purposes.  CONCLUSIONS:  1. The left ventricular systolic function is normal, with a visually estimated ejection fraction of 60%.  2. Spectral Doppler shows a Grade I (impaired relaxation  pattern) of left ventricular diastolic filling with normal left atrial filling pressure.  3. There is low normal right ventricular systolic function.  4. There is no evidence of mitral valve stenosis.  5. No evidence of mitral valve regurgitation.  6. No evidence of tricuspid regurgitation.  7. Aortic valve stenosis is not present.  8. The main pulmonary artery is normal in size, and position, with normal bifurcation into the left and right pulmonary arteries. RECOMMENDATIONS: Technically suboptimal and limited study, therefore accuracy of above interpretation could be substantially diminished. Clinical correlation is advised. Consider additional imaging modalities if clinically indicated.  QUANTITATIVE DATA SUMMARY:  2D MEASUREMENTS:           Normal Ranges: Ao Root d:       2.80 cm   (2.0-3.7cm) LAs:             3.00 cm   (2.7-4.0cm) IVSd:            0.90 cm   (0.6-1.1cm) LVPWd:           1.10 cm   (0.6-1.1cm) LVIDd:           4.40 cm   (3.9-5.9cm) LVIDs:           3.10 cm LV Mass Index:   89.5 g/m2 LV % FS          29.5 %  LA VOLUME:                    Normal Ranges: LA Vol A4C:        59.1 ml    (22+/-6mL/m2) LA Vol A2C:        28.7 ml LA Vol BP:         43.8 ml LA Vol Index A4C:  35.8ml/m2 LA Vol Index A2C:  17.4 ml/m2 LA Vol Index BP:   26.5 ml/m2 LA Area A4C:       19.2 cm2 LA Area A2C:       12.6 cm2 LA Major Axis A4C: 5.3 cm LA Major Axis A2C: 4.7 cm LA Volume Index:   25.4 ml/m2 LA Vol A4C:        56.2 ml LA Vol A2C:        27.5 ml LA Vol Index BSA:  25.3 ml/m2  RA VOLUME BY A/L METHOD:           Normal Ranges: RA Vol A4C:              16.0 ml   (8.3-19.5ml) RA Vol Index A4C:        9.7 ml/m2 RA Area A4C:             9.3 cm2 RA Major Axis A4C:       4.6 cm  AORTA MEASUREMENTS:         Normal Ranges: Asc Ao, d:          3.20 cm (2.1-3.4cm)  LV SYSTOLIC FUNCTION BY 2D PLANIMETRY (MOD):                      Normal Ranges: EF-A4C View:    58 % (>=55%) EF-A2C View:    59 % EF-Biplane:     58 % EF-Visual:       60 % LV EF Reported: 60 %  LV DIASTOLIC FUNCTION:           Normal Ranges: MV Peak E:             0.82 m/s  (0.7-1.2 m/s) MV Peak A:             1.17 m/s  (0.42-0.7 m/s) E/A Ratio:             0.70      (1.0-2.2) MV e'                  0.102 m/s (>8.0) MV lateral e'          0.15 m/s MV medial e'           0.05 m/s E/e' Ratio:            7.97      (<8.0)  MITRAL VALVE:          Normal Ranges: MV DT:        140 msec (150-240msec)  AORTIC VALVE:                     Normal Ranges: AoV Vmax:                1.38 m/s (<=1.7m/s) AoV Peak P.6 mmHg (<20mmHg) AoV Mean P.0 mmHg (1.7-11.5mmHg) LVOT Max Vicente:            1.03 m/s (<=1.1m/s) AoV VTI:                 31.40 cm (18-25cm) LVOT VTI:                26.90 cm LVOT Diameter:           1.90 cm  (1.8-2.4cm) AoV Area, VTI:           2.43 cm2 (2.5-5.5cm2) AoV Area,Vmax:           2.12 cm2 (2.5-4.5cm2) AoV Dimensionless Index: 0.86  RIGHT VENTRICLE: TAPSE: 32.0 mm RV s'  0.18 m/s  TRICUSPID VALVE/RVSP:          Normal Ranges: Peak TR Velocity:     2.81 m/s RV Syst Pressure:     35 mmHg  (< 30mmHg) IVC Diam:             1.70 cm  PULMONIC VALVE:          Normal Ranges: PV Accel Time:  98 msec  (>120ms) PV Max Vicente:     0.8 m/s  (0.6-0.9m/s) PV Max P.9 mmHg  Doug Sierra DO Electronically signed on 2025 at 11:06:44 AM  Wall Scoring  ** Final **     ECG 12 Lead    Result Date: 2025  Normal sinus rhythm Low voltage QRS Nonspecific ST and T wave abnormality Abnormal ECG When compared with ECG of 2025 13:08, Nonspecific T wave abnormality, worse in Lateral leads    ECG 12 Lead    Result Date: 2025  Normal sinus rhythm Low voltage QRS Nonspecific ST and T wave abnormality Abnormal ECG When compared with ECG of 2025 08:34, Nonspecific T wave abnormality now evident in Lateral leads Confirmed by Flako Jacobs ([id number]) on 2025 6:31:57 AM       EEG    Result Date: 2025  IMPRESSION This routine EEG is normal.  No epileptiform discharges or lateralizing signs are seen. However, clinical correlation is strongly recommended. This report has been interpreted and electronically signed by        This patient has a central line   Reason for the central line remaining today?     This patient has a urinary catheter   Reason for the urinary catheter remaining today?           Assessment/Plan     Assessment & Plan  Encephalopathy, unspecified type  Acute encephalopathy most likely probably due to underlying dementia triggered by underlying infection.  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  Pneumonia currently on antibiotics.  Plan.  Continue with current medical treatment and continue with current medications.  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  Continue with PT OT eval and okay to transfer back to the nursing home when medically stable    -Follow-up in 2 to 3 months    [id number]         Due to technical limitations of voice recognition and human error, this note may not accurately reflect the care of the patient.     Roverto Almonte MD

## 2025-01-08 NOTE — PROGRESS NOTES
01/08/25 1350   Discharge Planning   Home or Post Acute Services In home services   Type of Post Acute Facility Services Long term care   Expected Discharge Disposition Inter  ([facility])   Does the patient need discharge transport arranged? Yes   RoundTrip coordination needed? Yes   Patient Choice   Provider Choice list and CMS website (https://medicare.gov/care-compare#search) for post-acute Quality and Resource Measure Data were provided and reviewed with: Patient;Family   Patient / Family choosing to utilize agency / facility established prior to hospitalization Yes     Possible dc back to LTC today. Pending if pt will need PICC/IV abx at dc.      UPDATE 1530:  [facility] confirmed with [facility] that pt was started on IV abx at their facility 12/30 with stop date of 1/3 which is when pt admitted to [facility]. Abx/PICC to be discontinued at dc. Planned for dc today. Facility updated. SW working on dc transport.

## 2025-01-08 NOTE — ASSESSMENT & PLAN NOTE
Acute encephalopathy most likely probably due to underlying dementia triggered by underlying infection.  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  Pneumonia currently on antibiotics.  Plan.  Continue with current medical treatment and continue with current medications.  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  Continue with PT OT eval and okay to transfer back to the nursing home when medically stable    -Follow-up in 2 to 3 months    [id number]

## 2025-01-08 NOTE — CARE PLAN
Problem: Pain - Adult  Goal: Verbalizes/displays adequate comfort level or baseline comfort level  Outcome: Progressing     Problem: Safety - Adult  Goal: Free from fall injury  Outcome: Progressing     Problem: Discharge Planning  Goal: Discharge to home or other facility with appropriate resources  Outcome: Progressing     Problem: Chronic Conditions and Co-morbidities  Goal: Patient's chronic conditions and co-morbidity symptoms are monitored and maintained or improved  Outcome: Progressing     Problem: Skin  Goal: Decreased wound size/increased tissue granulation at next dressing change  1/7/2025 2220 by Huma Mir RN  Flowsheets (Taken 1/7/2025 2220)  Decreased wound size/increased tissue granulation at next dressing change: Promote sleep for wound healing  1/7/2025 2220 by Huma Mir RN  Outcome: Progressing  Flowsheets (Taken 1/7/2025 2220)  Decreased wound size/increased tissue granulation at next dressing change: Promote sleep for wound healing  Goal: Participates in plan/prevention/treatment measures  1/7/2025 2220 by Huma Mir RN  Flowsheets (Taken 1/7/2025 2220)  Participates in plan/prevention/treatment measures: Elevate heels  1/7/2025 2220 by Huma Mir RN  Outcome: Progressing  Flowsheets (Taken 1/7/2025 2220)  Participates in plan/prevention/treatment measures: Elevate heels  Goal: Prevent/manage excess moisture  1/7/2025 2220 by Huma Mir RN  Flowsheets (Taken 1/7/2025 2220)  Prevent/manage excess moisture:   Cleanse incontinence/protect with barrier cream   Moisturize dry skin  1/7/2025 2220 by Huma Mir RN  Outcome: Progressing  Flowsheets (Taken 1/7/2025 2220)  Prevent/manage excess moisture:   Cleanse incontinence/protect with barrier cream   Moisturize dry skin  Goal: Prevent/minimize sheer/friction injuries  1/7/2025 2220 by Huma Mir RN  Flowsheets (Taken 1/7/2025 2220)  Prevent/minimize sheer/friction injuries: Turn/reposition every 2  hours/use positioning/transfer devices  1/7/2025 2220 by Huma Mir RN  Outcome: Progressing  Flowsheets (Taken 1/7/2025 2220)  Prevent/minimize sheer/friction injuries: Turn/reposition every 2 hours/use positioning/transfer devices  Goal: Promote/optimize nutrition  1/7/2025 2220 by Huma Mir RN  Flowsheets (Taken 1/7/2025 2220)  Promote/optimize nutrition:   Consume > 50% meals/supplements   Assist with feeding   Monitor/record intake including meals  1/7/2025 2220 by Huma Mir RN  Outcome: Progressing  Flowsheets (Taken 1/7/2025 2220)  Promote/optimize nutrition:   Consume > 50% meals/supplements   Assist with feeding   Monitor/record intake including meals  Goal: Promote skin healing  1/7/2025 2220 by Huma Mir RN  Flowsheets (Taken 1/7/2025 2220)  Promote skin healing:   Assess skin/pad under line(s)/device(s)   Turn/reposition every 2 hours/use positioning/transfer devices  1/7/2025 2220 by Huma Mir RN  Outcome: Progressing  Flowsheets (Taken 1/7/2025 2220)  Promote skin healing:   Assess skin/pad under line(s)/device(s)   Turn/reposition every 2 hours/use positioning/transfer devices     Problem: Fall/Injury  Goal: Not fall by end of shift  Outcome: Progressing  Goal: Be free from injury by end of the shift  Outcome: Progressing  Goal: Verbalize understanding of personal risk factors for fall in the hospital  Outcome: Progressing  Goal: Verbalize understanding of risk factor reduction measures to prevent injury from fall in the home  Outcome: Progressing  Goal: Use assistive devices by end of the shift  Outcome: Progressing  Goal: Pace activities to prevent fatigue by end of the shift  Outcome: Progressing   The patient's goals for the shift include  rest    The clinical goals for the shift include safety

## 2025-01-08 NOTE — PROGRESS NOTES
Pt. Will discharge this date returning to [facility]/long term care at 5:30 pm via ambulance.  Spoke with son, nilda to inform, he is aware and in agreement to transfer.

## 2025-01-08 NOTE — DISCHARGE SUMMARY
Medical Group Discharge Summary  DISCHARGE DIAGNOSIS     Acute metabolic encephalopathy   Vascular Dementia  UTI with indwelling morrison catheter present on admission  ÓSCAR on CKD III  Junctional Bradycardia  Metabolic acidosis  Hypothryoidism  Hypertension  Fibromyalgia      HOSPITAL COURSE AND DETAILS     Acute metabolic encephalopathy   Vascular Dementia  History of dementia  History of anxiety  -P/W worsening confusion for several days after being started on ertapenem  -Etiology is likely multifactorial from IV Ertapenem,  acute kidney injury, multiple sedative medications, and hypothyroidism.  -Per MAR pt was on Percocet, Ativan, gabapentin, Abilify, Zoloft, Valium, buspirone, Robaxin;    -CT head showed no acute findings  -MRI brain was obtained which revealed no acute process but did comment on some brain volume loss, small vessel ischemic changes, bilateral lacunar infarcts.  -Neurology was consulted for evaluation given continued confusion.  Suspect patient likely has underlying vascular dementia.  Exam findings and neurology evaluation were discussed with patient's son Mj Sandoval  -Patient's medications were reconciled prior to discharge with buspirone, Valium, Norco, abilify, zoloft and Robaxin discontinued  -Mentation returned to baseline.    History of urinary tract infection with an indwelling Morrison catheter present on admission  -Previous cultures grew multidrug-resistant E. coli and Enterococcus; she has been receiving IV ertapenem via PICC line.  This was in place on presentation with diagnosis/treatment initiated well at nursing facility  -Ertapenem was discontinued during hospitalization due to concerns about causing confusion.  -Was transition to vancomycin/ceftriaxone and de-escalated to ceftriaxone alone.  -Urine culture here shows no growth  -Prior provider discussed antibiotics with ID who felt that the Enterococcus is most likely contamination, continued ceftriaxone advised.   -Patient  completed 6 days of antibiotic therapy in house, completing course of therapy.  Antibiotics discontinued on discharge.     Acute kidney injury on chronic kidney disease stage III  Metabolic acidosis  -Most likely prerenal from dehydration, from poor oral intake  -Improved with IV fluid resuscitation and holding of nephrotoxic meds  -Scr trend 2.12-->1.84-->1.2; creatinine appears to be stable at baseline.  -Lisinopril resumed on DC given improvement in renal function     Hypothyroidism  -Suboptimal control, TSH 15.66 Free T4.6  -Levothyroxine dosing was increased to 125 mcg p.o. daily. To continue on this dose at DC.      Junctional bradycardia  -Most likely related to medication, possible hypothyroidism suboptimally controlled  -Currently remains in sinus rhythm heart rates in the 60s to 70s.  -Echocardiogram shows preserved EF.  -Seen by EP cardiology, recommended outpatient follow-up, no need for urgent pacemaker.  Hold Lopressor on discharge.  -Family is not amenable for pacemaker placement should it become required.     Hypertension  -Managed with lisinopril 40 mg daily and amlodipine 5 mg daily at time of DC. Will need further titration on outpatient basis.      9.  Fibromyalgia  -Gabapentin was initially discontinued due to confusion but restarted while in house.       Patient was in stable condition on day of discharge with no acute concerns.  Patient's son, Mj, was updated on plan of care.  Patient was advised to to follow-up with cardiology and PCP following discharge for post hospital follow-up.     40 minutes spent on discharge. Time calculated includes outpatient care coordination, bedside education, and counselling.         ---Parts of this documentation were completed using the Dragon Dictation system (voice recognition software). There may be spelling and/or grammatical errors that were not corrected prior to final submission.---    Harshad Mendoza MD    DISCHARGE PHYSICAL EXAM     Last Recorded  Vitals:  Vitals:    01/08/25 0443 01/08/25 0750 01/08/25 1113 01/08/25 1300   BP: 152/67 174/83 (!) 182/77 175/79   BP Location:  Left leg  Right leg   Patient Position:  Lying     Pulse: 69 75 70    Resp: 16 18     Temp: 36.1 °C (97 °F) 36.4 °C (97.5 °F) 36.6 °C (97.9 °F)    TempSrc:  Temporal     SpO2: 95% 93% 95%    Weight:       Height:           Physical Exam  General: Well-developed elderly female in mild distress  HEENT: Clear sclera, EOMI, trachea midline, moist mucous membranes  Respiratory: Lungs clear to auscultation, with no wheezes or rhonchi appreciated  Cardiovascular: S1 and S2 auscultated, no murmurs clicks or rubs  Abdomen: Soft, nontender, nondistended  Skin: Warm, dry    DISCHARGE MEDICATIONS        Your medication list        STOP taking these medications      metoprolol tartrate 25 mg tablet  Commonly known as: Lopressor               ASK your doctor about these medications        Instructions Last Dose Given Next Dose Due   acetaminophen 650 mg ER tablet  Commonly known as: Tylenol 8 HOUR           amLODIPine 5 mg tablet  Commonly known as: Norvasc           aspirin 81 mg EC tablet           busPIRone 10 mg tablet  Commonly known as: Buspar  Ask about: Which instructions should I use?           diazePAM 5 mg tablet  Commonly known as: Valium      Take 1 tablet (5 mg) by mouth once daily at bedtime.       docusate sodium 100 mg capsule  Commonly known as: Colace           Flomax 0.4 mg 24 hr capsule  Generic drug: tamsulosin           gabapentin 100 mg capsule  Commonly known as: Neurontin  Ask about: Which instructions should I use?           HYDROcodone-acetaminophen 5-325 mg tablet  Commonly known as: Norco      Take 1 tablet by mouth every 6 hours if needed for severe pain (7 - 10).       lactobacillus acidophilus & bulgar 1 million cell chewable tablet  Commonly known as: Lactinex           levothyroxine 100 mcg tablet  Commonly known as: Synthroid, Levoxyl  Ask about: Which instructions  should I use?           lisinopril 40 mg tablet           meclizine 25 mg tablet  Commonly known as: Antivert           melatonin 3 mg capsule           methocarbamol 500 mg tablet  Commonly known as: Robaxin      Take 1 tablet (500 mg) by mouth every 8 hours if needed for muscle spasms for up to 7 days.       multivitamin with minerals tablet           naloxone 4 mg/0.1 mL nasal spray  Commonly known as: Narcan      Administer 1 spray (4 mg) into affected nostril(s) if needed for opioid reversal. May repeat every 2-3 minutes if needed, alternating nostrils, until medical assistance becomes available.       nitrofurantoin 50 mg capsule  Commonly known as: Macrodantin           polyethylene glycol 17 gram packet  Commonly known as: Glycolax, Miralax           Voltaren 1 % gel  Generic drug: diclofenac sodium           white petrolatum-mineral oiL 94-3 % ophthalmic ointment  Commonly known as: Tears Naturale PM                      OUTPATIENT FOLLOW-UP     Future Appointments   Date Time Provider Department Center   1/28/2025 10:00 AM NICHOLE [id number] ECG/HOLTER [id number] [place]   2/17/2025  9:00 AM JOCELIN Estrella-CNP [id number] [place]

## 2025-01-08 NOTE — CARE PLAN
Problem: Pain - Adult  Goal: Verbalizes/displays adequate comfort level or baseline comfort level  Outcome: Progressing     Problem: Safety - Adult  Goal: Free from fall injury  Outcome: Progressing     Problem: Discharge Planning  Goal: Discharge to home or other facility with appropriate resources  Outcome: Progressing     Problem: Chronic Conditions and Co-morbidities  Goal: Patient's chronic conditions and co-morbidity symptoms are monitored and maintained or improved  Outcome: Progressing     Problem: Skin  Goal: Decreased wound size/increased tissue granulation at next dressing change  Outcome: Progressing  Goal: Participates in plan/prevention/treatment measures  Outcome: Progressing  Goal: Prevent/manage excess moisture  Outcome: Progressing  Goal: Prevent/minimize sheer/friction injuries  Outcome: Progressing  Goal: Promote/optimize nutrition  Outcome: Progressing  Goal: Promote skin healing  Outcome: Progressing     Problem: Fall/Injury  Goal: Not fall by end of shift  Outcome: Progressing  Goal: Be free from injury by end of the shift  Outcome: Progressing  Goal: Verbalize understanding of personal risk factors for fall in the hospital  Outcome: Progressing  Goal: Verbalize understanding of risk factor reduction measures to prevent injury from fall in the home  Outcome: Progressing  Goal: Use assistive devices by end of the shift  Outcome: Progressing  Goal: Pace activities to prevent fatigue by end of the shift  Outcome: Progressing   The patient's goals for the shift include  rest    The clinical goals for the shift include safety

## 2025-01-09 ENCOUNTER — NURSING HOME VISIT (OUTPATIENT)
Dept: POST ACUTE CARE | Facility: EXTERNAL LOCATION | Age: OVER 89
End: 2025-01-09
Payer: COMMERCIAL

## 2025-01-09 VITALS
OXYGEN SATURATION: 94 % | HEIGHT: 63 IN | BODY MASS INDEX: 24.57 KG/M2 | TEMPERATURE: 97 F | SYSTOLIC BLOOD PRESSURE: 149 MMHG | WEIGHT: 138.67 LBS | RESPIRATION RATE: 18 BRPM | DIASTOLIC BLOOD PRESSURE: 67 MMHG | HEART RATE: 72 BPM

## 2025-01-09 DIAGNOSIS — I10 HTN (HYPERTENSION), BENIGN: Primary | ICD-10-CM

## 2025-01-09 DIAGNOSIS — G93.40 ACUTE ENCEPHALOPATHY: ICD-10-CM

## 2025-01-09 DIAGNOSIS — G47.00 INSOMNIA, UNSPECIFIED TYPE: ICD-10-CM

## 2025-01-09 DIAGNOSIS — F01.54 VASCULAR DEMENTIA WITH ANXIETY, UNSPECIFIED DEMENTIA SEVERITY: ICD-10-CM

## 2025-01-09 DIAGNOSIS — R00.1 JUNCTIONAL BRADYCARDIA: ICD-10-CM

## 2025-01-09 DIAGNOSIS — H91.90 HOH (HARD OF HEARING): ICD-10-CM

## 2025-01-09 DIAGNOSIS — Z87.440 HISTORY OF RECURRENT UTIS: ICD-10-CM

## 2025-01-09 DIAGNOSIS — F32.A DEPRESSION, UNSPECIFIED DEPRESSION TYPE: ICD-10-CM

## 2025-01-09 DIAGNOSIS — M19.90 OSTEOARTHRITIS, UNSPECIFIED OSTEOARTHRITIS TYPE, UNSPECIFIED SITE: ICD-10-CM

## 2025-01-09 DIAGNOSIS — M79.7 FIBROMYALGIA: ICD-10-CM

## 2025-01-09 DIAGNOSIS — E03.9 HYPOTHYROIDISM, UNSPECIFIED TYPE: ICD-10-CM

## 2025-01-09 DIAGNOSIS — Z97.8 FOLEY CATHETER IN PLACE: ICD-10-CM

## 2025-01-09 DIAGNOSIS — R33.9 URINARY RETENTION: ICD-10-CM

## 2025-01-09 DIAGNOSIS — F41.9 ANXIETY: ICD-10-CM

## 2025-01-09 PROCEDURE — 99309 SBSQ NF CARE MODERATE MDM 30: CPT | Performed by: NURSE PRACTITIONER

## 2025-01-09 PROCEDURE — 2500000004 HC RX 250 GENERAL PHARMACY W/ HCPCS (ALT 636 FOR OP/ED): Performed by: HOSPITALIST

## 2025-01-09 RX ADMIN — HEPARIN SODIUM 5000 UNITS: 5000 INJECTION INTRAVENOUS; SUBCUTANEOUS at 00:45

## 2025-01-09 ASSESSMENT — PAIN - FUNCTIONAL ASSESSMENT: PAIN_FUNCTIONAL_ASSESSMENT: 0-10

## 2025-01-09 ASSESSMENT — PAIN SCALES - GENERAL: PAINLEVEL_OUTOF10: 0 - NO PAIN

## 2025-01-09 NOTE — CARE PLAN
Problem: Pain - Adult  Goal: Verbalizes/displays adequate comfort level or baseline comfort level  Outcome: Progressing     Problem: Safety - Adult  Goal: Free from fall injury  Outcome: Progressing     Problem: Discharge Planning  Goal: Discharge to home or other facility with appropriate resources  Outcome: Progressing     Problem: Chronic Conditions and Co-morbidities  Goal: Patient's chronic conditions and co-morbidity symptoms are monitored and maintained or improved  Outcome: Progressing     Problem: Skin  Goal: Decreased wound size/increased tissue granulation at next dressing change  1/8/2025 2124 by Huma Mir RN  Flowsheets (Taken 1/8/2025 2124)  Decreased wound size/increased tissue granulation at next dressing change: Promote sleep for wound healing  1/8/2025 2124 by Huma Mir RN  Outcome: Progressing  Flowsheets (Taken 1/8/2025 2124)  Decreased wound size/increased tissue granulation at next dressing change: Promote sleep for wound healing  Goal: Participates in plan/prevention/treatment measures  1/8/2025 2124 by Huma Mir RN  Flowsheets (Taken 1/8/2025 2124)  Participates in plan/prevention/treatment measures: Elevate heels  1/8/2025 2124 by Huma Mir RN  Outcome: Progressing  Flowsheets (Taken 1/8/2025 2124)  Participates in plan/prevention/treatment measures: Elevate heels  Goal: Prevent/manage excess moisture  1/8/2025 2124 by Huma Mir RN  Flowsheets (Taken 1/8/2025 2124)  Prevent/manage excess moisture:   Moisturize dry skin   Cleanse incontinence/protect with barrier cream  1/8/2025 2124 by Huma Mir RN  Outcome: Progressing  Flowsheets (Taken 1/8/2025 2124)  Prevent/manage excess moisture:   Moisturize dry skin   Cleanse incontinence/protect with barrier cream  Goal: Prevent/minimize sheer/friction injuries  1/8/2025 2124 by Huma Mir RN  Flowsheets (Taken 1/8/2025 2124)  Prevent/minimize sheer/friction injuries: Turn/reposition every 2  hours/use positioning/transfer devices  1/8/2025 2124 by Huma Mir RN  Outcome: Progressing  Flowsheets (Taken 1/8/2025 2124)  Prevent/minimize sheer/friction injuries: Turn/reposition every 2 hours/use positioning/transfer devices  Goal: Promote/optimize nutrition  1/8/2025 2124 by Huma Mir RN  Flowsheets (Taken 1/8/2025 2124)  Promote/optimize nutrition:   Consume > 50% meals/supplements   Monitor/record intake including meals   Assist with feeding  1/8/2025 2124 by Huma Mir RN  Outcome: Progressing  Flowsheets (Taken 1/8/2025 2124)  Promote/optimize nutrition:   Consume > 50% meals/supplements   Monitor/record intake including meals   Assist with feeding  Goal: Promote skin healing  1/8/2025 2124 by Huma Mir RN  Flowsheets (Taken 1/8/2025 2124)  Promote skin healing:   Turn/reposition every 2 hours/use positioning/transfer devices   Protective dressings over bony prominences   Assess skin/pad under line(s)/device(s)  1/8/2025 2124 by Huma Mir RN  Outcome: Progressing  Flowsheets (Taken 1/8/2025 2124)  Promote skin healing:   Turn/reposition every 2 hours/use positioning/transfer devices   Protective dressings over bony prominences   Assess skin/pad under line(s)/device(s)     Problem: Fall/Injury  Goal: Not fall by end of shift  Outcome: Progressing  Goal: Be free from injury by end of the shift  Outcome: Progressing  Goal: Verbalize understanding of personal risk factors for fall in the hospital  Outcome: Progressing  Goal: Verbalize understanding of risk factor reduction measures to prevent injury from fall in the home  Outcome: Progressing  Goal: Use assistive devices by end of the shift  Outcome: Progressing  Goal: Pace activities to prevent fatigue by end of the shift  Outcome: Progressing

## 2025-01-09 NOTE — NURSING NOTE
EMS arrived to transfer pt to [facility]. Dash monitor removed from pt, Called [facility] to give update. Patient CHG/bath completed.

## 2025-01-09 NOTE — LETTER
Patient: Jacqueline Sandoval  : 1935    Encounter Date: 2025    Name: Jacqueline Sandoval  YOB: 1935    FOLLOW UP VISIT: Long term care resident    SUBJECTIVE:  The patient was recently hospitalized for a change in mental status.  She was admitted on January 3 and discharged this morning  from [facility].  The patient was sent to the emergency room for worsening confusion.  the patient had recently been started on ertapenem IV for a UTI in the nursing facility.  Encephalopathy thought to be multifactorial from the IV antibiotics, acute kidney injury, multiple sedative medications, and hypothyroidism.  CT of the head showed no acute findings.  MRI of the brain was obtained and revealed no acute processes but did show some brain volume loss, small vessel ischemic changes and bilateral lacunar infarcts.  Neurology followed and felt the patient had underlying vascular dementia.  Medications discontinued were BuSpar, Valium, Norco, Abilify, Zoloft and Robaxin.  Patient's mentation returned to baseline before discharge.  Patient has history of multiple recurrent UTIs as well as as a indwelling Barker catheter.  Previous cultures grew multiresistant drug E. coli and Enterococcus.  Meropenem was discontinued and she was transitioned to Vanco/ceftriaxone and then de-escalated to ceftriaxone only.  Urine culture showed no growth.  Patient had a total of 6 days of antibiotics.  Patient's kidney status returned to normal and felt that the acute kidney injury was from poor oral intake and dehydration.  Lisinopril and amlodipine was resumed on discharge.  Patient's TSH was 15.66 and free T4 0.66 and levothyroxine was increased to 125 mcg p.o. daily.  Patient was also noted to be in junctional bradycardia, Cardiology followed and thought to be related to hypothyroidism.  Echo was performed and showed preserved EF. Recommended outpatient follow-up but no need for an urgent pacemaker.  Initially patient's  "gabapentin was discontinued due to her confusion but prior to discharge was reinstated for fibromyalgia.  At this time the patient is sitting up in bed reading over some paperwork.  She is pleasantly confused and very hard of hearing.  Patient has no complaints at this time.  She denies any chest pain or shortness of breath.  Barker catheter to drainage and patient denies any irritation or dysuria with the Barker.     REVIEW OF SYSTEMS:   All review of systems are negative unless otherwise stated above under subjective.    LABS REVIEWED AT FACILITY:  Lab Results   Component Value Date    WBC 7.0 01/07/2025    HGB 10.2 (L) 01/07/2025    HCT 29.3 (L) 01/07/2025    MCV 92 01/07/2025     01/07/2025     Lab Results   Component Value Date    GLUCOSE 91 01/07/2025    CALCIUM 8.8 01/07/2025     01/07/2025    K 3.6 01/07/2025    CO2 23 01/07/2025     01/07/2025    BUN 16 01/07/2025    CREATININE 1.24 (H) 01/07/2025     Lab Results   Component Value Date    TSH 15.66 (H) 01/03/2025     Allergies   Allergen Reactions   • Penicillins Unknown   • Sulfa (Sulfonamide Antibiotics) Unknown and Rash       MEDICATIONS REVIEWED AT FACILITY:  Please see nursing facility medication list.    Living will related issues reviewed-Code status: DNR CCA    OBJECTIVE:  /80   Pulse 70   Temp 36.2 °C (97.2 °F)   Resp 18   Ht 1.6 m (5' 3\")   Wt 63.7 kg (140 lb 6.4 oz)   SpO2 97% Comment: Room air  BMI 24.87 kg/m²   Physical Exam  Constitutional:       Appearance: She is normal weight.   HENT:      Head: Normocephalic. [place]  Cardiovascular:      Rate and Rhythm: Normal rate and regular rhythm.   Pulmonary:      Effort: Pulmonary effort is normal.      Breath sounds: Normal breath sounds.   Abdominal:      General: Abdomen is flat. Bowel sounds are normal.      Palpations: Abdomen is soft.   Genitourinary:     Comments: Barker to CD - clear pale yellow urine  Musculoskeletal:         General: Tenderness present.   Skin:    "  General: Skin is warm and dry.   Neurological:      General: No focal deficit present.      Mental Status: She is alert.      Comments: Oriented x2  [place] [facility]   Psychiatric:         Mood and Affect: Mood normal.     Assessment/Plan  Problem List Items Addressed This Visit       Anxiety    Depression    Fibromyalgia    HTN (hypertension), benign - Primary    [place] [facility] (hard of hearing)    Hypothyroidism    Osteoarthritis    Urinary retention    History of recurrent UTIs    Barker catheter in place     Other Visit Diagnoses       Junctional bradycardia        Acute encephalopathy        Vascular dementia with anxiety, unspecified dementia severity        Insomnia, unspecified type                Skin integrity:  Nursing to monitor skin integrity as patient is at risk for pressure injuries.  Turn and reposition Q 2 hours or more.  Air mattress and when up in chair cushion reducing device.  Dietician to evaluate and recommend.  Nutritional supplement to be implemented if needed.  Please monitor skin integrity and other pressure areas.    PLAN:  Pt has been seen for an follow up visit - see HPI.  The patient resides in a Long term care facility.  Recent nursing evaluation and notes were reviewed.     Acute Encephalopathy:  Resolved - Multifactorial    Junctional bradycardia:  Cardiology - EP followed  No urgent need for pacemaker  Avoid all AV nicola agents  Outpatient f/up w/48 hr Holter monitor in 3 weeks and appt w/Dr. Sandy after  Echo 1/4/25  CONCLUSIONS:   1. The left ventricular systolic function is normal, with a visually estimated ejection fraction of 60%.   2. Spectral Doppler shows a Grade I (impaired relaxation pattern) of left ventricular diastolic filling with normal left atrial filling pressure.   3. There is low normal right ventricular systolic function.   4. There is no evidence of mitral valve stenosis.   5. No evidence of mitral valve regurgitation.   6. No evidence of tricuspid regurgitation.   7. Aortic valve  stenosis is not present.   8. The main pulmonary artery is normal in size, and position, with normal bifurcation into the left and right pulmonary arteries.     HTN:   Monitor and follow BP readings.   Continue home meds -amlodipine 5 mg daily and lisinopril 40 mg daily  Labs to be done intermittently and adjust meds as needed.  BP readings reviewed -130s over 80  Labs reviewed -1/7/2025  Avoid nephrotoxic drugs.    Urinary retention, recurrent UTI's, Chronic Barker:  Barker to CD  Previously followed by Dr. Padilla Urology and family requested no outside f/up appts if could be handled here at the facility. (previously on Flomax and Prophylactic ATB Macrobid 50 mg daily - d'cd this past admission)    Hypothyroid:  Lab Results   Component Value Date    TSH 15.66 (H) 01/03/2025   Levothyroxine increased to 125 mcg daily  Will need to check TSH in a few weeks    Fibromyalgia:  Gabapentin 200 mg nightly    Depression/Anxiety, Vascular dementia:  All medications were discontinued in the hospital  Mind care to follow  F/up Neurology in 2-3 months (Dr. Alex [phone])  Redirect as needed and maintain safe environment  Seroquel 25 mg nightly    Insomnia:  Melatonin nightly    CAD:  Last lipid in May 2024  Cont Baby ASA    CARE GAPS:  Diabetes screening needed  PNA Vaccine updated (last 10/14/2022)  Medicare Annual Visit  Bone Density Scan  CKD: urine protein screening  DTaP/Tdap/Td vaccine  Zoster Vaccines    Any decline or change in condition needs to be communicated with the physician or myself.    Discussion with nursing staff regarding ongoing care and management.  Communication regarding patients status, overall condition, changes with plan (medications or treatments), and any questions from family completed if present.  If family not available, would communicate in person or via phone if needed.  We will continue with the medications noted above.    We will continue to follow the patient here at the  facility.    Discharge planning:   No plan for discharge, LTC resident, rehab potential    *Please note that nursing facility notes, facility EMR, outside laboratory agency, and  EMR do not interface.     Completion of the note was done through Dragon voice recognition technology and may include unintended or grammatical errors which may not have been recognized when finalizing the note.     Time:    GLORIA Valverde      Electronically Signed By: GLORIA Valverde   1/13/25  3:47 PM

## 2025-01-09 NOTE — CARE PLAN
Problem: Pain - Adult  Goal: Verbalizes/displays adequate comfort level or baseline comfort level  Outcome: Progressing     Problem: Safety - Adult  Goal: Free from fall injury  Outcome: Progressing     Problem: Discharge Planning  Goal: Discharge to home or other facility with appropriate resources  Outcome: Progressing     Problem: Chronic Conditions and Co-morbidities  Goal: Patient's chronic conditions and co-morbidity symptoms are monitored and maintained or improved  Outcome: Progressing     Problem: Skin  Goal: Decreased wound size/increased tissue granulation at next dressing change  Outcome: Progressing  Goal: Participates in plan/prevention/treatment measures  Outcome: Progressing  Goal: Prevent/manage excess moisture  Outcome: Progressing  Goal: Prevent/minimize sheer/friction injuries  Outcome: Progressing  Goal: Promote/optimize nutrition  Outcome: Progressing  Goal: Promote skin healing  Outcome: Progressing     Problem: Fall/Injury  Goal: Not fall by end of shift  Outcome: Progressing  Goal: Be free from injury by end of the shift  Outcome: Progressing  Goal: Verbalize understanding of personal risk factors for fall in the hospital  Outcome: Progressing  Goal: Verbalize understanding of risk factor reduction measures to prevent injury from fall in the home  Outcome: Progressing  Goal: Use assistive devices by end of the shift  Outcome: Progressing  Goal: Pace activities to prevent fatigue by end of the shift  Outcome: Progressing   The patient's goals for the shift include  rest    The clinical goals for the shift include pt will remain hemodynamically stable

## 2025-01-12 LAB
ATRIAL RATE: 63 BPM
ATRIAL RATE: 70 BPM
ATRIAL RATE: 75 BPM
P AXIS: 46 DEGREES
P AXIS: 48 DEGREES
P AXIS: 53 DEGREES
P OFFSET: 178 MS
P OFFSET: 180 MS
P OFFSET: 182 MS
P ONSET: 123 MS
P ONSET: 126 MS
P ONSET: 130 MS
PR INTERVAL: 182 MS
PR INTERVAL: 184 MS
PR INTERVAL: 196 MS
Q ONSET: 217 MS
Q ONSET: 221 MS
Q ONSET: 222 MS
QRS COUNT: 10 BEATS
QRS COUNT: 12 BEATS
QRS COUNT: 12 BEATS
QRS DURATION: 92 MS
QRS DURATION: 94 MS
QRS DURATION: 96 MS
QT INTERVAL: 408 MS
QT INTERVAL: 412 MS
QT INTERVAL: 438 MS
QTC CALCULATION(BAZETT): 417 MS
QTC CALCULATION(BAZETT): 460 MS
QTC CALCULATION(BAZETT): 473 MS
QTC FREDERICIA: 414 MS
QTC FREDERICIA: 443 MS
QTC FREDERICIA: 461 MS
R AXIS: -18 DEGREES
R AXIS: -19 DEGREES
R AXIS: -20 DEGREES
T AXIS: 16 DEGREES
T AXIS: 48 DEGREES
T AXIS: 80 DEGREES
T OFFSET: 423 MS
T OFFSET: 425 MS
T OFFSET: 441 MS
VENTRICULAR RATE: 63 BPM
VENTRICULAR RATE: 70 BPM
VENTRICULAR RATE: 75 BPM

## 2025-01-13 VITALS
RESPIRATION RATE: 18 BRPM | SYSTOLIC BLOOD PRESSURE: 139 MMHG | BODY MASS INDEX: 24.88 KG/M2 | WEIGHT: 140.4 LBS | HEIGHT: 63 IN | DIASTOLIC BLOOD PRESSURE: 80 MMHG | OXYGEN SATURATION: 97 % | HEART RATE: 70 BPM | TEMPERATURE: 97.2 F

## 2025-01-13 NOTE — PROGRESS NOTES
Name: Jacqueline Sandoval  YOB: 1935    FOLLOW UP VISIT: Long term care resident    SUBJECTIVE:  The patient was recently hospitalized for a change in mental status.  She was admitted on January 3 and discharged this morning January 9 from [facility].  The patient was sent to the emergency room for worsening confusion.  the patient had recently been started on ertapenem IV for a UTI in the nursing facility.  Encephalopathy thought to be multifactorial from the IV antibiotics, acute kidney injury, multiple sedative medications, and hypothyroidism.  CT of the head showed no acute findings.  MRI of the brain was obtained and revealed no acute processes but did show some brain volume loss, small vessel ischemic changes and bilateral lacunar infarcts.  Neurology followed and felt the patient had underlying vascular dementia.  Medications discontinued were BuSpar, Valium, Norco, Abilify, Zoloft and Robaxin.  Patient's mentation returned to baseline before discharge.  Patient has history of multiple recurrent UTIs as well as as a indwelling Barker catheter.  Previous cultures grew multiresistant drug E. coli and Enterococcus.  Meropenem was discontinued and she was transitioned to Vanco/ceftriaxone and then de-escalated to ceftriaxone only.  Urine culture showed no growth.  Patient had a total of 6 days of antibiotics.  Patient's kidney status returned to normal and felt that the acute kidney injury was from poor oral intake and dehydration.  Lisinopril and amlodipine was resumed on discharge.  Patient's TSH was 15.66 and free T4 0.66 and levothyroxine was increased to 125 mcg p.o. daily.  Patient was also noted to be in junctional bradycardia, Cardiology followed and thought to be related to hypothyroidism.  Echo was performed and showed preserved EF. Recommended outpatient follow-up but no need for an urgent pacemaker.  Initially patient's gabapentin was discontinued due to her confusion but prior to discharge was  "reinstated for fibromyalgia.  At this time the patient is sitting up in bed reading over some paperwork.  She is pleasantly confused and very hard of hearing.  Patient has no complaints at this time.  She denies any chest pain or shortness of breath.  Barker catheter to drainage and patient denies any irritation or dysuria with the Barker.     REVIEW OF SYSTEMS:   All review of systems are negative unless otherwise stated above under subjective.    LABS REVIEWED AT FACILITY:  Lab Results   Component Value Date    WBC 7.0 01/07/2025    HGB 10.2 (L) 01/07/2025    HCT 29.3 (L) 01/07/2025    MCV 92 01/07/2025     01/07/2025     Lab Results   Component Value Date    GLUCOSE 91 01/07/2025    CALCIUM 8.8 01/07/2025     01/07/2025    K 3.6 01/07/2025    CO2 23 01/07/2025     01/07/2025    BUN 16 01/07/2025    CREATININE 1.24 (H) 01/07/2025     Lab Results   Component Value Date    TSH 15.66 (H) 01/03/2025     Allergies   Allergen Reactions    Penicillins Unknown    Sulfa (Sulfonamide Antibiotics) Unknown and Rash       MEDICATIONS REVIEWED AT FACILITY:  Please see nursing facility medication list.    Living will related issues reviewed-Code status: DNR CCA    OBJECTIVE:  /80   Pulse 70   Temp 36.2 °C (97.2 °F)   Resp 18   Ht 1.6 m (5' 3\")   Wt 63.7 kg (140 lb 6.4 oz)   SpO2 97% Comment: Room air  BMI 24.87 kg/m²   Physical Exam  Constitutional:       Appearance: She is normal weight.   HENT:      Head: Normocephalic. [place]  Cardiovascular:      Rate and Rhythm: Normal rate and regular rhythm.   Pulmonary:      Effort: Pulmonary effort is normal.      Breath sounds: Normal breath sounds.   Abdominal:      General: Abdomen is flat. Bowel sounds are normal.      Palpations: Abdomen is soft.   Genitourinary:     Comments: Barker to CD - clear pale yellow urine  Musculoskeletal:         General: Tenderness present.   Skin:     General: Skin is warm and dry.   Neurological:      General: No focal " deficit present.      Mental Status: She is alert.      Comments: Oriented x2  [place]   Psychiatric:         Mood and Affect: Mood normal.     Assessment/Plan   Problem List Items Addressed This Visit       Anxiety    Depression    Fibromyalgia    HTN (hypertension), benign - Primary    [place] (hard of hearing)    Hypothyroidism    Osteoarthritis    Urinary retention    History of recurrent UTIs    Barker catheter in place     Other Visit Diagnoses       Junctional bradycardia        Acute encephalopathy        Vascular dementia with anxiety, unspecified dementia severity        Insomnia, unspecified type                Skin integrity:  Nursing to monitor skin integrity as patient is at risk for pressure injuries.  Turn and reposition Q 2 hours or more.  Air mattress and when up in chair cushion reducing device.  Dietician to evaluate and recommend.  Nutritional supplement to be implemented if needed.  Please monitor skin integrity and other pressure areas.    PLAN:  Pt has been seen for an follow up visit - see HPI.  The patient resides in a Long term care facility.  Recent nursing evaluation and notes were reviewed.     Acute Encephalopathy:  Resolved - Multifactorial    Junctional bradycardia:  Cardiology - EP followed  No urgent need for pacemaker  Avoid all AV nicola agents  Outpatient f/up w/48 hr Holter monitor in 3 weeks and appt w/Dr. Sandy after  Echo 1/4/25  CONCLUSIONS:   1. The left ventricular systolic function is normal, with a visually estimated ejection fraction of 60%.   2. Spectral Doppler shows a Grade I (impaired relaxation pattern) of left ventricular diastolic filling with normal left atrial filling pressure.   3. There is low normal right ventricular systolic function.   4. There is no evidence of mitral valve stenosis.   5. No evidence of mitral valve regurgitation.   6. No evidence of tricuspid regurgitation.   7. Aortic valve stenosis is not present.   8. The main pulmonary artery is normal in  size, and position, with normal bifurcation into the left and right pulmonary arteries.     HTN:   Monitor and follow BP readings.   Continue home meds -amlodipine 5 mg daily and lisinopril 40 mg daily  Labs to be done intermittently and adjust meds as needed.  BP readings reviewed -130s over 80  Labs reviewed -1/7/2025  Avoid nephrotoxic drugs.    Urinary retention, recurrent UTI's, Chronic Barker:  Barker to CD  Previously followed by Dr. Padilla Urology and family requested no outside f/up appts if could be handled here at the facility. (previously on Flomax and Prophylactic ATB Macrobid 50 mg daily - d'cd this past admission)    Hypothyroid:  Lab Results   Component Value Date    TSH 15.66 (H) 01/03/2025   Levothyroxine increased to 125 mcg daily  Will need to check TSH in a few weeks    Fibromyalgia:  Gabapentin 200 mg nightly    Depression/Anxiety, Vascular dementia:  All medications were discontinued in the hospital  Mind care to follow  F/up Neurology in 2-3 months (Dr. Alex [phone])  Redirect as needed and maintain safe environment  Seroquel 25 mg nightly    Insomnia:  Melatonin nightly    CAD:  Last lipid in May 2024  Cont Baby ASA    CARE GAPS:  Diabetes screening needed  PNA Vaccine updated (last 10/14/2022)  Medicare Annual Visit  Bone Density Scan  CKD: urine protein screening  DTaP/Tdap/Td vaccine  Zoster Vaccines    Any decline or change in condition needs to be communicated with the physician or myself.    Discussion with nursing staff regarding ongoing care and management.  Communication regarding patients status, overall condition, changes with plan (medications or treatments), and any questions from family completed if present.  If family not available, would communicate in person or via phone if needed.  We will continue with the medications noted above.    We will continue to follow the patient here at the facility.    Discharge planning:   No plan for discharge, LTC resident, rehab  potential    *Please note that nursing facility notes, facility EMR, outside laboratory agency, and  EMR do not interface.     Completion of the note was done through Dragon voice recognition technology and may include unintended or grammatical errors which may not have been recognized when finalizing the note.     Time:    Rosetta Christy, APRN-CNP

## 2025-01-23 ENCOUNTER — NURSING HOME VISIT (OUTPATIENT)
Dept: POST ACUTE CARE | Facility: EXTERNAL LOCATION | Age: OVER 89
End: 2025-01-23
Payer: COMMERCIAL

## 2025-01-23 DIAGNOSIS — N18.31 ANEMIA DUE TO STAGE 3A CHRONIC KIDNEY DISEASE (MULTI): ICD-10-CM

## 2025-01-23 DIAGNOSIS — Z97.8 FOLEY CATHETER IN PLACE: ICD-10-CM

## 2025-01-23 DIAGNOSIS — E03.9 HYPOTHYROIDISM, UNSPECIFIED TYPE: ICD-10-CM

## 2025-01-23 DIAGNOSIS — D63.1 ANEMIA DUE TO STAGE 3A CHRONIC KIDNEY DISEASE (MULTI): ICD-10-CM

## 2025-01-23 DIAGNOSIS — M19.90 OSTEOARTHRITIS, UNSPECIFIED OSTEOARTHRITIS TYPE, UNSPECIFIED SITE: ICD-10-CM

## 2025-01-23 DIAGNOSIS — H01.003 BLEPHARITIS OF EYELID OF RIGHT EYE, UNSPECIFIED EYELID, UNSPECIFIED TYPE: Primary | ICD-10-CM

## 2025-01-23 DIAGNOSIS — R33.9 URINARY RETENTION: ICD-10-CM

## 2025-01-23 DIAGNOSIS — F32.A DEPRESSION, UNSPECIFIED DEPRESSION TYPE: ICD-10-CM

## 2025-01-23 DIAGNOSIS — R42 VERTIGO: ICD-10-CM

## 2025-01-23 DIAGNOSIS — F41.9 ANXIETY: ICD-10-CM

## 2025-01-23 DIAGNOSIS — I10 HTN (HYPERTENSION), BENIGN: ICD-10-CM

## 2025-01-23 DIAGNOSIS — M79.7 FIBROMYALGIA: ICD-10-CM

## 2025-01-23 PROCEDURE — 99309 SBSQ NF CARE MODERATE MDM 30: CPT | Performed by: NURSE PRACTITIONER

## 2025-01-23 NOTE — LETTER
"Patient: Jacqueline Sandoval  : 1935    Encounter Date: 2025    Name: Jacqueline Sandoval  YOB: 1935    MONTHLY VISIT: Long term care resident    SUBJECTIVE:  Nurse reported the patient last week was complaining of itchy irritated right eye. Erythromycin ointment was ordered as well as warm compresses.   Today, Jacqueline is awake resting in bed. She appears comfortable and in no acute distress. She is [place] and this makes conversing difficult. No complaints of eye irritation. No c/o CP or SOB. Barker catheter continues to drain without difficulty.   The patient has a f/up w/Cardiology coming up next week.    REVIEW OF SYSTEMS:  All review of systems are negative unless otherwise stated above under subjective.    LABS REVIEWED AT FACILITY:  25  Alb 3.4, ALT 6, BUN 22, Cl 108, Co2 21, Creat 1.57, GFR 31, H/H 9.8/28.9, TP 5.9    Allergies   Allergen Reactions   • Penicillins Unknown   • Sulfa (Sulfonamide Antibiotics) Unknown and Rash       MEDICATIONS REVIEWED AT FACILITY:  Please see nursing facility medication list.    Living will related issues reviewed-Code status: DNRCCA    OBJECTIVE:  /60   Pulse 64   Temp 36.2 °C (97.2 °F)   Resp 16   Ht 1.6 m (5' 3\")   Wt 56.7 kg (125 lb)   SpO2 97% Comment: RA  BMI 22.14 kg/m²   Physical Exam  Constitutional:       Appearance: She is normal weight.   HENT:      Head: Normocephalic. [place]  Cardiovascular:      Rate and Rhythm: Normal rate and regular rhythm.   Pulmonary:      Effort: Pulmonary effort is normal.      Breath sounds: Normal breath sounds.   Abdominal:      General: Abdomen is flat. Bowel sounds are normal.      Palpations: Abdomen is soft.   Genitourinary:     Comments: Barker to CD - clear pale yellow urine  Musculoskeletal:         General: Tenderness present.   Skin:     General: Skin is warm and dry.   Neurological:      General: No focal deficit present.      Mental Status: She is alert.      Comments: Oriented x2  [place] "   Psychiatric:         Mood and Affect: Mood normal.     Assessment/Plan  Problem List Items Addressed This Visit       Anxiety    Depression    Fibromyalgia    HTN (hypertension), benign    Hypothyroidism    Osteoarthritis    Vertigo    Anemia due to stage 3a chronic kidney disease (Multi)    Urinary retention    Barker catheter in place     Other Visit Diagnoses       Blepharitis of eyelid of right eye, unspecified eyelid, unspecified type    -  Primary            Skin integrity:  Nursing to monitor skin integrity as patient is at risk for pressure injuries.  Turn and reposition Q 2 hours or more.  Air mattress and when up in chair cushion reducing device.  Dietician to evaluate and recommend.  Nutritional supplement to be implemented if needed.  Please monitor skin integrity and other pressure areas.    PLAN:  Pt has been seen for monthly visit.    The patient is confined to a long term care facility.   Recent nursing evaluation and notes were reviewed.     Blepharitis:  improving  Started 1/16 Erythromycin ointment x 7 days  Warm compress    Junctional bradycardia:  Cardiology - EP followed in hospital  No urgent need for pacemaker  Avoid all AV nicola agents  Outpatient f/up w/48 hr Holter monitor in 3 weeks and appt w/Dr. Sandy after  Echo 1/4/25  CONCLUSIONS:   1. The left ventricular systolic function is normal, with a visually estimated ejection fraction of 60%.   2. Spectral Doppler shows a Grade I (impaired relaxation pattern) of left ventricular diastolic filling with normal left atrial filling pressure.   3. There is low normal right ventricular systolic function.   4. There is no evidence of mitral valve stenosis.   5. No evidence of mitral valve regurgitation.   6. No evidence of tricuspid regurgitation.   7. Aortic valve stenosis is not present.   8. The main pulmonary artery is normal in size, and position, with normal bifurcation into the left and right pulmonary arteries.  F/up Cardiology 1/28/25 for  Holter monitor    HTN:   Monitor and follow BP readings.   Continue home meds -amlodipine 5 mg daily and lisinopril 40 mg daily  Labs to be done intermittently and adjust meds as needed.  BP readings reviewed -130/60's  Labs reviewed  Avoid nephrotoxic drugs.     Urinary retention, recurrent UTI's, Chronic Barker:  Barker to CD  Previously followed by Dr. Padilla Urology and family requested no outside f/up appts if could be handled here at the facility. (previously on Flomax and Prophylactic ATB Macrobid 50 mg daily - d'cd this past admission)     Hypothyroid:        Lab Results   Component Value Date     TSH 15.66 (H) 01/03/2025   Levothyroxine increased to 125 mcg daily  Will need to check TSH in a few weeks around 2/14/25     Fibromyalgia:  Gabapentin 200 mg nightly     Depression/Anxiety, Vascular dementia:  All medications were discontinued in the hospital  Mind care to follow - appt 1/29/25  F/up Neurology in 2-3 months (Dr. Alex [phone])  Redirect as needed and maintain safe environment  Seroquel 25 mg nightly     Insomnia:  Melatonin nightly     CAD:  Last lipid in May 2024  Cont Baby ASA    Vertigo:  Meclizine PRN    Any decline or change in condition needs to be communicated with the physician or myself.    Discussion with nursing staff regarding ongoing care and management.  Communication regarding patients status, overall condition, changes with plan (medications or treatments), and any questions from family completed if present.  If family not available, would communicate in person or via phone if needed.  We will continue with the plan and medications noted above.    We will continue to follow the patient here at the facility.    Discharge planning:   No plan for discharge, LTC resident, rehab potential    *Please note that nursing facility notes, facility EMR, outside laboratory agency, and  EMR do not interface.     Completion of the note was done through Dragon voice recognition technology and  may include unintended or grammatical errors which may not have been recognized when finalizing the note.     Time:    GLORIA Valverde        Electronically Signed By: GLORIA Valverde   1/30/25  7:05 PM

## 2025-01-28 ENCOUNTER — APPOINTMENT (OUTPATIENT)
Dept: CARDIOLOGY | Facility: CLINIC | Age: OVER 89
End: 2025-01-28
Payer: COMMERCIAL

## 2025-01-28 DIAGNOSIS — R00.1 BRADYCARDIA: ICD-10-CM

## 2025-01-30 VITALS
OXYGEN SATURATION: 97 % | HEIGHT: 63 IN | HEART RATE: 64 BPM | BODY MASS INDEX: 22.15 KG/M2 | RESPIRATION RATE: 16 BRPM | TEMPERATURE: 97.2 F | WEIGHT: 125 LBS | DIASTOLIC BLOOD PRESSURE: 60 MMHG | SYSTOLIC BLOOD PRESSURE: 132 MMHG

## 2025-01-30 NOTE — PROGRESS NOTES
"Name: Jacqueline Sandoval  YOB: 1935    MONTHLY VISIT: Long term care resident    SUBJECTIVE:  Nurse reported the patient last week was complaining of itchy irritated right eye. Erythromycin ointment was ordered as well as warm compresses.   Today, Jacqueline is awake resting in bed. She appears comfortable and in no acute distress. She is [place] and this makes conversing difficult. No complaints of eye irritation. No c/o CP or SOB. Barker catheter continues to drain without difficulty.   The patient has a f/up w/Cardiology coming up next week.    REVIEW OF SYSTEMS:  All review of systems are negative unless otherwise stated above under subjective.    LABS REVIEWED AT FACILITY:  1/13/25  Alb 3.4, ALT 6, BUN 22, Cl 108, Co2 21, Creat 1.57, GFR 31, H/H 9.8/28.9, TP 5.9    Allergies   Allergen Reactions    Penicillins Unknown    Sulfa (Sulfonamide Antibiotics) Unknown and Rash       MEDICATIONS REVIEWED AT FACILITY:  Please see nursing facility medication list.    Living will related issues reviewed-Code status: DNRCCA    OBJECTIVE:  /60   Pulse 64   Temp 36.2 °C (97.2 °F)   Resp 16   Ht 1.6 m (5' 3\")   Wt 56.7 kg (125 lb)   SpO2 97% Comment: RA  BMI 22.14 kg/m²   Physical Exam  Constitutional:       Appearance: She is normal weight.   HENT:      Head: Normocephalic. [place]  Cardiovascular:      Rate and Rhythm: Normal rate and regular rhythm.   Pulmonary:      Effort: Pulmonary effort is normal.      Breath sounds: Normal breath sounds.   Abdominal:      General: Abdomen is flat. Bowel sounds are normal.      Palpations: Abdomen is soft.   Genitourinary:     Comments: Barker to CD - clear pale yellow urine  Musculoskeletal:         General: Tenderness present.   Skin:     General: Skin is warm and dry.   Neurological:      General: No focal deficit present.      Mental Status: She is alert.      Comments: Oriented x2  [place]   Psychiatric:         Mood and Affect: Mood normal.     Assessment/Plan   Problem " List Items Addressed This Visit       Anxiety    Depression    Fibromyalgia    HTN (hypertension), benign    Hypothyroidism    Osteoarthritis    Vertigo    Anemia due to stage 3a chronic kidney disease (Multi)    Urinary retention    Barker catheter in place     Other Visit Diagnoses       Blepharitis of eyelid of right eye, unspecified eyelid, unspecified type    -  Primary            Skin integrity:  Nursing to monitor skin integrity as patient is at risk for pressure injuries.  Turn and reposition Q 2 hours or more.  Air mattress and when up in chair cushion reducing device.  Dietician to evaluate and recommend.  Nutritional supplement to be implemented if needed.  Please monitor skin integrity and other pressure areas.    PLAN:  Pt has been seen for monthly visit.    The patient is confined to a long term care facility.   Recent nursing evaluation and notes were reviewed.     Blepharitis:  improving  Started 1/16 Erythromycin ointment x 7 days  Warm compress    Junctional bradycardia:  Cardiology - EP followed in hospital  No urgent need for pacemaker  Avoid all AV nicola agents  Outpatient f/up w/48 hr Holter monitor in 3 weeks and appt w/Dr. Sandy after  Echo 1/4/25  CONCLUSIONS:   1. The left ventricular systolic function is normal, with a visually estimated ejection fraction of 60%.   2. Spectral Doppler shows a Grade I (impaired relaxation pattern) of left ventricular diastolic filling with normal left atrial filling pressure.   3. There is low normal right ventricular systolic function.   4. There is no evidence of mitral valve stenosis.   5. No evidence of mitral valve regurgitation.   6. No evidence of tricuspid regurgitation.   7. Aortic valve stenosis is not present.   8. The main pulmonary artery is normal in size, and position, with normal bifurcation into the left and right pulmonary arteries.  F/up Cardiology 1/28/25 for Holter monitor    HTN:   Monitor and follow BP readings.   Continue home meds  -amlodipine 5 mg daily and lisinopril 40 mg daily  Labs to be done intermittently and adjust meds as needed.  BP readings reviewed -130/60's  Labs reviewed  Avoid nephrotoxic drugs.     Urinary retention, recurrent UTI's, Chronic Barker:  Barker to CD  Previously followed by Dr. Padilla Urology and family requested no outside f/up appts if could be handled here at the facility. (previously on Flomax and Prophylactic ATB Macrobid 50 mg daily - d'cd this past admission)     Hypothyroid:        Lab Results   Component Value Date     TSH 15.66 (H) 01/03/2025   Levothyroxine increased to 125 mcg daily  Will need to check TSH in a few weeks around 2/14/25     Fibromyalgia:  Gabapentin 200 mg nightly     Depression/Anxiety, Vascular dementia:  All medications were discontinued in the hospital  Mind care to follow - appt 1/29/25  F/up Neurology in 2-3 months (Dr. Alex [phone])  Redirect as needed and maintain safe environment  Seroquel 25 mg nightly     Insomnia:  Melatonin nightly     CAD:  Last lipid in May 2024  Cont Baby ASA    Vertigo:  Meclizine PRN    Any decline or change in condition needs to be communicated with the physician or myself.    Discussion with nursing staff regarding ongoing care and management.  Communication regarding patients status, overall condition, changes with plan (medications or treatments), and any questions from family completed if present.  If family not available, would communicate in person or via phone if needed.  We will continue with the plan and medications noted above.    We will continue to follow the patient here at the facility.    Discharge planning:   No plan for discharge, LTC resident, rehab potential    *Please note that nursing facility notes, facility EMR, outside laboratory agency, and  EMR do not interface.     Completion of the note was done through Dragon voice recognition technology and may include unintended or grammatical errors which may not have been  recognized when finalizing the note.     Time:    Rosetta Christy, APRN-CNP

## 2025-02-13 ENCOUNTER — APPOINTMENT (OUTPATIENT)
Dept: LAB | Facility: HOSPITAL | Age: OVER 89
End: 2025-02-13
Payer: COMMERCIAL

## 2025-02-13 ENCOUNTER — OFFICE VISIT (OUTPATIENT)
Dept: CARDIOLOGY | Facility: CLINIC | Age: OVER 89
End: 2025-02-13
Payer: COMMERCIAL

## 2025-02-13 ENCOUNTER — TELEPHONE (OUTPATIENT)
Dept: CARDIOLOGY | Facility: CLINIC | Age: OVER 89
End: 2025-02-13
Payer: COMMERCIAL

## 2025-02-13 VITALS — DIASTOLIC BLOOD PRESSURE: 58 MMHG | HEART RATE: 68 BPM | SYSTOLIC BLOOD PRESSURE: 116 MMHG

## 2025-02-13 DIAGNOSIS — Z78.9 NEVER SMOKED TOBACCO: ICD-10-CM

## 2025-02-13 DIAGNOSIS — R94.31 ABNORMAL HOLTER MONITOR FINDING: ICD-10-CM

## 2025-02-13 DIAGNOSIS — R00.1 BRADYCARDIA: Primary | ICD-10-CM

## 2025-02-13 DIAGNOSIS — R79.1 ABNORMAL COAGULATION PROFILE: ICD-10-CM

## 2025-02-13 LAB
ANION GAP SERPL CALC-SCNC: 14 MMOL/L (ref 10–20)
APTT PPP: 35 SECONDS (ref 27–38)
BUN SERPL-MCNC: 39 MG/DL (ref 6–23)
CALCIUM SERPL-MCNC: 9.4 MG/DL (ref 8.6–10.3)
CHLORIDE SERPL-SCNC: 104 MMOL/L (ref 98–107)
CO2 SERPL-SCNC: 21 MMOL/L (ref 21–32)
CREAT SERPL-MCNC: 2.02 MG/DL (ref 0.5–1.05)
EGFRCR SERPLBLD CKD-EPI 2021: 23 ML/MIN/1.73M*2
ERYTHROCYTE [DISTWIDTH] IN BLOOD BY AUTOMATED COUNT: 14.9 % (ref 11.5–14.5)
GLUCOSE SERPL-MCNC: 116 MG/DL (ref 74–99)
HCT VFR BLD AUTO: 34.9 % (ref 36–46)
HGB BLD-MCNC: 11.5 G/DL (ref 12–16)
INR PPP: 1 (ref 0.9–1.1)
MCH RBC QN AUTO: 32.2 PG (ref 26–34)
MCHC RBC AUTO-ENTMCNC: 33 G/DL (ref 32–36)
MCV RBC AUTO: 98 FL (ref 80–100)
NRBC BLD-RTO: 0 /100 WBCS (ref 0–0)
PLATELET # BLD AUTO: 218 X10*3/UL (ref 150–450)
POTASSIUM SERPL-SCNC: 5 MMOL/L (ref 3.5–5.3)
PROTHROMBIN TIME: 11.8 SECONDS (ref 9.8–12.8)
RBC # BLD AUTO: 3.57 X10*6/UL (ref 4–5.2)
SODIUM SERPL-SCNC: 134 MMOL/L (ref 136–145)
WBC # BLD AUTO: 8.9 X10*3/UL (ref 4.4–11.3)

## 2025-02-13 PROCEDURE — 99215 OFFICE O/P EST HI 40 MIN: CPT | Performed by: INTERNAL MEDICINE

## 2025-02-13 PROCEDURE — 3074F SYST BP LT 130 MM HG: CPT | Performed by: INTERNAL MEDICINE

## 2025-02-13 PROCEDURE — 1159F MED LIST DOCD IN RCRD: CPT | Performed by: INTERNAL MEDICINE

## 2025-02-13 PROCEDURE — 85730 THROMBOPLASTIN TIME PARTIAL: CPT

## 2025-02-13 PROCEDURE — 85610 PROTHROMBIN TIME: CPT

## 2025-02-13 PROCEDURE — 80048 BASIC METABOLIC PNL TOTAL CA: CPT

## 2025-02-13 PROCEDURE — 1157F ADVNC CARE PLAN IN RCRD: CPT | Performed by: INTERNAL MEDICINE

## 2025-02-13 PROCEDURE — 1123F ACP DISCUSS/DSCN MKR DOCD: CPT | Performed by: INTERNAL MEDICINE

## 2025-02-13 PROCEDURE — 93227 XTRNL ECG REC<48 HR R&I: CPT | Performed by: INTERNAL MEDICINE

## 2025-02-13 PROCEDURE — 3078F DIAST BP <80 MM HG: CPT | Performed by: INTERNAL MEDICINE

## 2025-02-13 PROCEDURE — 85027 COMPLETE CBC AUTOMATED: CPT

## 2025-02-13 RX ORDER — TRAZODONE HYDROCHLORIDE 50 MG/1
50 TABLET ORAL NIGHTLY
COMMUNITY

## 2025-02-13 RX ORDER — POLYETHYLENE GLYCOL 400 AND PROPYLENE GLYCOL 4; 3 MG/ML; MG/ML
1 SOLUTION/ DROPS OPHTHALMIC 4 TIMES DAILY
COMMUNITY

## 2025-02-13 RX ORDER — SERTRALINE HYDROCHLORIDE 25 MG/1
25 TABLET, FILM COATED ORAL DAILY
COMMUNITY

## 2025-02-13 RX ORDER — MUPIROCIN 20 MG/G
1 OINTMENT TOPICAL ONCE
Status: CANCELLED | OUTPATIENT
Start: 2025-02-13 | End: 2025-02-13

## 2025-02-13 RX ORDER — SODIUM CHLORIDE 9 MG/ML
100 INJECTION, SOLUTION INTRAVENOUS CONTINUOUS
Status: CANCELLED | OUTPATIENT
Start: 2025-02-13 | End: 2025-02-14

## 2025-02-13 RX ORDER — HYDROXYZINE PAMOATE 25 MG/1
25 CAPSULE ORAL 3 TIMES DAILY PRN
COMMUNITY

## 2025-02-13 RX ORDER — VANCOMYCIN HYDROCHLORIDE 1 G/200ML
1000 INJECTION, SOLUTION INTRAVENOUS ONCE
Status: CANCELLED | OUTPATIENT
Start: 2025-02-13 | End: 2025-02-13

## 2025-02-13 RX ORDER — CHLORHEXIDINE GLUCONATE 40 MG/ML
SOLUTION TOPICAL ONCE
Status: CANCELLED | OUTPATIENT
Start: 2025-02-13 | End: 2025-02-13

## 2025-02-13 NOTE — PROGRESS NOTES
CARDIOLOGY OFFICE VISIT      CHIEF COMPLAINT  Chief Complaint   Patient presents with    Results       HISTORY OF PRESENT ILLNESS  HPI  89 y.o. female presenting with [facility], [facility]25..     Patient has a history of anxiety, dementia, fibromyalgia, hypertension and chronic indwelling Barker catheter for neurogenic bladder, chronic kidney disease.  Patient presented for worsening mental status changes.  Patient was recently placed in ertapenem and confusion or worsening the last few days..     Emergency department vital signs were stable with a blood pressure 140/80 mmHg heart rate of 71 bpm respiratory rate was 18 temperature 35.9.  Saturation 97%.  Initial EKG was consistent with sinus bradycardia rate of 56 bpm QRS ration 80 ms QT corrected 455 ms.  Subsequent EKG consistent with junctional bradycardia rate of 41 bpm QRS duration 74 ms QT corrected 430 ms.  This EKG was at 2 PM January 3, 2025.  EKG January 4, 2025 point 8 in the morning.  Sinus rhythm rate of 63 bpm rotation 92 ms QT corrected 472 ms  Laboratory on admission shows sodium 135 potassium 4.4 BUN 46 creatinine 2.2 ALT 7 AST 18 magnesium 2.48 lactate 0.5 thirds 2.7 subsequent troponin 5 TSH 15.66 WBC 8.5 hemoglobin 10.4 hematocrit 30.7 platelet count 254     Beta-blockers were discontinued during the admission. CT head showed no acute findings  -MRI brain was obtained which revealed no acute process but did comment on some brain volume loss, small vessel ischemic changes, bilateral lacunar infarcts.  -Neurology was consulted for evaluation given continued confusion.  Suspect patient likely has underlying vascular dementia.       she is back in sinus rhythm. Ertapenem was discontinued during hospitalization due to concerns about causing confusion.  -Was transition to vancomycin/ceftriaxone and de-escalated to ceftriaxone alone.  -Urine culture here shows no growth.ID felt that the Enterococcus is most likely  contamination, continued ceftriaxone advised.     Echocardiogram January 2025     CONCLUSIONS:   1. The left ventricular systolic function is normal, with a visually estimated ejection fraction of 60%.   2. Spectral Doppler shows a Grade I (impaired relaxation pattern) of left ventricular diastolic filling with normal left atrial filling pressure.   3. There is low normal right ventricular systolic function.   4. There is no evidence of mitral valve stenosis.   5. No evidence of mitral valve regurgitation.   6. No evidence of tricuspid regurgitation.   7. Aortic valve stenosis is not present.   8. The main pulmonary artery is normal in size, and position, with normal bifurcation into the left and right pulmonary arteries.    Patient is currently in a nursing home.    Patient apparently has been more tired than she used to feel.  Occasional near syncope.    A Holter monitor order shows underlying rhythm was sinus rhythm with episodes of sinus tachycardia and possibility of atrial fibrillation with rapid ventricular response.  Also there were significant pauses up to 9.8 seconds of duration at 6:19 AM.  Most of these episodes are during sleep time but there are some episodes also during daytime.    Past Medical History  Past Medical History:   Diagnosis Date    Acute cystitis without hematuria 05/31/2023    Anemia     Anxiety     Dementia     Depression     Falls     Fibromyalgia     Frequent UTI 08/22/2023    Heart failure     Hypertension     Left displaced femoral neck fracture 11/23/2023    Postoperative anemia 12/08/2023    Renal disorder     Urinary symptom or sign 06/20/2023       Social History  Social History     Tobacco Use    Smoking status: Never    Smokeless tobacco: Never   Vaping Use    Vaping status: Never Used   Substance Use Topics    Alcohol use: Never    Drug use: Never       Family History   No family history on file.     Allergies:  Allergies   Allergen Reactions    Penicillins Unknown    Sulfa  (Sulfonamide Antibiotics) Unknown and Rash        Outpatient Medications:  Current Outpatient Medications   Medication Instructions    acetaminophen (TYLENOL 8 HOUR) 1,300 mg, 2 times daily    amLODIPine (NORVASC) 5 mg, Daily    aspirin 81 mg EC tablet 1 tablet, Daily    docusate sodium (COLACE) 100 mg, 2 times daily    gabapentin (NEURONTIN) 200 mg, Nightly    hydrOXYzine pamoate (VISTARIL) 25 mg, 3 times daily PRN    lactobacillus acidophilus & bulgar (Lactinex) 1 million cell chewable tablet 1 tablet, Daily    levothyroxine (SYNTHROID, LEVOXYL) 125 mcg, oral, Daily, Take on an empty stomach at the same time each day, either 30 to 60 minutes prior to breakfast    lisinopril 40 mg, Daily    meclizine (ANTIVERT) 12.5 mg, 3 times daily PRN    melatonin 3 mg, Nightly    multivitamin with minerals tablet 1 tablet, Daily    peg 400-propylene glycol (Systane, propylene glycoL,) 0.4-0.3 % drops ophthalmic drops 1 drop, 4 times daily    polyethylene glycol (GLYCOLAX, MIRALAX) 17 g, Daily    QUEtiapine (SEROQUEL) 25 mg, oral, Nightly    sertraline (ZOLOFT) 25 mg, Daily    traZODone (DESYREL) 50 mg, Nightly    white petrolatum-mineral oiL (Tears Naturale PM) 94-3 % ophthalmic ointment 1 Application, Both Eyes, 2 times daily          REVIEW OF SYSTEMS  Review of Systems   All other systems reviewed and are negative.        VITALS  Vitals:    02/13/25 1219   BP: 116/58   Pulse: 68       PHYSICAL EXAM  Constitutional:       Appearance: Healthy appearance. Not in distress.   Neck:      Vascular: No JVR. JVD normal.   Pulmonary:      Effort: Pulmonary effort is normal.      Breath sounds: Normal breath sounds. No wheezing. No rhonchi. No rales.   Chest:      Chest wall: Not tender to palpatation.   Cardiovascular:      PMI at left midclavicular line. Normal rate. Regular rhythm. Normal S1. Normal S2.       Murmurs: There is no murmur.      No gallop.  No click. No rub.   Pulses:     Intact distal pulses.   Edema:     Peripheral  edema absent.   Abdominal:      General: Bowel sounds are normal.      Palpations: Abdomen is soft.      Tenderness: There is no abdominal tenderness.   Musculoskeletal: Normal range of motion.         General: No tenderness. Skin:     General: Skin is warm and dry.   Neurological:      General: No focal deficit present.      Mental Status: Alert and oriented to person, place and time.           ASSESSMENT AND PLAN  Clinical impression     1.  Mental status changes  2.  Dementia  3.  Sinus node dysfunction with episodes of bradycardia rates as low as20 bpm currently back in sinus rhythm. Holter monitor shows significant pauses up to 9.8 seconds of duration  4.  UTI  5.  Hypothyroidism  6.  Hypertension.    Plan recommendations    I had a discussion with patient and family member regarding findings of the Holter monitor.  Patient had episodes of bradycardia on admission at [facility].  No Holter monitor showed significant pauses up to 9 point seconds of duration.  Patient will benefit of a dual-chamber pacemaker implantation.     Procedure, risk, benefits and possible complications were explained to patient.  All questions were answered.  Patient agrees with plan.  Informed consent was signed.    Following office 7 days postprocedure for wound assessment.    Risk factor modification and lifestyle modification discussed with patient. Diet , exercise and hydration discussed with patient.    I have personally review with patient during this office visit, laboratory data, echocardiogram results, stress test results, Holter-event monitor results prior and after the last electrophysiology visit. All questions has been answered.    Please excuse any errors in grammar or translation related to this dictation.  Voice recognition software was utilized to prepare this document.        Scribe Attestation  By signing my name below, IIzabel LPN , [place]   attest that this documentation has been prepared under  the direction and in the presence of Leo Sandy MD.

## 2025-02-13 NOTE — PATIENT INSTRUCTIONS
Follow up 1 week post pacemaker for wound check    Pre-Procedure Patient Information     would like you to have a dual chamber pacemaker at [facility] on Monday. Our secretaries will help to set this up.       You do not need to hold any medications prior. Ok to take Aspirin. Ok to take all meds with a small sip of water morning of procedure.    2.Please have you blood work today    3.Please have transportation to and from the hospital. You should plan for same say discharge, However; there is a possibility you will need to stay over night.     4. You will receive a call from the hospital 24-72 hours before your procedure providing you with fasting instructions, procedure location , detail, and time of arrival.     5. Please bring a current list of medication with you to the hospital.    6.Nothing to eat after midnight before procedure    7. Please bring to our attention if you have any contrast, latex or metal allergies.        DID YOU KNOW  We have a pharmacy here in the [facility].  They can fill all prescriptions, not just cardiac medications.  Prescriptions from other pharmacies can easily be transferred to the  pharmacy by the  pharmacist on site.   pharmacies offer FREE HOME DELIVERY on medications to anywhere in [place]. They can sync your medications. Typically prescriptions can be ready in 10 - 15 minutes. If pharmacy is unable to fill your  prescription or if cost is more than your paying now the Pharmacist can easily transfer back to your Pharmacy of choice. Pharmacy phone # [phone].     Please bring all medicines, vitamins, and herbal supplements with you in original bottles to every appointment!!!!    Prescriptions will not be filled unless you are compliant with your follow up appointments or have a follow up appointment scheduled as per instruction of your physician. Refills should be requested at the time of your visit.

## 2025-02-13 NOTE — H&P (VIEW-ONLY)
CARDIOLOGY OFFICE VISIT      CHIEF COMPLAINT  Chief Complaint   Patient presents with    Results       HISTORY OF PRESENT ILLNESS  HPI  89 y.o. female presenting with mental status changes at [facility] January 4, 2025..     Patient has a history of anxiety, dementia, fibromyalgia, hypertension and chronic indwelling Barker catheter for neurogenic bladder, chronic kidney disease.  Patient presented for worsening mental status changes.  Patient was recently placed in ertapenem and confusion or worsening the last few days..     Emergency department vital signs were stable with a blood pressure 140/80 mmHg heart rate of 71 bpm respiratory rate was 18 temperature 35.9.  Saturation 97%.  Initial EKG was consistent with sinus bradycardia rate of 56 bpm QRS ration 80 ms QT corrected 455 ms.  Subsequent EKG consistent with junctional bradycardia rate of 41 bpm QRS duration 74 ms QT corrected 430 ms.  This EKG was at 2 PM January 3, 2025.  EKG January 4, 2025 point 8 in the morning.  Sinus rhythm rate of 63 bpm rotation 92 ms QT corrected 472 ms  Laboratory on admission shows sodium 135 potassium 4.4 BUN 46 creatinine 2.2 ALT 7 AST 18 magnesium 2.48 lactate 0.5 thirds 2.7 subsequent troponin 5 TSH 15.66 WBC 8.5 hemoglobin 10.4 hematocrit 30.7 platelet count 254     Beta-blockers were discontinued during the admission. CT head showed no acute findings  -MRI brain was obtained which revealed no acute process but did comment on some brain volume loss, small vessel ischemic changes, bilateral lacunar infarcts.  -Neurology was consulted for evaluation given continued confusion.  Suspect patient likely has underlying vascular dementia.       she is back in sinus rhythm. Ertapenem was discontinued during hospitalization due to concerns about causing confusion.  -Was transition to vancomycin/ceftriaxone and de-escalated to ceftriaxone alone.  -Urine culture here shows no growth.ID felt that the Enterococcus is most likely  contamination, continued ceftriaxone advised.     Echocardiogram January 2025     CONCLUSIONS:   1. The left ventricular systolic function is normal, with a visually estimated ejection fraction of 60%.   2. Spectral Doppler shows a Grade I (impaired relaxation pattern) of left ventricular diastolic filling with normal left atrial filling pressure.   3. There is low normal right ventricular systolic function.   4. There is no evidence of mitral valve stenosis.   5. No evidence of mitral valve regurgitation.   6. No evidence of tricuspid regurgitation.   7. Aortic valve stenosis is not present.   8. The main pulmonary artery is normal in size, and position, with normal bifurcation into the left and right pulmonary arteries.    Patient is currently in a nursing home.    Patient apparently has been more tired than she used to feel.  Occasional near syncope.    A Holter monitor order shows underlying rhythm was sinus rhythm with episodes of sinus tachycardia and possibility of atrial fibrillation with rapid ventricular response.  Also there were significant pauses up to 9.8 seconds of duration at 6:19 AM.  Most of these episodes are during sleep time but there are some episodes also during daytime.    Past Medical History  Past Medical History:   Diagnosis Date    Acute cystitis without hematuria 05/31/2023    Anemia     Anxiety     Dementia     Depression     Falls     Fibromyalgia     Frequent UTI 08/22/2023    Heart failure     Hypertension     Left displaced femoral neck fracture 11/23/2023    Postoperative anemia 12/08/2023    Renal disorder     Urinary symptom or sign 06/20/2023       Social History  Social History     Tobacco Use    Smoking status: Never    Smokeless tobacco: Never   Vaping Use    Vaping status: Never Used   Substance Use Topics    Alcohol use: Never    Drug use: Never       Family History   No family history on file.     Allergies:  Allergies   Allergen Reactions    Penicillins Unknown    Sulfa  (Sulfonamide Antibiotics) Unknown and Rash        Outpatient Medications:  Current Outpatient Medications   Medication Instructions    acetaminophen (TYLENOL 8 HOUR) 1,300 mg, 2 times daily    amLODIPine (NORVASC) 5 mg, Daily    aspirin 81 mg EC tablet 1 tablet, Daily    docusate sodium (COLACE) 100 mg, 2 times daily    gabapentin (NEURONTIN) 200 mg, Nightly    hydrOXYzine pamoate (VISTARIL) 25 mg, 3 times daily PRN    lactobacillus acidophilus & bulgar (Lactinex) 1 million cell chewable tablet 1 tablet, Daily    levothyroxine (SYNTHROID, LEVOXYL) 125 mcg, oral, Daily, Take on an empty stomach at the same time each day, either 30 to 60 minutes prior to breakfast    lisinopril 40 mg, Daily    meclizine (ANTIVERT) 12.5 mg, 3 times daily PRN    melatonin 3 mg, Nightly    multivitamin with minerals tablet 1 tablet, Daily    peg 400-propylene glycol (Systane, propylene glycoL,) 0.4-0.3 % drops ophthalmic drops 1 drop, 4 times daily    polyethylene glycol (GLYCOLAX, MIRALAX) 17 g, Daily    QUEtiapine (SEROQUEL) 25 mg, oral, Nightly    sertraline (ZOLOFT) 25 mg, Daily    traZODone (DESYREL) 50 mg, Nightly    white petrolatum-mineral oiL (Tears Naturale PM) 94-3 % ophthalmic ointment 1 Application, Both Eyes, 2 times daily          REVIEW OF SYSTEMS  Review of Systems   All other systems reviewed and are negative.        VITALS  Vitals:    02/13/25 1219   BP: 116/58   Pulse: 68       PHYSICAL EXAM  Constitutional:       Appearance: Healthy appearance. Not in distress.   Neck:      Vascular: No JVR. JVD normal.   Pulmonary:      Effort: Pulmonary effort is normal.      Breath sounds: Normal breath sounds. No wheezing. No rhonchi. No rales.   Chest:      Chest wall: Not tender to palpatation.   Cardiovascular:      PMI at left midclavicular line. Normal rate. Regular rhythm. Normal S1. Normal S2.       Murmurs: There is no murmur.      No gallop.  No click. No rub.   Pulses:     Intact distal pulses.   Edema:     Peripheral  edema absent.   Abdominal:      General: Bowel sounds are normal.      Palpations: Abdomen is soft.      Tenderness: There is no abdominal tenderness.   Musculoskeletal: Normal range of motion.         General: No tenderness. Skin:     General: Skin is warm and dry.   Neurological:      General: No focal deficit present.      Mental Status: Alert and oriented to person, place and time.           ASSESSMENT AND PLAN  Clinical impression     1.  Mental status changes  2.  Dementia  3.  Sinus node dysfunction with episodes of bradycardia rates as low as20 bpm currently back in sinus rhythm. Holter monitor shows significant pauses up to 9.8 seconds of duration  4.  UTI  5.  Hypothyroidism  6.  Hypertension.    Plan recommendations    I had a discussion with patient and family member regarding findings of the Holter monitor.  Patient had episodes of bradycardia on admission at [facility].  No Holter monitor showed significant pauses up to 9 point seconds of duration.  Patient will benefit of a dual-chamber pacemaker implantation.     Procedure, risk, benefits and possible complications were explained to patient.  All questions were answered.  Patient agrees with plan.  Informed consent was signed.    Following office 7 days postprocedure for wound assessment.    Risk factor modification and lifestyle modification discussed with patient. Diet , exercise and hydration discussed with patient.    I have personally review with patient during this office visit, laboratory data, echocardiogram results, stress test results, Holter-event monitor results prior and after the last electrophysiology visit. All questions has been answered.    Please excuse any errors in grammar or translation related to this dictation.  Voice recognition software was utilized to prepare this document.        Scribe Attestation  By signing my name below, IIzabel LPN , [place]   attest that this documentation has been prepared under  the direction and in the presence of Leo Sandy MD.

## 2025-02-17 ENCOUNTER — HOSPITAL ENCOUNTER (OUTPATIENT)
Facility: HOSPITAL | Age: OVER 89
Discharge: NURSING, REHAB OR LONG-TERM CARE | End: 2025-02-18
Attending: INTERNAL MEDICINE | Admitting: INTERNAL MEDICINE
Payer: COMMERCIAL

## 2025-02-17 ENCOUNTER — APPOINTMENT (OUTPATIENT)
Dept: RADIOLOGY | Facility: HOSPITAL | Age: OVER 89
End: 2025-02-17
Payer: COMMERCIAL

## 2025-02-17 ENCOUNTER — APPOINTMENT (OUTPATIENT)
Dept: CARDIOLOGY | Facility: CLINIC | Age: OVER 89
End: 2025-02-17
Payer: COMMERCIAL

## 2025-02-17 ENCOUNTER — APPOINTMENT (OUTPATIENT)
Dept: CARDIOLOGY | Facility: HOSPITAL | Age: OVER 89
End: 2025-02-17
Payer: COMMERCIAL

## 2025-02-17 ENCOUNTER — ANESTHESIA EVENT (OUTPATIENT)
Dept: CARDIOLOGY | Facility: HOSPITAL | Age: OVER 89
End: 2025-02-17
Payer: COMMERCIAL

## 2025-02-17 ENCOUNTER — ANESTHESIA (OUTPATIENT)
Dept: CARDIOLOGY | Facility: HOSPITAL | Age: OVER 89
End: 2025-02-17
Payer: COMMERCIAL

## 2025-02-17 DIAGNOSIS — R94.31 ABNORMAL HOLTER MONITOR FINDING: ICD-10-CM

## 2025-02-17 DIAGNOSIS — Z95.0 PACEMAKER: ICD-10-CM

## 2025-02-17 DIAGNOSIS — R00.1 BRADYCARDIA: Primary | ICD-10-CM

## 2025-02-17 PROCEDURE — 2720000007 HC OR 272 NO HCPCS: Performed by: INTERNAL MEDICINE

## 2025-02-17 PROCEDURE — 93010 ELECTROCARDIOGRAM REPORT: CPT | Performed by: INTERNAL MEDICINE

## 2025-02-17 PROCEDURE — 7100000002 HC RECOVERY ROOM TIME - EACH INCREMENTAL 1 MINUTE: Performed by: INTERNAL MEDICINE

## 2025-02-17 PROCEDURE — 3700000002 HC GENERAL ANESTHESIA TIME - EACH INCREMENTAL 1 MINUTE: Performed by: INTERNAL MEDICINE

## 2025-02-17 PROCEDURE — 33208 INSRT HEART PM ATRIAL & VENT: CPT | Performed by: INTERNAL MEDICINE

## 2025-02-17 PROCEDURE — 2500000001 HC RX 250 WO HCPCS SELF ADMINISTERED DRUGS (ALT 637 FOR MEDICARE OP): Performed by: NURSE PRACTITIONER

## 2025-02-17 PROCEDURE — 93005 ELECTROCARDIOGRAM TRACING: CPT

## 2025-02-17 PROCEDURE — 7100000011 HC EXTENDED STAY RECOVERY HOURLY - NURSING UNIT

## 2025-02-17 PROCEDURE — 2500000004 HC RX 250 GENERAL PHARMACY W/ HCPCS (ALT 636 FOR OP/ED): Performed by: INTERNAL MEDICINE

## 2025-02-17 PROCEDURE — 7100000001 HC RECOVERY ROOM TIME - INITIAL BASE CHARGE: Performed by: INTERNAL MEDICINE

## 2025-02-17 PROCEDURE — 33208 INSRT HEART PM ATRIAL & VENT: CPT | Mod: KX | Performed by: INTERNAL MEDICINE

## 2025-02-17 PROCEDURE — 93005 ELECTROCARDIOGRAM TRACING: CPT | Mod: 59

## 2025-02-17 PROCEDURE — 7100000010 HC PHASE TWO TIME - EACH INCREMENTAL 1 MINUTE: Performed by: INTERNAL MEDICINE

## 2025-02-17 PROCEDURE — C1898 LEAD, PMKR, OTHER THAN TRANS: HCPCS | Performed by: INTERNAL MEDICINE

## 2025-02-17 PROCEDURE — 2500000005 HC RX 250 GENERAL PHARMACY W/O HCPCS: Performed by: NURSE PRACTITIONER

## 2025-02-17 PROCEDURE — 2750000001 HC OR 275 NO HCPCS: Performed by: INTERNAL MEDICINE

## 2025-02-17 PROCEDURE — 71045 X-RAY EXAM CHEST 1 VIEW: CPT

## 2025-02-17 PROCEDURE — 2780000003 HC OR 278 NO HCPCS: Performed by: INTERNAL MEDICINE

## 2025-02-17 PROCEDURE — C1785 PMKR, DUAL, RATE-RESP: HCPCS | Performed by: INTERNAL MEDICINE

## 2025-02-17 PROCEDURE — C1892 INTRO/SHEATH,FIXED,PEEL-AWAY: HCPCS | Performed by: INTERNAL MEDICINE

## 2025-02-17 PROCEDURE — 2500000004 HC RX 250 GENERAL PHARMACY W/ HCPCS (ALT 636 FOR OP/ED): Performed by: NURSE ANESTHETIST, CERTIFIED REGISTERED

## 2025-02-17 PROCEDURE — 7100000009 HC PHASE TWO TIME - INITIAL BASE CHARGE: Performed by: INTERNAL MEDICINE

## 2025-02-17 PROCEDURE — 71045 X-RAY EXAM CHEST 1 VIEW: CPT | Performed by: RADIOLOGY

## 2025-02-17 PROCEDURE — 3700000001 HC GENERAL ANESTHESIA TIME - INITIAL BASE CHARGE: Performed by: INTERNAL MEDICINE

## 2025-02-17 DEVICE — LEAD 5076-45 CAPSUREFIX NOVUS US EN
Type: IMPLANTABLE DEVICE | Site: CHEST | Status: FUNCTIONAL
Brand: CAPSUREFIX® NOVUS

## 2025-02-17 DEVICE — IPG W1DR01 AZURE XT DR MRI WL USA BCP
Type: IMPLANTABLE DEVICE | Site: CHEST | Status: FUNCTIONAL
Brand: AZURE™ XT DR MRI SURESCAN™

## 2025-02-17 DEVICE — LEAD 5076-52 CAPSUREFIX NOVUS US EN
Type: IMPLANTABLE DEVICE | Site: CHEST | Status: FUNCTIONAL
Brand: CAPSUREFIX® NOVUS

## 2025-02-17 RX ORDER — GABAPENTIN 100 MG/1
200 CAPSULE ORAL NIGHTLY
Status: DISCONTINUED | OUTPATIENT
Start: 2025-02-17 | End: 2025-02-18 | Stop reason: HOSPADM

## 2025-02-17 RX ORDER — PROPOFOL 10 MG/ML
INJECTION, EMULSION INTRAVENOUS AS NEEDED
Status: DISCONTINUED | OUTPATIENT
Start: 2025-02-17 | End: 2025-02-17

## 2025-02-17 RX ORDER — VANCOMYCIN HYDROCHLORIDE 1 G/20ML
INJECTION, POWDER, LYOPHILIZED, FOR SOLUTION INTRAVENOUS AS NEEDED
Status: DISCONTINUED | OUTPATIENT
Start: 2025-02-17 | End: 2025-02-17 | Stop reason: HOSPADM

## 2025-02-17 RX ORDER — ONDANSETRON HYDROCHLORIDE 2 MG/ML
4 INJECTION, SOLUTION INTRAVENOUS EVERY 6 HOURS PRN
Status: DISCONTINUED | OUTPATIENT
Start: 2025-02-17 | End: 2025-02-18 | Stop reason: HOSPADM

## 2025-02-17 RX ORDER — MUPIROCIN 20 MG/G
1 OINTMENT TOPICAL ONCE
Status: COMPLETED | OUTPATIENT
Start: 2025-02-17 | End: 2025-02-17

## 2025-02-17 RX ORDER — ONDANSETRON 4 MG/1
4 TABLET, FILM COATED ORAL EVERY 8 HOURS PRN
Status: DISCONTINUED | OUTPATIENT
Start: 2025-02-17 | End: 2025-02-18 | Stop reason: HOSPADM

## 2025-02-17 RX ORDER — ACETAMINOPHEN 160 MG/5ML
650 SOLUTION ORAL EVERY 4 HOURS PRN
Status: DISCONTINUED | OUTPATIENT
Start: 2025-02-17 | End: 2025-02-18 | Stop reason: HOSPADM

## 2025-02-17 RX ORDER — SERTRALINE HYDROCHLORIDE 50 MG/1
25 TABLET, FILM COATED ORAL DAILY
Status: DISCONTINUED | OUTPATIENT
Start: 2025-02-18 | End: 2025-02-18 | Stop reason: HOSPADM

## 2025-02-17 RX ORDER — TRAZODONE HYDROCHLORIDE 50 MG/1
50 TABLET ORAL NIGHTLY
Status: DISCONTINUED | OUTPATIENT
Start: 2025-02-17 | End: 2025-02-18 | Stop reason: HOSPADM

## 2025-02-17 RX ORDER — MECLIZINE HCL 12.5 MG 12.5 MG/1
12.5 TABLET ORAL 3 TIMES DAILY PRN
Status: DISCONTINUED | OUTPATIENT
Start: 2025-02-17 | End: 2025-02-18 | Stop reason: HOSPADM

## 2025-02-17 RX ORDER — LISINOPRIL 20 MG/1
40 TABLET ORAL DAILY
Status: DISCONTINUED | OUTPATIENT
Start: 2025-02-18 | End: 2025-02-18 | Stop reason: HOSPADM

## 2025-02-17 RX ORDER — LEVOTHYROXINE SODIUM 125 UG/1
125 TABLET ORAL DAILY
Status: DISCONTINUED | OUTPATIENT
Start: 2025-02-18 | End: 2025-02-18 | Stop reason: HOSPADM

## 2025-02-17 RX ORDER — ASPIRIN 81 MG/1
81 TABLET ORAL DAILY
Status: DISCONTINUED | OUTPATIENT
Start: 2025-02-18 | End: 2025-02-18 | Stop reason: HOSPADM

## 2025-02-17 RX ORDER — POLYETHYLENE GLYCOL 3350 17 G/17G
17 POWDER, FOR SOLUTION ORAL DAILY
Status: DISCONTINUED | OUTPATIENT
Start: 2025-02-17 | End: 2025-02-18 | Stop reason: HOSPADM

## 2025-02-17 RX ORDER — DOCUSATE SODIUM 100 MG/1
100 CAPSULE, LIQUID FILLED ORAL 2 TIMES DAILY
Status: DISCONTINUED | OUTPATIENT
Start: 2025-02-17 | End: 2025-02-18 | Stop reason: HOSPADM

## 2025-02-17 RX ORDER — VANCOMYCIN HYDROCHLORIDE 1 G/200ML
1000 INJECTION, SOLUTION INTRAVENOUS ONCE
Status: DISCONTINUED | OUTPATIENT
Start: 2025-02-17 | End: 2025-02-18 | Stop reason: HOSPADM

## 2025-02-17 RX ORDER — HYDROXYZINE PAMOATE 25 MG/1
25 CAPSULE ORAL 3 TIMES DAILY PRN
Status: DISCONTINUED | OUTPATIENT
Start: 2025-02-17 | End: 2025-02-18 | Stop reason: HOSPADM

## 2025-02-17 RX ORDER — LIDOCAINE HYDROCHLORIDE 10 MG/ML
INJECTION, SOLUTION EPIDURAL; INFILTRATION; INTRACAUDAL; PERINEURAL AS NEEDED
Status: DISCONTINUED | OUTPATIENT
Start: 2025-02-17 | End: 2025-02-17 | Stop reason: HOSPADM

## 2025-02-17 RX ORDER — AMLODIPINE BESYLATE 5 MG/1
5 TABLET ORAL DAILY
Status: DISCONTINUED | OUTPATIENT
Start: 2025-02-17 | End: 2025-02-18 | Stop reason: HOSPADM

## 2025-02-17 RX ORDER — CHLORHEXIDINE GLUCONATE 40 MG/ML
SOLUTION TOPICAL ONCE
Status: COMPLETED | OUTPATIENT
Start: 2025-02-17 | End: 2025-02-17

## 2025-02-17 RX ORDER — ACETAMINOPHEN 325 MG/1
650 TABLET ORAL EVERY 4 HOURS PRN
Status: DISCONTINUED | OUTPATIENT
Start: 2025-02-17 | End: 2025-02-18 | Stop reason: HOSPADM

## 2025-02-17 RX ORDER — ACETAMINOPHEN 650 MG/1
650 SUPPOSITORY RECTAL EVERY 4 HOURS PRN
Status: DISCONTINUED | OUTPATIENT
Start: 2025-02-17 | End: 2025-02-18 | Stop reason: HOSPADM

## 2025-02-17 RX ORDER — TRAMADOL HYDROCHLORIDE 50 MG/1
50 TABLET, FILM COATED ORAL EVERY 8 HOURS PRN
Status: DISCONTINUED | OUTPATIENT
Start: 2025-02-17 | End: 2025-02-18 | Stop reason: HOSPADM

## 2025-02-17 RX ADMIN — AMLODIPINE BESYLATE 5 MG: 5 TABLET ORAL at 17:48

## 2025-02-17 RX ADMIN — ACETAMINOPHEN 650 MG: 325 TABLET ORAL at 18:41

## 2025-02-17 RX ADMIN — SODIUM CHLORIDE: 9 INJECTION, SOLUTION INTRAVENOUS at 16:02

## 2025-02-17 RX ADMIN — TRAZODONE HYDROCHLORIDE 50 MG: 50 TABLET ORAL at 20:30

## 2025-02-17 RX ADMIN — PROPOFOL 30 MG: 10 INJECTION, EMULSION INTRAVENOUS at 16:33

## 2025-02-17 RX ADMIN — PROPOFOL 50 MG: 10 INJECTION, EMULSION INTRAVENOUS at 16:11

## 2025-02-17 RX ADMIN — PROPOFOL 30 MG: 10 INJECTION, EMULSION INTRAVENOUS at 16:25

## 2025-02-17 RX ADMIN — DOCUSATE SODIUM 100 MG: 100 CAPSULE, LIQUID FILLED ORAL at 20:30

## 2025-02-17 RX ADMIN — MUPIROCIN 1 APPLICATION: 20 OINTMENT TOPICAL at 13:25

## 2025-02-17 RX ADMIN — PROPOFOL 50 MCG/KG/MIN: 10 INJECTION, EMULSION INTRAVENOUS at 16:12

## 2025-02-17 RX ADMIN — GABAPENTIN 200 MG: 100 CAPSULE ORAL at 20:30

## 2025-02-17 RX ADMIN — Medication: at 13:25

## 2025-02-17 RX ADMIN — TRAMADOL HYDROCHLORIDE 50 MG: 50 TABLET, FILM COATED ORAL at 22:10

## 2025-02-17 SDOH — SOCIAL STABILITY: SOCIAL INSECURITY: DO YOU FEEL UNSAFE GOING BACK TO THE PLACE WHERE YOU ARE LIVING?: NO

## 2025-02-17 SDOH — SOCIAL STABILITY: SOCIAL INSECURITY: DOES ANYONE TRY TO KEEP YOU FROM HAVING/CONTACTING OTHER FRIENDS OR DOING THINGS OUTSIDE YOUR HOME?: NO

## 2025-02-17 SDOH — SOCIAL STABILITY: SOCIAL INSECURITY: HAVE YOU HAD ANY THOUGHTS OF HARMING ANYONE ELSE?: NO

## 2025-02-17 SDOH — SOCIAL STABILITY: SOCIAL INSECURITY: ABUSE: ADULT

## 2025-02-17 SDOH — SOCIAL STABILITY: SOCIAL INSECURITY: HAVE YOU HAD THOUGHTS OF HARMING ANYONE ELSE?: NO

## 2025-02-17 SDOH — SOCIAL STABILITY: SOCIAL INSECURITY: WERE YOU ABLE TO COMPLETE ALL THE BEHAVIORAL HEALTH SCREENINGS?: YES

## 2025-02-17 SDOH — SOCIAL STABILITY: SOCIAL INSECURITY: DO YOU FEEL ANYONE HAS EXPLOITED OR TAKEN ADVANTAGE OF YOU FINANCIALLY OR OF YOUR PERSONAL PROPERTY?: NO

## 2025-02-17 SDOH — HEALTH STABILITY: MENTAL HEALTH: CURRENT SMOKER: 0

## 2025-02-17 SDOH — SOCIAL STABILITY: SOCIAL INSECURITY: ARE YOU OR HAVE YOU BEEN THREATENED OR ABUSED PHYSICALLY, EMOTIONALLY, OR SEXUALLY BY ANYONE?: NO

## 2025-02-17 SDOH — SOCIAL STABILITY: SOCIAL INSECURITY: ARE THERE ANY APPARENT SIGNS OF INJURIES/BEHAVIORS THAT COULD BE RELATED TO ABUSE/NEGLECT?: NO

## 2025-02-17 SDOH — SOCIAL STABILITY: SOCIAL INSECURITY: HAS ANYONE EVER THREATENED TO HURT YOUR FAMILY OR YOUR PETS?: NO

## 2025-02-17 SDOH — SOCIAL STABILITY: SOCIAL INSECURITY
ASK PARENT OR GUARDIAN: ARE THERE TIMES WHEN YOU, YOUR CHILD(REN), OR ANY MEMBER OF YOUR HOUSEHOLD FEEL UNSAFE, HARMED, OR THREATENED AROUND PERSONS WITH WHOM YOU KNOW OR LIVE?: NO

## 2025-02-17 SDOH — ECONOMIC STABILITY: HOUSING INSECURITY: DO YOU FEEL UNSAFE GOING BACK TO THE PLACE WHERE YOU LIVE?: NO

## 2025-02-17 ASSESSMENT — PAIN DESCRIPTION - LOCATION
LOCATION: CHEST
LOCATION: SHOULDER

## 2025-02-17 ASSESSMENT — COGNITIVE AND FUNCTIONAL STATUS - GENERAL
PERSONAL GROOMING: A LITTLE
MOVING TO AND FROM BED TO CHAIR: A LITTLE
DAILY ACTIVITIY SCORE: 18
EATING MEALS: A LITTLE
TOILETING: A LITTLE
HELP NEEDED FOR BATHING: A LITTLE
STANDING UP FROM CHAIR USING ARMS: A LITTLE
DRESSING REGULAR LOWER BODY CLOTHING: A LITTLE
CLIMB 3 TO 5 STEPS WITH RAILING: A LOT
WALKING IN HOSPITAL ROOM: A LOT
DRESSING REGULAR UPPER BODY CLOTHING: A LITTLE
MOVING FROM LYING ON BACK TO SITTING ON SIDE OF FLAT BED WITH BEDRAILS: A LITTLE
MOBILITY SCORE: 16
TURNING FROM BACK TO SIDE WHILE IN FLAT BAD: A LITTLE

## 2025-02-17 ASSESSMENT — LIFESTYLE VARIABLES
AUDIT-C TOTAL SCORE: 0
AUDIT-C TOTAL SCORE: 0
HOW MANY STANDARD DRINKS CONTAINING ALCOHOL DO YOU HAVE ON A TYPICAL DAY: PATIENT DOES NOT DRINK
PRESCIPTION_ABUSE_PAST_12_MONTHS: NO
SUBSTANCE_ABUSE_PAST_12_MONTHS: NO
HOW OFTEN DO YOU HAVE A DRINK CONTAINING ALCOHOL: NEVER
SKIP TO QUESTIONS 9-10: 1
HOW OFTEN DO YOU HAVE 6 OR MORE DRINKS ON ONE OCCASION: NEVER

## 2025-02-17 ASSESSMENT — PAIN SCALES - GENERAL
PAINLEVEL_OUTOF10: 0 - NO PAIN
PAINLEVEL_OUTOF10: 7
PAINLEVEL_OUTOF10: 6
PAINLEVEL_OUTOF10: 0 - NO PAIN

## 2025-02-17 ASSESSMENT — COLUMBIA-SUICIDE SEVERITY RATING SCALE - C-SSRS
2. HAVE YOU ACTUALLY HAD ANY THOUGHTS OF KILLING YOURSELF?: NO
1. IN THE PAST MONTH, HAVE YOU WISHED YOU WERE DEAD OR WISHED YOU COULD GO TO SLEEP AND NOT WAKE UP?: NO
6. HAVE YOU EVER DONE ANYTHING, STARTED TO DO ANYTHING, OR PREPARED TO DO ANYTHING TO END YOUR LIFE?: NO

## 2025-02-17 ASSESSMENT — PATIENT HEALTH QUESTIONNAIRE - PHQ9
1. LITTLE INTEREST OR PLEASURE IN DOING THINGS: NOT AT ALL
SUM OF ALL RESPONSES TO PHQ9 QUESTIONS 1 & 2: 0
2. FEELING DOWN, DEPRESSED OR HOPELESS: NOT AT ALL

## 2025-02-17 ASSESSMENT — PAIN - FUNCTIONAL ASSESSMENT
PAIN_FUNCTIONAL_ASSESSMENT: 0-10

## 2025-02-17 ASSESSMENT — PAIN DESCRIPTION - ORIENTATION: ORIENTATION: LEFT

## 2025-02-17 NOTE — ANESTHESIA POSTPROCEDURE EVALUATION
Patient: Jacqueline Sandoval    Procedure Summary       Date: 02/17/25 Room / Location: [facility] / [facility]    Anesthesia Start: 1602 Anesthesia Stop: 1646    Procedure: PPM IMPLANT DUAL (Left: Chest) Diagnosis:       Bradycardia      Abnormal Holter monitor finding    Providers: Leo Sandy MD Responsible Provider: Flex Gordon DO    Anesthesia Type: MAC ASA Status: 3            Anesthesia Type: MAC    Anesthesia Post Evaluation    Patient location during evaluation: bedside  Patient participation: complete - patient participated  Level of consciousness: awake and alert  Pain management: adequate  Airway patency: patent  Cardiovascular status: acceptable  Respiratory status: acceptable  Hydration status: acceptable  Postoperative Nausea and Vomiting: none      No notable events documented.

## 2025-02-17 NOTE — INTERVAL H&P NOTE
H&P reviewed. The patient was examined and there are no changes to the H&P.   Detail Level: Simple Number Of Freeze-Thaw Cycles: 1 freeze-thaw cycle Post-Care Instructions: I reviewed with the patient in detail post-care instructions. Patient is to wear sunprotection, and avoid picking at any of the treated lesions. Pt may apply Vaseline to crusted or scabbing areas. Show Applicator Variable?: Yes Render Note In Bullet Format When Appropriate: No Application Tool (Optional): Cry-AC Consent: The patient's consent was obtained including but not limited to risks of crusting, scabbing, blistering, scarring, darker or lighter pigmentary change, recurrence, incomplete removal and infection.

## 2025-02-17 NOTE — ANESTHESIA PREPROCEDURE EVALUATION
Jacqueline Sandoval is a 89 y.o. female here for:    PPM IMPLANT DUAL  With Leo Sandy MD  Pre-Op Diagnosis Codes:      * Slow heart rate [R00.1]     * Abnormal EKG [R94.31]    Relevant Problems   Cardiac  Echo 1/4/25:  CONCLUSIONS:   1. The left ventricular systolic function is normal, with a visually estimated ejection fraction of 60%.   2. Spectral Doppler shows a Grade I (impaired relaxation pattern) of left ventricular diastolic filling with normal left atrial filling pressure.   3. There is low normal right ventricular systolic function.   4. There is no evidence of mitral valve stenosis.   5. No evidence of mitral valve regurgitation.   6. No evidence of tricuspid regurgitation.   7. Aortic valve stenosis is not present.   8. The main pulmonary artery is normal in size, and position, with normal bifurcation into the left and right pulmonary arteries.     (+) Abnormal Holter monitor finding   (+) HTN (hypertension), benign      Neuro   (+) Anxiety   (+) Depression      /Renal   (+) Hyponatremia      Endocrine   (+) Hypothyroidism      Hematology   (+) Anemia due to stage 3a chronic kidney disease (Multi)      Musculoskeletal   (+) Fibromyalgia   (+) Osteoarthritis      HEENT   (+) [place] (hard of hearing)       Lab Results   Component Value Date    HGB 11.5 (L) 02/13/2025    HCT 34.9 (L) 02/13/2025    WBC 8.9 02/13/2025     02/13/2025     (L) 02/13/2025    K 5.0 02/13/2025     02/13/2025    CREATININE 2.02 (H) 02/13/2025    BUN 39 (H) 02/13/2025       Social History     Tobacco Use   Smoking Status Never   Smokeless Tobacco Never       Allergies   Allergen Reactions    Penicillins Unknown    Sulfa (Sulfonamide Antibiotics) Unknown and Rash       Current Outpatient Medications   Medication Instructions    acetaminophen (TYLENOL 8 HOUR) 1,300 mg, 2 times daily    amLODIPine (NORVASC) 5 mg, Daily    aspirin 81 mg EC tablet 1 tablet, Daily    docusate sodium (COLACE) 100 mg, 2 times daily     gabapentin (NEURONTIN) 200 mg, Nightly    hydrOXYzine pamoate (VISTARIL) 25 mg, 3 times daily PRN    levothyroxine (SYNTHROID, LEVOXYL) 125 mcg, oral, Daily, Take on an empty stomach at the same time each day, either 30 to 60 minutes prior to breakfast    lisinopril 40 mg, Daily    meclizine (ANTIVERT) 12.5 mg, 3 times daily PRN    melatonin 3 mg, Nightly    multivitamin with minerals tablet 1 tablet, Daily    peg 400-propylene glycol (Systane, propylene glycoL,) 0.4-0.3 % drops ophthalmic drops 1 drop, 4 times daily    polyethylene glycol (GLYCOLAX, MIRALAX) 17 g, Daily    sertraline (ZOLOFT) 25 mg, Daily    traZODone (DESYREL) 50 mg, Nightly       Past Surgical History:   Procedure Laterality Date    JOINT REPLACEMENT      OTHER SURGICAL HISTORY  10/05/2022    Humerus fracture repair       No family history on file.    NPO Details:  NPO/Void Status  Date of Last Liquid: 02/17/25  Time of Last Liquid: 0600  Date of Last Solid: 02/17/25  Time of Last Solid: 1800  Time of Last Void: 0000 (Pt has morrison cather from NH)        Physical Exam    Airway  Mallampati: II  TM distance: >3 FB  Neck ROM: full     Cardiovascular - normal exam     Dental - normal exam     Pulmonary - normal exam     Abdominal            Anesthesia Plan    History of general anesthesia?: yes  History of complications of general anesthesia?: no    ASA 3     MAC     The patient is not a current smoker.    intravenous induction   Anesthetic plan and risks discussed with patient.    Plan discussed with CRNA.

## 2025-02-17 NOTE — Clinical Note
The PACEMAKER, DUAL CHAMBER, MARISA MRI XT DR - [id number] device was inserted. The leads were placed into the connector and visually verified to be in correct position. Injury current obtained.

## 2025-02-18 ENCOUNTER — APPOINTMENT (OUTPATIENT)
Dept: CARDIOLOGY | Facility: HOSPITAL | Age: OVER 89
End: 2025-02-18
Payer: COMMERCIAL

## 2025-02-18 ENCOUNTER — APPOINTMENT (OUTPATIENT)
Dept: RADIOLOGY | Facility: HOSPITAL | Age: OVER 89
End: 2025-02-18
Payer: COMMERCIAL

## 2025-02-18 VITALS
DIASTOLIC BLOOD PRESSURE: 76 MMHG | HEIGHT: 63 IN | HEART RATE: 66 BPM | BODY MASS INDEX: 22.5 KG/M2 | RESPIRATION RATE: 16 BRPM | WEIGHT: 126.98 LBS | TEMPERATURE: 97.9 F | SYSTOLIC BLOOD PRESSURE: 180 MMHG | OXYGEN SATURATION: 94 %

## 2025-02-18 DIAGNOSIS — I49.5 SINOATRIAL NODE DYSFUNCTION (MULTI): ICD-10-CM

## 2025-02-18 DIAGNOSIS — Z95.0 CARDIAC PACEMAKER IN SITU: Primary | ICD-10-CM

## 2025-02-18 PROCEDURE — 2500000002 HC RX 250 W HCPCS SELF ADMINISTERED DRUGS (ALT 637 FOR MEDICARE OP, ALT 636 FOR OP/ED): Performed by: NURSE PRACTITIONER

## 2025-02-18 PROCEDURE — 2500000005 HC RX 250 GENERAL PHARMACY W/O HCPCS: Performed by: NURSE PRACTITIONER

## 2025-02-18 PROCEDURE — 93280 PM DEVICE PROGR EVAL DUAL: CPT

## 2025-02-18 PROCEDURE — 2500000001 HC RX 250 WO HCPCS SELF ADMINISTERED DRUGS (ALT 637 FOR MEDICARE OP): Performed by: NURSE PRACTITIONER

## 2025-02-18 PROCEDURE — 7100000011 HC EXTENDED STAY RECOVERY HOURLY - NURSING UNIT

## 2025-02-18 PROCEDURE — 71046 X-RAY EXAM CHEST 2 VIEWS: CPT

## 2025-02-18 PROCEDURE — 2500000004 HC RX 250 GENERAL PHARMACY W/ HCPCS (ALT 636 FOR OP/ED): Performed by: NURSE PRACTITIONER

## 2025-02-18 PROCEDURE — 93280 PM DEVICE PROGR EVAL DUAL: CPT | Performed by: INTERNAL MEDICINE

## 2025-02-18 PROCEDURE — 71046 X-RAY EXAM CHEST 2 VIEWS: CPT | Performed by: RADIOLOGY

## 2025-02-18 RX ORDER — HYDRALAZINE HYDROCHLORIDE 20 MG/ML
10 INJECTION INTRAMUSCULAR; INTRAVENOUS EVERY 6 HOURS PRN
Status: DISCONTINUED | OUTPATIENT
Start: 2025-02-18 | End: 2025-02-18 | Stop reason: HOSPADM

## 2025-02-18 RX ADMIN — DOCUSATE SODIUM 100 MG: 100 CAPSULE, LIQUID FILLED ORAL at 08:24

## 2025-02-18 RX ADMIN — LEVOTHYROXINE SODIUM 125 MCG: 125 TABLET ORAL at 06:39

## 2025-02-18 RX ADMIN — TRAMADOL HYDROCHLORIDE 50 MG: 50 TABLET, FILM COATED ORAL at 16:20

## 2025-02-18 RX ADMIN — AMLODIPINE BESYLATE 5 MG: 5 TABLET ORAL at 08:24

## 2025-02-18 RX ADMIN — SERTRALINE HYDROCHLORIDE 25 MG: 50 TABLET, FILM COATED ORAL at 08:24

## 2025-02-18 RX ADMIN — TRAMADOL HYDROCHLORIDE 50 MG: 50 TABLET, FILM COATED ORAL at 06:43

## 2025-02-18 RX ADMIN — CARBOXYMETHYLCELLULOSE SODIUM 1 DROP: 5 SOLUTION/ DROPS OPHTHALMIC at 15:52

## 2025-02-18 RX ADMIN — ONDANSETRON 4 MG: 2 INJECTION INTRAMUSCULAR; INTRAVENOUS at 15:47

## 2025-02-18 RX ADMIN — ASPIRIN 81 MG: 81 TABLET, COATED ORAL at 08:24

## 2025-02-18 RX ADMIN — HYDRALAZINE HYDROCHLORIDE 10 MG: 20 INJECTION INTRAMUSCULAR; INTRAVENOUS at 15:06

## 2025-02-18 RX ADMIN — LISINOPRIL 40 MG: 20 TABLET ORAL at 08:24

## 2025-02-18 ASSESSMENT — COGNITIVE AND FUNCTIONAL STATUS - GENERAL
TOILETING: A LOT
MOVING FROM LYING ON BACK TO SITTING ON SIDE OF FLAT BED WITH BEDRAILS: A LITTLE
HELP NEEDED FOR BATHING: A LOT
MOVING TO AND FROM BED TO CHAIR: A LITTLE
MOBILITY SCORE: 16
CLIMB 3 TO 5 STEPS WITH RAILING: A LOT
EATING MEALS: A LITTLE
WALKING IN HOSPITAL ROOM: A LOT
DRESSING REGULAR UPPER BODY CLOTHING: A LOT
TURNING FROM BACK TO SIDE WHILE IN FLAT BAD: A LITTLE
DAILY ACTIVITIY SCORE: 14
PERSONAL GROOMING: A LITTLE
STANDING UP FROM CHAIR USING ARMS: A LITTLE
DRESSING REGULAR LOWER BODY CLOTHING: A LOT

## 2025-02-18 ASSESSMENT — PAIN - FUNCTIONAL ASSESSMENT
PAIN_FUNCTIONAL_ASSESSMENT: 0-10

## 2025-02-18 ASSESSMENT — PAIN DESCRIPTION - LOCATION: LOCATION: SHOULDER

## 2025-02-18 ASSESSMENT — PAIN SCALES - GENERAL
PAINLEVEL_OUTOF10: 6
PAINLEVEL_OUTOF10: 4
PAINLEVEL_OUTOF10: 6
PAINLEVEL_OUTOF10: 0 - NO PAIN

## 2025-02-18 ASSESSMENT — PAIN DESCRIPTION - ORIENTATION: ORIENTATION: LEFT

## 2025-02-18 NOTE — CARE PLAN
Problem: Fall/Injury  Goal: Not fall by end of shift  Outcome: Progressing  Goal: Be free from injury by end of the shift  Outcome: Progressing   The patient's goals for the shift include rest    The clinical goals for the shift include safety

## 2025-02-18 NOTE — PROGRESS NOTES
Pt. Will discharge this date returning to [facility] at 2:00 pm via ambulance.  Spoke with son, nilda who is aware and in agreement to transfer.

## 2025-02-18 NOTE — DISCHARGE SUMMARY
Discharge Diagnosis  Bradycardia    Hospital Course  Patient is an 89-year-old female who was admitted electively to [facility] on 2/17/2025 for dual-chamber permanent pacemaker implant due to recent episodes of tachybradycardia syndrome with evidence of sinus tachycardia/possible A-fib with RVR as well as significant pauses up to 9.8 seconds.  Patient underwent successful implant of a [employer] Velma XT DR MRI device under the care of Dr. Sandy.  She tolerated the procedure well.  Please see procedural report for complete details.     Patient was monitored overnight on telemetry.  This AM she is doing well.  She denies complaints of lightheadedness, dizziness, chest pain, shortness of breath or palpitations.  She denies complaints of incisional pain while at rest.  She does endorse some discomfort with movement.  Left prepectoral pacemaker site/dressing is intact with no drainage, redness, hematoma or edema.  Device check this AM reveals good sensing, capture and lead impedances.  CXR this AM reveals no pneumothorax and pacemaker leads in good position.  Patient can be discharged back to her nursing facility today.  Outpatient follow-up has been arranged with the electrophysiology service.  Postprocedural activity, restrictions, potential complications, medications and future follow-up discussed at length.  All questions answered.  Patient verbalized understanding.    Pertinent Physical Exam At Time of Discharge  Physical Exam  Vitals reviewed.   Constitutional:       Appearance: Normal appearance.   HENT:      Head: Normocephalic and atraumatic.      Nose: Nose normal.      Mouth/Throat:      Mouth: Mucous membranes are moist.      Pharynx: Oropharynx is clear.   Eyes:      Pupils: Pupils are equal, round, and reactive to light.   Cardiovascular:      Rate and Rhythm: Normal rate and regular rhythm.      Pulses: Normal pulses.      Heart sounds: Normal heart sounds.   Pulmonary:      Effort: Pulmonary  effort is normal.      Breath sounds: Normal breath sounds.   Abdominal:      General: Bowel sounds are normal.      Palpations: Abdomen is soft.   Musculoskeletal:         General: Normal range of motion.      Cervical back: Normal range of motion and neck supple.   Skin:     General: Skin is warm and dry.      Comments: Left pre-pectoral PPM site/dressing is intact with no drainage, redness, hematoma or edema.     Neurological:      General: No focal deficit present.      Mental Status: She is alert and oriented to person, place, and time.   Psychiatric:         Mood and Affect: Mood normal.         Behavior: Behavior normal.       Home Medications     Medication List      CONTINUE taking these medications     acetaminophen 650 mg ER tablet; Commonly known as: Tylenol 8 HOUR   amLODIPine 5 mg tablet; Commonly known as: Norvasc   aspirin 81 mg EC tablet   docusate sodium 100 mg capsule; Commonly known as: Colace   gabapentin 100 mg capsule; Commonly known as: Neurontin   hydrOXYzine pamoate 25 mg capsule; Commonly known as: Vistaril   levothyroxine 125 mcg tablet; Commonly known as: Synthroid, Levoxyl;   Take 1 tablet (125 mcg) by mouth early in the morning.. Take on an empty   stomach at the same time each day, either 30 to 60 minutes prior to   breakfast   lisinopril 40 mg tablet   meclizine 12.5 mg tablet; Commonly known as: Antivert   melatonin 3 mg capsule   multivitamin with minerals tablet   polyethylene glycol 17 gram packet; Commonly known as: Glycolax, Miralax   sertraline 25 mg tablet; Commonly known as: Zoloft   Systane (propylene glycoL) 0.4-0.3 % drops ophthalmic drops; Generic   drug: peg 400-propylene glycol   traZODone 50 mg tablet; Commonly known as: [place]       Outpatient Follow-Up  Future Appointments   Date Time Provider Department Center   2/24/2025  2:00 PM [facility] [id number] CARDIOLOGY RN 1 [id number] [place]   5/21/2025 10:20 AM [facility] 1 VIOLETA Romero   5/21/2025 11:15 AM  Leo Sandy MD [id number] Kiran John, APRN-CNP

## 2025-02-18 NOTE — PROGRESS NOTES
Pt was admitted electively( extended recovery)   on 2/17/2025 for dual-chamber permanent pacemaker implant- Dr. Sandy  Pt is LTC resident at [facility] nursing facility, requested referral to  be sent via care port.   [facility] has confirmed pt is ltc resident, updated SW plan is for dc and return to nursing facility today.

## 2025-02-20 ENCOUNTER — NURSING HOME VISIT (OUTPATIENT)
Dept: POST ACUTE CARE | Facility: EXTERNAL LOCATION | Age: OVER 89
End: 2025-02-20
Payer: COMMERCIAL

## 2025-02-20 DIAGNOSIS — H91.90 HOH (HARD OF HEARING): ICD-10-CM

## 2025-02-20 DIAGNOSIS — Z95.0 S/P PLACEMENT OF CARDIAC PACEMAKER: ICD-10-CM

## 2025-02-20 DIAGNOSIS — I10 HTN (HYPERTENSION), BENIGN: ICD-10-CM

## 2025-02-20 DIAGNOSIS — Z87.440 HISTORY OF RECURRENT UTIS: ICD-10-CM

## 2025-02-20 DIAGNOSIS — G47.00 INSOMNIA, UNSPECIFIED TYPE: ICD-10-CM

## 2025-02-20 DIAGNOSIS — L76.82 INCISIONAL PAIN: ICD-10-CM

## 2025-02-20 DIAGNOSIS — Z97.8 FOLEY CATHETER IN PLACE: ICD-10-CM

## 2025-02-20 DIAGNOSIS — M79.7 FIBROMYALGIA: ICD-10-CM

## 2025-02-20 DIAGNOSIS — E03.9 HYPOTHYROIDISM, UNSPECIFIED TYPE: ICD-10-CM

## 2025-02-20 DIAGNOSIS — M19.90 OSTEOARTHRITIS, UNSPECIFIED OSTEOARTHRITIS TYPE, UNSPECIFIED SITE: ICD-10-CM

## 2025-02-20 DIAGNOSIS — R42 VERTIGO: ICD-10-CM

## 2025-02-20 DIAGNOSIS — F32.A DEPRESSION, UNSPECIFIED DEPRESSION TYPE: ICD-10-CM

## 2025-02-20 DIAGNOSIS — F01.54 VASCULAR DEMENTIA WITH ANXIETY, UNSPECIFIED DEMENTIA SEVERITY: ICD-10-CM

## 2025-02-20 DIAGNOSIS — F41.9 ANXIETY: ICD-10-CM

## 2025-02-20 DIAGNOSIS — R33.9 URINARY RETENTION: ICD-10-CM

## 2025-02-20 DIAGNOSIS — R00.1 JUNCTIONAL BRADYCARDIA: Primary | ICD-10-CM

## 2025-02-20 PROCEDURE — 99309 SBSQ NF CARE MODERATE MDM 30: CPT | Performed by: NURSE PRACTITIONER

## 2025-02-20 NOTE — LETTER
"Patient: Jacqueline Sandoval  : 1935    Encounter Date: 2025    Name: Jacqueline Sandoval  YOB: 1935    MONTHLY VISIT: Long term care resident    SUBJECTIVE:  The patient is a 89 yr old female who is here at [facility] as a LTC resident. She is being evaluated today for multiple medical problems.   The patient transitioned here to LTC after a skilled stay and hospitalization in Oct of 2022.   Patient was recently hospitalized a couple days ago for Dual - chamber Permanent pacemaker implant due to recent episodes of tachybradycardia syndrome w/evidence of sinus tachycardia/possible A-Fib w/RVR was well as significant pauses up to 9.8 seconds.   The patient underwent successful implant on 25 of a [employer] [place] XT DR MRI device under the care of Dr. Sandy. F/up device check showed good sensing, capture and lead impedances. CXR showed no pneumothorax and PM leads in good position.   PMH of recent hospitalization in 2025 for UTI treated w/IVAB and encephalopathy, as well as an episode of Junctional bradycardia (thought to be related to medication - possible hypothyroidism suboptimally controlled) and seen by EP who recommended outpatient f/up and possible pacemaker placement, fx left femur and repair on 23 (post op anemia due to blood loss and received one unit of pRBC's, post op CAP treated w/Levaquin - recovered, and urinary retention requiring Barker cath placement with failed attempts to pass voiding trial and started on Flomax - previously followed by urology Dr. Padilla), left humerus and repair 2022, recurrent UTI's - on prophylactic antibiotics, HTN, per patient and son CHF, fibromyalgia, Hyponatremia, anxiety/depression, and hypothyroidism. Patient is [place]. Patient is never a smoker. She is allergic to PCN and sulfa. The patient has a son who visits regularly.   The patient is resting in bed at this time w/ice pack to chest. She is c/o severe pain and states \"why " "did you let them do this to me\"? She is anxious and difficult to communicate with due the anxiety and also [place] status.     REVIEW OF SYSTEMS:  All review of systems are negative unless otherwise stated above under subjective.    Allergies   Allergen Reactions   • Penicillins Unknown   • Sulfa (Sulfonamide Antibiotics) Unknown and Rash       MEDICATIONS REVIEWED AT FACILITY:  Please see nursing facility medication list.    Living will related issues reviewed-Code status: DNRCCA    OBJECTIVE:  /68   Pulse 60   Physical Exam  Constitutional:       Appearance: She is normal weight.   HENT:      Head: Normocephalic. [place]  Cardiovascular:      Rate and Rhythm: Normal rate and regular rhythm.   Pulmonary:      Effort: Pulmonary effort is normal.      Breath sounds: Normal breath sounds.   Abdominal:      General: Abdomen is flat. Bowel sounds are normal.      Palpations: Abdomen is soft.   Genitourinary:     Comments: Barker to CD - clear pale yellow urine  Musculoskeletal:         General: Tenderness present.   Skin:     General: Skin is warm and dry. Left chest PPM site/dressing is intact with no drainage, redness, hematoma or edema.   Neurological:      General: No focal deficit present.      Mental Status: She is alert.      Comments: Oriented x2  [place]   Psychiatric:         Mood and Affect: Mood anxious     Assessment/Plan  Problem List Items Addressed This Visit       Anxiety    Depression    Fibromyalgia    HTN (hypertension), benign    [place] (hard of hearing)    Hypothyroidism    Osteoarthritis    Vertigo    Urinary retention    History of recurrent UTIs    Barker catheter in place     Other Visit Diagnoses       Junctional bradycardia    -  Primary    Vascular dementia with anxiety, unspecified dementia severity        Insomnia, unspecified type        S/P placement of cardiac pacemaker        Incisional pain                Skin integrity:  Nursing to monitor skin integrity as patient is at risk for pressure " injuries.  Turn and reposition Q 2 hours or more.  Air mattress and when up in chair cushion reducing device.  Dietician to evaluate and recommend.  Nutritional supplement to be implemented if needed.  Please monitor skin integrity and other pressure areas.    Wound Care :  Wound care per nursing  Left chest PM site  Referral to Wound clinic or Wound Nurse Practitioner if appropriate.    PLAN:  Pt has been seen for monthly visit.    The patient is confined to a long term care facility.   Recent nursing evaluation and notes were reviewed.     Junctional bradycardia, s/p PPM 2/17/25:  Cardiology - EP f/up as directed  Wound care per nursing  Pain: will add Vicodin one tab q6hr prn for 7 days  Echo 1/4/25  CONCLUSIONS:   1. The left ventricular systolic function is normal, with a visually estimated ejection fraction of 60%.   2. Spectral Doppler shows a Grade I (impaired relaxation pattern) of left ventricular diastolic filling with normal left atrial filling pressure.   3. There is low normal right ventricular systolic function.   4. There is no evidence of mitral valve stenosis.   5. No evidence of mitral valve regurgitation.   6. No evidence of tricuspid regurgitation.   7. Aortic valve stenosis is not present.   8. The main pulmonary artery is normal in size, and position, with normal bifurcation into the left and right pulmonary arteries.  Lab Results   Component Value Date    WBC 8.9 02/13/2025    HGB 11.5 (L) 02/13/2025    HCT 34.9 (L) 02/13/2025    MCV 98 02/13/2025     02/13/2025       HTN/CKD:   Monitor and follow BP readings.   Continue home meds -amlodipine 5 mg daily and lisinopril 40 mg daily  Labs to be done intermittently and adjust meds as needed.  BP readings reviewed -140/60's  Lab Results   Component Value Date    GLUCOSE 116 (H) 02/13/2025    CALCIUM 9.4 02/13/2025     (L) 02/13/2025    K 5.0 02/13/2025    CO2 21 02/13/2025     02/13/2025    BUN 39 (H) 02/13/2025    CREATININE  2.02 (H) 02/13/2025   Avoid nephrotoxic drugs.     Urinary retention, recurrent UTI's, Chronic Barker:  Barker to CD  Previously followed by Dr. Padilla Urology and family requested no outside f/up appts if could be handled here at the facility. (previously on Flomax and Prophylactic ATB Macrobid 50 mg daily - d'cd this past admission)     Hypothyroid:            Lab Results   Component Value Date     TSH 15.66 (H) 01/03/2025   Levothyroxine increased to 125 mcg daily  No TSH found, will need to order     Fibromyalgia:  Gabapentin 200 mg nightly     Depression/Anxiety, Vascular dementia:  All medications were discontinued in the hospital in Jan.   F/up Neurology in 2-3 months (Dr. Alex [phone])  Redirect as needed and maintain safe environment  Will order Hydroxyzine for anxiety and Mind Care to evaluate.      Insomnia:  Melatonin nightly     CAD:  Last lipid in May 2024  Cont Baby ASA     Vertigo:  Meclizine PRN     Any decline or change in condition needs to be communicated with the physician or myself.    Discussion with nursing staff regarding ongoing care and management.  Communication regarding patients status, overall condition, changes with plan (medications or treatments), and any questions from family completed if present.  If family not available, would communicate in person or via phone if needed.  We will continue with the plan and medications noted above.    We will continue to follow the patient here at the facility.    Discharge planning:   No plan for discharge, LTC resident, rehab potential    *Please note that nursing facility notes, facility EMR, outside laboratory agency, and  EMR do not interface.     Completion of the note was done through Dragon voice recognition technology and may include unintended or grammatical errors which may not have been recognized when finalizing the note.     Time:    GLORIA Valverde      Electronically Signed By: GLORIA Valverde   3/23/25  12:15 PM   none

## 2025-02-23 LAB
ATRIAL RATE: 394 BPM
ATRIAL RATE: 64 BPM
P AXIS: 49 DEGREES
P OFFSET: 183 MS
P ONSET: 127 MS
PR INTERVAL: 176 MS
Q ONSET: 215 MS
Q ONSET: 220 MS
QRS COUNT: 11 BEATS
QRS COUNT: 6 BEATS
QRS DURATION: 104 MS
QRS DURATION: 116 MS
QT INTERVAL: 492 MS
QT INTERVAL: 594 MS
QTC CALCULATION(BAZETT): 459 MS
QTC CALCULATION(BAZETT): 507 MS
QTC FREDERICIA: 500 MS
QTC FREDERICIA: 502 MS
R AXIS: 0 DEGREES
R AXIS: 11 DEGREES
T AXIS: 35 DEGREES
T AXIS: 47 DEGREES
T OFFSET: 461 MS
T OFFSET: 517 MS
VENTRICULAR RATE: 36 BPM
VENTRICULAR RATE: 64 BPM

## 2025-02-24 ENCOUNTER — APPOINTMENT (OUTPATIENT)
Dept: CARDIOLOGY | Facility: CLINIC | Age: OVER 89
End: 2025-02-24
Payer: COMMERCIAL

## 2025-02-24 DIAGNOSIS — Z95.0 PACEMAKER: ICD-10-CM

## 2025-02-24 DIAGNOSIS — R00.1 BRADYCARDIA: ICD-10-CM

## 2025-02-24 NOTE — PROGRESS NOTES
Pt. here for wound check of pacemaker implant by Dr. Sandy on 2/17/25 at [facility]. L clavicular area is well approximated with no erythema or drainage. Pt. denies any fever or chills. Temp 98.6

## 2025-03-13 ENCOUNTER — NURSING HOME VISIT (OUTPATIENT)
Dept: POST ACUTE CARE | Facility: EXTERNAL LOCATION | Age: OVER 89
End: 2025-03-13
Payer: COMMERCIAL

## 2025-03-13 DIAGNOSIS — Z97.8 FOLEY CATHETER IN PLACE: ICD-10-CM

## 2025-03-13 DIAGNOSIS — R33.9 URINARY RETENTION: ICD-10-CM

## 2025-03-13 DIAGNOSIS — F33.1 MODERATE EPISODE OF RECURRENT MAJOR DEPRESSIVE DISORDER: ICD-10-CM

## 2025-03-13 DIAGNOSIS — Z87.440 HISTORY OF RECURRENT UTIS: ICD-10-CM

## 2025-03-13 DIAGNOSIS — F41.9 ANXIETY: Primary | ICD-10-CM

## 2025-03-13 DIAGNOSIS — M79.7 FIBROMYALGIA: ICD-10-CM

## 2025-03-13 DIAGNOSIS — R63.4 WEIGHT LOSS: ICD-10-CM

## 2025-03-13 DIAGNOSIS — R00.1 JUNCTIONAL BRADYCARDIA: ICD-10-CM

## 2025-03-13 DIAGNOSIS — Z95.0 S/P PLACEMENT OF CARDIAC PACEMAKER: ICD-10-CM

## 2025-03-13 DIAGNOSIS — I10 HTN (HYPERTENSION), BENIGN: ICD-10-CM

## 2025-03-13 DIAGNOSIS — E03.9 HYPOTHYROIDISM, UNSPECIFIED TYPE: ICD-10-CM

## 2025-03-13 DIAGNOSIS — F01.54 VASCULAR DEMENTIA WITH ANXIETY, UNSPECIFIED DEMENTIA SEVERITY: ICD-10-CM

## 2025-03-13 PROCEDURE — 99309 SBSQ NF CARE MODERATE MDM 30: CPT | Performed by: NURSE PRACTITIONER

## 2025-03-23 VITALS — DIASTOLIC BLOOD PRESSURE: 68 MMHG | SYSTOLIC BLOOD PRESSURE: 144 MMHG | HEART RATE: 60 BPM

## 2025-03-23 NOTE — PROGRESS NOTES
"Name: Jacqueline Sandoval  YOB: 1935    MONTHLY VISIT: Long term care resident    SUBJECTIVE:  The patient is a 89 yr old female who is here at [facility] as a LTC resident. She is being evaluated today for multiple medical problems.   The patient transitioned here to LTC after a skilled stay and hospitalization in Oct of 2022.   Patient was recently hospitalized a couple days ago for Dual - chamber Permanent pacemaker implant due to recent episodes of tachybradycardia syndrome w/evidence of sinus tachycardia/possible A-Fib w/RVR was well as significant pauses up to 9.8 seconds.   The patient underwent successful implant on 2/17/25 of a [employer] Velma XT DR MRI device under the care of Dr. Sandy. F/up device check showed good sensing, capture and lead impedances. CXR showed no pneumothorax and PM leads in good position.   PMH of recent hospitalization in Jan 2025 for UTI treated w/IVAB and encephalopathy, as well as an episode of Junctional bradycardia (thought to be related to medication - possible hypothyroidism suboptimally controlled) and seen by EP who recommended outpatient f/up and possible pacemaker placement, fx left femur and repair on 11/24/23 (post op anemia due to blood loss and received one unit of pRBC's, post op CAP treated w/Levaquin - recovered, and urinary retention requiring Barker cath placement with failed attempts to pass voiding trial and started on Flomax - previously followed by urology Dr. Padilla), left humerus and repair 9/2022, recurrent UTI's - on prophylactic antibiotics, HTN, per patient and son CHF, fibromyalgia, Hyponatremia, anxiety/depression, and hypothyroidism. Patient is [place]. Patient is never a smoker. She is allergic to PCN and sulfa. The patient has a son who visits regularly.   The patient is resting in bed at this time w/ice pack to chest. She is c/o severe pain and states \"why did you let them do this to me\"? She is anxious and difficult to " communicate with due the anxiety and also [place] status.     REVIEW OF SYSTEMS:  All review of systems are negative unless otherwise stated above under subjective.    Allergies   Allergen Reactions    Penicillins Unknown    Sulfa (Sulfonamide Antibiotics) Unknown and Rash       MEDICATIONS REVIEWED AT FACILITY:  Please see nursing facility medication list.    Living will related issues reviewed-Code status: DNRCCA    OBJECTIVE:  /68   Pulse 60   Physical Exam  Constitutional:       Appearance: She is normal weight.   HENT:      Head: Normocephalic. [place]  Cardiovascular:      Rate and Rhythm: Normal rate and regular rhythm.   Pulmonary:      Effort: Pulmonary effort is normal.      Breath sounds: Normal breath sounds.   Abdominal:      General: Abdomen is flat. Bowel sounds are normal.      Palpations: Abdomen is soft.   Genitourinary:     Comments: Barker to CD - clear pale yellow urine  Musculoskeletal:         General: Tenderness present.   Skin:     General: Skin is warm and dry. Left chest PPM site/dressing is intact with no drainage, redness, hematoma or edema.   Neurological:      General: No focal deficit present.      Mental Status: She is alert.      Comments: Oriented x2  [place]   Psychiatric:         Mood and Affect: Mood anxious     Assessment/Plan   Problem List Items Addressed This Visit       Anxiety    Depression    Fibromyalgia    HTN (hypertension), benign    [place] (hard of hearing)    Hypothyroidism    Osteoarthritis    Vertigo    Urinary retention    History of recurrent UTIs    Barker catheter in place     Other Visit Diagnoses       Junctional bradycardia    -  Primary    Vascular dementia with anxiety, unspecified dementia severity        Insomnia, unspecified type        S/P placement of cardiac pacemaker        Incisional pain                Skin integrity:  Nursing to monitor skin integrity as patient is at risk for pressure injuries.  Turn and reposition Q 2 hours or more.  Air mattress and  when up in chair cushion reducing device.  Dietician to evaluate and recommend.  Nutritional supplement to be implemented if needed.  Please monitor skin integrity and other pressure areas.    Wound Care :  Wound care per nursing  Left chest PM site  Referral to Wound clinic or Wound Nurse Practitioner if appropriate.    PLAN:  Pt has been seen for monthly visit.    The patient is confined to a long term care facility.   Recent nursing evaluation and notes were reviewed.     Junctional bradycardia, s/p PPM 2/17/25:  Cardiology - EP f/up as directed  Wound care per nursing  Pain: will add Vicodin one tab q6hr prn for 7 days  Echo 1/4/25  CONCLUSIONS:   1. The left ventricular systolic function is normal, with a visually estimated ejection fraction of 60%.   2. Spectral Doppler shows a Grade I (impaired relaxation pattern) of left ventricular diastolic filling with normal left atrial filling pressure.   3. There is low normal right ventricular systolic function.   4. There is no evidence of mitral valve stenosis.   5. No evidence of mitral valve regurgitation.   6. No evidence of tricuspid regurgitation.   7. Aortic valve stenosis is not present.   8. The main pulmonary artery is normal in size, and position, with normal bifurcation into the left and right pulmonary arteries.  Lab Results   Component Value Date    WBC 8.9 02/13/2025    HGB 11.5 (L) 02/13/2025    HCT 34.9 (L) 02/13/2025    MCV 98 02/13/2025     02/13/2025       HTN/CKD:   Monitor and follow BP readings.   Continue home meds -amlodipine 5 mg daily and lisinopril 40 mg daily  Labs to be done intermittently and adjust meds as needed.  BP readings reviewed -140/60's  Lab Results   Component Value Date    GLUCOSE 116 (H) 02/13/2025    CALCIUM 9.4 02/13/2025     (L) 02/13/2025    K 5.0 02/13/2025    CO2 21 02/13/2025     02/13/2025    BUN 39 (H) 02/13/2025    CREATININE 2.02 (H) 02/13/2025   Avoid nephrotoxic drugs.     Urinary retention,  recurrent UTI's, Chronic Barker:  Barker to CD  Previously followed by Dr. Padilla Urology and family requested no outside f/up appts if could be handled here at the facility. (previously on Flomax and Prophylactic ATB Macrobid 50 mg daily - d'cd this past admission)     Hypothyroid:            Lab Results   Component Value Date     TSH 15.66 (H) 01/03/2025   Levothyroxine increased to 125 mcg daily  No TSH found, will need to order     Fibromyalgia:  Gabapentin 200 mg nightly     Depression/Anxiety, Vascular dementia:  All medications were discontinued in the hospital in Jan.   F/up Neurology in 2-3 months (Dr. Alex [phone])  Redirect as needed and maintain safe environment  Will order Hydroxyzine for anxiety and Mind Care to evaluate.      Insomnia:  Melatonin nightly     CAD:  Last lipid in May 2024  Cont Baby ASA     Vertigo:  Meclizine PRN     Any decline or change in condition needs to be communicated with the physician or myself.    Discussion with nursing staff regarding ongoing care and management.  Communication regarding patients status, overall condition, changes with plan (medications or treatments), and any questions from family completed if present.  If family not available, would communicate in person or via phone if needed.  We will continue with the plan and medications noted above.    We will continue to follow the patient here at the facility.    Discharge planning:   No plan for discharge, LTC resident, rehab potential    *Please note that nursing facility notes, facility EMR, outside laboratory agency, and  EMR do not interface.     Completion of the note was done through Dragon voice recognition technology and may include unintended or grammatical errors which may not have been recognized when finalizing the note.     Time:    Rosetta Christy, APRN-CNP

## 2025-03-25 ENCOUNTER — TELEPHONE (OUTPATIENT)
Dept: CARDIOLOGY | Facility: CLINIC | Age: OVER 89
End: 2025-03-25
Payer: COMMERCIAL

## 2025-03-25 NOTE — TELEPHONE ENCOUNTER
Patient has a device. Attempted to call patient's nurse back to have her send a remote transmission. [facility] was unable to get a hold of her after multiple pages. LM with the phone number on file in the chart also.

## 2025-03-25 NOTE — TELEPHONE ENCOUNTER
Nurse name Shanta Diez from [facility] called and stated that patient passed out after her morning meds but the nurses were able to get her in bed safely with her vitals being 119/67, Heart rate: 90 , and resp being 16 with 97% of room air. Before she passed out her vitals were 144/62 with HR: 68. They wanted to make Dr. Sandy aware due to the fact this has never happened to the patient before and if any question or concerns to please call [phone] and ask for patient nurse.

## 2025-03-26 ENCOUNTER — HOSPITAL ENCOUNTER (OUTPATIENT)
Dept: CARDIOLOGY | Facility: HOSPITAL | Age: OVER 89
Discharge: HOME | End: 2025-03-26
Payer: COMMERCIAL

## 2025-03-26 DIAGNOSIS — I49.5 SINOATRIAL NODE DYSFUNCTION (MULTI): ICD-10-CM

## 2025-03-26 DIAGNOSIS — Z95.0 CARDIAC PACEMAKER IN SITU: ICD-10-CM

## 2025-04-06 VITALS — SYSTOLIC BLOOD PRESSURE: 139 MMHG | HEART RATE: 60 BPM | DIASTOLIC BLOOD PRESSURE: 77 MMHG

## 2025-04-06 NOTE — PROGRESS NOTES
Name: Jacqueline Sandoval  YOB: 1935    ACUTE VISIT: Long term care resident    SUBJECTIVE:  Nursing reporting increased anxiety in the evening and HS. Patient also noted to have weight loss.  The resident has h/o anxiety and depression. She has been exhibiting increased agitation in the evening and night, calling son. Recent hospitalization, medications were adjusted and patients Valium was d'cd on return to the facility per Neurology. The patient has Vistaril PRN.   Jacqueline is sitting up in bed awake. She is pleasant but very [place]. I am not able to have a meaningful conversation. She reports that she thinks she is going crazy at times and laughs.   She has some upcoming appts in May for the Pacemaker that was recently placed. Wound is healing well.    REVIEW OF SYSTEMS:   All review of systems are negative unless otherwise stated above under subjective.    Allergies   Allergen Reactions    Penicillins Unknown    Sulfa (Sulfonamide Antibiotics) Unknown and Rash       MEDICATIONS REVIEWED AT FACILITY:  Please see nursing facility medication list.    Living will related issues reviewed-Code status: DNRCCA    OBJECTIVE:  /77   Pulse 60   Physical Exam  Constitutional:       Appearance: She is normal weight.   HENT:      Head: Normocephalic. [place]  Cardiovascular:      Rate and Rhythm: Normal rate and regular rhythm.   Pulmonary:      Effort: Pulmonary effort is normal.      Breath sounds: Normal breath sounds.   Abdominal:      General: Abdomen is flat. Bowel sounds are normal.      Palpations: Abdomen is soft.   Genitourinary:     Comments: Barker to CD - clear pale yellow urine  Musculoskeletal:         General: Tenderness present.   Skin:     General: Skin is warm and dry. Left chest PPM site with no drainage, redness, hematoma or edema.   Neurological:      General: No focal deficit present.      Mental Status: She is alert.      Comments: Oriented x2  [place]   Psychiatric:         Mood and Affect: Mood  anxious    Assessment/Plan   Problem List Items Addressed This Visit       Anxiety - Primary    Depression    Fibromyalgia    HTN (hypertension), benign    Hypothyroidism    Urinary retention    History of recurrent UTIs    Barker catheter in place     Other Visit Diagnoses       Junctional bradycardia        S/P placement of cardiac pacemaker        Vascular dementia with anxiety, unspecified dementia severity        Weight loss                Skin integrity:  Nursing to monitor skin integrity as patient is at risk for pressure injuries.  Turn and reposition Q 2 hours or more.  Air mattress and when up in chair cushion reducing device.  Dietician to evaluate and recommend.  Nutritional supplement to be implemented if needed.  Please monitor skin integrity and other pressure areas.    Wound Care :  Wound care per nursing  Left chest PM site  Referral to Wound clinic or Wound Nurse Practitioner if appropriate.    PLAN:  Pt has been seen for an Acute visit.  The patient resides in a Long term care facility.  Recent nursing evaluation and notes were reviewed.     Increased anxiety in evening/HS, Depression/Anxiety, Vascular dementia:  All medications were discontinued in the hospital in Jan. 2025 thought to be over medicated w/sedatives.  Neurology felt Vascular dementia underlying and triggered previous episode of acute encephalopathy as well as underlying infection.   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 Alex [phone])  Redirect as needed and maintain safe environment  Will order Hydroxyzine 25 mg Q8hr PRN for anxiety.  [facility] to evaluate.   Cont Zoloft 25 mg QHS  Cont Trazodone but will increase to 50 mg QHS    Insomnia:  Melatonin nightly, will increase to 6 mg QHS    Weight loss, decreased appetite, h/o vascular dementia:  Patient has had weight loss.   Weight as of this date: 3/11/25: 120.4#  12/3/2024: 142.2#  Nutritional juices  started as opposed to house supplement (pt does not like taste) and will trial Magic cup. Son is aware and encouraged to bring food from home for resident.   ______________________________________________________________________________________________________  Dementia is a progressive disease and the cognitive ability of the patient will decline over time.   The sense of smell and taste can change leading to food aversions and decreased appetite.   Decreased motor skills can cause difficulty with using utensils.  Decreased appetite and weight loss is common in patients suffering from Dementia and this is a continued challenge for caregivers and health care providers as well as alarming for family members.  Fingers foods and cutting foods into smaller pieces can allow for the patient to feed themselves.   Hand feeding by caregivers is the best option for patients suffering from dementia.   High caloric puddings, high caloric shakes, or house supplements can be added for patients with weight loss.   Favorite food items can be brought in by the patients families.  Patients in the later stages of Dementia will often experience swallowing difficulties which can arise as the disease progresses leading to decreased food intake, malnutrition, and dehydration.    Families need ongoing education regarding the decline of patients suffering from Dementia and understand the progressive nature of the disease that currently has no cure.    Realistic expectation needs to be in place regarding quality of life and comfort for the patient.       CAD:  Last lipid in May 2024  Cont Baby ASA     Vertigo:  Meclizine PRN    Junctional bradycardia, s/p PPM 2/17/25:  Cardiology - EP f/up as directed - appt in May  Wound care per nursing  Echo 1/4/25  CONCLUSIONS:   1. The left ventricular systolic function is normal, with a visually estimated ejection fraction of 60%.   2. Spectral Doppler shows a Grade I (impaired relaxation pattern) of  left ventricular diastolic filling with normal left atrial filling pressure.   3. There is low normal right ventricular systolic function.   4. There is no evidence of mitral valve stenosis.   5. No evidence of mitral valve regurgitation.   6. No evidence of tricuspid regurgitation.   7. Aortic valve stenosis is not present.   8. The main pulmonary artery is normal in size, and position, with normal bifurcation into the left and right pulmonary arteries.    HTN/CKD:   Monitor and follow BP readings.   Continue home meds -amlodipine 5 mg daily and lisinopril 40 mg daily  Labs to be done intermittently and adjust meds as needed.  BP readings reviewed -130/70s  Avoid nephrotoxic drugs.     Urinary retention, recurrent UTI's, Chronic Barker:  Barker to CD  Previously followed by Dr. Padilla Urology and family requested no outside f/up appts if could be handled here at the facility. (previously on Flomax and Prophylactic ATB Macrobid 50 mg daily - d'cd this past admission)     Hypothyroid:            Lab Results   Component Value Date     TSH 15.66 (H) 01/03/2025   Levothyroxine increased to 125 mcg daily  TSH pending 3/25/25     Fibromyalgia:  Gabapentin 200 mg nightly    Any decline or change in condition needs to be communicated with the physician or myself.    Discussion with nursing staff regarding ongoing care and management.  Communication regarding patients status, overall condition, changes with plan (medications or treatments), and any questions from family completed if present.  If family not available, would communicate in person or via phone if needed.  We will continue with the medications noted above.    We will continue to follow the patient here at the facility.    Discharge planning:   No plan for discharge, LTC resident, rehab potential    *Please note that nursing facility notes, facility EMR, outside laboratory agency, and  EMR do not interface.     Completion of the note was done through Dragon voice  recognition technology and may include unintended or grammatical errors which may not have been recognized when finalizing the note.     Time:    Rosetta Christy, JOCELIN-CNP

## 2025-04-14 ENCOUNTER — NURSING HOME VISIT (OUTPATIENT)
Dept: POST ACUTE CARE | Facility: EXTERNAL LOCATION | Age: OVER 89
End: 2025-04-14
Payer: COMMERCIAL

## 2025-04-14 DIAGNOSIS — I10 HTN (HYPERTENSION), BENIGN: ICD-10-CM

## 2025-04-14 DIAGNOSIS — N18.31 STAGE 3A CHRONIC KIDNEY DISEASE (MULTI): ICD-10-CM

## 2025-04-14 DIAGNOSIS — R33.9 URINARY RETENTION: ICD-10-CM

## 2025-04-14 DIAGNOSIS — I11.0 HYPERTENSIVE HEART DISEASE WITH CHRONIC COMBINED SYSTOLIC AND DIASTOLIC CONGESTIVE HEART FAILURE: Primary | ICD-10-CM

## 2025-04-14 DIAGNOSIS — I50.42 HYPERTENSIVE HEART DISEASE WITH CHRONIC COMBINED SYSTOLIC AND DIASTOLIC CONGESTIVE HEART FAILURE: Primary | ICD-10-CM

## 2025-04-14 DIAGNOSIS — F01.54 VASCULAR DEMENTIA WITH ANXIETY, UNSPECIFIED DEMENTIA SEVERITY: ICD-10-CM

## 2025-04-14 DIAGNOSIS — Z95.0 S/P PLACEMENT OF CARDIAC PACEMAKER: ICD-10-CM

## 2025-04-14 DIAGNOSIS — F41.9 SEVERE ANXIETY: ICD-10-CM

## 2025-04-14 DIAGNOSIS — Z97.8 FOLEY CATHETER IN PLACE: ICD-10-CM

## 2025-04-14 DIAGNOSIS — Z87.440 HISTORY OF RECURRENT UTIS: ICD-10-CM

## 2025-04-14 DIAGNOSIS — F33.1 MODERATE EPISODE OF RECURRENT MAJOR DEPRESSIVE DISORDER: ICD-10-CM

## 2025-04-14 DIAGNOSIS — M79.7 FIBROMYALGIA: ICD-10-CM

## 2025-04-14 DIAGNOSIS — E03.9 HYPOTHYROIDISM, UNSPECIFIED TYPE: ICD-10-CM

## 2025-04-14 PROCEDURE — 99309 SBSQ NF CARE MODERATE MDM 30: CPT | Performed by: NURSE PRACTITIONER

## 2025-04-14 NOTE — LETTER
Patient: Jacqueline Sandoval  : 1935    Encounter Date: 2025    Name: Jacqueline Sandoval  YOB: 1935    MONTHLY VISIT: Long term care resident    SUBJECTIVE:  Jacqueline Sandoval is a 89 y.o. female who lives here at [facility] as LTC resident. Patient is being evaluated for multiple medical problems.   Nursing reports that the son is asking for a Hospice consult.   The patient transitioned here to LTC after a skilled visit and hospitalization in Oct of 2022.   The patient recently had a Dual chamber permanent pacemaker placed 25. PMH of recurrent UTI - on prophylactic ATB, Left femur fx and repair 2023, urinary retention related to neurogenic bladder requiring a Barker catheter (previously followed by Urology - Dr. Padilla) - recently Barker catheter set up changed , left humerus fx and repair 2022, HTN, CKD, hypertensive heart disease w/heart failure, fibromyalgia, hyponatremia, Dementia, anxiety/depression (followed by Mind care), and hypothyroidism. Patient is [place],   Patient has been having increased anxiety. Mind care seen today. Zoloft increased to 75 mg daily. Vistaril 25 mg BID routine and Ativan 0.5 mg Q12 hr PRN. Son asked for Hospice consult today. [facility] nurse here to evaluate patient.   The patient is resting comfortably in bed and without signs of distress. Patient is [place] and this makes communication difficult.     REVIEW OF SYSTEMS:  All review of systems are negative unless otherwise stated above under subjective.    LABS REVIEWED AT FACILITY:  Date: 25  Test Value Flag Reference Range   WBC 7.71  3.70 - 11.00   HGB 10.5 L 11.5 - 15.5   HCT 30.3 L 36.0 - 46.0   Platelets 220  150 - 400   BUN 24 H 7 - 21   Creat 1.41 H 0.58 - 0.96   GFR 36 L >60     Other Abnormal Values  Test Value Flag Reference range   RBC 3.29 L 3.9 - 5.2   ALBUMIN 3.6 L 3.9 - 4.9   Na 129 L 136 - 144   Cl 97 L 98 - 107     TSH  REPORTED 2025 11:33  TSH L 0.052 mIU/L  0.270-4.200          Free T4  REPORTED 03/25/2025 21:07  Free T4 H 2.5 ng/dL 0.9-1.7 PLDEF    RX Allergies[1]    MEDICATIONS REVIEWED AT FACILITY:  Please see nursing facility medication list.    Living will related issues reviewed-Code status: DNC    OBJECTIVE:  /63   Pulse 60   Physical Exam  Constitutional:       Appearance: She is normal weight.   HENT:      Head: Normocephalic. [place]  Cardiovascular:      Rate and Rhythm: Normal rate and regular rhythm.   Pulmonary:      Effort: Pulmonary effort is normal.      Breath sounds: Normal breath sounds.   Abdominal:      General: Abdomen is flat. Bowel sounds are normal.      Palpations: Abdomen is soft.   Genitourinary:     Comments: Barker to CD - clear pale yellow urine  Musculoskeletal:         General: Tenderness present.   Skin:     General: Skin is warm and dry. Left chest PPM site healed well  Neurological:      General: No focal deficit present.      Mental Status: She is alert.      Comments: Oriented x2  [place]   Psychiatric:         Mood and Affect: Mood anxious    Assessment/Plan  Problem List Items Addressed This Visit       Depression    Fibromyalgia    HTN (hypertension), benign    Hypothyroidism    Urinary retention    History of recurrent UTIs    Barker catheter in place     Other Visit Diagnoses         Hypertensive heart disease with chronic combined systolic and diastolic congestive heart failure    -  Primary      S/P placement of cardiac pacemaker          Vascular dementia with anxiety, unspecified dementia severity          Stage 3a chronic kidney disease (Multi)          Severe anxiety                PLAN:  Pt has been seen for monthly visit.    The patient is confined to a long term care facility.   Recent nursing evaluation and notes were reviewed.     Consult for Hospice care:  Hospice is for patients with serious or terminal illnesses who are no longer being treated to cure the disease.   The goal of hospice care is to keep the  patient comfortable.   Decisions about medications are made by the Facility Physician, APRN, and Hospice team collaboratively with the patient and family.  Medications for chronic illnesses are not normally continued long term as medications could cause harm or are no longer benefit the patient to maintain comfort.   Patients and family can request for certain medications to be continued if the physician, APRN, and Hospice team is in agreement to continue these medications as the goal of Hospice care is to provide comfort.   Medications that are continued or initiated are medications for pain, antiemetics, antidepressants, antianxiety, oxygen and/or medications for respiratory symptoms.   Labs and diagnostic testing are not routinely done while the patient is on Hospice care but can be ordered per the discretion of the physician, APRN, and Hospice team if absolutely needed.     Increased anxiety in evening/HS, Depression/Anxiety, Vascular dementia:  All medications were discontinued in the hospital in Jan. 2025 thought to be over medicated w/sedatives.  Neurology felt Vascular dementia underlying and triggered previous episode of acute encephalopathy as well as underlying infection.   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 Alex [phone]) - this will not be done as patient will be signing w/Hospice.  Redirect as needed and maintain safe environment  Will order Hydroxyzine 25 mg BID routine  Mind Care seen today  Increased today to Zoloft 75 mg QHS  Trazodone 50 mg QHS  Ativan 0.5 mg Q12 hr PRN     Insomnia:  Melatonin nightly, will increase to 6 mg QHS     Weight loss, decreased appetite, h/o vascular dementia:  Patient has had weight loss.   Weight as of this date:   4/8/25: 120.1#  3/11/25: 120.4#  12/3/2024: 142.2#  ______________________________________________________________________________________________________  Dementia is a  progressive disease and the cognitive ability of the patient will decline over time.   The sense of smell and taste can change leading to food aversions and decreased appetite.   Decreased motor skills can cause difficulty with using utensils.  Decreased appetite and weight loss is common in patients suffering from Dementia and this is a continued challenge for caregivers and health care providers as well as alarming for family members.  Fingers foods and cutting foods into smaller pieces can allow for the patient to feed themselves.   Hand feeding by caregivers is the best option for patients suffering from dementia.   High caloric puddings, high caloric shakes, or house supplements can be added for patients with weight loss.   Favorite food items can be brought in by the patients families.  Patients in the later stages of Dementia will often experience swallowing difficulties which can arise as the disease progresses leading to decreased food intake, malnutrition, and dehydration.    Families need ongoing education regarding the decline of patients suffering from Dementia and understand the progressive nature of the disease that currently has no cure.    Realistic expectation needs to be in place regarding quality of life and comfort for the patient.        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 The left ventricular systolic function is normal, with a visually estimated ejection fraction of 60%.   2. Spectral Doppler shows a Grade I (impaired relaxation pattern) of left ventricular diastolic filling with normal left atrial filling pressure.   3. There is low normal right ventricular systolic function.   4. There is no evidence of mitral valve stenosis.   5. No evidence of mitral valve  regurgitation.   6. No evidence of tricuspid regurgitation.   7. Aortic valve stenosis is not present.   8. The main pulmonary artery is normal in size, and position, with normal bifurcation into the left and right pulmonary arteries.     Wound Care :  Wound care per nursing  Left chest PM site  Referral to Wound clinic or Wound Nurse Practitioner if appropriate.    HTN/HF, CKD:   Monitor and follow BP readings.   Continue home meds -amlodipine 5 mg daily and lisinopril 40 mg daily  Labs to be done intermittently and adjust meds as needed  BP readings reviewed -120/60s  Avoid nephrotoxic drugs.     Urinary retention, recurrent UTI's, Chronic Barker:  Barker to CD (changed 4/13/25)  Previously followed by Dr. Padilla Urology and family requested no outside f/up appts if could be handled here at the facility. (previously on Flomax and Prophylactic ATB Macrobid 50 mg daily - d'cd this past admission)     Hypothyroid:            Lab Results   Component Value Date     TSH 15.66 (H) 01/03/2025   Levothyroxine 88 mcg daily    Fibromyalgia:  Gabapentin 200 mg nightly    Skin integrity:  Nursing to monitor skin integrity as patient is at risk for pressure injuries.  Turn and reposition Q 2 hours or more.  Air mattress and when up in chair cushion reducing device.  Dietician to evaluate and recommend.  Nutritional supplement to be implemented if needed.  Please monitor skin integrity and other pressure areas.    Any decline or change in condition needs to be communicated with the physician or myself.    Discussion with nursing staff regarding ongoing care and management.  Communication regarding patients status, overall condition, changes with plan (medications or treatments), and any questions from family completed if present.  If family not available, would communicate in person or via phone if needed.  We will continue with the plan and medications noted above.    We will continue to follow the patient here at the  facility.    Discharge planning:   No plan for discharge, LTC resident, rehab potential    *Please note that nursing facility notes, facility EMR, outside laboratory agency, and  EMR do not interface.     Completion of the note was done through Dragon voice recognition technology and may include unintended or grammatical errors which may not have been recognized when finalizing the note.     Time:    GLORIA Valverde        Electronically Signed By: GLORIA Valverde   5/24/25  8:48 PM       [1]  Allergies  Allergen Reactions   • Penicillins Unknown   • Sulfa (Sulfonamide Antibiotics) Unknown and Rash

## 2025-04-17 ENCOUNTER — NURSING HOME VISIT (OUTPATIENT)
Dept: POST ACUTE CARE | Facility: EXTERNAL LOCATION | Age: OVER 89
End: 2025-04-17
Payer: COMMERCIAL

## 2025-04-17 DIAGNOSIS — F33.1 MODERATE EPISODE OF RECURRENT MAJOR DEPRESSIVE DISORDER: ICD-10-CM

## 2025-04-17 DIAGNOSIS — Z51.5 HOSPICE CARE PATIENT: ICD-10-CM

## 2025-04-17 DIAGNOSIS — R33.9 URINARY RETENTION: ICD-10-CM

## 2025-04-17 DIAGNOSIS — F01.54 VASCULAR DEMENTIA WITH ANXIETY, UNSPECIFIED DEMENTIA SEVERITY: ICD-10-CM

## 2025-04-17 DIAGNOSIS — I11.0 HYPERTENSIVE HEART DISEASE WITH CHRONIC COMBINED SYSTOLIC AND DIASTOLIC CONGESTIVE HEART FAILURE: ICD-10-CM

## 2025-04-17 DIAGNOSIS — I50.42 HYPERTENSIVE HEART DISEASE WITH CHRONIC COMBINED SYSTOLIC AND DIASTOLIC CONGESTIVE HEART FAILURE: ICD-10-CM

## 2025-04-17 DIAGNOSIS — F41.9 SEVERE ANXIETY: Primary | ICD-10-CM

## 2025-04-17 DIAGNOSIS — Z97.8 FOLEY CATHETER IN PLACE: ICD-10-CM

## 2025-04-17 PROCEDURE — 99308 SBSQ NF CARE LOW MDM 20: CPT | Performed by: NURSE PRACTITIONER

## 2025-04-17 NOTE — LETTER
"Patient: Jacqueline Sandoval  : 1935    Encounter Date: 2025    Name: Jacqueline Sandoval  YOB: 1935    FOLLOW UP VISIT: Long term care resident, Hospice [facility]    SUBJECTIVE:  Jacqueline Sandoval is a 89 y.o. female who is now on Hospice care. Routine Labs can be d'cd. Comfort meds per Hospice. Routine meds to be discontinued.   Patient is resting quietly in bed. She smiles when awakened. Pt, appears comfortable stating \"I just want to go to heaven\".    No acute distress noted.   Nursing team reports no changes or concerns.    REVIEW OF SYSTEMS:   All review of systems are negative unless otherwise stated above under subjective.    RX Allergies[1]    MEDICATIONS REVIEWED AT FACILITY:  Please see nursing facility medication list.    [facility]    OBJECTIVE:  /68   Physical Exam  Constitutional:       Appearance: She is normal weight.   HENT:      Head: Normocephalic. [place]  Cardiovascular:      Rate and Rhythm: Normal rate and regular rhythm.   Pulmonary:      Effort: Pulmonary effort is normal.      Breath sounds: Normal breath sounds.   Abdominal:      General: Abdomen is flat. Bowel sounds are normal.      Palpations: Abdomen is soft.   Genitourinary:     Comments: Barker to CD - clear samuel urine  Musculoskeletal:         General: Tenderness present.   Skin:     General: Skin is warm and dry. Left chest PPM site healed well  Neurological:      General: No focal deficit present.      Mental Status: She is alert.      Comments: Oriented x2  [place]   Psychiatric:         Mood and Affect: Mood pleasant and anxious     Assessment/Plan  Problem List Items Addressed This Visit       Depression    Urinary retention    Barker catheter in place     Other Visit Diagnoses         Severe anxiety    -  Primary      Hypertensive heart disease with chronic combined systolic and diastolic congestive heart failure          Vascular dementia with anxiety, unspecified " dementia severity          Hospice care patient                PLAN:  Pt has been seen for an follow up visit - see HPI.  The patient resides in a Long term care facility.  Recent nursing evaluation and notes were reviewed.     Hospice care:  Hospice is for patients with serious or terminal illnesses who are no longer being treated to cure the disease.   The goal of hospice care is to keep the patient comfortable.   Decisions about medications are made by the Facility Physician, APRN, and Hospice team collaboratively with the patient and family.  Medications for chronic illnesses are not normally continued long term as medications could cause harm or are no longer benefit the patient to maintain comfort.   Patients and family can request for certain medications to be continued if the physician, APRN, and Hospice team is in agreement to continue these medications as the goal of Hospice care is to provide comfort.   Medications that are continued or initiated are medications for pain, antiemetics, antidepressants, antianxiety, oxygen and/or medications for respiratory symptoms.   Labs and diagnostic testing are not routinely done while the patient is on Hospice care but can be ordered per the discretion of the physician, APRN, and Hospice team if absolutely needed.      Discomfort:  Morphine 5 mg Q2hr Prn  Levsin Q4hr PRN    Anxiety/Depression, Vascular dementia:  Mind care following  Hydroxyzine 25 mg BID routine  Zoloft 75 mg QHS  Trazodone 50 mg QHS  Increased Ativan 0.5 mg Q4 hr PRN    Skin integrity:  Nursing to monitor skin integrity as patient is at risk for pressure injuries.  Turn and reposition Q 2 hours or more.  Air mattress and when up in chair cushion reducing device.  Dietician to evaluate and recommend.  Nutritional supplement to be implemented if needed.  Please monitor skin integrity and other pressure areas.    Any decline or change in condition needs to be communicated with the physician or myself.     Discussion with nursing staff regarding ongoing care and management.  Communication regarding patients status, overall condition, changes with plan (medications or treatments), and any questions from family completed if present.  If family not available, would communicate in person or via phone if needed.  We will continue with the medications noted above.    We will continue to follow the patient here at the facility.    Discharge planning:   No plan for discharge, LTC resident, rehab potential    *Please note that nursing facility notes, facility EMR, outside laboratory agency, and  EMR do not interface.     Completion of the note was done through Dragon voice recognition technology and may include unintended or grammatical errors which may not have been recognized when finalizing the note.     Time:    GLORIA Valverde        Electronically Signed By: GLORIA Valverde   5/24/25  9:18 PM       [1]  Allergies  Allergen Reactions   • Penicillins Unknown   • Sulfa (Sulfonamide Antibiotics) Unknown and Rash

## 2025-05-21 ENCOUNTER — APPOINTMENT (OUTPATIENT)
Dept: CARDIOLOGY | Facility: CLINIC | Age: OVER 89
End: 2025-05-21
Payer: COMMERCIAL

## 2025-05-21 ENCOUNTER — APPOINTMENT (OUTPATIENT)
Dept: CARDIOLOGY | Facility: HOSPITAL | Age: OVER 89
End: 2025-05-21
Payer: COMMERCIAL

## 2025-05-24 VITALS — DIASTOLIC BLOOD PRESSURE: 68 MMHG | SYSTOLIC BLOOD PRESSURE: 124 MMHG

## 2025-05-24 VITALS — SYSTOLIC BLOOD PRESSURE: 122 MMHG | HEART RATE: 60 BPM | DIASTOLIC BLOOD PRESSURE: 63 MMHG

## 2025-05-25 NOTE — PROGRESS NOTES
"Name: Jacqueline Sandoval  YOB: 1935    FOLLOW UP VISIT: Long term care resident, Hospice [facility]    SUBJECTIVE:  Jacqueline Sandoval is a 89 y.o. female who is now on Hospice care. Routine Labs can be d'cd. Comfort meds per Hospice. Routine meds to be discontinued.   Patient is resting quietly in bed. She smiles when awakened. Pt, appears comfortable stating \"I just want to go to heaven\".    No acute distress noted.   Nursing team reports no changes or concerns.    REVIEW OF SYSTEMS:   All review of systems are negative unless otherwise stated above under subjective.    RX Allergies[1]    MEDICATIONS REVIEWED AT FACILITY:  Please see nursing facility medication list.    [facility]    OBJECTIVE:  /68   Physical Exam  Constitutional:       Appearance: She is normal weight.   HENT:      Head: Normocephalic. [place]  Cardiovascular:      Rate and Rhythm: Normal rate and regular rhythm.   Pulmonary:      Effort: Pulmonary effort is normal.      Breath sounds: Normal breath sounds.   Abdominal:      General: Abdomen is flat. Bowel sounds are normal.      Palpations: Abdomen is soft.   Genitourinary:     Comments: Barker to CD - clear samuel urine  Musculoskeletal:         General: Tenderness present.   Skin:     General: Skin is warm and dry. Left chest PPM site healed well  Neurological:      General: No focal deficit present.      Mental Status: She is alert.      Comments: Oriented x2  [place]   Psychiatric:         Mood and Affect: Mood pleasant and anxious     Assessment/Plan   Problem List Items Addressed This Visit       Depression    Urinary retention    Barker catheter in place     Other Visit Diagnoses         Severe anxiety    -  Primary      Hypertensive heart disease with chronic combined systolic and diastolic congestive heart failure          Vascular dementia with anxiety, unspecified dementia severity          Hospice care patient                PLAN:  Pt " has been seen for an follow up visit - see HPI.  The patient resides in a Long term care facility.  Recent nursing evaluation and notes were reviewed.     Hospice care:  Hospice is for patients with serious or terminal illnesses who are no longer being treated to cure the disease.   The goal of hospice care is to keep the patient comfortable.   Decisions about medications are made by the Facility Physician, APRN, and Hospice team collaboratively with the patient and family.  Medications for chronic illnesses are not normally continued long term as medications could cause harm or are no longer benefit the patient to maintain comfort.   Patients and family can request for certain medications to be continued if the physician, APRN, and Hospice team is in agreement to continue these medications as the goal of Hospice care is to provide comfort.   Medications that are continued or initiated are medications for pain, antiemetics, antidepressants, antianxiety, oxygen and/or medications for respiratory symptoms.   Labs and diagnostic testing are not routinely done while the patient is on Hospice care but can be ordered per the discretion of the physician, APRN, and Hospice team if absolutely needed.      Discomfort:  Morphine 5 mg Q2hr Prn  Levsin Q4hr PRN    Anxiety/Depression, Vascular dementia:  Mind care following  Hydroxyzine 25 mg BID routine  Zoloft 75 mg QHS  Trazodone 50 mg QHS  Increased Ativan 0.5 mg Q4 hr PRN    Skin integrity:  Nursing to monitor skin integrity as patient is at risk for pressure injuries.  Turn and reposition Q 2 hours or more.  Air mattress and when up in chair cushion reducing device.  Dietician to evaluate and recommend.  Nutritional supplement to be implemented if needed.  Please monitor skin integrity and other pressure areas.    Any decline or change in condition needs to be communicated with the physician or myself.    Discussion with nursing staff regarding ongoing care and  management.  Communication regarding patients status, overall condition, changes with plan (medications or treatments), and any questions from family completed if present.  If family not available, would communicate in person or via phone if needed.  We will continue with the medications noted above.    We will continue to follow the patient here at the facility.    Discharge planning:   No plan for discharge, LTC resident, rehab potential    *Please note that nursing facility notes, facility EMR, outside laboratory agency, and  EMR do not interface.     Completion of the note was done through Dragon voice recognition technology and may include unintended or grammatical errors which may not have been recognized when finalizing the note.     Time:    Rosetta Christy, APRN-CNP         [1]   Allergies  Allergen Reactions    Penicillins Unknown    Sulfa (Sulfonamide Antibiotics) Unknown and Rash

## 2025-05-25 NOTE — PROGRESS NOTES
Name: Jacqueline Sandoval  YOB: 1935    MONTHLY VISIT: Long term care resident    SUBJECTIVE:  Jacqueline Sandoval is a 89 y.o. female who lives here at [facility] as LTC resident. Patient is being evaluated for multiple medical problems.   Nursing reports that the son is asking for a Hospice consult.   The patient transitioned here to LTC after a skilled visit and hospitalization in Oct of 2022.   The patient recently had a Dual chamber permanent pacemaker placed 2/17/25. PMH of recurrent UTI - on prophylactic ATB, Left femur fx and repair 11/2023, urinary retention related to neurogenic bladder requiring a Barker catheter (previously followed by Urology - Dr. Padilla) - recently Barker catheter set up changed 4/13, left humerus fx and repair 9/2022, HTN, CKD, hypertensive heart disease w/heart failure, fibromyalgia, hyponatremia, Dementia, anxiety/depression (followed by Mind care), and hypothyroidism. Patient is [place],   Patient has been having increased anxiety. Mind care seen today. Zoloft increased to 75 mg daily. Vistaril 25 mg BID routine and Ativan 0.5 mg Q12 hr PRN. Son asked for H[facility] consult today[facility]   The patient is resting comfortably in bed and without signs of distress. Patient is [place] and this makes communication difficult.     REVIEW OF SYSTEMS:  All review of systems are negative unless otherwise stated above under subjective.    LABS REVIEWED AT FACILITY:  Date: 4/2/25  Test Value Flag Reference Range   WBC 7.71  3.70 - 11.00   HGB 10.5 L 11.5 - 15.5   HCT 30.3 L 36.0 - 46.0   Platelets 220  150 - 400   BUN 24 H 7 - 21   Creat 1.41 H 0.58 - 0.96   GFR 36 L >60     Other Abnormal Values  Test Value Flag Reference range   RBC 3.29 L 3.9 - 5.2   ALBUMIN 3.6 L 3.9 - 4.9   Na 129 L 136 - 144   Cl 97 L 98 - 107     TSH  REPORTED 03/25/2025 11:33  TSH L 0.052 mIU/L 0.270-4.200 FH         Free T4  REPORTED 03/25/2025 21:07  Free T4 H 2.5 ng/dL  0.9-1.7 PLDEF    RX Allergies[1]    MEDICATIONS REVIEWED AT FACILITY:  Please see nursing facility medication list.    Living will related issues reviewed-Code status: DNRCC    OBJECTIVE:  /63   Pulse 60   Physical Exam  Constitutional:       Appearance: She is normal weight.   HENT:      Head: Normocephalic. [place]  Cardiovascular:      Rate and Rhythm: Normal rate and regular rhythm.   Pulmonary:      Effort: Pulmonary effort is normal.      Breath sounds: Normal breath sounds.   Abdominal:      General: Abdomen is flat. Bowel sounds are normal.      Palpations: Abdomen is soft.   Genitourinary:     Comments: Barker to CD - clear pale yellow urine  Musculoskeletal:         General: Tenderness present.   Skin:     General: Skin is warm and dry. Left chest PPM site healed well  Neurological:      General: No focal deficit present.      Mental Status: She is alert.      Comments: Oriented x2  [place]   Psychiatric:         Mood and Affect: Mood anxious    Assessment/Plan   Problem List Items Addressed This Visit       Depression    Fibromyalgia    HTN (hypertension), benign    Hypothyroidism    Urinary retention    History of recurrent UTIs    Barker catheter in place     Other Visit Diagnoses         Hypertensive heart disease with chronic combined systolic and diastolic congestive heart failure    -  Primary      S/P placement of cardiac pacemaker          Vascular dementia with anxiety, unspecified dementia severity          Stage 3a chronic kidney disease (Multi)          Severe anxiety                PLAN:  Pt has been seen for monthly visit.    The patient is confined to a long term care facility.   Recent nursing evaluation and notes were reviewed.     Consult for Hospice care:  Hospice is for patients with serious or terminal illnesses who are no longer being treated to cure the disease.   The goal of hospice care is to keep the patient comfortable.   Decisions about medications are made by the Facility  Physician, APRN, and Hospice team collaboratively with the patient and family.  Medications for chronic illnesses are not normally continued long term as medications could cause harm or are no longer benefit the patient to maintain comfort.   Patients and family can request for certain medications to be continued if the physician, APRN, and Hospice team is in agreement to continue these medications as the goal of Hospice care is to provide comfort.   Medications that are continued or initiated are medications for pain, antiemetics, antidepressants, antianxiety, oxygen and/or medications for respiratory symptoms.   Labs and diagnostic testing are not routinely done while the patient is on Hospice care but can be ordered per the discretion of the physician, APRN, and Hospice team if absolutely needed.     Increased anxiety in evening/HS, Depression/Anxiety, Vascular dementia:  All medications were discontinued in the hospital in Jan. 2025 thought to be over medicated w/sedatives.  Neurology felt Vascular dementia underlying and triggered previous episode of acute encephalopathy as well as underlying infection.   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 Alex [phone]) - this will not be done as patient will be signing w/Hospice.  Redirect as needed and maintain safe environment  Will order Hydroxyzine 25 mg BID routine  Mind Care seen today  Increased today to Zoloft 75 mg QHS  Trazodone 50 mg QHS  Ativan 0.5 mg Q12 hr PRN     Insomnia:  Melatonin nightly, will increase to 6 mg QHS     Weight loss, decreased appetite, h/o vascular dementia:  Patient has had weight loss.   Weight as of this date:   4/8/25: 120.1#  3/11/25: 120.4#  12/3/2024: 142.2#  ______________________________________________________________________________________________________  Dementia is a progressive disease and the cognitive ability of the patient will decline over time.    The sense of smell and taste can change leading to food aversions and decreased appetite.   Decreased motor skills can cause difficulty with using utensils.  Decreased appetite and weight loss is common in patients suffering from Dementia and this is a continued challenge for caregivers and health care providers as well as alarming for family members.  Fingers foods and cutting foods into smaller pieces can allow for the patient to feed themselves.   Hand feeding by caregivers is the best option for patients suffering from dementia.   High caloric puddings, high caloric shakes, or house supplements can be added for patients with weight loss.   Favorite food items can be brought in by the patients families.  Patients in the later stages of Dementia will often experience swallowing difficulties which can arise as the disease progresses leading to decreased food intake, malnutrition, and dehydration.    Families need ongoing education regarding the decline of patients suffering from Dementia and understand the progressive nature of the disease that currently has no cure.    Realistic expectation needs to be in place regarding quality of life and comfort for the patient.        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 The left ventricular systolic function is normal, with a visually estimated ejection fraction of 60%.   2. Spectral Doppler shows a Grade I (impaired relaxation pattern) of left ventricular diastolic filling with normal left atrial filling pressure.   3. There is low normal right ventricular systolic function.   4. There is no evidence of mitral valve stenosis.   5. No evidence of mitral valve regurgitation.   6. No evidence of tricuspid regurgitation.   7. Aortic valve stenosis is not  present.   8. The main pulmonary artery is normal in size, and position, with normal bifurcation into the left and right pulmonary arteries.     Wound Care :  Wound care per nursing  Left chest PM site  Referral to Wound clinic or Wound Nurse Practitioner if appropriate.    HTN/HF, CKD:   Monitor and follow BP readings.   Continue home meds -amlodipine 5 mg daily and lisinopril 40 mg daily  Labs to be done intermittently and adjust meds as needed  BP readings reviewed -120/60s  Avoid nephrotoxic drugs.     Urinary retention, recurrent UTI's, Chronic Barker:  Barker to CD (changed 4/13/25)  Previously followed by Dr. Padilla Urology and family requested no outside f/up appts if could be handled here at the facility. (previously on Flomax and Prophylactic ATB Macrobid 50 mg daily - d'cd this past admission)     Hypothyroid:            Lab Results   Component Value Date     TSH 15.66 (H) 01/03/2025   Levothyroxine 88 mcg daily    Fibromyalgia:  Gabapentin 200 mg nightly    Skin integrity:  Nursing to monitor skin integrity as patient is at risk for pressure injuries.  Turn and reposition Q 2 hours or more.  Air mattress and when up in chair cushion reducing device.  Dietician to evaluate and recommend.  Nutritional supplement to be implemented if needed.  Please monitor skin integrity and other pressure areas.    Any decline or change in condition needs to be communicated with the physician or myself.    Discussion with nursing staff regarding ongoing care and management.  Communication regarding patients status, overall condition, changes with plan (medications or treatments), and any questions from family completed if present.  If family not available, would communicate in person or via phone if needed.  We will continue with the plan and medications noted above.    We will continue to follow the patient here at the facility.    Discharge planning:   No plan for discharge, LTC resident, rehab potential    *Please note  that nursing facility notes, facility EMR, outside laboratory agency, and  EMR do not interface.     Completion of the note was done through Dragon voice recognition technology and may include unintended or grammatical errors which may not have been recognized when finalizing the note.     Time:    Rosetta Christy, APRN-CNP         [1]   Allergies  Allergen Reactions    Penicillins Unknown    Sulfa (Sulfonamide Antibiotics) Unknown and Rash

## 2025-07-28 ENCOUNTER — NURSING HOME VISIT (OUTPATIENT)
Dept: POST ACUTE CARE | Facility: EXTERNAL LOCATION | Age: OVER 89
End: 2025-07-28
Payer: COMMERCIAL

## 2025-07-28 DIAGNOSIS — Z51.5 HOSPICE CARE PATIENT: ICD-10-CM

## 2025-07-28 DIAGNOSIS — F33.1 MODERATE EPISODE OF RECURRENT MAJOR DEPRESSIVE DISORDER: ICD-10-CM

## 2025-07-28 DIAGNOSIS — R00.1 BRADYCARDIA: ICD-10-CM

## 2025-07-28 DIAGNOSIS — F01.54 VASCULAR DEMENTIA WITH ANXIETY, UNSPECIFIED DEMENTIA SEVERITY: ICD-10-CM

## 2025-07-28 DIAGNOSIS — E03.9 HYPOTHYROIDISM, UNSPECIFIED TYPE: ICD-10-CM

## 2025-07-28 DIAGNOSIS — I10 HTN (HYPERTENSION), BENIGN: Primary | ICD-10-CM

## 2025-07-28 DIAGNOSIS — Z97.8 FOLEY CATHETER IN PLACE: ICD-10-CM

## 2025-07-28 DIAGNOSIS — F41.9 SEVERE ANXIETY: ICD-10-CM

## 2025-07-28 DIAGNOSIS — F32.A DEPRESSION, UNSPECIFIED DEPRESSION TYPE: ICD-10-CM

## 2025-07-28 DIAGNOSIS — I11.0 HYPERTENSIVE HEART DISEASE WITH CHRONIC COMBINED SYSTOLIC AND DIASTOLIC CONGESTIVE HEART FAILURE: ICD-10-CM

## 2025-07-28 DIAGNOSIS — F41.9 ANXIETY: ICD-10-CM

## 2025-07-28 DIAGNOSIS — H91.90 HOH (HARD OF HEARING): ICD-10-CM

## 2025-07-28 DIAGNOSIS — I50.42 HYPERTENSIVE HEART DISEASE WITH CHRONIC COMBINED SYSTOLIC AND DIASTOLIC CONGESTIVE HEART FAILURE: ICD-10-CM

## 2025-07-28 DIAGNOSIS — R33.9 URINARY RETENTION: ICD-10-CM

## 2025-07-28 DIAGNOSIS — Z95.0 S/P PLACEMENT OF CARDIAC PACEMAKER: ICD-10-CM

## 2025-07-28 NOTE — LETTER
"Patient: Jacqueline Sandoval  : 1935    Encounter Date: 2025    Name: Jacqueline Sandoval  YOB: 1935    MONTHLY VISIT: Long term care resident, Hospice per [facility]    SUBJECTIVE:  Jacqueline Sandoval is a 89 y.o. female who is on Hospice care. Routine Labs were to be d'cd, however, a routine TSH was drawn on  showing a reading of 153.90. 88 mcg of Levothyroxine started. I asked nursing to cancel all future labs in the system.   Comfort meds per Hospice.   Patient is resting quietly in bed. She smiles when awakened. Pt, appears comfortable stating \"I just want to go to heaven\".    No acute distress noted.   Nursing team reports no changes or concerns.    REVIEW OF SYSTEMS:  All review of systems are negative unless otherwise stated above under subjective.    RX Allergies[1]    MEDICATIONS:  Medication reconciliation is an ongoing process here at the nursing facility. Medications are reviewed regularly, and adjustments are made as clinically appropriate to ensure optimal care and safety.    Please Note:  The medication list in the hospital’s EMR may not always correspond to or accurately reflect the current regimen documented in the facility’s records. The nursing facility’s medication list should be considered the most accurate and up-to-date source for the patient’s active medications while here in the facility.     Medication list:  Please refer to Facility medication list.    OBJECTIVE:  /82   Physical Exam  Constitutional:       Appearance: She is normal weight.   HENT:      Head: Normocephalic. [place]  Cardiovascular:      Rate and Rhythm: Normal rate and regular rhythm.   Pulmonary:      Effort: Pulmonary effort is normal.      Breath sounds: Normal breath sounds.   Abdominal:      General: Abdomen is flat. Bowel sounds are normal.      Palpations: Abdomen is soft.   Genitourinary:     Comments: Barker to CD - clear samuel urine  Musculoskeletal:         General: Tenderness present. "   Skin:     General: Skin is warm and dry.   Neurological:      General: No focal deficit present.      Mental Status: She is alert.      Comments: Oriented x2  [place]   Psychiatric:         Mood and Affect: Mood pleasant and anxious    Assessment/Plan  Problem List Items Addressed This Visit       Anxiety    Depression    HTN (hypertension), benign - Primary    [place] (hard of hearing)    Hypothyroidism    Urinary retention    Barker catheter in place    Bradycardia     Other Visit Diagnoses         Hypertensive heart disease with chronic combined systolic and diastolic congestive heart failure          Vascular dementia with anxiety, unspecified dementia severity          Severe anxiety          Hospice care patient          S/P placement of cardiac pacemaker                PLAN:  Pt has been seen for monthly visit.    The patient is confined to a long term care facility.   Recent nursing evaluation and notes were reviewed.     Hospice care:  Hospice is for patients with serious or terminal illnesses who are no longer being treated to cure the disease.   The goal of hospice care is to keep the patient comfortable.   Decisions about medications are made by the Facility Physician, APRN, and Hospice team collaboratively with the patient and family.  Medications for chronic illnesses are not normally continued long term as medications could cause harm or are no longer benefit the patient to maintain comfort.   Patients and family can request for certain medications to be continued if the physician, APRN, and Hospice team is in agreement to continue these medications as the goal of Hospice care is to provide comfort.   Medications that are continued or initiated are medications for pain, antiemetics, antidepressants, antianxiety, oxygen and/or medications for respiratory symptoms.   Labs and diagnostic testing are not routinely done while the patient is on Hospice care but can be ordered per the discretion of the physician,  APRN, and Hospice team if absolutely needed.      Discomfort:  Morphine 5 mg Q2hr Prn  Levsin Q4hr PRN     Anxiety/Depression, Vascular dementia:  Mind care following  Hydroxyzine 25 mg BID routine  Zoloft 75 mg QHS  Abilify 2 mg daily  Trazodone 50 mg QHS  Increased Ativan 0.5 mg Q4 hr PRN     Cardiac Pacemaker in SITU    Skin integrity:  Nursing to monitor skin integrity as patient is at risk for pressure injuries.  Turn and reposition Q 2 hours or more.  Air mattress and when up in chair cushion reducing device.  Dietician to evaluate and recommend.  Nutritional supplement to be implemented if needed.  Please monitor skin integrity and other pressure areas.    Living will related issues reviewed-Code status: DNRCC    Any decline or change in condition needs to be communicated with the physician or myself.    Discussion with nursing staff regarding ongoing care and management.  Communication regarding patients status, overall condition, changes with plan (medications or treatments), and any questions from family completed if present.  If family not available, would communicate in person or via phone if needed.  We will continue with the plan and medications noted above.    We will continue to follow the patient here at the facility.    Discharge planning:   No plan for discharge, LTC resident, rehab potential    *Please note that nursing facility notes, facility EMR, outside laboratory agency, and  EMR do not interface.     Completion of the note was done through Dragon voice recognition technology and may include unintended or grammatical errors which may not have been recognized when finalizing the note.     Time:    GLORIA Valverde        Electronically Signed By: GLORIA Valverde   8/6/25  4:10 PM       [1]  Allergies  Allergen Reactions   • Penicillins Unknown   • Sulfa (Sulfonamide Antibiotics) Unknown and Rash

## 2025-08-06 VITALS — DIASTOLIC BLOOD PRESSURE: 82 MMHG | SYSTOLIC BLOOD PRESSURE: 160 MMHG

## 2025-08-06 NOTE — PROGRESS NOTES
"Name: Jacqueline Sandoval  YOB: 1935    MONTHLY VISIT: Long term care resident, Hospice per [facility]    SUBJECTIVE:  Jacqueline Sandoval is a 89 y.o. female who is on Hospice care. Routine Labs were to be d'cd, however, a routine TSH was drawn on 6/18 showing a reading of 153.90. 88 mcg of Levothyroxine started. I asked nursing to cancel all future labs in the system.   Comfort meds per Hospice.   Patient is resting quietly in bed. She smiles when awakened. Pt, appears comfortable stating \"I just want to go to heaven\".    No acute distress noted.   Nursing team reports no changes or concerns.    REVIEW OF SYSTEMS:  All review of systems are negative unless otherwise stated above under subjective.    RX Allergies[1]    MEDICATIONS:  Medication reconciliation is an ongoing process here at the nursing facility. Medications are reviewed regularly, and adjustments are made as clinically appropriate to ensure optimal care and safety.    Please Note:  The medication list in the hospital’s EMR may not always correspond to or accurately reflect the current regimen documented in the facility’s records. The nursing facility’s medication list should be considered the most accurate and up-to-date source for the patient’s active medications while here in the facility.     Medication list:  Please refer to Facility medication list.    OBJECTIVE:  /82   Physical Exam  Constitutional:       Appearance: She is normal weight.   HENT:      Head: Normocephalic. [place]  Cardiovascular:      Rate and Rhythm: Normal rate and regular rhythm.   Pulmonary:      Effort: Pulmonary effort is normal.      Breath sounds: Normal breath sounds.   Abdominal:      General: Abdomen is flat. Bowel sounds are normal.      Palpations: Abdomen is soft.   Genitourinary:     Comments: Barker to CD - clear samuel urine  Musculoskeletal:         General: Tenderness present.   Skin:     General: Skin is warm and dry.   Neurological:      General: No " focal deficit present.      Mental Status: She is alert.      Comments: Oriented x2  [place]   Psychiatric:         Mood and Affect: Mood pleasant and anxious    Assessment/Plan   Problem List Items Addressed This Visit       Anxiety    Depression    HTN (hypertension), benign - Primary    [place] (hard of hearing)    Hypothyroidism    Urinary retention    Barker catheter in place    Bradycardia     Other Visit Diagnoses         Hypertensive heart disease with chronic combined systolic and diastolic congestive heart failure          Vascular dementia with anxiety, unspecified dementia severity          Severe anxiety          Hospice care patient          S/P placement of cardiac pacemaker                PLAN:  Pt has been seen for monthly visit.    The patient is confined to a long term care facility.   Recent nursing evaluation and notes were reviewed.     Hospice care:  Hospice is for patients with serious or terminal illnesses who are no longer being treated to cure the disease.   The goal of hospice care is to keep the patient comfortable.   Decisions about medications are made by the Facility Physician, APRN, and Hospice team collaboratively with the patient and family.  Medications for chronic illnesses are not normally continued long term as medications could cause harm or are no longer benefit the patient to maintain comfort.   Patients and family can request for certain medications to be continued if the physician, APRN, and Hospice team is in agreement to continue these medications as the goal of Hospice care is to provide comfort.   Medications that are continued or initiated are medications for pain, antiemetics, antidepressants, antianxiety, oxygen and/or medications for respiratory symptoms.   Labs and diagnostic testing are not routinely done while the patient is on Hospice care but can be ordered per the discretion of the physician, APRN, and Hospice team if absolutely needed.      Discomfort:  Morphine 5 mg  Q2hr Prn  Levsin Q4hr PRN     Anxiety/Depression, Vascular dementia:  Mind care following  Hydroxyzine 25 mg BID routine  Zoloft 75 mg QHS  Abilify 2 mg daily  Trazodone 50 mg QHS  Increased Ativan 0.5 mg Q4 hr PRN     Cardiac Pacemaker in SITU    Skin integrity:  Nursing to monitor skin integrity as patient is at risk for pressure injuries.  Turn and reposition Q 2 hours or more.  Air mattress and when up in chair cushion reducing device.  Dietician to evaluate and recommend.  Nutritional supplement to be implemented if needed.  Please monitor skin integrity and other pressure areas.    Living will related issues reviewed-Code status: DNRCC    Any decline or change in condition needs to be communicated with the physician or myself.    Discussion with nursing staff regarding ongoing care and management.  Communication regarding patients status, overall condition, changes with plan (medications or treatments), and any questions from family completed if present.  If family not available, would communicate in person or via phone if needed.  We will continue with the plan and medications noted above.    We will continue to follow the patient here at the facility.    Discharge planning:   No plan for discharge, LTC resident, rehab potential    *Please note that nursing facility notes, facility EMR, outside laboratory agency, and  EMR do not interface.     Completion of the note was done through Dragon voice recognition technology and may include unintended or grammatical errors which may not have been recognized when finalizing the note.     Time:    Rosetta Christy APRN-CNP         [1]   Allergies  Allergen Reactions    Penicillins Unknown    Sulfa (Sulfonamide Antibiotics) Unknown and Rash

## 2025-08-28 ENCOUNTER — NURSING HOME VISIT (OUTPATIENT)
Dept: POST ACUTE CARE | Facility: EXTERNAL LOCATION | Age: OVER 89
End: 2025-08-28
Payer: COMMERCIAL

## 2025-08-28 DIAGNOSIS — H91.90 HOH (HARD OF HEARING): ICD-10-CM

## 2025-08-28 DIAGNOSIS — I10 HTN (HYPERTENSION), BENIGN: ICD-10-CM

## 2025-08-28 DIAGNOSIS — Z51.5 HOSPICE CARE PATIENT: ICD-10-CM

## 2025-08-28 DIAGNOSIS — F01.54 VASCULAR DEMENTIA WITH ANXIETY, UNSPECIFIED DEMENTIA SEVERITY: ICD-10-CM

## 2025-08-28 DIAGNOSIS — Z97.8 FOLEY CATHETER IN PLACE: ICD-10-CM

## 2025-08-28 DIAGNOSIS — F41.9 SEVERE ANXIETY: ICD-10-CM

## 2025-08-28 DIAGNOSIS — I50.42 HYPERTENSIVE HEART DISEASE WITH CHRONIC COMBINED SYSTOLIC AND DIASTOLIC CONGESTIVE HEART FAILURE: ICD-10-CM

## 2025-08-28 DIAGNOSIS — F32.A DEPRESSION, UNSPECIFIED DEPRESSION TYPE: Primary | ICD-10-CM

## 2025-08-28 DIAGNOSIS — E03.9 HYPOTHYROIDISM, UNSPECIFIED TYPE: ICD-10-CM

## 2025-08-28 DIAGNOSIS — R33.9 URINARY RETENTION: ICD-10-CM

## 2025-08-28 DIAGNOSIS — I11.0 HYPERTENSIVE HEART DISEASE WITH CHRONIC COMBINED SYSTOLIC AND DIASTOLIC CONGESTIVE HEART FAILURE: ICD-10-CM

## 2025-08-28 PROCEDURE — 99309 SBSQ NF CARE MODERATE MDM 30: CPT | Performed by: NURSE PRACTITIONER

## 2025-09-03 VITALS — HEART RATE: 70 BPM | DIASTOLIC BLOOD PRESSURE: 76 MMHG | SYSTOLIC BLOOD PRESSURE: 140 MMHG

## (undated) DEVICE — DRESSING, MEPILEX BORDER, POST-OP AG, 4 X 12 IN

## (undated) DEVICE — STRAP, VELCRO, BODY, 4 X 60IN, NS

## (undated) DEVICE — TRAY, MINOR, SINGLE BASIN, STERILE

## (undated) DEVICE — GLOVE, SURGICAL, PROTEXIS PI , 8.5, PF, LF

## (undated) DEVICE — GLOVE, SURGICAL, PROTEXIS PI , 7.5, PF, LF

## (undated) DEVICE — MAT, FLOOR, STANDARD FLUID BARRIER, 32X44, GREEN

## (undated) DEVICE — DRILL BIT, JSA, 2.9MM, SHORT, STERILE

## (undated) DEVICE — INTRODUCER SYSTEM, PRELUDE SNAP, SPLITTABLE, 9 FR X 13 CM

## (undated) DEVICE — PROTECTOR, NERVE, ULNAR, PINK

## (undated) DEVICE — HOOD, SURGICAL, FLYTE, T7 PLUS, PEEL AWAY SHIELD

## (undated) DEVICE — DRAPE, SHEET, U, STERI DRAPE, 47 X 51 IN, DISPOSABLE, STERILE

## (undated) DEVICE — MANIFOLD, 4 PORT NEPTUNE STANDARD

## (undated) DEVICE — SHIELD, SCOOP FENESTRATION, SCATPAD, ABSORBENT, DUAL, LEFT SUB/ C

## (undated) DEVICE — TUBING, IRRIGATION, HIGH FLOW, HAND PIECE SET

## (undated) DEVICE — HEMOSTAT, D-STAT FLOWABLE

## (undated) DEVICE — TRAY, SURESTEP, SILICONE DRAINAGE BAG, STATLOCK, 16FR

## (undated) DEVICE — CEMENT MIX KIT, REVOLUTION, W BREAKAWAY-HIP

## (undated) DEVICE — BLADE, SAW, SAGITTAL, 21 X 84.5 X 0.89 MM, STAINLESS STEEL, STERILE

## (undated) DEVICE — INTRODUCER SYSTEM, PRELUDE SNAP, SPLITTABLE, HEMOSTATIC, 7FR

## (undated) DEVICE — BLADE, GEN COATED 2.75, LF

## (undated) DEVICE — STRAP, ARM BOARD, 32 X 1.5

## (undated) DEVICE — NEEDLE, SAFETY, 18 G X 1.5 IN

## (undated) DEVICE — DRAPE, SHEET, HD TOP BAR , 27.75 X 16 IN

## (undated) DEVICE — BATH BLANKET STERILE

## (undated) DEVICE — SYRINGE, 30 CC, LUER LOCK

## (undated) DEVICE — DRAPE, INCISE, ANTIMICROBIAL, IOBAN 2, STERI DRAPE, 23 X 33 IN, DISPOSABLE, STERILE

## (undated) DEVICE — SOLUTION, SODIUM CHLORIDE 0.9%, 3000ML, BAG

## (undated) DEVICE — Device

## (undated) DEVICE — POSITIONER, CRADLE, HEAD, MEDC, FOAM SLOTTED

## (undated) DEVICE — SUTURE, MONOCRYL, 4-0, 27 IN, PS-2, UNDYED

## (undated) DEVICE — GLOVE, SURGICAL, PROTEXIS PI BLUE W/NEUTHERA, 7.5, PF, LF

## (undated) DEVICE — APPLICATOR, CHLORAPREP, W/ORANGE TINT, 26ML

## (undated) DEVICE — TOWELS 4-PK

## (undated) DEVICE — COVER, MAYO STAND, W/PAD, 23 IN, DISPOSABLE, PLASTIC, LF, STERILE

## (undated) DEVICE — HEMOSTAT, ARISTA, ABSORBABLE, 3 GRAMS

## (undated) DEVICE — IRRIGATION SYSTEM, BRUSH, W/SUCTION, FEMORAL CANAL

## (undated) DEVICE — DRESSING, AQUACEL AG, HYDROFIBER W/SILVER, 3.5 X 6 IN

## (undated) DEVICE — SOLUTION, IRRIGATION, USP, SODIUM CHLORIDE 0.9%, 3000 ML